# Patient Record
Sex: MALE | Race: WHITE | Employment: OTHER | URBAN - NONMETROPOLITAN AREA
[De-identification: names, ages, dates, MRNs, and addresses within clinical notes are randomized per-mention and may not be internally consistent; named-entity substitution may affect disease eponyms.]

---

## 2017-02-10 PROBLEM — L97.919 NONHEALING ULCER OF RIGHT LOWER LEG (HCC): Status: ACTIVE | Noted: 2017-02-10

## 2017-02-10 PROBLEM — I73.9 PVD (PERIPHERAL VASCULAR DISEASE) (HCC): Status: ACTIVE | Noted: 2017-02-10

## 2017-07-17 ENCOUNTER — HOSPITAL ENCOUNTER (OUTPATIENT)
Dept: WOUND CARE | Age: 67
Discharge: HOME | End: 2017-07-17
Payer: MEDICARE

## 2017-07-17 VITALS
SYSTOLIC BLOOD PRESSURE: 122 MMHG | RESPIRATION RATE: 16 BRPM | OXYGEN SATURATION: 96 % | TEMPERATURE: 98.3 F | DIASTOLIC BLOOD PRESSURE: 60 MMHG | HEART RATE: 65 BPM

## 2017-07-17 DIAGNOSIS — L97.919 VENOUS ULCER OF RIGHT LOWER EXTREMITY WITH VARICOSE VEINS (HCC): ICD-10-CM

## 2017-07-17 DIAGNOSIS — I87.2 VENOUS INSUFFICIENCY OF RIGHT LEG: ICD-10-CM

## 2017-07-17 DIAGNOSIS — I83.019 VENOUS ULCER OF RIGHT LOWER EXTREMITY WITH VARICOSE VEINS (HCC): ICD-10-CM

## 2017-07-17 DIAGNOSIS — I73.9 PVD (PERIPHERAL VASCULAR DISEASE) (HCC): ICD-10-CM

## 2017-07-17 PROCEDURE — 29580 STRAPPING UNNA BOOT: CPT

## 2017-07-17 PROCEDURE — A6456 ZINC PASTE BAND W >=3"<5"/YD: HCPCS

## 2017-07-17 ASSESSMENT — PAIN SCALES - GENERAL: PAINLEVEL_OUTOF10: 0

## 2017-07-20 ENCOUNTER — HOSPITAL ENCOUNTER (OUTPATIENT)
Dept: WOUND CARE | Age: 67
Discharge: HOME | End: 2017-07-20
Payer: MEDICARE

## 2017-07-20 VITALS
TEMPERATURE: 97.9 F | DIASTOLIC BLOOD PRESSURE: 71 MMHG | OXYGEN SATURATION: 98 % | HEART RATE: 65 BPM | SYSTOLIC BLOOD PRESSURE: 155 MMHG | RESPIRATION RATE: 18 BRPM

## 2017-07-20 DIAGNOSIS — I83.019 VENOUS ULCER OF RIGHT LOWER EXTREMITY WITH VARICOSE VEINS (HCC): ICD-10-CM

## 2017-07-20 DIAGNOSIS — L97.919 VENOUS ULCER OF RIGHT LOWER EXTREMITY WITH VARICOSE VEINS (HCC): ICD-10-CM

## 2017-07-20 DIAGNOSIS — I73.9 PVD (PERIPHERAL VASCULAR DISEASE) (HCC): ICD-10-CM

## 2017-07-20 DIAGNOSIS — L97.919: Primary | ICD-10-CM

## 2017-07-20 DIAGNOSIS — I87.2 VENOUS INSUFFICIENCY OF RIGHT LEG: ICD-10-CM

## 2017-07-20 PROCEDURE — 6370000000 HC RX 637 (ALT 250 FOR IP): Performed by: NURSE PRACTITIONER

## 2017-07-20 PROCEDURE — 97597 DBRDMT OPN WND 1ST 20 CM/<: CPT | Performed by: NURSE PRACTITIONER

## 2017-07-20 PROCEDURE — A6456 ZINC PASTE BAND W >=3"<5"/YD: HCPCS

## 2017-07-20 PROCEDURE — 97597 DBRDMT OPN WND 1ST 20 CM/<: CPT

## 2017-07-20 PROCEDURE — A6454 SELF-ADHER BAND W>=3" <5"/YD: HCPCS

## 2017-07-20 PROCEDURE — 97598 DBRDMT OPN WND ADDL 20CM/<: CPT | Performed by: NURSE PRACTITIONER

## 2017-07-20 PROCEDURE — A6197 ALGINATE DRSG >16 <=48 SQ IN: HCPCS

## 2017-07-20 RX ADMIN — LIDOCAINE HYDROCHLORIDE: 20 JELLY TOPICAL at 14:21

## 2017-07-20 ASSESSMENT — PAIN SCALES - GENERAL: PAINLEVEL_OUTOF10: 0

## 2017-07-24 ENCOUNTER — HOSPITAL ENCOUNTER (OUTPATIENT)
Dept: WOUND CARE | Age: 67
Discharge: HOME | End: 2017-07-24
Payer: MEDICARE

## 2017-07-24 VITALS
HEART RATE: 80 BPM | BODY MASS INDEX: 33.97 KG/M2 | RESPIRATION RATE: 20 BRPM | SYSTOLIC BLOOD PRESSURE: 115 MMHG | DIASTOLIC BLOOD PRESSURE: 57 MMHG | WEIGHT: 230 LBS | OXYGEN SATURATION: 96 % | TEMPERATURE: 97.8 F

## 2017-07-24 DIAGNOSIS — I73.9 PVD (PERIPHERAL VASCULAR DISEASE) (HCC): ICD-10-CM

## 2017-07-24 DIAGNOSIS — L97.919: ICD-10-CM

## 2017-07-24 DIAGNOSIS — I83.019 VENOUS ULCER OF RIGHT LOWER EXTREMITY WITH VARICOSE VEINS (HCC): ICD-10-CM

## 2017-07-24 DIAGNOSIS — L97.919 VENOUS ULCER OF RIGHT LOWER EXTREMITY WITH VARICOSE VEINS (HCC): ICD-10-CM

## 2017-07-24 DIAGNOSIS — I87.2 VENOUS INSUFFICIENCY OF RIGHT LEG: ICD-10-CM

## 2017-07-24 PROCEDURE — 29580 STRAPPING UNNA BOOT: CPT

## 2017-07-24 PROCEDURE — A6454 SELF-ADHER BAND W>=3" <5"/YD: HCPCS

## 2017-07-24 PROCEDURE — A6456 ZINC PASTE BAND W >=3"<5"/YD: HCPCS

## 2017-07-24 ASSESSMENT — PAIN SCALES - GENERAL: PAINLEVEL_OUTOF10: 0

## 2017-07-27 ENCOUNTER — HOSPITAL ENCOUNTER (OUTPATIENT)
Dept: WOUND CARE | Age: 67
Discharge: HOME | End: 2017-07-27
Payer: MEDICARE

## 2017-07-27 VITALS
HEIGHT: 69 IN | WEIGHT: 227.4 LBS | OXYGEN SATURATION: 97 % | SYSTOLIC BLOOD PRESSURE: 131 MMHG | RESPIRATION RATE: 18 BRPM | HEART RATE: 81 BPM | BODY MASS INDEX: 33.68 KG/M2 | TEMPERATURE: 97.7 F | DIASTOLIC BLOOD PRESSURE: 62 MMHG

## 2017-07-27 DIAGNOSIS — I83.019 VENOUS ULCER OF RIGHT LOWER EXTREMITY WITH VARICOSE VEINS (HCC): ICD-10-CM

## 2017-07-27 DIAGNOSIS — I87.2 VENOUS INSUFFICIENCY OF RIGHT LEG: ICD-10-CM

## 2017-07-27 DIAGNOSIS — L97.919: ICD-10-CM

## 2017-07-27 DIAGNOSIS — L60.3 DYSTROPHIC NAIL: ICD-10-CM

## 2017-07-27 DIAGNOSIS — L97.919 VENOUS ULCER OF RIGHT LOWER EXTREMITY WITH VARICOSE VEINS (HCC): ICD-10-CM

## 2017-07-27 PROCEDURE — 97597 DBRDMT OPN WND 1ST 20 CM/<: CPT | Performed by: NURSE PRACTITIONER

## 2017-07-27 PROCEDURE — 87186 SC STD MICRODIL/AGAR DIL: CPT

## 2017-07-27 PROCEDURE — 87075 CULTR BACTERIA EXCEPT BLOOD: CPT

## 2017-07-27 PROCEDURE — 87205 SMEAR GRAM STAIN: CPT

## 2017-07-27 PROCEDURE — 97597 DBRDMT OPN WND 1ST 20 CM/<: CPT

## 2017-07-27 PROCEDURE — 87147 CULTURE TYPE IMMUNOLOGIC: CPT

## 2017-07-27 PROCEDURE — A6454 SELF-ADHER BAND W>=3" <5"/YD: HCPCS

## 2017-07-27 PROCEDURE — A6197 ALGINATE DRSG >16 <=48 SQ IN: HCPCS

## 2017-07-27 PROCEDURE — A6456 ZINC PASTE BAND W >=3"<5"/YD: HCPCS

## 2017-07-27 PROCEDURE — 87077 CULTURE AEROBIC IDENTIFY: CPT

## 2017-07-27 PROCEDURE — 6370000000 HC RX 637 (ALT 250 FOR IP): Performed by: NURSE PRACTITIONER

## 2017-07-27 PROCEDURE — A6210 FOAM DRG >16<=48 SQ IN W/O B: HCPCS

## 2017-07-27 PROCEDURE — 87070 CULTURE OTHR SPECIMN AEROBIC: CPT

## 2017-07-27 RX ADMIN — LIDOCAINE HYDROCHLORIDE 1 TUBE: 20 JELLY TOPICAL at 11:02

## 2017-07-27 ASSESSMENT — PAIN SCALES - GENERAL: PAINLEVEL_OUTOF10: 0

## 2017-07-31 ENCOUNTER — HOSPITAL ENCOUNTER (OUTPATIENT)
Dept: WOUND CARE | Age: 67
Discharge: HOME | End: 2017-07-31
Payer: MEDICARE

## 2017-07-31 VITALS
SYSTOLIC BLOOD PRESSURE: 131 MMHG | DIASTOLIC BLOOD PRESSURE: 61 MMHG | TEMPERATURE: 98 F | OXYGEN SATURATION: 99 % | RESPIRATION RATE: 18 BRPM | HEART RATE: 78 BPM

## 2017-07-31 PROCEDURE — 29580 STRAPPING UNNA BOOT: CPT

## 2017-07-31 PROCEDURE — A6456 ZINC PASTE BAND W >=3"<5"/YD: HCPCS

## 2017-07-31 ASSESSMENT — PAIN SCALES - GENERAL: PAINLEVEL_OUTOF10: 0

## 2017-08-01 ENCOUNTER — TELEPHONE (OUTPATIENT)
Dept: WOUND CARE | Age: 67
End: 2017-08-01

## 2017-08-01 LAB
AEROBIC CULTURE: ABNORMAL
AEROBIC CULTURE: ABNORMAL
ANAEROBIC CULTURE: ABNORMAL
GRAM STAIN RESULT: ABNORMAL
ORGANISM: ABNORMAL

## 2017-08-01 RX ORDER — CEPHALEXIN 500 MG/1
500 CAPSULE ORAL 2 TIMES DAILY
COMMUNITY
Start: 2017-08-01 | End: 2017-08-15

## 2017-08-03 ENCOUNTER — HOSPITAL ENCOUNTER (OUTPATIENT)
Dept: WOUND CARE | Age: 67
Discharge: HOME | End: 2017-08-03
Payer: MEDICARE

## 2017-08-03 VITALS
TEMPERATURE: 97.3 F | HEART RATE: 77 BPM | OXYGEN SATURATION: 100 % | RESPIRATION RATE: 16 BRPM | WEIGHT: 227 LBS | BODY MASS INDEX: 33.52 KG/M2 | DIASTOLIC BLOOD PRESSURE: 74 MMHG | SYSTOLIC BLOOD PRESSURE: 133 MMHG

## 2017-08-03 PROCEDURE — A6454 SELF-ADHER BAND W>=3" <5"/YD: HCPCS

## 2017-08-03 PROCEDURE — 29580 STRAPPING UNNA BOOT: CPT

## 2017-08-03 PROCEDURE — A6456 ZINC PASTE BAND W >=3"<5"/YD: HCPCS

## 2017-08-03 ASSESSMENT — PAIN SCALES - GENERAL: PAINLEVEL_OUTOF10: 0

## 2017-08-07 ENCOUNTER — HOSPITAL ENCOUNTER (OUTPATIENT)
Dept: WOUND CARE | Age: 67
Discharge: HOME | End: 2017-08-07
Payer: MEDICARE

## 2017-08-07 VITALS
OXYGEN SATURATION: 100 % | RESPIRATION RATE: 16 BRPM | BODY MASS INDEX: 33.63 KG/M2 | DIASTOLIC BLOOD PRESSURE: 67 MMHG | HEART RATE: 66 BPM | HEIGHT: 69 IN | SYSTOLIC BLOOD PRESSURE: 130 MMHG | WEIGHT: 227.06 LBS

## 2017-08-07 PROCEDURE — A6456 ZINC PASTE BAND W >=3"<5"/YD: HCPCS

## 2017-08-07 PROCEDURE — 29580 STRAPPING UNNA BOOT: CPT

## 2017-08-07 PROCEDURE — A6210 FOAM DRG >16<=48 SQ IN W/O B: HCPCS

## 2017-08-07 PROCEDURE — A6454 SELF-ADHER BAND W>=3" <5"/YD: HCPCS

## 2017-08-07 ASSESSMENT — PAIN SCALES - GENERAL: PAINLEVEL_OUTOF10: 0

## 2017-08-14 ENCOUNTER — HOSPITAL ENCOUNTER (OUTPATIENT)
Dept: WOUND CARE | Age: 67
Discharge: HOME | End: 2017-08-14
Payer: MEDICARE

## 2017-08-14 VITALS
HEIGHT: 69 IN | OXYGEN SATURATION: 99 % | WEIGHT: 233.8 LBS | RESPIRATION RATE: 18 BRPM | DIASTOLIC BLOOD PRESSURE: 69 MMHG | HEART RATE: 76 BPM | BODY MASS INDEX: 34.63 KG/M2 | SYSTOLIC BLOOD PRESSURE: 134 MMHG | TEMPERATURE: 97.7 F

## 2017-08-14 DIAGNOSIS — L97.919: Primary | ICD-10-CM

## 2017-08-14 PROCEDURE — A6454 SELF-ADHER BAND W>=3" <5"/YD: HCPCS

## 2017-08-14 PROCEDURE — 99212 OFFICE O/P EST SF 10 MIN: CPT

## 2017-08-14 PROCEDURE — A6210 FOAM DRG >16<=48 SQ IN W/O B: HCPCS

## 2017-08-14 PROCEDURE — A6456 ZINC PASTE BAND W >=3"<5"/YD: HCPCS

## 2017-08-17 ENCOUNTER — HOSPITAL ENCOUNTER (OUTPATIENT)
Dept: WOUND CARE | Age: 67
Discharge: HOME | End: 2017-08-17
Payer: MEDICARE

## 2017-08-21 ENCOUNTER — HOSPITAL ENCOUNTER (OUTPATIENT)
Dept: WOUND CARE | Age: 67
Discharge: HOME | End: 2017-08-21
Payer: MEDICARE

## 2017-08-21 VITALS
HEART RATE: 71 BPM | DIASTOLIC BLOOD PRESSURE: 65 MMHG | BODY MASS INDEX: 34.45 KG/M2 | TEMPERATURE: 97.8 F | HEIGHT: 69 IN | OXYGEN SATURATION: 99 % | RESPIRATION RATE: 18 BRPM | WEIGHT: 232.6 LBS | SYSTOLIC BLOOD PRESSURE: 134 MMHG

## 2017-08-21 DIAGNOSIS — L97.919: Primary | ICD-10-CM

## 2017-08-21 PROCEDURE — A6454 SELF-ADHER BAND W>=3" <5"/YD: HCPCS

## 2017-08-21 PROCEDURE — 99213 OFFICE O/P EST LOW 20 MIN: CPT

## 2017-08-24 ENCOUNTER — HOSPITAL ENCOUNTER (OUTPATIENT)
Dept: WOUND CARE | Age: 67
Discharge: HOME | End: 2017-08-24
Payer: MEDICARE

## 2017-08-24 VITALS
SYSTOLIC BLOOD PRESSURE: 133 MMHG | RESPIRATION RATE: 18 BRPM | DIASTOLIC BLOOD PRESSURE: 65 MMHG | TEMPERATURE: 97.7 F | HEART RATE: 71 BPM

## 2017-08-24 DIAGNOSIS — L97.919: ICD-10-CM

## 2017-08-24 PROCEDURE — A6454 SELF-ADHER BAND W>=3" <5"/YD: HCPCS

## 2017-08-24 PROCEDURE — 99213 OFFICE O/P EST LOW 20 MIN: CPT

## 2017-08-24 ASSESSMENT — PAIN SCALES - GENERAL: PAINLEVEL_OUTOF10: 0

## 2017-08-28 ENCOUNTER — HOSPITAL ENCOUNTER (OUTPATIENT)
Dept: WOUND CARE | Age: 67
Discharge: HOME | End: 2017-08-28
Payer: MEDICARE

## 2017-08-28 VITALS
BODY MASS INDEX: 33.74 KG/M2 | TEMPERATURE: 97.9 F | HEART RATE: 67 BPM | SYSTOLIC BLOOD PRESSURE: 129 MMHG | WEIGHT: 228.5 LBS | OXYGEN SATURATION: 100 % | RESPIRATION RATE: 18 BRPM | DIASTOLIC BLOOD PRESSURE: 67 MMHG

## 2017-08-28 DIAGNOSIS — L97.919: ICD-10-CM

## 2017-08-28 PROCEDURE — A6454 SELF-ADHER BAND W>=3" <5"/YD: HCPCS

## 2017-08-28 PROCEDURE — A6197 ALGINATE DRSG >16 <=48 SQ IN: HCPCS

## 2017-08-28 PROCEDURE — 99213 OFFICE O/P EST LOW 20 MIN: CPT

## 2017-08-28 ASSESSMENT — PAIN SCALES - GENERAL: PAINLEVEL_OUTOF10: 0

## 2017-08-31 ENCOUNTER — HOSPITAL ENCOUNTER (OUTPATIENT)
Dept: WOUND CARE | Age: 67
Discharge: HOME | End: 2017-08-31
Payer: MEDICARE

## 2017-08-31 VITALS
TEMPERATURE: 97.7 F | BODY MASS INDEX: 33.67 KG/M2 | SYSTOLIC BLOOD PRESSURE: 132 MMHG | OXYGEN SATURATION: 99 % | HEART RATE: 73 BPM | RESPIRATION RATE: 20 BRPM | WEIGHT: 228 LBS | DIASTOLIC BLOOD PRESSURE: 67 MMHG

## 2017-08-31 PROCEDURE — A6456 ZINC PASTE BAND W >=3"<5"/YD: HCPCS

## 2017-08-31 PROCEDURE — A6210 FOAM DRG >16<=48 SQ IN W/O B: HCPCS

## 2017-08-31 PROCEDURE — A6454 SELF-ADHER BAND W>=3" <5"/YD: HCPCS

## 2017-08-31 PROCEDURE — 99213 OFFICE O/P EST LOW 20 MIN: CPT

## 2017-08-31 ASSESSMENT — PAIN SCALES - GENERAL: PAINLEVEL_OUTOF10: 0

## 2017-09-06 ENCOUNTER — HOSPITAL ENCOUNTER (OUTPATIENT)
Dept: WOUND CARE | Age: 67
Discharge: HOME | End: 2017-09-06
Payer: MEDICARE

## 2017-09-06 VITALS
DIASTOLIC BLOOD PRESSURE: 72 MMHG | SYSTOLIC BLOOD PRESSURE: 160 MMHG | OXYGEN SATURATION: 99 % | TEMPERATURE: 97.9 F | HEART RATE: 61 BPM | RESPIRATION RATE: 18 BRPM

## 2017-09-06 DIAGNOSIS — I83.019 VENOUS ULCER OF RIGHT LOWER EXTREMITY WITH VARICOSE VEINS (HCC): Primary | ICD-10-CM

## 2017-09-06 DIAGNOSIS — L97.919 VENOUS ULCER OF RIGHT LOWER EXTREMITY WITH VARICOSE VEINS (HCC): Primary | ICD-10-CM

## 2017-09-06 PROCEDURE — A4649 SURGICAL SUPPLIES: HCPCS

## 2017-09-06 PROCEDURE — A6454 SELF-ADHER BAND W>=3" <5"/YD: HCPCS

## 2017-09-06 PROCEDURE — 97597 DBRDMT OPN WND 1ST 20 CM/<: CPT

## 2017-09-06 PROCEDURE — 97598 DBRDMT OPN WND ADDL 20CM/<: CPT | Performed by: NURSE PRACTITIONER

## 2017-09-06 PROCEDURE — A6210 FOAM DRG >16<=48 SQ IN W/O B: HCPCS

## 2017-09-06 PROCEDURE — 6370000000 HC RX 637 (ALT 250 FOR IP): Performed by: NURSE PRACTITIONER

## 2017-09-06 PROCEDURE — 97597 DBRDMT OPN WND 1ST 20 CM/<: CPT | Performed by: NURSE PRACTITIONER

## 2017-09-06 RX ADMIN — LIDOCAINE HYDROCHLORIDE: 20 JELLY TOPICAL at 13:29

## 2017-09-11 ENCOUNTER — HOSPITAL ENCOUNTER (OUTPATIENT)
Dept: WOUND CARE | Age: 67
Discharge: HOME | End: 2017-09-11
Payer: MEDICARE

## 2017-09-11 VITALS
TEMPERATURE: 97.5 F | WEIGHT: 233 LBS | DIASTOLIC BLOOD PRESSURE: 75 MMHG | OXYGEN SATURATION: 98 % | HEART RATE: 61 BPM | BODY MASS INDEX: 34.51 KG/M2 | SYSTOLIC BLOOD PRESSURE: 156 MMHG | RESPIRATION RATE: 18 BRPM | HEIGHT: 69 IN

## 2017-09-11 PROCEDURE — A6454 SELF-ADHER BAND W>=3" <5"/YD: HCPCS

## 2017-09-11 PROCEDURE — A4649 SURGICAL SUPPLIES: HCPCS

## 2017-09-11 PROCEDURE — 99213 OFFICE O/P EST LOW 20 MIN: CPT

## 2017-09-11 ASSESSMENT — PAIN SCALES - GENERAL: PAINLEVEL_OUTOF10: 0

## 2017-09-15 ENCOUNTER — HOSPITAL ENCOUNTER (OUTPATIENT)
Dept: WOUND CARE | Age: 67
Discharge: HOME | End: 2017-09-15
Payer: MEDICARE

## 2017-09-15 VITALS
DIASTOLIC BLOOD PRESSURE: 73 MMHG | RESPIRATION RATE: 18 BRPM | OXYGEN SATURATION: 98 % | TEMPERATURE: 97.8 F | SYSTOLIC BLOOD PRESSURE: 148 MMHG | HEART RATE: 72 BPM

## 2017-09-15 PROCEDURE — 97597 DBRDMT OPN WND 1ST 20 CM/<: CPT | Performed by: NURSE PRACTITIONER

## 2017-09-15 PROCEDURE — 97597 DBRDMT OPN WND 1ST 20 CM/<: CPT

## 2017-09-15 PROCEDURE — A6454 SELF-ADHER BAND W>=3" <5"/YD: HCPCS

## 2017-09-15 PROCEDURE — 87186 SC STD MICRODIL/AGAR DIL: CPT

## 2017-09-15 PROCEDURE — 87070 CULTURE OTHR SPECIMN AEROBIC: CPT

## 2017-09-15 PROCEDURE — 87205 SMEAR GRAM STAIN: CPT

## 2017-09-15 PROCEDURE — 15271 SKIN SUB GRAFT TRNK/ARM/LEG: CPT

## 2017-09-15 PROCEDURE — 87077 CULTURE AEROBIC IDENTIFY: CPT

## 2017-09-15 PROCEDURE — 87147 CULTURE TYPE IMMUNOLOGIC: CPT

## 2017-09-15 PROCEDURE — 87075 CULTR BACTERIA EXCEPT BLOOD: CPT

## 2017-09-15 PROCEDURE — 6370000000 HC RX 637 (ALT 250 FOR IP): Performed by: NURSE PRACTITIONER

## 2017-09-15 RX ADMIN — LIDOCAINE HYDROCHLORIDE 1 APPLICATOR: 20 JELLY TOPICAL at 10:24

## 2017-09-15 ASSESSMENT — PAIN SCALES - GENERAL: PAINLEVEL_OUTOF10: 0

## 2017-09-18 ENCOUNTER — HOSPITAL ENCOUNTER (OUTPATIENT)
Dept: GENERAL RADIOLOGY | Age: 67
Discharge: HOME | End: 2017-09-18
Payer: MEDICARE

## 2017-09-18 ENCOUNTER — HOSPITAL ENCOUNTER (OUTPATIENT)
Age: 67
Discharge: HOME | End: 2017-09-18
Payer: MEDICARE

## 2017-09-18 ENCOUNTER — HOSPITAL ENCOUNTER (OUTPATIENT)
Dept: WOUND CARE | Age: 67
Discharge: HOME | End: 2017-09-18
Payer: MEDICARE

## 2017-09-18 VITALS
OXYGEN SATURATION: 99 % | SYSTOLIC BLOOD PRESSURE: 147 MMHG | RESPIRATION RATE: 18 BRPM | HEART RATE: 82 BPM | DIASTOLIC BLOOD PRESSURE: 70 MMHG | TEMPERATURE: 97.8 F

## 2017-09-18 DIAGNOSIS — L97.919 VENOUS ULCER OF RIGHT LOWER EXTREMITY WITH VARICOSE VEINS (HCC): ICD-10-CM

## 2017-09-18 DIAGNOSIS — I87.2 VENOUS INSUFFICIENCY OF RIGHT LEG: ICD-10-CM

## 2017-09-18 DIAGNOSIS — I83.019 VENOUS ULCER OF RIGHT LOWER EXTREMITY WITH VARICOSE VEINS (HCC): ICD-10-CM

## 2017-09-18 DIAGNOSIS — I83.019 VENOUS ULCER OF RIGHT LOWER EXTREMITY WITH VARICOSE VEINS (HCC): Primary | ICD-10-CM

## 2017-09-18 DIAGNOSIS — L97.919: ICD-10-CM

## 2017-09-18 DIAGNOSIS — L97.919 VENOUS ULCER OF RIGHT LOWER EXTREMITY WITH VARICOSE VEINS (HCC): Primary | ICD-10-CM

## 2017-09-18 LAB
ANION GAP SERPL CALCULATED.3IONS-SCNC: 13 MEQ/L (ref 8–16)
BUN BLDV-MCNC: 9 MG/DL (ref 7–22)
CALCIUM SERPL-MCNC: 9.4 MG/DL (ref 8.5–10.5)
CHLORIDE BLD-SCNC: 98 MEQ/L (ref 98–111)
CO2: 28 MEQ/L (ref 23–33)
CREAT SERPL-MCNC: 1 MG/DL (ref 0.4–1.2)
EKG ATRIAL RATE: 58 BPM
EKG P AXIS: 41 DEGREES
EKG P-R INTERVAL: 288 MS
EKG Q-T INTERVAL: 434 MS
EKG QRS DURATION: 112 MS
EKG QTC CALCULATION (BAZETT): 426 MS
EKG R AXIS: 51 DEGREES
EKG T AXIS: -59 DEGREES
EKG VENTRICULAR RATE: 58 BPM
GFR SERPL CREATININE-BSD FRML MDRD: 74 ML/MIN/1.73M2
GLUCOSE BLD-MCNC: 85 MG/DL (ref 70–108)
HCT VFR BLD CALC: 40.3 % (ref 42–52)
HEMOGLOBIN: 13.8 GM/DL (ref 14–18)
MCH RBC QN AUTO: 30.4 PG (ref 27–31)
MCHC RBC AUTO-ENTMCNC: 34.3 GM/DL (ref 33–37)
MCV RBC AUTO: 88.7 FL (ref 80–94)
PDW BLD-RTO: 14.7 % (ref 11.5–14.5)
PLATELET # BLD: 223 THOU/MM3 (ref 130–400)
PMV BLD AUTO: 7.3 MCM (ref 7.4–10.4)
POTASSIUM SERPL-SCNC: 4.2 MEQ/L (ref 3.5–5.2)
RBC # BLD: 4.54 MILL/MM3 (ref 4.7–6.1)
SODIUM BLD-SCNC: 139 MEQ/L (ref 135–145)
WBC # BLD: 5.8 THOU/MM3 (ref 4.8–10.8)

## 2017-09-18 PROCEDURE — 71020 XR CHEST STANDARD TWO VW: CPT

## 2017-09-18 PROCEDURE — 29580 STRAPPING UNNA BOOT: CPT

## 2017-09-18 PROCEDURE — A4649 SURGICAL SUPPLIES: HCPCS

## 2017-09-18 PROCEDURE — A6456 ZINC PASTE BAND W >=3"<5"/YD: HCPCS

## 2017-09-18 PROCEDURE — 93005 ELECTROCARDIOGRAM TRACING: CPT

## 2017-09-18 PROCEDURE — 85027 COMPLETE CBC AUTOMATED: CPT

## 2017-09-18 PROCEDURE — 80048 BASIC METABOLIC PNL TOTAL CA: CPT

## 2017-09-18 PROCEDURE — A6454 SELF-ADHER BAND W>=3" <5"/YD: HCPCS

## 2017-09-18 PROCEDURE — A6210 FOAM DRG >16<=48 SQ IN W/O B: HCPCS

## 2017-09-18 PROCEDURE — 36415 COLL VENOUS BLD VENIPUNCTURE: CPT

## 2017-09-18 PROCEDURE — G0463 HOSPITAL OUTPT CLINIC VISIT: HCPCS

## 2017-09-18 RX ORDER — CEFADROXIL 500 MG/1
500 CAPSULE ORAL 2 TIMES DAILY
Qty: 56 CAPSULE | Refills: 0 | Status: SHIPPED | OUTPATIENT
Start: 2017-09-18 | End: 2017-09-19 | Stop reason: CLARIF

## 2017-09-18 ASSESSMENT — PAIN SCALES - GENERAL: PAINLEVEL_OUTOF10: 0

## 2017-09-19 RX ORDER — SULFAMETHOXAZOLE AND TRIMETHOPRIM 800; 160 MG/1; MG/1
1 TABLET ORAL 2 TIMES DAILY
COMMUNITY
End: 2017-10-16 | Stop reason: SDUPTHER

## 2017-09-19 NOTE — PROGRESS NOTES
PAT call; Spoke with Marcellus Andino. David Ryan had seen Nadeen Godinez CNP in past, she is not longer at the office. Pt is without a primary care provider at this moment. Going to try to get in with Dr. Meenakshi Briceno.

## 2017-09-19 NOTE — PROGRESS NOTES
Sanjana Jesus reviewed pt history and EKG and feels it would be best to do surgery in the Main OR. Phone message regarding this left for Dr. Sam Fonseca [phone].

## 2017-09-20 LAB
AEROBIC CULTURE: ABNORMAL
AEROBIC CULTURE: ABNORMAL
ANAEROBIC CULTURE: ABNORMAL
GRAM STAIN RESULT: ABNORMAL
ORGANISM: ABNORMAL
ORGANISM: ABNORMAL

## 2017-09-21 ENCOUNTER — HOSPITAL ENCOUNTER (OUTPATIENT)
Dept: WOUND CARE | Age: 67
Discharge: HOME | End: 2017-09-21
Payer: MEDICARE

## 2017-09-21 VITALS
RESPIRATION RATE: 18 BRPM | HEART RATE: 80 BPM | DIASTOLIC BLOOD PRESSURE: 58 MMHG | TEMPERATURE: 96 F | SYSTOLIC BLOOD PRESSURE: 112 MMHG | BODY MASS INDEX: 34.07 KG/M2 | OXYGEN SATURATION: 96 % | HEIGHT: 69 IN | WEIGHT: 230 LBS

## 2017-09-21 DIAGNOSIS — L97.919: ICD-10-CM

## 2017-09-21 PROCEDURE — 29580 STRAPPING UNNA BOOT: CPT

## 2017-09-21 PROCEDURE — A6456 ZINC PASTE BAND W >=3"<5"/YD: HCPCS

## 2017-09-21 PROCEDURE — A6454 SELF-ADHER BAND W>=3" <5"/YD: HCPCS

## 2017-09-25 ENCOUNTER — ANESTHESIA EVENT (OUTPATIENT)
Dept: OPERATING ROOM | Age: 67
End: 2017-09-25
Payer: MEDICARE

## 2017-09-25 ENCOUNTER — ANESTHESIA (OUTPATIENT)
Dept: OPERATING ROOM | Age: 67
End: 2017-09-25
Payer: MEDICARE

## 2017-09-25 ENCOUNTER — HOSPITAL ENCOUNTER (OUTPATIENT)
Age: 67
Setting detail: OUTPATIENT SURGERY
Discharge: HOME | End: 2017-09-25
Attending: PODIATRIST | Admitting: PODIATRIST
Payer: MEDICARE

## 2017-09-25 VITALS
DIASTOLIC BLOOD PRESSURE: 71 MMHG | RESPIRATION RATE: 16 BRPM | OXYGEN SATURATION: 94 % | TEMPERATURE: 96.9 F | HEIGHT: 69 IN | BODY MASS INDEX: 33.34 KG/M2 | SYSTOLIC BLOOD PRESSURE: 140 MMHG | WEIGHT: 225.09 LBS | HEART RATE: 69 BPM

## 2017-09-25 VITALS — OXYGEN SATURATION: 98 % | DIASTOLIC BLOOD PRESSURE: 56 MMHG | SYSTOLIC BLOOD PRESSURE: 131 MMHG

## 2017-09-25 PROCEDURE — 7100000010 HC PHASE II RECOVERY - FIRST 15 MIN: Performed by: PODIATRIST

## 2017-09-25 PROCEDURE — 6370000000 HC RX 637 (ALT 250 FOR IP)

## 2017-09-25 PROCEDURE — 6360000002 HC RX W HCPCS: Performed by: STUDENT IN AN ORGANIZED HEALTH CARE EDUCATION/TRAINING PROGRAM

## 2017-09-25 PROCEDURE — 7100000001 HC PACU RECOVERY - ADDTL 15 MIN: Performed by: PODIATRIST

## 2017-09-25 PROCEDURE — 2500000003 HC RX 250 WO HCPCS: Performed by: PODIATRIST

## 2017-09-25 PROCEDURE — 6360000002 HC RX W HCPCS: Performed by: NURSE ANESTHETIST, CERTIFIED REGISTERED

## 2017-09-25 PROCEDURE — 7100000000 HC PACU RECOVERY - FIRST 15 MIN: Performed by: PODIATRIST

## 2017-09-25 PROCEDURE — A6454 SELF-ADHER BAND W>=3" <5"/YD: HCPCS | Performed by: PODIATRIST

## 2017-09-25 PROCEDURE — A6446 CONFORM BAND S W>=3" <5"/YD: HCPCS | Performed by: PODIATRIST

## 2017-09-25 PROCEDURE — 2500000003 HC RX 250 WO HCPCS: Performed by: NURSE ANESTHETIST, CERTIFIED REGISTERED

## 2017-09-25 PROCEDURE — 3600000003 HC SURGERY LEVEL 3 BASE: Performed by: PODIATRIST

## 2017-09-25 PROCEDURE — 3700000000 HC ANESTHESIA ATTENDED CARE: Performed by: PODIATRIST

## 2017-09-25 PROCEDURE — 2580000003 HC RX 258: Performed by: STUDENT IN AN ORGANIZED HEALTH CARE EDUCATION/TRAINING PROGRAM

## 2017-09-25 PROCEDURE — 3700000001 HC ADD 15 MINUTES (ANESTHESIA): Performed by: PODIATRIST

## 2017-09-25 PROCEDURE — 3600000013 HC SURGERY LEVEL 3 ADDTL 15MIN: Performed by: PODIATRIST

## 2017-09-25 PROCEDURE — 7100000011 HC PHASE II RECOVERY - ADDTL 15 MIN: Performed by: PODIATRIST

## 2017-09-25 DEVICE — INTEGRA® BILAYER MATRIX WOUND DRESSING 4 IN*5 IN (10 CM*12.5 CM)
Type: IMPLANTABLE DEVICE | Status: FUNCTIONAL
Brand: INTEGRA®

## 2017-09-25 RX ORDER — FENTANYL CITRATE 50 UG/ML
INJECTION, SOLUTION INTRAMUSCULAR; INTRAVENOUS PRN
Status: DISCONTINUED | OUTPATIENT
Start: 2017-09-25 | End: 2017-09-25 | Stop reason: SDUPTHER

## 2017-09-25 RX ORDER — SODIUM CHLORIDE 0.9 % (FLUSH) 0.9 %
10 SYRINGE (ML) INJECTION EVERY 12 HOURS SCHEDULED
Status: DISCONTINUED | OUTPATIENT
Start: 2017-09-25 | End: 2017-09-25 | Stop reason: HOSPADM

## 2017-09-25 RX ORDER — HYDROCODONE BITARTRATE AND ACETAMINOPHEN 5; 325 MG/1; MG/1
2 TABLET ORAL EVERY 4 HOURS PRN
Status: DISCONTINUED | OUTPATIENT
Start: 2017-09-25 | End: 2017-09-25 | Stop reason: HOSPADM

## 2017-09-25 RX ORDER — HYDROCODONE BITARTRATE AND ACETAMINOPHEN 5; 325 MG/1; MG/1
1 TABLET ORAL EVERY 4 HOURS PRN
Qty: 42 TABLET | Refills: 0 | Status: SHIPPED | OUTPATIENT
Start: 2017-09-25 | End: 2017-10-02

## 2017-09-25 RX ORDER — ONDANSETRON 2 MG/ML
4 INJECTION INTRAMUSCULAR; INTRAVENOUS EVERY 6 HOURS PRN
Status: DISCONTINUED | OUTPATIENT
Start: 2017-09-25 | End: 2017-09-25 | Stop reason: HOSPADM

## 2017-09-25 RX ORDER — GLYCOPYRROLATE 0.2 MG/ML
INJECTION INTRAMUSCULAR; INTRAVENOUS PRN
Status: DISCONTINUED | OUTPATIENT
Start: 2017-09-25 | End: 2017-09-25 | Stop reason: SDUPTHER

## 2017-09-25 RX ORDER — PROPOFOL 10 MG/ML
INJECTION, EMULSION INTRAVENOUS PRN
Status: DISCONTINUED | OUTPATIENT
Start: 2017-09-25 | End: 2017-09-25 | Stop reason: SDUPTHER

## 2017-09-25 RX ORDER — LIDOCAINE HYDROCHLORIDE 20 MG/ML
INJECTION, SOLUTION INFILTRATION; PERINEURAL PRN
Status: DISCONTINUED | OUTPATIENT
Start: 2017-09-25 | End: 2017-09-25 | Stop reason: SDUPTHER

## 2017-09-25 RX ORDER — FENTANYL CITRATE 50 UG/ML
25 INJECTION, SOLUTION INTRAMUSCULAR; INTRAVENOUS EVERY 5 MIN PRN
Status: DISCONTINUED | OUTPATIENT
Start: 2017-09-25 | End: 2017-09-25 | Stop reason: HOSPADM

## 2017-09-25 RX ORDER — SODIUM CHLORIDE 9 MG/ML
INJECTION, SOLUTION INTRAVENOUS CONTINUOUS
Status: DISCONTINUED | OUTPATIENT
Start: 2017-09-25 | End: 2017-09-25 | Stop reason: HOSPADM

## 2017-09-25 RX ORDER — ONDANSETRON 2 MG/ML
INJECTION INTRAMUSCULAR; INTRAVENOUS PRN
Status: DISCONTINUED | OUTPATIENT
Start: 2017-09-25 | End: 2017-09-25 | Stop reason: SDUPTHER

## 2017-09-25 RX ORDER — HYDROCODONE BITARTRATE AND ACETAMINOPHEN 5; 325 MG/1; MG/1
TABLET ORAL
Status: COMPLETED
Start: 2017-09-25 | End: 2017-09-25

## 2017-09-25 RX ORDER — HYDROCODONE BITARTRATE AND ACETAMINOPHEN 5; 325 MG/1; MG/1
1 TABLET ORAL EVERY 4 HOURS PRN
Status: DISCONTINUED | OUTPATIENT
Start: 2017-09-25 | End: 2017-09-25 | Stop reason: HOSPADM

## 2017-09-25 RX ORDER — DEXAMETHASONE SODIUM PHOSPHATE 4 MG/ML
INJECTION, SOLUTION INTRA-ARTICULAR; INTRALESIONAL; INTRAMUSCULAR; INTRAVENOUS; SOFT TISSUE PRN
Status: DISCONTINUED | OUTPATIENT
Start: 2017-09-25 | End: 2017-09-25 | Stop reason: SDUPTHER

## 2017-09-25 RX ORDER — LABETALOL HYDROCHLORIDE 5 MG/ML
10 INJECTION, SOLUTION INTRAVENOUS EVERY 10 MIN PRN
Status: DISCONTINUED | OUTPATIENT
Start: 2017-09-25 | End: 2017-09-25 | Stop reason: HOSPADM

## 2017-09-25 RX ORDER — FENTANYL CITRATE 50 UG/ML
50 INJECTION, SOLUTION INTRAMUSCULAR; INTRAVENOUS EVERY 5 MIN PRN
Status: DISCONTINUED | OUTPATIENT
Start: 2017-09-25 | End: 2017-09-25 | Stop reason: HOSPADM

## 2017-09-25 RX ORDER — SODIUM CHLORIDE 0.9 % (FLUSH) 0.9 %
10 SYRINGE (ML) INJECTION PRN
Status: DISCONTINUED | OUTPATIENT
Start: 2017-09-25 | End: 2017-09-25 | Stop reason: HOSPADM

## 2017-09-25 RX ORDER — PROMETHAZINE HYDROCHLORIDE 25 MG/ML
12.5 INJECTION, SOLUTION INTRAMUSCULAR; INTRAVENOUS
Status: DISCONTINUED | OUTPATIENT
Start: 2017-09-25 | End: 2017-09-25 | Stop reason: HOSPADM

## 2017-09-25 RX ORDER — ACETAMINOPHEN 325 MG/1
650 TABLET ORAL EVERY 4 HOURS PRN
Status: DISCONTINUED | OUTPATIENT
Start: 2017-09-25 | End: 2017-09-25 | Stop reason: HOSPADM

## 2017-09-25 RX ADMIN — DEXAMETHASONE SODIUM PHOSPHATE 4 MG: 4 INJECTION, SOLUTION INTRAMUSCULAR; INTRAVENOUS at 13:49

## 2017-09-25 RX ADMIN — SODIUM CHLORIDE: 9 INJECTION, SOLUTION INTRAVENOUS at 13:42

## 2017-09-25 RX ADMIN — ONDANSETRON 4 MG: 2 INJECTION INTRAMUSCULAR; INTRAVENOUS at 14:19

## 2017-09-25 RX ADMIN — PROPOFOL 200 MG: 10 INJECTION, EMULSION INTRAVENOUS at 13:49

## 2017-09-25 RX ADMIN — FENTANYL CITRATE 25 MCG: 50 INJECTION INTRAMUSCULAR; INTRAVENOUS at 14:23

## 2017-09-25 RX ADMIN — GLYCOPYRROLATE 0.2 MG: 0.2 INJECTION, SOLUTION INTRAMUSCULAR; INTRAVENOUS at 13:42

## 2017-09-25 RX ADMIN — LIDOCAINE HYDROCHLORIDE 80 MG: 20 INJECTION, SOLUTION INFILTRATION; PERINEURAL at 13:49

## 2017-09-25 RX ADMIN — CEFAZOLIN SODIUM 2 G: 2 SOLUTION INTRAVENOUS at 13:42

## 2017-09-25 RX ADMIN — SODIUM CHLORIDE: 9 INJECTION, SOLUTION INTRAVENOUS at 10:53

## 2017-09-25 RX ADMIN — HYDROCODONE BITARTRATE AND ACETAMINOPHEN 2 TABLET: 5; 325 TABLET ORAL at 15:38

## 2017-09-25 RX ADMIN — FENTANYL CITRATE 25 MCG: 50 INJECTION INTRAMUSCULAR; INTRAVENOUS at 14:08

## 2017-09-25 RX ADMIN — FENTANYL CITRATE 50 MCG: 50 INJECTION INTRAMUSCULAR; INTRAVENOUS at 13:58

## 2017-09-25 ASSESSMENT — PULMONARY FUNCTION TESTS
PIF_VALUE: 4
PIF_VALUE: 4
PIF_VALUE: 6
PIF_VALUE: 0
PIF_VALUE: 2
PIF_VALUE: 0
PIF_VALUE: 26
PIF_VALUE: 4
PIF_VALUE: 0
PIF_VALUE: 4
PIF_VALUE: 5
PIF_VALUE: 4
PIF_VALUE: 3
PIF_VALUE: 4
PIF_VALUE: 4
PIF_VALUE: 0
PIF_VALUE: 0
PIF_VALUE: 4
PIF_VALUE: 0
PIF_VALUE: 3
PIF_VALUE: 3
PIF_VALUE: 4
PIF_VALUE: 1
PIF_VALUE: 4
PIF_VALUE: 0
PIF_VALUE: 3
PIF_VALUE: 2
PIF_VALUE: 5
PIF_VALUE: 3
PIF_VALUE: 4
PIF_VALUE: 4
PIF_VALUE: 3
PIF_VALUE: 0
PIF_VALUE: 14
PIF_VALUE: 5
PIF_VALUE: 4
PIF_VALUE: 1
PIF_VALUE: 9
PIF_VALUE: 4
PIF_VALUE: 4
PIF_VALUE: 3
PIF_VALUE: 5

## 2017-09-25 ASSESSMENT — PAIN SCALES - GENERAL
PAINLEVEL_OUTOF10: 8
PAINLEVEL_OUTOF10: 3
PAINLEVEL_OUTOF10: 5
PAINLEVEL_OUTOF10: 0

## 2017-09-25 ASSESSMENT — PAIN DESCRIPTION - ORIENTATION: ORIENTATION: RIGHT

## 2017-09-25 ASSESSMENT — PAIN DESCRIPTION - LOCATION: LOCATION: LEG

## 2017-09-25 ASSESSMENT — PAIN DESCRIPTION - PAIN TYPE: TYPE: SURGICAL PAIN

## 2017-09-25 NOTE — IP AVS SNAPSHOT
Patient Information     Patient Name DERRICK Van Party 1950      SEDATION/ANALGESIA INFORMATION/HOME GOING ADVICE     SEDATION / ANALGESIA INFORMATION / Beatriz Zayana 85 have received the sedation/analgesia medication during your visit    Sedation/analgesia is used during short medical procedures under controlled supervision. The medication will produce a strong relaxation. You will be able to hear, speak and follow instructions, but your memory and alertness will be decreased. You will be able to swallow and breathe on your own. During sedation/analgesia your blood pressure, heart and breathing will be watched closely. After the procedure, you may not remember what was said or done. You may have the following effects from the medication. \" Drowsiness, dizziness, sleepiness or confusion. \" Difficulty remembering or delayed reaction times. \" Loss of fine muscle control or difficulty with your balance especially while walking. \" Difficulty focusing or blurred vision. You may not be aware of slight changes in your behavior and/or your reaction time because of the medication used during the procedure. Therefore you should follow these instructions. \" Have someone responsible help you with your care. \" Do not drive for 24 hours. \" Do not operate equipment for 24 hours (lawnmowers, power tools, kitchen accessories, stove). \" Do not drink any alcoholic beverages for a minimum of 24 hours. \" Do not make important personal, legal or business decisions for 24 hours. \" You may experience dizziness or lightheadedness. Move slowly and carefully, do not make sudden position changes. \" Drink extra amounts of fluids today. \" Increase your diet as tolerated (unless you have received specific instructions from your doctor). \" If you feel nauseated, continue with liquids until the nausea is gone. \" Notify your physician if you have not urinated within 8 hours after the procedure. \" Resume your medications unless otherwise instructed. Contact your physician if you have any questions or concerns.     IF YOU REPORT TO AN EMERGENCY ROOM, DOCTOR'S OFFICE OR HOSPITAL WITHIN 24 HOURS AFTER YOUR PROCEDURE, BRING THIS SHEET AND YOUR AFTER VISIT SUMMARY WITH YOU AND GIVE IT TO THE PHYSICIAN OR NURSE ATTENDING YOU.

## 2017-09-25 NOTE — H&P (VIEW-ONLY)
sounds, soft, non-distended  Extremities (lower extremity examination in detail)  Vascular: Dorsalis pedis and posterior tibial pulses are weakly bilaterally. Skin temperature is warm to warm from proximal tibial tuberosity to distal digits. CFT sluggish but intact to stacia-wound site. Edema nonpitting in nature. Hair growth absent. Quality of skin atrophic/brawny. Dermatologic: See wound documentation for further details. Erythematous/inflammatory ulceration of the right lateral calf, just proximal to the lateral malleoli. Minimal serous drainage, with punched out presentation around the periphery. No malodor/probing to bone/proximal streaking appreciated. Neurovascular: Epicritic and protopathic sensations are grossly intact. Musculoskeletal: Muscle strength 5/5 for all plantarflexors, dorsiflexors, inverters and everters examined. Mild pain with palpation of right ankle.  No further acute bony abnormalities of note.       Wound Care Documentation:  Wound 10/28/16 Leg Right;Lateral;Lower;Proximal #1 (Active)   Wound Type Wound 9/18/2017 10:29 AM   Dressing Status Intact 9/18/2017 10:29 AM   Dressing Changed Changed/New 9/18/2017 10:29 AM   Dressing/Treatment Vacuum dressing 6/12/2017  8:53 AM   Wound Cleansed Rinsed/Irrigated with saline 9/18/2017 10:29 AM   Wound Length (cm) 3.5 cm 9/18/2017 10:29 AM   Wound Width (cm) 7.5 cm 9/18/2017 10:29 AM   Wound Depth (cm)  0.3 9/18/2017 10:29 AM   Calculated Wound Size (cm^2) (l*w) 26.25 cm^2 9/18/2017 10:29 AM   Change in Wound Size % (l*w) -118.75 9/18/2017 10:29 AM   Wound Assessment Red;Yellow 9/18/2017 10:29 AM   Margins Attached edges 9/18/2017 10:29 AM   Stacia-wound Assessment Dry;Pink;Purple;Red 9/18/2017 10:29 AM   [place]%Wound Bed 80 6/29/2017  2:09 PM   Red%Wound Bed 80 9/18/2017 10:29 AM   Yellow%Wound Bed 20 9/18/2017 10:29 AM   Black%Wound Bed 5 6/2/2017  9:05 AM   Drainage Amount Moderate 9/18/2017 10:29 AM   Drainage Description Serosanguinous 9/18/2017 10:29 AM   Odor None 9/18/2017 10:29 AM   Culture Taken Yes 5/25/2017 12:04 PM   Time out Yes 9/15/2017 10:21 AM   Procedural Pain 0 9/15/2017 10:21 AM   Post procedural Pain 0 9/15/2017 10:21 AM   Op First Treatment Date 10/28/16 10/28/2016 11:35 AM   Number of days:325           Wound Culture                  Gram Stain Result 09/15/2017 10:38  [facility]     No segmented neutrophils observed. Rare epithelial cells observed. Few gram positive cocci occurring singly and in pairs.       Organism (Abnormal) 09/15/2017 10:38 AM [facility]     Staphylococcus aureus     Aerobic Culture 09/15/2017 10:38 AM [facility]     moderate growth   In the treatment of gram positive infections, GENTAMICIN   should be CONSIDERED a SYNERGYSTIC agent ONLY. Ciprofloxacin and Levofloxacin, regardless of in vitro   sensitivity, should not be used for staphylococcal infections   other than uncomplicated lower UTIs. Moxifloxacin, regardless of in vitro sensitivity, should   not be used for staphylococcal infections.       Organism (Abnormal) 09/15/2017 10:38 AM [facility]     gram negative bacilli     Aerobic Culture 09/15/2017 10:38 AM [facility]     light growth                 Testing Performed By             Lab - Abbreviation Name Director Address Valid Date Range     [facility] - [address] [facility] Morris Powers MD [address] [place] [id number] 08/30/17 0855-Present                 Narrative               Source: leg       Site: swab right lower leg wound          Current Antibiotics: none                 Culture & Susceptibility                    STAPHYLOCOCCUS AUREUS         Antibiotic Interpretation DESTINY Unit Status     Penicillin G Resistant >=0.5 mcg/mL Preliminary     clindamycin Sensitive <=0.25 mcg/mL Preliminary     erythromycin Sensitive =0.5 mcg/mL Preliminary     gentamicin Sensitive <=0.5 mcg/mL Preliminary     oxacillin Sensitive =0.5 mcg/mL Preliminary     tetracycline Sensitive <=1 mcg/mL Preliminary     trimethoprim-sulfamethoxazole Sensitive <=10 mcg/mL Preliminary                        Arteriogram       PROCEDURE: AORTOFEMORAL ANGIOGRAM/with runoff procedure:           CLINICAL INFORMATION: Venous ulcer of right lower extremity with varicose veins (HCC), Venous insufficiency of right leg, Nonhealing ulcer of right lower leg ([address] PVD (peripheral vascular disease) ([facility])       PERFORMED BY:  Dr. Jose Hernandez MD       APPROACH: Left common femoral artery, micropuncture technique. CATHETERS: 5 [facility] VCF, [facility],, 5 Western Suzanne SOS, 5 [facility] Fer[facility] 2. ..       STENTS: None. VESSELS CATHETERIZED: Upper abdominal aorta, lower abdominal aorta, right common iliac and left external iliac arteries. Dayron Hatch DIAGNOSTIC PROCEDURES: Abdominal aortogram, pelvic angiography, right leg arteriogram, left leg arteriogram..           INTERVENTIONS: None. FLUOROSCOPY TIME: 9 minutes 56 seconds   FLUOROSCOPIC IMAGES: 40   SEDATION: Versed 2.0mg ; fentanyl 100mcg , IV; the patient was sedated for 45 minutes during this procedure and monitored with EKG and pulse ox monitoring devices by registered nurse.       OTHER MEDICATIONS: None . CONTRAST: 114 ml, Optiray-320. CLOSURE: Angio-Seal device, successful without complication.           TECHNIQUE: Signed informed consent was obtained prior to performing this procedure. The patient was sedated, as indicated above. Access was obtained and a 5 Western Suzanne vascular sheath was inserted. The procedures as above were then performed.        FINDINGS:        AORTA: Aorta is mildly tortuous. Both renal arteries, SMA, and celiac artery are widely patent.       PELVIC VESSELS: Both common, internal, and external iliac arteries are widely patent.       Angiogram RIGHT leg:   CFA AND PROFUNDA: The common and deep femoral arteries are widely patent.  The specimen is submitted as received.  1 ns.    MTK/DKR:candice  Microscopic Examination: The specimen consists of a punch biopsy of skin.  The overlying epidermis demonstrates spongiosis with acanthosis and parakeratosis.  The underlying dermis consists of granulation tissue and stroma with repair. Virginia Bumpers is no evidence of dysplasia or neoplasia.  No specific pathologic findings are identified.  The histologic findings are indicative of chronic repair.         ASSESSMENT AND PLAN:  Assessment:      Ulcer Identification:  Ulcer Type: venous  Contributing Factors: edema, venous stasis, shear force and non-adherence     Wound: Venous stasis dermatitis with associated inflammatory ulceration, right calf      Depth of Diabetic/Pressure/Non Pressure Ulcers or Wound:  Non-Pressure ulcer, fat layer exposed          Patient Active Problem List   Diagnosis Code    Seizure disorder ([facility] G40.909    Venous ulcer of right lower extremity with varicose veins ([facility]) I83.019    Venous insufficiency of right leg I87.2    Dystrophic nail L60.3    Nonhealing [facility] right lower leg ([facility] L97.819    PVD (peripheral vascular disease) ([facility]) I73.9         Procedure Note  Indications:  Based on my examination of this patient's wound(s)/ulcer(s) today, debridement is not required to promote healing and evaluate the extent healing.     Plan:      Patient initially examined and evaluated  Patient has failed outpatient conservative wound care, will be scheduled for excisional debridement of right calf wound with application of skin substitute graft/wound vac  Patient placed in a unna boot compression therapy   Rx Duricef 500mg BID [id number] based on most recent A/A C/s  Patient to follow up at [facility]ound clinic 1 week post op          Bennett Mcdonald D.P.M.    9/18/2017  6:54 PM

## 2017-09-25 NOTE — IP AVS SNAPSHOT
Zoster Vaccine 3/24/2010    Pneumococcal Vaccines (two) for all adults aged 72 and over (1 of 2 - PCV13) 3/24/2015    Yearly Flu Vaccine (1) 9/1/2017    Cholesterol Screening 9/15/2020    Tetanus Combination Vaccine (2 - Td) 4/12/2023                 Care Plan Once You Return Home    This section includes instructions you will need to follow once you leave the hospital.  Your care team will discuss these with you, so you and those caring for you know how to best care for your health needs at home. This section may also include educational information about certain health topics that may be of help to you. Discharge Instructions                                              Post-Operative Instructions    Diet: Drink liquids first; if tolerated add soft foods and increase diet as tolerated  Activity: Adults do NOT drive today. Due to anesthesia and medication your reflexes are slower. If you are wearing a surgical shoe or walking boot you may not drive until Dr. Marko Ashraf releases you to do so. · Ambulate: Weightbearing tolerated to the surgical extremity with the CAM boot applied  · Leave the bandage in place. Do not remove it. Keep it dry and clean. · Rest by staying off your feet as much as possible. (Rent movies, read a book, etc.)  · Wear surgical shoe/boot at all times when you are up on your foot/feet. · Elevate the involved foot. Proper elevation is when the involved foot/feet are at the same level as the heart or slightly higher. · If the foot has some throbbing you may apply ice to the top of the ankle area or behind the knee, 20 min on 20 min off. · Take medications as prescribed   · If there should be any excess bleeding to the bandage (wet blood larger than the size of a quarter), uncontrolled pain, questions or concerns please contact:   Office between 8:00 a.m. - 4:00P. Monica Mcdowell by cell phone [phone] · Please return to office for a post-op dressing change and elevation of healing. Post-operative appointment is at previously scheduled date. NNAMDI Webb DPM PGY-I  Foot and Ankle Surgical Resident            Maurice Easley allows you to send messages to your doctor, view your test results, renew your prescriptions, schedule appointments, view visit notes, and more. How Do I Sign Up? 1. In your Internet browser, go to [email or web address]  2. Click on the Sign Up Now link in the Sign In box. You will see the New Member Sign Up page. 3. Enter your Cod[employer]ied Access Code exactly as it appears below. You will not need to use this code after youve completed the sign-up process. If you do not sign up before the expiration date, you must request a new code. Cod[employer]ied Access Code: [email or web address]  Expires: 10/13/2017 11:49 AM    4. Enter your Social Security Number ([id number]) and Date of Birth (mm/dd/yyyy) as indicated and click Submit. You will be taken to the next sign-up page. 5. Create a Codefied ID. This will be your Codefied login ID and cannot be changed, so think of one that is secure and easy to remember. 6. Create a Codefied password. You can change your password at any time. 7. Enter your Password Reset Question and Answer. This can be used at a later time if you forget your password. 8. Enter your e-mail address. You will receive e-mail notification when new information is available in [address]. 9. Click Sign Up. You can now view your medical record. Additional Information  If you have questions, please contact the physician practice where you receive care. Remember, Cod[employer]ied is NOT to be used for urgent needs. For medical emergencies, dial 911. For questions regarding your Codefied account call [phone]. If you have a clinical question, please call your doctor's office.          View your information online ? Review your current list of  medications, immunization, and allergies. ? Review your future test results online . ? Review your discharge instructions provided by your caregivers at discharge    Certain functionality such as prescription refills, scheduling appointments or sending messages to your provider are not activated if your provider does not use [facility] in his/her office    For questions regarding your MyChart account call [phone]. If you have a clinical question, please call your doctor's office. The information on all pages of the After Visit Summary has been reviewed with me, the patient and/or responsible adult, by my health care provider(s). I had the opportunity to ask questions regarding this information. I understand I should dispose of my armband safely at home to protect my health information. A complete copy of the After Visit Summary has been given to me, the patient and/or responsible adult.            Patient Signature/Responsible Adult:____________________    Clinician Signature:_____________________    Date:_____________________    Time:_____________________

## 2017-09-25 NOTE — PROGRESS NOTES
Pt in bed with family by side. Asking for pain medication. Asking for sandwich and pudding and applesauce and pepsi. No drainage from right leg   1535 No drainage from incision site. Pt is eating and drinking asking for pain medication. 1605  Pt in bed with family by side. No drainage from incision site.

## 2017-10-02 ENCOUNTER — HOSPITAL ENCOUNTER (OUTPATIENT)
Dept: WOUND CARE | Age: 67
Discharge: HOME | End: 2017-10-02
Payer: MEDICARE

## 2017-10-02 VITALS
TEMPERATURE: 97.8 F | DIASTOLIC BLOOD PRESSURE: 63 MMHG | HEART RATE: 93 BPM | SYSTOLIC BLOOD PRESSURE: 130 MMHG | RESPIRATION RATE: 18 BRPM | OXYGEN SATURATION: 99 % | WEIGHT: 229 LBS | BODY MASS INDEX: 33.82 KG/M2

## 2017-10-02 PROCEDURE — 97605 NEG PRS WND THER DME<=50SQCM: CPT

## 2017-10-02 PROCEDURE — A6222 GAUZE <=16 IN NO W/SAL W/O B: HCPCS

## 2017-10-02 ASSESSMENT — PAIN SCALES - GENERAL: PAINLEVEL_OUTOF10: 0

## 2017-10-02 NOTE — IP AVS SNAPSHOT
After Visit Summary  (Discharge Instructions)    Medication List for Home    Based on the information you provided to us as well as any changes during this visit, the following is your updated medication list.  Compare this with your prescription bottles at home. If you have any questions or concerns, contact your primary care physician's office.              Daily Medication List (This medication list can be shared with any healthcare provider who is helping you manage your medications)      ASK your doctor about these medications if you have questions        Last Dose    Next Dose Due AM NOON PM NIGHT    HYDROcodone-acetaminophen 5-325 MG per tablet   Commonly known as:  NORCO   Take 1 tablet by mouth every 4 hours as needed for Pain .                                         levETIRAcetam 1000 MG tablet   Commonly known as:  KEPPRA   Take 1 tablet by mouth 2 times daily                                         lisinopril 20 MG tablet   Commonly known as:  PRINIVIL;ZESTRIL   Take 20 mg by mouth daily Indications: Doesn't know when he ran out                                         pyridoxine 25 MG tablet   Commonly known as:  B-6   Take 25 mg by mouth daily Indications: doesnt when he ran out                                         sulfamethoxazole-trimethoprim 800-160 MG per tablet   Commonly known as:  BACTRIM DS;SEPTRA DS   Take 1 tablet by mouth 2 times daily Indications: 28 days                                         vitamin B-12 1000 MCG tablet   Commonly known as:  CYANOCOBALAMIN   Take 1,000 mcg by mouth daily Indications: doesnt know when he ran out                                                 Allergies as of 10/2/2017     No Known Allergies      Immunizations as of 10/2/2017     Name Date Dose VIS Date Route    Tdap (Boostrix, Adacel) 4/12/2013 0.5 mL 1/24/2012 Intramuscular      Last Vitals          Most Recent Value    Temp  97.8 °F (36.6 °C)    Pulse  93    Resp  18    BP  130/63 It is important to keep your appointments. Please bring your current insurance card, photo ID, co-pay, and all medication bottles to your appointment. If self-pay, payment is expected at the time of service. Future Appointments     10/16/2017 10:15 AM     Appointment with Erinn Gutierres DPM at [address] ([phone])   Please arrive 15 minutes prior to appointment time, bring insurance card and photo ID.    [place] [address] [address]  [facility] [address]       10/18/2017 2:00 PM     Appointment with Luis Pinedo DO at [address] [facility] ([phone])   Please arrive 15 minutes prior to scheduled appointment time to complete paper work, bring photo ID and insurance cards. [facility] [address]       11/29/2017 3:30 PM     Appointment with Ruiz Jonas MD at [address] [facility] ([phone])   Please arrive 15 minutes prior to appointment time, bring insurance card and photo ID.    [address] [facility] [address]  [facility] [address]         Preventive Care        Date Due    Hepatitis C screening is recommended for all adults regardless of risk factors born between [facility] at least once (lifetime) who have never been tested. 1950    Colonoscopy 3/24/2000    Zoster Vaccine 3/24/2010    Pneumococcal Vaccines (two) for all adults aged 72 and over (1 of 2 - PCV13) 3/24/2015    Yearly Flu Vaccine (1) 9/1/2017    Cholesterol Screening 9/15/2020    Tetanus Combination Vaccine (2 - Td) 4/12/2023                 Care Plan Once You Return Home    This section includes instructions you will need to follow once you leave the hospital.  Your care team will discuss these with you, so you and those caring for you know how to best care for your health needs at home. This section may also include educational information about certain health topics that may be of help to you.           Discharge Instructions       Visit Discharge/Physician Orders: -Continue antibiotic as prescribed.  -Staples removed from skin graft today.  -Skin graft surgery in 1 week on Monday October 9th. Wound Location: Right lower leg     Do not shower, take baths or get wound wet, unless otherwise instructed by your Wound Care doctor. Keep all dressings clean & dry. Dressing orders: Wound dressed with cuticerin, wound vac, and wrapped with kerlix and ace bandage. Leave intact until surgery in 1 week. Follow up visit:   1 week- surgery at [facility] on Monday October 9th at 1:00 pm, arrive at 11:00 am  2 weeks at [address] [place] [address] on Monday October 16th at 10:15 am      Keep next scheduled appointment. Please give 24 hour notice if unable to keep appointment. [phone]      If you experience any of the following, please call the Wound Care Service during business hours: [phone].                        *Increase in pain                        *Temperature over 101                        *Increase in drainage from your wound or a foul odor                        *Uncontrolled swelling                        *Need for compression bandage changes due to slippage, breakthrough drainage      If you need medical attention outside of business hours, please contact your Primary Care Doctor or go to the nearest emergency room. [employer] Signup     [employer] allows you to send messages to your doctor, view your test results, renew your prescriptions, schedule appointments, view visit notes, and more. How Do I Sign Up? 1. In your Internet browser, go to [email or web address]  2. Click on the Sign Up Now link in the Sign In box. You will see the New Member Sign Up page. 3. Enter your Selligy Access Code exactly as it appears below. You will not need to use this code after youve completed the sign-up process. If you do not sign up before the expiration date, you must request a new code.   Selligy Access Code: [email or web address] has been given to me, the patient and/or responsible adult.            Patient Signature/Responsible Adult:____________________    Clinician Signature:_____________________    Date:_____________________    Time:_____________________

## 2017-10-02 NOTE — PROGRESS NOTES
[facility]         Progress Note      [facility] RECORD NUMBER:  [id number]  AGE: 79 y.o. GENDER: male  : 1950  EPISODE DATE:  10/2/2017    Subjective:     No chief complaint on file. HISTORY of PRESENT ILLNESS HPI     Laurie Owens is a 79 y.o. male established at the wound center, following up after excisional debridement of right leg wound with application of skin substitute graft 17. Graft appears to be healthy, no signs of infection present. Silicone bilayer removed from surface with reapplication of NPWT. Will plan on harvest of STSG next week. Updated prescription for bactrim DS. All further questions/concerns were addressed.          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 Ashlyn Thomas follows, no siezure activity since       PAST SURGICAL HISTORY    Past Surgical History:   Procedure Laterality Date    ME INCIS/DRAIN THIGH/KNEE ABSCESS,DEEP Right 2017    RIGHT SERGIO LEG WOUND DEBRIDEMENT WITH SKIN GRAFT AND WOUND VAC APPLICATION performed by Eduin Smiley DPM at . [facility]      vein stripping       FAMILY HISTORY    Family History   Problem Relation Age of Onset    Cancer Mother     Stroke Mother     Diabetes Mother     High Blood Pressure Mother     High Cholesterol Mother     Stroke Father     COPD Father     Diabetes Maternal Grandmother     Heart Disease Maternal Grandfather     Cancer Sister     Asthma Sister     High Blood Pressure Sister     High Cholesterol Sister     Arthritis Sister     Diabetes Sister     Diabetes Sister        SOCIAL HISTORY    Social History   Substance Use Topics    Smoking status: Never Smoker    Smokeless tobacco: Never Used    Alcohol use No      Comment:         ALLERGIES    No Known Allergies    MEDICATIONS    Current Outpatient Prescriptions on File Prior to Encounter   Medication Sig Dispense Refill    HYDROcodone-acetaminophen (NORCO) 5-325 MG per tablet Take 1 tablet by mouth every 4 hours as needed for Pain . 42 tablet 0    sulfamethoxazole-trimethoprim (BACTRIM DS;SEPTRA DS) 800-160 MG per tablet Take 1 tablet by mouth 2 times daily Indications: 28 days      vitamin B-12 (CYANOCOBALAMIN) 1000 MCG tablet Take 1,000 mcg by mouth daily Indications: doesnt know when he ran out       pyridoxine (B-6) 25 MG tablet Take 25 mg by mouth daily Indications: doesnt when he ran out       lisinopril (PRINIVIL;ZESTRIL) 20 MG tablet Take 20 mg by mouth daily Indications: Doesn't know when he ran out       levETIRAcetam (KEPPRA) 1000 MG tablet Take 1 tablet by mouth 2 times daily 60 tablet 11     No current facility-administered medications on file prior to encounter. REVIEW OF SYSTEMS    A comprehensive review of systems was negative. Objective: There were no vitals taken for this visit.     Wt Readings from Last 3 Encounters:   09/25/17 225 lb 1.4 oz (102.1 kg)   09/21/17 230 lb (104.3 kg)   09/11/17 233 lb (105.7 kg)     PHYSICAL EXAMINATION  CONSTITUTIONAL:  awake, alert, cooperative, no apparent distress, and appears stated age  EYES:  pupils equal, round and reactive to light, extra ocular muscles intact, sclera clear, conjunctiva normal  ENT:  normocepalic, without obvious abnormality  NECK:  Supple, symmetrical, trachea midline, no adenopathy  LUNGS:  No increased work of breathing, good air exchange, clear to auscultation bilaterally, no crackles or wheezing  CARDIOVASCULAR:  Normal apical impulse, regular rate and rhythm  ABDOMEN:  normal bowel sounds, soft, non-distended  Extremities (lower extremity examination in detail)  Vascular: Dorsalis pedis and posterior tibial pulses are weakly bilaterally. Skin temperature is warm to warm from proximal tibial tuberosity to distal digits. CFT sluggish but intact to stacia-wound site. Edema nonpitting in nature. Hair growth absent. Quality of skin atrophic/brawny. Dermatologic: See wound documentation for further details. Graft well adhered to site. No signs of infection present. Silicone bilayer removed + staples. NPWT reapplied to site at 125mmhg continuous. Neurovascular: Epicritic and protopathic sensations are grossly intact. Musculoskeletal: Muscle strength 5/5 for all plantarflexors, dorsiflexors, inverters and everters examined. Mild pain with palpation of right ankle. No further acute bony abnormalities of note. Wound Care Documentation:  Negative Pressure Wound Therapy Leg Right (Active)   Wound Type Surgical 9/25/2017  4:05 PM   Wound Assessment Edges well approximated;Yellow;Red 10/2/2017 11:33 AM   Stacia-wound Assessment Maceration; White 10/2/2017 11:33 AM   Unit Type KCI 10/2/2017 11:33 AM   Dressing Type Black foam 10/2/2017 11:33 AM   Number of pieces used 1 10/2/2017 11:33 AM   Cycle Continuous 10/2/2017 11:33 AM   Target Pressure (mmHg) 125 10/2/2017 11:33 AM   Irrigation Solution Normal Saline 10/2/2017 11:33 AM   Canister changed? Yes 10/2/2017 11:33 AM   Dressing Status Dry; Intact; Changed 10/2/2017 11:33 AM   Dressing Changed Changed/New 10/2/2017 11:33 AM   Drainage Amount Moderate 10/2/2017 11:33 AM   Drainage Description Serosanguinous 10/2/2017 11:33 AM   Odor None 10/2/2017 11:33 AM   Number of days:7       Wound 10/28/16 Leg Right;Lateral;Lower;Proximal #1 (Active)   Wound Type Wound 10/2/2017 11:33 AM   Dressing Status Intact 10/2/2017 11:33 AM   Dressing Changed Changed/New 10/2/2017 11:33 AM   Dressing/Treatment Dry dressing 9/25/2017 10:46 AM   Wound Cleansed Rinsed/Irrigated with saline 10/2/2017 11:33 AM   Wound Length (cm) 4.5 cm 10/2/2017 11:33 AM   Wound Width (cm) 7 cm 10/2/2017 11:33 AM   Wound Depth By      [address] Name Director Address Valid Date Range     071-OS - [address]2553 Bernardo Cho MD [address] [place] [id number] 08/30/17 0855-Present       Narrative      Source: leg       Site: swab right lower leg wound          Current Antibiotics: none       Culture & Susceptibility      STAPHYLOCOCCUS AUREUS      Antibiotic Interpretation DESTINY Unit Status     Penicillin G Resistant >=0.5 mcg/mL Preliminary     clindamycin Sensitive <=0.25 mcg/mL Preliminary     erythromycin Sensitive =0.5 mcg/mL Preliminary     gentamicin Sensitive <=0.5 mcg/mL Preliminary     oxacillin Sensitive =0.5 mcg/mL Preliminary     tetracycline Sensitive <=1 mcg/mL Preliminary     trimethoprim-sulfamethoxazole Sensitive <=10 mcg/mL Preliminary                 Arteriogram  PROCEDURE: AORTOFEMORAL ANGIOGRAM/with runoff procedure:           CLINICAL INFORMATION: Venous ulcer of right lower extremity with varicose veins (HCC), Venous insufficiency of right leg, Nonhealing ulcer of right lower leg ([address] PVD (peripheral vascular disease) ([facility])       PERFORMED BY:  Dr. Paty Linares MD       APPROACH: Left common femoral artery, micropuncture technique. CATHETERS: [facility], [facility] JAZLYN catheter,, 5 [facility] SOS, 5 [facility] Ferro 2... STENTS: None. VESSELS CATHETERIZED: Upper abdominal aorta, lower abdominal aorta, right common iliac and left external iliac arteries. Berta Early DIAGNOSTIC PROCEDURES: Abdominal aortogram, pelvic angiography, right leg arteriogram, left leg arteriogram..           INTERVENTIONS: None. FLUOROSCOPY TIME: 9 minutes 56 seconds   FLUOROSCOPIC IMAGES: 40   SEDATION: Versed 2.0mg ; fentanyl 100mcg , IV; the patient was sedated for 45 minutes during this procedure and monitored with EKG and pulse ox monitoring devices by registered nurse. OTHER MEDICATIONS: None . CONTRAST: 114 ml, Optiray-320.    CLOSURE: Angio-Seal device, successful take, well maintained  Silicone layer removed, NPWT reapplied at 125mmhg continuous  Rx updated for Bactrim  Plan for harvest STSG 10.9.17    Treatment:   No orders of the defined types were placed in this encounter. Antibiotics: Yes (bactrim)  Follow up: 1 weeks  Please see attached Discharge Instructions  Written patient dismissal instructions given to patient and signed by patient or POA. Visit Discharge/Physician Orders:  -Continue antibiotic as prescribed.  -Staples removed from skin graft today.  -Skin graft surgery in 1 week on Monday October 9th. Wound Location: Right lower leg   Do not shower, take baths or get wound wet, unless otherwise instructed by your Wound Care doctor. Keep all dressings clean & dry. Dressing orders: Wound dressed with cuticerin, wound vac, and wrapped with kerlix and ace bandage. Leave intact until surgery in 1 week. Follow up visit:   1 week- surgery at [facility] on Monday October 9th at 1:00 pm, arrive at 11:00 am  2 weeks at [address] [place] [address] on Monday October 16th at 10:15 am  Keep next scheduled appointment. Please give 24 hour notice if unable to keep appointment. [phone]  If you experience any of the following, please call the Wound Care Service during business hours: [phone].                        *Increase in pain                        *Temperature over 101                        *Increase in drainage from your wound or a foul odor                        *Uncontrolled swelling                        *Need for compression bandage changes due to slippage, breakthrough drainage  If you need medical attention outside of business hours, please contact your Primary Care Doctor or go to the nearest emergency room.      Calli Calderón   Electronically signed by Calli Calderón DPM on 10/2/2017 at 11:06 AM

## 2017-10-03 ENCOUNTER — TELEPHONE (OUTPATIENT)
Dept: FAMILY MEDICINE CLINIC | Age: 67
End: 2017-10-03

## 2017-10-03 NOTE — TELEPHONE ENCOUNTER
Pt confirmed appointment day and time  Pt informed. Pt informed. Pt states that he had pneumonia shots completed but will need to find where they were completed and dates. Needs flu shot at upcoming appointment.

## 2017-10-03 NOTE — TELEPHONE ENCOUNTER
Previsit planning call, pt had a generic ans lucian left msg stating this is Yuri Lente at 's office, please call us at [phone], at your earliest convenience. 1. Appt time and date. (give directions) Dr Forde Monday  10/18/17 at 2:00     2. Arrive 15 min before appt. 3. Please bring all medications to appt. 4. Bring immunization record.   (if no record, which immunizations have you had and where?)

## 2017-10-09 ENCOUNTER — ANESTHESIA (OUTPATIENT)
Dept: OPERATING ROOM | Age: 67
End: 2017-10-09
Payer: MEDICARE

## 2017-10-09 ENCOUNTER — ANESTHESIA EVENT (OUTPATIENT)
Dept: OPERATING ROOM | Age: 67
End: 2017-10-09
Payer: MEDICARE

## 2017-10-09 ENCOUNTER — HOSPITAL ENCOUNTER (OUTPATIENT)
Age: 67
Setting detail: OUTPATIENT SURGERY
Discharge: HOME | End: 2017-10-09
Attending: PODIATRIST | Admitting: PODIATRIST
Payer: MEDICARE

## 2017-10-09 VITALS
DIASTOLIC BLOOD PRESSURE: 52 MMHG | OXYGEN SATURATION: 100 % | SYSTOLIC BLOOD PRESSURE: 110 MMHG | RESPIRATION RATE: 5 BRPM | TEMPERATURE: 98.6 F

## 2017-10-09 VITALS
HEART RATE: 94 BPM | RESPIRATION RATE: 18 BRPM | WEIGHT: 221 LBS | OXYGEN SATURATION: 96 % | TEMPERATURE: 97.9 F | DIASTOLIC BLOOD PRESSURE: 65 MMHG | SYSTOLIC BLOOD PRESSURE: 133 MMHG | HEIGHT: 69 IN | BODY MASS INDEX: 32.73 KG/M2

## 2017-10-09 DIAGNOSIS — G40.909 SEIZURE DISORDER (HCC): ICD-10-CM

## 2017-10-09 PROCEDURE — 2500000003 HC RX 250 WO HCPCS: Performed by: NURSE ANESTHETIST, CERTIFIED REGISTERED

## 2017-10-09 PROCEDURE — 6370000000 HC RX 637 (ALT 250 FOR IP): Performed by: PODIATRIST

## 2017-10-09 PROCEDURE — 3700000001 HC ADD 15 MINUTES (ANESTHESIA): Performed by: PODIATRIST

## 2017-10-09 PROCEDURE — 2500000003 HC RX 250 WO HCPCS: Performed by: PODIATRIST

## 2017-10-09 PROCEDURE — A6446 CONFORM BAND S W>=3" <5"/YD: HCPCS | Performed by: PODIATRIST

## 2017-10-09 PROCEDURE — 3600000003 HC SURGERY LEVEL 3 BASE: Performed by: PODIATRIST

## 2017-10-09 PROCEDURE — 7100000011 HC PHASE II RECOVERY - ADDTL 15 MIN: Performed by: PODIATRIST

## 2017-10-09 PROCEDURE — 7100000001 HC PACU RECOVERY - ADDTL 15 MIN: Performed by: PODIATRIST

## 2017-10-09 PROCEDURE — 6360000002 HC RX W HCPCS: Performed by: STUDENT IN AN ORGANIZED HEALTH CARE EDUCATION/TRAINING PROGRAM

## 2017-10-09 PROCEDURE — 2580000003 HC RX 258: Performed by: STUDENT IN AN ORGANIZED HEALTH CARE EDUCATION/TRAINING PROGRAM

## 2017-10-09 PROCEDURE — 6360000002 HC RX W HCPCS: Performed by: NURSE ANESTHETIST, CERTIFIED REGISTERED

## 2017-10-09 PROCEDURE — A6223 GAUZE >16<=48 NO W/SAL W/O B: HCPCS | Performed by: PODIATRIST

## 2017-10-09 PROCEDURE — 3600000013 HC SURGERY LEVEL 3 ADDTL 15MIN: Performed by: PODIATRIST

## 2017-10-09 PROCEDURE — A6259 TRANSPARENT FILM > 48 SQ IN: HCPCS | Performed by: PODIATRIST

## 2017-10-09 PROCEDURE — 7100000010 HC PHASE II RECOVERY - FIRST 15 MIN: Performed by: PODIATRIST

## 2017-10-09 PROCEDURE — 3700000000 HC ANESTHESIA ATTENDED CARE: Performed by: PODIATRIST

## 2017-10-09 PROCEDURE — 7100000000 HC PACU RECOVERY - FIRST 15 MIN: Performed by: PODIATRIST

## 2017-10-09 RX ORDER — DEXAMETHASONE SODIUM PHOSPHATE 4 MG/ML
INJECTION, SOLUTION INTRA-ARTICULAR; INTRALESIONAL; INTRAMUSCULAR; INTRAVENOUS; SOFT TISSUE PRN
Status: DISCONTINUED | OUTPATIENT
Start: 2017-10-09 | End: 2017-10-09 | Stop reason: SDUPTHER

## 2017-10-09 RX ORDER — SODIUM CHLORIDE 0.9 % (FLUSH) 0.9 %
10 SYRINGE (ML) INJECTION PRN
Status: DISCONTINUED | OUTPATIENT
Start: 2017-10-09 | End: 2017-10-09 | Stop reason: HOSPADM

## 2017-10-09 RX ORDER — HYDROCODONE BITARTRATE AND ACETAMINOPHEN 5; 325 MG/1; MG/1
1 TABLET ORAL EVERY 6 HOURS PRN
Qty: 42 TABLET | Refills: 0 | Status: SHIPPED | OUTPATIENT
Start: 2017-10-09 | End: 2017-10-16

## 2017-10-09 RX ORDER — GINSENG 100 MG
CAPSULE ORAL PRN
Status: DISCONTINUED | OUTPATIENT
Start: 2017-10-09 | End: 2017-10-09 | Stop reason: HOSPADM

## 2017-10-09 RX ORDER — LIDOCAINE HYDROCHLORIDE 20 MG/ML
INJECTION, SOLUTION INFILTRATION; PERINEURAL PRN
Status: DISCONTINUED | OUTPATIENT
Start: 2017-10-09 | End: 2017-10-09 | Stop reason: SDUPTHER

## 2017-10-09 RX ORDER — MINERAL OIL
OIL (ML) MISCELLANEOUS PRN
Status: DISCONTINUED | OUTPATIENT
Start: 2017-10-09 | End: 2017-10-09 | Stop reason: HOSPADM

## 2017-10-09 RX ORDER — EPHEDRINE SULFATE 50 MG/ML
INJECTION INTRAVENOUS PRN
Status: DISCONTINUED | OUTPATIENT
Start: 2017-10-09 | End: 2017-10-09 | Stop reason: SDUPTHER

## 2017-10-09 RX ORDER — BUPIVACAINE HYDROCHLORIDE AND EPINEPHRINE 5; 5 MG/ML; UG/ML
INJECTION, SOLUTION EPIDURAL; INTRACAUDAL; PERINEURAL PRN
Status: DISCONTINUED | OUTPATIENT
Start: 2017-10-09 | End: 2017-10-09 | Stop reason: HOSPADM

## 2017-10-09 RX ORDER — PROPOFOL 10 MG/ML
INJECTION, EMULSION INTRAVENOUS PRN
Status: DISCONTINUED | OUTPATIENT
Start: 2017-10-09 | End: 2017-10-09 | Stop reason: SDUPTHER

## 2017-10-09 RX ORDER — SODIUM CHLORIDE 9 MG/ML
INJECTION, SOLUTION INTRAVENOUS CONTINUOUS
Status: DISCONTINUED | OUTPATIENT
Start: 2017-10-09 | End: 2017-10-09 | Stop reason: HOSPADM

## 2017-10-09 RX ORDER — LEVETIRACETAM 1000 MG/1
1000 TABLET ORAL 2 TIMES DAILY
Qty: 60 TABLET | Refills: 11 | Status: SHIPPED | OUTPATIENT
Start: 2017-10-09 | End: 2017-11-29 | Stop reason: SDUPTHER

## 2017-10-09 RX ORDER — FENTANYL CITRATE 50 UG/ML
INJECTION, SOLUTION INTRAMUSCULAR; INTRAVENOUS PRN
Status: DISCONTINUED | OUTPATIENT
Start: 2017-10-09 | End: 2017-10-09 | Stop reason: SDUPTHER

## 2017-10-09 RX ORDER — GLYCOPYRROLATE 0.2 MG/ML
INJECTION INTRAMUSCULAR; INTRAVENOUS PRN
Status: DISCONTINUED | OUTPATIENT
Start: 2017-10-09 | End: 2017-10-09 | Stop reason: SDUPTHER

## 2017-10-09 RX ORDER — SODIUM CHLORIDE 0.9 % (FLUSH) 0.9 %
10 SYRINGE (ML) INJECTION EVERY 12 HOURS SCHEDULED
Status: DISCONTINUED | OUTPATIENT
Start: 2017-10-09 | End: 2017-10-09 | Stop reason: HOSPADM

## 2017-10-09 RX ORDER — HYDROCODONE BITARTRATE AND ACETAMINOPHEN 5; 325 MG/1; MG/1
1 TABLET ORAL EVERY 6 HOURS PRN
Status: DISCONTINUED | OUTPATIENT
Start: 2017-10-09 | End: 2017-10-09 | Stop reason: HOSPADM

## 2017-10-09 RX ORDER — SULFAMETHOXAZOLE AND TRIMETHOPRIM 800; 160 MG/1; MG/1
1 TABLET ORAL 2 TIMES DAILY
Qty: 28 TABLET | Refills: 0 | Status: SHIPPED | OUTPATIENT
Start: 2017-10-09 | End: 2017-10-23

## 2017-10-09 RX ORDER — COVID-19 ANTIGEN TEST
1 KIT MISCELLANEOUS DAILY
Status: ON HOLD | COMMUNITY
End: 2017-10-28 | Stop reason: HOSPADM

## 2017-10-09 RX ORDER — ONDANSETRON 2 MG/ML
INJECTION INTRAMUSCULAR; INTRAVENOUS PRN
Status: DISCONTINUED | OUTPATIENT
Start: 2017-10-09 | End: 2017-10-09 | Stop reason: SDUPTHER

## 2017-10-09 RX ADMIN — HYDROCODONE BITARTRATE AND ACETAMINOPHEN 1 TABLET: 5; 325 TABLET ORAL at 14:49

## 2017-10-09 RX ADMIN — FENTANYL CITRATE 50 MCG: 50 INJECTION INTRAMUSCULAR; INTRAVENOUS at 13:22

## 2017-10-09 RX ADMIN — FENTANYL CITRATE 50 MCG: 50 INJECTION INTRAMUSCULAR; INTRAVENOUS at 13:08

## 2017-10-09 RX ADMIN — PROPOFOL 200 MG: 10 INJECTION, EMULSION INTRAVENOUS at 12:37

## 2017-10-09 RX ADMIN — EPHEDRINE SULFATE 10 MG: 50 INJECTION, SOLUTION INTRAVENOUS at 13:17

## 2017-10-09 RX ADMIN — ONDANSETRON 4 MG: 2 INJECTION INTRAMUSCULAR; INTRAVENOUS at 13:05

## 2017-10-09 RX ADMIN — EPHEDRINE SULFATE 10 MG: 50 INJECTION, SOLUTION INTRAVENOUS at 13:02

## 2017-10-09 RX ADMIN — FENTANYL CITRATE 100 MCG: 50 INJECTION INTRAMUSCULAR; INTRAVENOUS at 12:37

## 2017-10-09 RX ADMIN — PHENYLEPHRINE HYDROCHLORIDE 100 MCG: 10 INJECTION INTRAMUSCULAR; INTRAVENOUS; SUBCUTANEOUS at 13:02

## 2017-10-09 RX ADMIN — LIDOCAINE HYDROCHLORIDE 100 MG: 20 INJECTION, SOLUTION INFILTRATION; PERINEURAL at 12:37

## 2017-10-09 RX ADMIN — PHENYLEPHRINE HYDROCHLORIDE 100 MCG: 10 INJECTION INTRAMUSCULAR; INTRAVENOUS; SUBCUTANEOUS at 13:10

## 2017-10-09 RX ADMIN — EPHEDRINE SULFATE 10 MG: 50 INJECTION, SOLUTION INTRAVENOUS at 12:56

## 2017-10-09 RX ADMIN — SODIUM CHLORIDE: 9 INJECTION, SOLUTION INTRAVENOUS at 11:38

## 2017-10-09 RX ADMIN — EPHEDRINE SULFATE 10 MG: 50 INJECTION, SOLUTION INTRAVENOUS at 13:04

## 2017-10-09 RX ADMIN — CEFAZOLIN SODIUM 2 G: 2 SOLUTION INTRAVENOUS at 12:37

## 2017-10-09 RX ADMIN — GLYCOPYRROLATE 0.2 MG: 0.2 INJECTION, SOLUTION INTRAMUSCULAR; INTRAVENOUS at 12:51

## 2017-10-09 RX ADMIN — DEXAMETHASONE SODIUM PHOSPHATE 8 MG: 4 INJECTION, SOLUTION INTRAMUSCULAR; INTRAVENOUS at 12:37

## 2017-10-09 RX ADMIN — EPHEDRINE SULFATE 10 MG: 50 INJECTION, SOLUTION INTRAVENOUS at 12:58

## 2017-10-09 ASSESSMENT — PULMONARY FUNCTION TESTS
PIF_VALUE: 12
PIF_VALUE: 11
PIF_VALUE: 3
PIF_VALUE: 4
PIF_VALUE: 25
PIF_VALUE: 1
PIF_VALUE: 12
PIF_VALUE: 4
PIF_VALUE: 3
PIF_VALUE: 3
PIF_VALUE: 4
PIF_VALUE: 11
PIF_VALUE: 12
PIF_VALUE: 2
PIF_VALUE: 16
PIF_VALUE: 5
PIF_VALUE: 4
PIF_VALUE: 11
PIF_VALUE: 12
PIF_VALUE: 12
PIF_VALUE: 4
PIF_VALUE: 4
PIF_VALUE: 2
PIF_VALUE: 11
PIF_VALUE: 11
PIF_VALUE: 3
PIF_VALUE: 12
PIF_VALUE: 11
PIF_VALUE: 5
PIF_VALUE: 12
PIF_VALUE: 21
PIF_VALUE: 3
PIF_VALUE: 11
PIF_VALUE: 4
PIF_VALUE: 11
PIF_VALUE: 4
PIF_VALUE: 12
PIF_VALUE: 4
PIF_VALUE: 5
PIF_VALUE: 11
PIF_VALUE: 12
PIF_VALUE: 3
PIF_VALUE: 12
PIF_VALUE: 6
PIF_VALUE: 4
PIF_VALUE: 3
PIF_VALUE: 11
PIF_VALUE: 3
PIF_VALUE: 3
PIF_VALUE: 4

## 2017-10-09 ASSESSMENT — PAIN SCALES - GENERAL
PAINLEVEL_OUTOF10: 5
PAINLEVEL_OUTOF10: 5
PAINLEVEL_OUTOF10: 3

## 2017-10-09 ASSESSMENT — PAIN - FUNCTIONAL ASSESSMENT: PAIN_FUNCTIONAL_ASSESSMENT: 0-10

## 2017-10-09 ASSESSMENT — PAIN DESCRIPTION - LOCATION: LOCATION: LEG

## 2017-10-09 ASSESSMENT — PAIN DESCRIPTION - PAIN TYPE: TYPE: SURGICAL PAIN

## 2017-10-09 ASSESSMENT — PAIN DESCRIPTION - ORIENTATION: ORIENTATION: RIGHT

## 2017-10-09 NOTE — ANESTHESIA POSTPROCEDURE EVALUATION
Department of Anesthesiology  Postprocedure Note    Patient: Ivette Vega  MRN: [id number]  YOB: 1950  Date of evaluation: 10/9/2017  Time:  3:22 PM     Procedure Summary     Date:  10/09/17 Room / Location:  [address]    Anesthesia Start:  5918 Anesthesia Stop:  2519    Procedure:  SPLIT THICKNESS SKIN GRAFT FROM RIGHT LEG (Right ) Diagnosis:  (RIGHT LEG ULCER)    Surgeon:  Sally Gonsalez DPM Responsible Provider:  Stephenie Willson DO    Anesthesia Type:  general ASA Status:  Not recorded          Anesthesia Type: general    Daljit Phase I: Daljit Score: 9    Daljit Phase II: Daljit Score: 10    Last vitals: Reviewed and per EMR flowsheets.        Anesthesia Post Evaluation    Patient location during evaluation: bedside  Patient participation: complete - patient participated  Level of consciousness: awake and alert  Airway patency: patent  Nausea & Vomiting: no nausea and no vomiting  Complications: no  Cardiovascular status: hemodynamically stable  Respiratory status: acceptable  Hydration status: stable

## 2017-10-09 NOTE — PROGRESS NOTES
Pt meets d/c requirements. Pt given instructions and RX given. Pt to lobby via wheelchair. Pt in stable condition.

## 2017-10-09 NOTE — PROGRESS NOTES
1330-Pt to PACU from OR post right leg split thickness skin graft by Dr Trvea Calhoun. Bedside report received from Ellie Dickinson CRNA [facility]. Pt connected to monitor, VS stable, respirations unlabored with 8L O2 via simple mask from OR.  20g IV noted in LH, infusing well; site soft, clean, dry, intact. Surgical site noted to RLE, ace wrap dressing in place, clean, dry, intact. Per report, skin graft site right outer thigh, wound vac in place on right outer calf. Ortho boot in place upon arrival.    1335-Pt waking, oriented and appropriate, simple mask removed and 2L o2 via NC applied. Pt denies needs at this time, denies pain. Nurse offers warm blankets, pt refuses at this time. 1345-Pt awake, denies pain or needs. VS stable. Nurse offers ice chips, pt refuses, states \"I'll wait to have a pepsi\". 1400-Pt reassessed for readiness for discharge from PACU. Pt alert and oriented, VS stable, respirations unlabored on 2L O2 via NC. IV continues to infuse without difficulty; site soft, clean, dry, intact. Surgical dressing remains in place, clean, dry, intact. Wound vac in place. Pt continues to deny pain. Pt transported to [facility] by nurse at this time. Bed left in lowest position and locked. Sister present upon arrival.  Call light given. Report given to Russel Persaud RN; no questions or concerns voiced.

## 2017-10-09 NOTE — OP NOTE
kit.  The wound VAC was set at 100 mmHE continuous for  normal postoperative skin protocol. The patient was noted to have  good adequate suction on the periphery of the graft which is followed  with a dry sterile dressing. The harvest site was dressed with  Bacitracin ointment with application of the overlying Xeroform  dressing and a double fix and william. The patient tolerated the  procedure well. The patient was transported to PACU in stable. All  questions concerning postoperative management were addressed with the  patient's sister. The patient will continue with the postoperative  Bactrim DS and followup in the wound care center at approximately in 1  week for further evaluation.         Rocky Galloway D.P.M.    D: 10/09/2017 13:36:42       T: 10/09/2017 14:30:20     [email or web address]  Job#: [id number]     Doc#: [id number]

## 2017-10-09 NOTE — BRIEF OP NOTE
Brief Postoperative Note  ______________________________________________________________    Patient: Laurie Owens  YOB: 1950  MRN: [id number]  Date of Procedure: 10/9/2017    Pre-Op Diagnosis: RIGHT LEG ULCER    Post-Op Diagnosis: Same       Procedure(s):  Complexed delayed wound closure venous stasis ulcer measuring 7x4 cm with SPLIT THICKNESS SKIN GRAFT FROM RIGHT LEG  Application of negative pressure therapy    Anesthesia: General    Surgeon(s):  Eduin Smiley DPM    Staff:  Scrub Person First: Hal Feliz  Scrub Person Second: Silvia Leahy     Estimated Blood Loss: * No values recorded between 10/9/2017 12:31 PM and 10/9/2017  8:22 PM *    Complications: None    Specimens:   * No specimens in log *     Implants:  * No implants in log *      Drains:   Negative Pressure Wound Therapy Leg Right (Active)   Wound Type Surgical 9/25/2017  4:05 PM   Wound Assessment Edges well approximated;Yellow;Red 10/2/2017 11:33 AM   Renate-wound Assessment Maceration; White 10/2/2017 11:33 AM   Unit Type KCI 10/2/2017 11:33 AM   Dressing Type Black foam 10/2/2017 11:33 AM   Number of pieces used 1 10/2/2017 11:33 AM   Cycle Continuous 10/2/2017 11:33 AM   Target Pressure (mmHg) 125 10/2/2017 11:33 AM   Irrigation Solution Normal Saline 10/2/2017 11:33 AM   Canister changed? Yes 10/2/2017 11:33 AM   Dressing Status Dry; Intact; Changed 10/2/2017 11:33 AM   Dressing Changed Changed/New 10/2/2017 11:33 AM   Drainage Amount Moderate 10/2/2017 11:33 AM   Drainage Description Serosanguinous 10/2/2017 11:33 AM   Odor None 10/2/2017 11:33 AM       Findings: Consistent with dx    Rj Britton DPM  Date: 10/9/2017  Time: 1:31 PM

## 2017-10-09 NOTE — INTERVAL H&P NOTE
Department of Surgery  H&P Interval Note  Outpatient History and Physical was reviewed pre-operatively, with no interval changes to note prior to scheduled surgical intervention. Patient was assessed, all questions/concerns regarding stacia-operative management were addressed to patient satisfaction. Operative site was marked prior to transportation to the operative room. Sangita Morrison D.P.M.

## 2017-10-09 NOTE — ANESTHESIA PRE PROCEDURE
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 Janeth Jacobs follows, no siezure activity since       Past Surgical History:        Procedure Laterality Date    ND INCIS/DRAIN THIGH/KNEE ABSCESS,DEEP Right 9/25/2017    RIGHT SERGIO LEG WOUND DEBRIDEMENT WITH SKIN GRAFT AND WOUND VAC APPLICATION performed by Brandi Regalado DPM at . [facility] 97      vein stripping       Social History:    Social History   Substance Use Topics    Smoking status: Never Smoker    Smokeless tobacco: Never Used    Alcohol use No      Comment:                                  Counseling given: Not Answered      Vital Signs (Current):   Vitals:    10/09/17 1109   BP: 119/65   Pulse: 78   Resp: 16   Temp: 98 °F (36.7 °C)   TempSrc: Temporal   SpO2: 97%   Weight: 221 lb (100.2 kg)   Height: 5' 9\" (1.753 m)                                              BP Readings from Last 3 Encounters:   10/09/17 119/65   10/02/17 130/63   09/25/17 (!) 140/71       NPO Status: Time of last liquid consumption: 2130                        Time of last solid consumption: 2130                        Date of last liquid consumption: 10/08/17                        Date of last solid food consumption: 10/08/17    BMI:   Wt Readings from Last 3 Encounters:   10/09/17 221 lb (100.2 kg)   10/02/17 229 lb (103.9 kg)   09/25/17 225 lb 1.4 oz (102.1 kg)     Body mass index is 32.64 kg/m².     CBC:   Lab Results   Component Value Date    WBC 5.8 09/18/2017 RBC 4.54 09/18/2017    HGB 13.8 09/18/2017    HCT 40.3 09/18/2017    MCV 88.7 09/18/2017    RDW 14.7 09/18/2017     09/18/2017       CMP:   Lab Results   Component Value Date     09/18/2017    K 4.2 09/18/2017    CL 98 09/18/2017    CO2 28 09/18/2017    BUN 9 09/18/2017    CREATININE 1.0 09/18/2017    LABGLOM 74 09/18/2017    GLUCOSE 85 09/18/2017    PROT 6.6 07/22/2014    CALCIUM 9.4 09/18/2017    BILITOT 0.6 07/22/2014    ALKPHOS 49 07/22/2014    AST 17 07/22/2014    ALT 14 07/22/2014       POC Tests: No results for input(s): POCGLU, POCNA, POCK, POCCL, POCBUN, POCHEMO, POCHCT in the last 72 hours. Coags:   Lab Results   Component Value Date    PROTIME 24.9 10/01/2014    INR 2.29 10/01/2014    APTT 33.3 07/17/2014       HCG (If Applicable): No results found for: PREGTESTUR, PREGSERUM, HCG, HCGQUANT     ABGs: No results found for: PHART, PO2ART, FPA3WBP, VAG9GOG, BEART, X2XJYSMJ     Type & Screen (If Applicable):  No results found for: LABABO, [address]    Anesthesia Evaluation  Patient summary reviewed and Nursing notes reviewed no history of anesthetic complications:   Airway: Mallampati: II  TM distance: >3 FB   Neck ROM: full  Mouth opening: > = 3 FB Dental:          Pulmonary: breath sounds clear to auscultation                             Cardiovascular:    (+) hypertension: moderate,       ECG reviewed  Rhythm: regular  Rate: normal                    Neuro/Psych:               GI/Hepatic/Renal:             Endo/Other:        (-) patient has not had pre-anesthesia visit        Pt had no PAT visit       Abdominal:           Vascular:                                      Anesthesia Plan      general     ASA 2       Induction: intravenous. MIPS: Postoperative opioids intended and Prophylactic antiemetics administered. Anesthetic plan and risks discussed with patient and spouse.       Plan discussed with CRNA, attending and surgical team.                  Tomas Favors, DO 10/9/2017

## 2017-10-16 ENCOUNTER — HOSPITAL ENCOUNTER (OUTPATIENT)
Dept: WOUND CARE | Age: 67
Discharge: HOME | End: 2017-10-16
Payer: MEDICARE

## 2017-10-16 VITALS
BODY MASS INDEX: 33.21 KG/M2 | OXYGEN SATURATION: 97 % | SYSTOLIC BLOOD PRESSURE: 108 MMHG | WEIGHT: 224.9 LBS | TEMPERATURE: 97.8 F | RESPIRATION RATE: 18 BRPM | HEART RATE: 87 BPM | DIASTOLIC BLOOD PRESSURE: 53 MMHG

## 2017-10-16 PROCEDURE — 97605 NEG PRS WND THER DME<=50SQCM: CPT

## 2017-10-16 PROCEDURE — 99213 OFFICE O/P EST LOW 20 MIN: CPT

## 2017-10-16 PROCEDURE — A6222 GAUZE <=16 IN NO W/SAL W/O B: HCPCS

## 2017-10-16 ASSESSMENT — PAIN SCALES - GENERAL: PAINLEVEL_OUTOF10: 0

## 2017-10-16 NOTE — PROGRESS NOTES
Right;Lateral;Lower;Proximal #1 (Active)   Wound Type Wound 10/2/2017 11:33 AM   Dressing Status Intact 10/2/2017 11:33 AM   Dressing Changed Changed/New 10/2/2017 11:33 AM   Dressing/Treatment Dry dressing 9/25/2017 10:46 AM   Wound Cleansed Rinsed/Irrigated with saline 10/2/2017 11:33 AM   Wound Length (cm) 4.5 cm 10/2/2017 11:33 AM   Wound Width (cm) 7 cm 10/2/2017 11:33 AM   Wound Depth (cm)  graft 10/2/2017 11:33 AM   Calculated Wound Size (cm^2) (l*w) 31.5 cm^2 10/2/2017 11:33 AM   Change in Wound Size % (l*w) -162.5 10/2/2017 11:33 AM   Wound Assessment Yellow;Pink 10/2/2017 11:33 AM   Margins Attached edges 10/2/2017 11:33 AM   Renate-wound Assessment Maceration; White;Pink 10/2/2017 11:33 AM   [place]%Wound Bed 50 10/2/2017 11:33 AM   Red%Wound Bed 90 9/21/2017 11:46 AM   Yellow%Wound Bed 50 10/2/2017 11:33 AM   Black%Wound Bed 5 6/2/2017  9:05 AM   Drainage Amount Moderate 10/2/2017 11:33 AM   Drainage Description Serosanguinous 10/2/2017 11:33 AM   Odor None 10/2/2017 11:33 AM   Culture Taken Yes 5/25/2017 12:04 PM   Time out Yes 9/15/2017 10:21 AM   Procedural Pain 0 9/15/2017 10:21 AM   Post procedural Pain 0 9/15/2017 10:21 AM   Op First Treatment Date 10/28/16 10/28/2016 11:35 AM   Number of days: 353       Incision 10/09/17 Thigh Right (Active)   Wound Assessment HENRY 10/9/2017  2:00 PM   Renate-wound Assessment HENRY 10/9/2017  2:00 PM   Closure HENRY 10/9/2017  2:00 PM   Drainage Amount None 10/9/2017  2:00 PM   Dressing/Treatment Ace Wrap 10/9/2017  2:00 PM   Dressing Status Clean;Dry; Intact 10/9/2017  2:00 PM   Number of days: 6       Incision 10/09/17 Leg Right (Active)   Wound Assessment HENRY 10/9/2017  2:00 PM   Renate-wound Assessment HENYR 10/9/2017  2:00 PM   Closure HENRY 10/9/2017  2:00 PM   Drainage Amount None 10/9/2017  2:22 PM   Dressing/Treatment Ace Wrap 10/9/2017  2:22 PM   Dressing Status Clean;Dry; Intact 10/9/2017  2:22 PM   Number of days: 6       Wound Care Documentation:  Negative Pressure Wound found for: SEDRATE  CRP: No results found for: CRP  Micro:   Lab Results   Component Value Date    BC No growth-preliminary  No growth   07/18/2014      Hemoglobin A1C: No results found for: [id number]      Wound Culture    Gram Stain Result 09/15/2017 10:38  [facility] [address] [facility]     No segmented neutrophils observed. Rare epithelial cells observed. Few gram positive cocci occurring singly and in pairs. Organism (Abnormal) 09/15/2017 10:38 AM [facility]     Staphylococcus aureus     Aerobic Culture 09/15/2017 10:38 AM [facility]     moderate growth   In the treatment of gram positive infections, GENTAMICIN   should be CONSIDERED a SYNERGYSTIC agent ONLY. Ciprofloxacin and Levofloxacin, regardless of in vitro   sensitivity, should not be used for staphylococcal infections   other than uncomplicated lower UTIs. Moxifloxacin, regardless of in vitro sensitivity, should   not be used for staphylococcal infections. Organism (Abnormal) 09/15/2017 10:38 AM [facility]     gram negative bacilli     Aerobic Culture 09/15/2017 10:38 AM [address] [facility]     light growth       Testing Performed By      Mary Kay Yip Name Director Address Valid Date Range     861-BD - 99577 Bernardo Vega MD [address] [place] [id number] 08/30/17 0855-Present       Narrative      Source: leg       Site: swab right lower leg wound          Current Antibiotics: none       Culture & Susceptibility      STAPHYLOCOCCUS AUREUS      Antibiotic Interpretation DESTINY Unit Status     Penicillin G Resistant >=0.5 mcg/mL Preliminary     clindamycin Sensitive <=0.25 mcg/mL Preliminary     erythromycin Sensitive =0.5 mcg/mL Preliminary     gentamicin Sensitive <=0.5 mcg/mL Preliminary     oxacillin Sensitive =0.5 mcg/mL Preliminary     tetracycline Sensitive <=1 mcg/mL Preliminary epidermis demonstrates spongiosis with acanthosis and parakeratosis. The underlying dermis consists of granulation tissue and stroma with repair. There is no evidence of dysplasia or neoplasia. No specific pathologic findings are identified. The histologic findings are indicative of chronic repair. Assessment:     Ulcer Identification:  Ulcer Type: venous  Contributing Factors: edema, venous stasis, shear force and non-adherence    Wound: Venous stasis dermatitis with associated inflammatory ulceration, right calf     Depth of Diabetic/Pressure/Non Pressure Ulcers or Wound:  Non-Pressure ulcer, fat layer exposed    Patient Active Problem List   Diagnosis Code    Seizure disorder ([facility] G40.909    Venous ulcer of right lower extremity with varicose veins ([facility]) I83.019    Venous insufficiency of right leg I87.2    Dystrophic nail L60.3    Nonhealing ulcer of right lower leg ([facility] L97.919    PVD (peripheral vascular disease) ([facility]) I73.9       Procedure Note  Indications:  Based on my examination of this patient's wound(s)/ulcer(s) today, debridement is not required to promote healing and evaluate the extent healing. NPWT applied to foot at 75mmhg continuous     Plan:     Patient examined and evaluated  Graft appears to have good take, well maintained   NPWT reapplied at 75mmhg continuous  Continue Bactrim DS until completion   Follow up in (1) week    Treatment:   No orders of the defined types were placed in this encounter. Visit Discharge/Physician Orders:  -Continue antibiotic as prescribed. Wound Location: Right lower leg, Right upper leg  Do not shower, take baths or get wound wet, unless otherwise instructed by your Wound Care doctor. Keep all dressings clean & dry. Dressing orders:   Right lower leg- NWPT at 75mmhg  Right upper leg- Adaptic, dry dressing   Follow up visit:   Keep next scheduled appointment. Please give 24 hour notice if unable to keep appointment.  [phone]  If you experience any of the following, please call the Wound Care Service during business hours: [phone].                        *Increase in pain                        *Temperature over 101                        *Increase in drainage from your wound or a foul odor                        *Uncontrolled swelling                        *Need for compression bandage changes due to slippage, breakthrough drainage  If you need medical attention outside of business hours, please contact your Primary Care Doctor or go to the nearest emergency room.      Leonid Zabala   Electronically signed by Leonid Zabala DPM on 10/16/2017 at 11:08 AM

## 2017-10-16 NOTE — PLAN OF CARE
Problem: Wound:  Goal: Will show signs of wound healing; wound closure and no evidence of infection  Will show signs of wound healing; wound closure and no evidence of infection   Outcome: Ongoing  Patient presents to wound clinic for follow up of right leg wounds. No s/s of infection. Patient afebrile. Right lower leg wound/graft dressed with wound vac at 75 mmHg continuous suction, leg wrapped with kerlix and ace wrap, then cam walker boot applied- leave intact for 1 week. Right upper leg dressed with cuticerin, gauze, and ABD pad- dressing to be changed every 3 days. Follow up in wound clinic in 1 week. Comments: Care plan reviewed with patient and sister. Patient and sister verbalize understanding of the plan of care and contribute to goal setting.

## 2017-10-18 ENCOUNTER — OFFICE VISIT (OUTPATIENT)
Dept: FAMILY MEDICINE CLINIC | Age: 67
End: 2017-10-18
Payer: MEDICARE

## 2017-10-18 ENCOUNTER — TELEPHONE (OUTPATIENT)
Dept: FAMILY MEDICINE CLINIC | Age: 67
End: 2017-10-18

## 2017-10-18 VITALS
HEART RATE: 84 BPM | DIASTOLIC BLOOD PRESSURE: 70 MMHG | HEIGHT: 60 IN | BODY MASS INDEX: 43.82 KG/M2 | WEIGHT: 223.2 LBS | RESPIRATION RATE: 14 BRPM | TEMPERATURE: 98.2 F | SYSTOLIC BLOOD PRESSURE: 110 MMHG

## 2017-10-18 DIAGNOSIS — L97.919 NONHEALING ULCER OF RIGHT LOWER LEG, UNSPECIFIED ULCER STAGE (HCC): ICD-10-CM

## 2017-10-18 DIAGNOSIS — E53.1 VITAMIN B6 DEFICIENCY: ICD-10-CM

## 2017-10-18 DIAGNOSIS — G40.909 SEIZURE DISORDER (HCC): Chronic | ICD-10-CM

## 2017-10-18 DIAGNOSIS — I10 ESSENTIAL HYPERTENSION: Primary | Chronic | ICD-10-CM

## 2017-10-18 DIAGNOSIS — Z12.5 SPECIAL SCREENING FOR MALIGNANT NEOPLASM OF PROSTATE: ICD-10-CM

## 2017-10-18 DIAGNOSIS — E53.8 B12 DEFICIENCY: ICD-10-CM

## 2017-10-18 DIAGNOSIS — I50.22 CHRONIC SYSTOLIC HEART FAILURE (HCC): Chronic | ICD-10-CM

## 2017-10-18 DIAGNOSIS — Z23 NEED FOR INFLUENZA VACCINATION: ICD-10-CM

## 2017-10-18 DIAGNOSIS — Z23 NEED FOR PNEUMOCOCCAL VACCINATION: ICD-10-CM

## 2017-10-18 PROCEDURE — G8484 FLU IMMUNIZE NO ADMIN: HCPCS | Performed by: FAMILY MEDICINE

## 2017-10-18 PROCEDURE — G0009 ADMIN PNEUMOCOCCAL VACCINE: HCPCS | Performed by: FAMILY MEDICINE

## 2017-10-18 PROCEDURE — 90688 IIV4 VACCINE SPLT 0.5 ML IM: CPT | Performed by: FAMILY MEDICINE

## 2017-10-18 PROCEDURE — 99204 OFFICE O/P NEW MOD 45 MIN: CPT | Performed by: FAMILY MEDICINE

## 2017-10-18 PROCEDURE — G0008 ADMIN INFLUENZA VIRUS VAC: HCPCS | Performed by: FAMILY MEDICINE

## 2017-10-18 PROCEDURE — G8417 CALC BMI ABV UP PARAM F/U: HCPCS | Performed by: FAMILY MEDICINE

## 2017-10-18 PROCEDURE — 90670 PCV13 VACCINE IM: CPT | Performed by: FAMILY MEDICINE

## 2017-10-18 PROCEDURE — 1123F ACP DISCUSS/DSCN MKR DOCD: CPT | Performed by: FAMILY MEDICINE

## 2017-10-18 PROCEDURE — 4040F PNEUMOC VAC/ADMIN/RCVD: CPT | Performed by: FAMILY MEDICINE

## 2017-10-18 PROCEDURE — G8427 DOCREV CUR MEDS BY ELIG CLIN: HCPCS | Performed by: FAMILY MEDICINE

## 2017-10-18 PROCEDURE — 1036F TOBACCO NON-USER: CPT | Performed by: FAMILY MEDICINE

## 2017-10-18 PROCEDURE — 3017F COLORECTAL CA SCREEN DOC REV: CPT | Performed by: FAMILY MEDICINE

## 2017-10-18 ASSESSMENT — PATIENT HEALTH QUESTIONNAIRE - PHQ9
SUM OF ALL RESPONSES TO PHQ9 QUESTIONS 1 & 2: 0
SUM OF ALL RESPONSES TO PHQ QUESTIONS 1-9: 0
2. FEELING DOWN, DEPRESSED OR HOPELESS: 0
1. LITTLE INTEREST OR PLEASURE IN DOING THINGS: 0

## 2017-10-18 NOTE — PATIENT INSTRUCTIONS
better pace. · Get enough rest at night. Sleeping with 1 or 2 pillows under your upper body and head may help you breathe easier. Lifestyle changes  · Do not smoke. Smoking can make a heart condition worse. If you need help quitting, talk to your doctor about stop-smoking programs and medicines. These can increase your chances of quitting for good. Quitting smoking may be the most important step you can take to protect your heart. · Limit alcohol to 2 drinks a day for men and 1 drink a day for women. Too much alcohol can cause health problems. · Avoid getting sick from colds and the flu. Get a pneumococcal vaccine shot. If you have had one before, ask your doctor whether you need another dose. Get a flu shot each year. If you must be around people with colds or the flu, wash your hands often. When should you call for help? Call 911 if you have symptoms of sudden heart failure such as:  · You have severe trouble breathing. · You cough up pink, foamy mucus. · You have a new irregular or rapid heartbeat. Call your doctor now or seek immediate medical care if:  · You have new or increased shortness of breath. · You are dizzy or lightheaded, or you feel like you may faint. · You have sudden weight gain, such as more than 2 to 3 pounds in a day or 5 pounds in a week. (Your doctor may suggest a different range of weight gain.)  · You have increased swelling in your legs, ankles, or feet. · You are suddenly so tired or weak that you cannot do your usual activities. Watch closely for changes in your health, and be sure to contact your doctor if:  · You develop new symptoms. Where can you learn more? Go to https://Markitcolette.iPosition. org and sign in to your [employer] account. Enter J274 in the [employer] box to learn more about \"Heart Failure: Care Instructions. \"     If you do not have an account, please click on the \"Sign Up Now\" link.   Current as of: April 3, 2017  Content Version: [employer]. Care instructions adapted under license by [facility] ([facility]). If you have questions about a medical condition or this instruction, always ask your healthcare professional. [address] any warranty or liability for your use of this information.

## 2017-10-18 NOTE — TELEPHONE ENCOUNTER
Pt scheduled at [address] [place] [address] on 10/24/17 at 4:00PM for ECHO. Pt to report to 2nd floor heart center at 3:30. NO prep for test. Left VM for Andreia Ceja (pt's sister) informing her. OK per HIPAA.

## 2017-10-18 NOTE — PROGRESS NOTES
2013  Influenza Vaccine - UTD FALL 2017  Pneumonia Vaccine - UTD PPV 23 AUG 2014 and PCV 13 OCT 2017  Zostavax - next visit     PSA testing discussion - due, ordered  AAA Screening - never smoker    Falls screening - n/a      Specialists List:  Leatha Beard: wound care  Min: neuro for seizures       Current Outpatient Prescriptions   Medication Sig Dispense Refill    Naproxen Sodium (ALEVE) 220 MG CAPS Take 1 tablet by mouth daily      sulfamethoxazole-trimethoprim (BACTRIM DS) 800-160 MG per tablet Take 1 tablet by mouth 2 times daily for 14 days 28 tablet 0    levETIRAcetam (KEPPRA) 1000 MG tablet Take 1 tablet by mouth 2 times daily 60 tablet 11    vitamin B-12 (CYANOCOBALAMIN) 1000 MCG tablet Take 1,000 mcg by mouth daily Indications: doesnt know when he ran out       pyridoxine (B-6) 25 MG tablet Take 25 mg by mouth daily Indications: doesnt when he ran out       lisinopril (PRINIVIL;ZESTRIL) 20 MG tablet Take 20 mg by mouth daily Indications: Doesn't know when he ran out        No current facility-administered medications for this visit. No orders of the defined types were placed in this encounter. All medications reviewed and reconciled, including OTC and herbal medications. Updated list given to patient. Patient Active Problem List    Diagnosis Date Noted    Osteoarthritis     Hypertension     Hyperlipidemia     History of DVT of right lower extremity      2015. Txt with 6 months of coumadin. Provoked.       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 Suha Sahu follows, no seizure activity since           Past Medical History:   Diagnosis Date    Heart failure with reduced ejection fraction ([facility]     History of alcohol abuse     History of DVT of right lower extremity     History of traumatic brain injury     Hyperlipidemia     Hypertension     Mentally challenged     \"slow\" states his sister    Osteoarthritis     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 ([facility] 07/2014    s/p fall down a hill, Dr. Suha Sahu follows, no seizure activity since    Seizure disorder ([facility] 7/17/2014         Past Surgical History:   Procedure Laterality Date    MO INCIS/DRAIN THIGH/KNEE ABSCESS,DEEP Right 9/25/2017    RIGHT SERGIO LEG WOUND DEBRIDEMENT WITH SKIN GRAFT AND WOUND VAC APPLICATION performed by Rangel Childs DPM at [address] [place] [address] Road Right 10/9/2017    SPLIT THICKNESS SKIN GRAFT FROM RIGHT LEG performed by Rangel Childs DPM at [place] [address]      vein stripping         No Known Allergies      Social History     Social History    Marital status:      Spouse name: N/A    Number of children: 1    Years of education: N/A     Occupational History    retired      Social History Main Topics    Smoking status: Never Smoker    Smokeless tobacco: Never Used    Alcohol use No      Comment:      Drug use: No    Sexual activity: Not on file     Other Topics Concern    Not on file     Social History Narrative    ** Merged History Encounter **              Family History   Problem Relation Age of Onset    Cancer Mother     Stroke Mother     Diabetes Mother     High Blood Pressure Mother     High Cholesterol Mother     Stroke Father     COPD Father     Diabetes Maternal Grandmother     Heart Disease Maternal Grandfather     Cancer Sister     Asthma Sister     High Blood Pressure Sister     High Cholesterol Sister     Arthritis Sister     Diabetes Sister     Diabetes Sister     Colon Cancer Neg Hx     Prostate Cancer Neg Hx          I have reviewed the patient's past medical history, past surgical history, allergies, medications, social and family history and I have made updates where appropriate. Review of Systems  Positive responses are highlighted in bold    Constitutional:  Fever, Chills, Night Sweats, Fatigue, Unexpected changes in weight  Eyes:  Eye discharge, Eye pain, Eye redness, Visual disturbances   HENT:  Ear pain, Tinnitus, Nosebleeds, Trouble swallowing, Hearing loss, Sore throat  Cardiovascular:  Chest Pain, Palpitations, Orthopnea, Paroxysmal Nocturnal Dyspnea  Respiratory:  Cough, Wheezing, Shortness of breath, Chest tightness, Apnea  Gastrointestinal:  Nausea, Vomiting, Diarrhea, Constipation, Heartburn, Blood in stool  Genitourinary:  Difficulty or painful urination, Flank pain, Change in frequency, Urgency  Skin:  Color change, Rash, Itching, Wound  Psychiatric:  Hallucinations, Anxiety, Depression, Suicidal ideation  Hematological:  Enlarged glands, Easy bleeding, Easily bruising  Musculoskeletal:  Joint pain, Back pain, Gait problems, Joint swelling, Myalgias  Neurological:  Dizziness, Headaches, Presyncope, Numbness, Seizures, Tremors  Allergy:  Environmental allergies, Food allergies  Endocrine:  Heat Intolerance, Cold Intolerance, Polydipsia, Polyphagia, Polyuria      PHYSICAL EXAM:  Vitals:    10/18/17 1409 10/18/17 1435   BP: (!) 96/56 110/70   Pulse: 84    Resp: 14    Temp: 98.2 °F (36.8 °C)    TempSrc: Oral    Weight: 223 lb 3.2 oz (101.2 kg)    Height: 4' 10\" (1.473 m)      Body mass index is 46.65 kg/m².   Pain Score:   0 - No pain    VS Reviewed  General Appearance: A&O x 3, No acute distress,well developed and well- nourished  Head: normocephalic and atraumatic  Eyes: pupils equal, round, and reactive to light, extraocular eye movements intact, conjunctivae and eye lids without erythema  ENT: external ear and ear canal clear bilaterally, TMs intact and regular, nose without deformity, nasal mucosa and turbinates normal without polyps, oropharynx normal, dentition is normal for age  Neck: supple and non-tender without mass, no thyromegaly or thyroid nodules, no cervical lymphadenopathy  Pulmonary/Chest: clear to auscultation bilaterally- no wheezes, rales or rhonchi, normal air movement, no respiratory distress or retractions  Cardiovascular: S1 and S2 auscultated w/ RRR. No murmurs, rubs, clicks, or gallops, distal pulses intact. Abdomen: soft, non-tender, non-distended, bowl sounds physiologic,  no rebound or guarding, no masses or hernias noted. Liver and spleen without enlargement. Extremities: no cyanosis, clubbing or edema of the lower extremities. +2 PT/DP bilaterally. Musculoskeletal: No joint swelling or gross deformity   Neuro:  Alert, 5/5 strength globally and symmetrically, 2+ patellar reflexes bilaterally  Psych: Affect appropriate. Mood normal. Thought process is normal without evidence of depression or psychosis. Good insight and appropriate interaction. Cognition and memory appear to be impaired. Skin: warm and dry, no rash or erythema, R leg in boot. Lymph:  No cervical, auricular or supraclavicular lymph nodes palpated      ECHO 22 JAN 2016   Conclusions      Summary   There was moderate global hypokinesis of the left ventricle. Doppler parameters were consistent with abnormal left ventricular   relaxation (grade 1 diastolic dysfunction). Ejection fraction is visually estimated at 35%. Left Ventricular size is Mildly increased . Pulmonic valve is structurally normal.   mild pulmonic regurgitation visualized. ASSESSMENT & PLAN  1. Essential hypertension    At goal  con't meds  Labs ordered  F/u 4 wks  Await records from [address] [place] [address] Metabolic Panel; Future  - Lipid Panel; Future  - Microalbumin / Creatinine Urine Ratio; Future  - TSH with Reflex; Future    2.  Seizure disorder ([facility]    Stable  con't keppra  F/u with

## 2017-10-23 ENCOUNTER — HOSPITAL ENCOUNTER (OUTPATIENT)
Dept: WOUND CARE | Age: 67
Discharge: HOME | End: 2017-10-23
Payer: MEDICARE

## 2017-10-23 VITALS
WEIGHT: 218 LBS | RESPIRATION RATE: 18 BRPM | HEART RATE: 68 BPM | TEMPERATURE: 97.6 F | SYSTOLIC BLOOD PRESSURE: 106 MMHG | DIASTOLIC BLOOD PRESSURE: 58 MMHG | BODY MASS INDEX: 32.29 KG/M2 | HEIGHT: 69 IN | OXYGEN SATURATION: 100 %

## 2017-10-23 DIAGNOSIS — L97.912 NONHEALING ULCER OF RIGHT LOWER LEG WITH FAT LAYER EXPOSED (HCC): Primary | ICD-10-CM

## 2017-10-23 PROCEDURE — A6222 GAUZE <=16 IN NO W/SAL W/O B: HCPCS

## 2017-10-23 PROCEDURE — 99213 OFFICE O/P EST LOW 20 MIN: CPT

## 2017-10-23 ASSESSMENT — PAIN SCALES - GENERAL: PAINLEVEL_OUTOF10: 0

## 2017-10-23 NOTE — PLAN OF CARE
Problem: Wound:  Goal: Will show signs of wound healing; wound closure and no evidence of infection  Will show signs of wound healing; wound closure and no evidence of infection   Outcome: Ongoing  Patient presents to wound clinic for follow up of right leg wounds. Right upper leg wound is scabbed, left open to air as ordered. Wound vac placed on hold. Right lower leg dressed with adaptic, gauze, kerlix, ace, cam walker boot- dressing changed at wound clinic on Wednesday and Friday. No s/s of infection. Patient afebrile. Skin graft staples removed today per Dr. Treva Calhoun. Follow up with Dr. Treva Calhoun in [address] [place] [address] in 1 week. Comments: Care plan reviewed with patient and sister. Patient and sister verbalize understanding of the plan of care and contribute to goal setting.

## 2017-10-24 ENCOUNTER — HOSPITAL ENCOUNTER (OUTPATIENT)
Dept: NON INVASIVE DIAGNOSTICS | Age: 67
Discharge: HOME | DRG: 683 | End: 2017-10-24
Payer: MEDICARE

## 2017-10-24 DIAGNOSIS — I50.22 CHRONIC SYSTOLIC HEART FAILURE (HCC): Chronic | ICD-10-CM

## 2017-10-24 LAB
LV EF: 50 %
LVEF MODALITY: NORMAL

## 2017-10-24 PROCEDURE — 93306 TTE W/DOPPLER COMPLETE: CPT

## 2017-10-25 ENCOUNTER — TELEPHONE (OUTPATIENT)
Dept: FAMILY MEDICINE CLINIC | Age: 67
End: 2017-10-25

## 2017-10-25 ENCOUNTER — HOSPITAL ENCOUNTER (OUTPATIENT)
Age: 67
Discharge: HOME | DRG: 683 | End: 2017-10-25
Payer: MEDICARE

## 2017-10-25 ENCOUNTER — APPOINTMENT (OUTPATIENT)
Dept: ULTRASOUND IMAGING | Age: 67
DRG: 683 | End: 2017-10-25
Payer: MEDICARE

## 2017-10-25 ENCOUNTER — HOSPITAL ENCOUNTER (INPATIENT)
Age: 67
LOS: 3 days | Discharge: HOME | DRG: 683 | End: 2017-10-28
Attending: EMERGENCY MEDICINE | Admitting: INTERNAL MEDICINE
Payer: MEDICARE

## 2017-10-25 DIAGNOSIS — I50.22 CHRONIC SYSTOLIC HEART FAILURE (HCC): Primary | Chronic | ICD-10-CM

## 2017-10-25 DIAGNOSIS — I10 ESSENTIAL HYPERTENSION: Chronic | ICD-10-CM

## 2017-10-25 DIAGNOSIS — N17.9 ACUTE RENAL FAILURE, UNSPECIFIED ACUTE RENAL FAILURE TYPE (HCC): Primary | ICD-10-CM

## 2017-10-25 DIAGNOSIS — E53.8 B12 DEFICIENCY: ICD-10-CM

## 2017-10-25 DIAGNOSIS — I50.22 CHRONIC SYSTOLIC HEART FAILURE (HCC): Chronic | ICD-10-CM

## 2017-10-25 DIAGNOSIS — E87.5 HYPERKALEMIA: ICD-10-CM

## 2017-10-25 DIAGNOSIS — Z12.5 SPECIAL SCREENING FOR MALIGNANT NEOPLASM OF PROSTATE: ICD-10-CM

## 2017-10-25 DIAGNOSIS — E53.1 VITAMIN B6 DEFICIENCY: ICD-10-CM

## 2017-10-25 LAB
ALBUMIN SERPL-MCNC: 4.3 G/DL (ref 3.5–5.1)
ALBUMIN SERPL-MCNC: 4.5 G/DL (ref 3.5–5.1)
ALP BLD-CCNC: 39 U/L (ref 38–126)
ALP BLD-CCNC: 42 U/L (ref 38–126)
ALT SERPL-CCNC: 20 U/L (ref 11–66)
ALT SERPL-CCNC: 22 U/L (ref 11–66)
ANION GAP SERPL CALCULATED.3IONS-SCNC: 14 MEQ/L (ref 8–16)
ANION GAP SERPL CALCULATED.3IONS-SCNC: 14 MEQ/L (ref 8–16)
ANISOCYTOSIS: ABNORMAL
AST SERPL-CCNC: 21 U/L (ref 5–40)
AST SERPL-CCNC: 22 U/L (ref 5–40)
BACTERIA: ABNORMAL /HPF
BASOPHILS # BLD: 0.9 %
BASOPHILS ABSOLUTE: 0 THOU/MM3 (ref 0–0.1)
BILIRUB SERPL-MCNC: 0.2 MG/DL (ref 0.3–1.2)
BILIRUB SERPL-MCNC: 0.2 MG/DL (ref 0.3–1.2)
BILIRUBIN URINE: NEGATIVE
BLOOD, URINE: NEGATIVE
BUN BLDV-MCNC: 58 MG/DL (ref 7–22)
BUN BLDV-MCNC: 62 MG/DL (ref 7–22)
CALCIUM SERPL-MCNC: 9.3 MG/DL (ref 8.5–10.5)
CALCIUM SERPL-MCNC: 9.4 MG/DL (ref 8.5–10.5)
CASTS 2: ABNORMAL /LPF
CASTS UA: ABNORMAL /LPF
CHARACTER, URINE: CLEAR
CHLORIDE BLD-SCNC: 103 MEQ/L (ref 98–111)
CHLORIDE BLD-SCNC: 104 MEQ/L (ref 98–111)
CHLORIDE, URINE: 64 MEQ/L
CHOLESTEROL, TOTAL: 163 MG/DL (ref 100–199)
CHP ED QC CHECK: NORMAL
CO2: 18 MEQ/L (ref 23–33)
CO2: 19 MEQ/L (ref 23–33)
COLOR: YELLOW
CREAT SERPL-MCNC: 4.1 MG/DL (ref 0.4–1.2)
CREAT SERPL-MCNC: 4.1 MG/DL (ref 0.4–1.2)
CREATININE, URINE: 306.3 MG/DL
CRYSTALS, UA: ABNORMAL
EOSINOPHIL # BLD: 2.5 %
EOSINOPHILS ABSOLUTE: 0.1 THOU/MM3 (ref 0–0.4)
EPITHELIAL CELLS, UA: ABNORMAL /HPF
GFR SERPL CREATININE-BSD FRML MDRD: 15 ML/MIN/1.73M2
GFR SERPL CREATININE-BSD FRML MDRD: 15 ML/MIN/1.73M2
GLUCOSE BLD-MCNC: 104 MG/DL (ref 70–108)
GLUCOSE BLD-MCNC: 96 MG/DL
GLUCOSE BLD-MCNC: 96 MG/DL (ref 70–108)
GLUCOSE BLD-MCNC: 99 MG/DL (ref 70–108)
GLUCOSE URINE: 500 MG/DL
HCT VFR BLD CALC: 36.3 % (ref 42–52)
HDLC SERPL-MCNC: 40 MG/DL
HEMOGLOBIN: 12.7 GM/DL (ref 14–18)
KETONES, URINE: NEGATIVE
LDL CHOLESTEROL CALCULATED: 100 MG/DL
LEUKOCYTE ESTERASE, URINE: NEGATIVE
LYMPHOCYTES # BLD: 19.7 %
LYMPHOCYTES ABSOLUTE: 0.9 THOU/MM3 (ref 1–4.8)
MAGNESIUM: 1.6 MG/DL (ref 1.6–2.4)
MCH RBC QN AUTO: 31.5 PG (ref 27–31)
MCHC RBC AUTO-ENTMCNC: 34.8 GM/DL (ref 33–37)
MCV RBC AUTO: 90.4 FL (ref 80–94)
MICROALBUMIN UR-MCNC: 5.85 MG/DL
MICROALBUMIN/CREAT UR-RTO: 19 MG/G (ref 0–30)
MISCELLANEOUS 2: ABNORMAL
MONOCYTES # BLD: 8.8 %
MONOCYTES ABSOLUTE: 0.4 THOU/MM3 (ref 0.4–1.3)
NITRITE, URINE: NEGATIVE
NUCLEATED RED BLOOD CELLS: 0 /100 WBC
OSMOLALITY CALCULATION: 289.9 MOSMOL/KG (ref 275–300)
PDW BLD-RTO: 15.6 % (ref 11.5–14.5)
PH UA: 5
PHOSPHORUS: 3.5 MG/DL (ref 2.4–4.7)
PLATELET # BLD: 276 THOU/MM3 (ref 130–400)
PMV BLD AUTO: 6.9 MCM (ref 7.4–10.4)
POTASSIUM SERPL-SCNC: 5.3 MEQ/L (ref 3.5–5.2)
POTASSIUM SERPL-SCNC: 5.6 MEQ/L (ref 3.5–5.2)
POTASSIUM SERPL-SCNC: 6.2 MEQ/L (ref 3.5–5.2)
POTASSIUM SERPL-SCNC: 6.2 MEQ/L (ref 3.5–5.2)
PROSTATE SPECIFIC ANTIGEN: 2.08 NG/ML (ref 0–1)
PROTEIN UA: NEGATIVE
RBC # BLD: 4.02 MILL/MM3 (ref 4.7–6.1)
RBC URINE: ABNORMAL /HPF
RENAL EPITHELIAL, UA: ABNORMAL
SEG NEUTROPHILS: 68.1 %
SEGMENTED NEUTROPHILS ABSOLUTE COUNT: 3.3 THOU/MM3 (ref 1.8–7.7)
SODIUM BLD-SCNC: 136 MEQ/L (ref 135–145)
SODIUM BLD-SCNC: 136 MEQ/L (ref 135–145)
SODIUM URINE: 86 MEQ/L
SPECIFIC GRAVITY, URINE: 1.02 (ref 1–1.03)
T4 FREE: 0.81 NG/DL (ref 0.93–1.76)
TOTAL PROTEIN: 7.5 G/DL (ref 6.1–8)
TOTAL PROTEIN: 7.6 G/DL (ref 6.1–8)
TRIGL SERPL-MCNC: 115 MG/DL (ref 0–199)
TSH SERPL DL<=0.05 MIU/L-ACNC: 4.49 UIU/ML (ref 0.4–4.2)
UROBILINOGEN, URINE: 0.2 EU/DL
VITAMIN B-12: 663 PG/ML (ref 211–911)
WBC # BLD: 4.8 THOU/MM3 (ref 4.8–10.8)
WBC UA: ABNORMAL /HPF
YEAST: ABNORMAL

## 2017-10-25 PROCEDURE — 93005 ELECTROCARDIOGRAM TRACING: CPT

## 2017-10-25 PROCEDURE — 2580000003 HC RX 258: Performed by: EMERGENCY MEDICINE

## 2017-10-25 PROCEDURE — 82043 UR ALBUMIN QUANTITATIVE: CPT

## 2017-10-25 PROCEDURE — 2500000003 HC RX 250 WO HCPCS: Performed by: NURSE PRACTITIONER

## 2017-10-25 PROCEDURE — 1200000003 HC TELEMETRY R&B

## 2017-10-25 PROCEDURE — 80061 LIPID PANEL: CPT

## 2017-10-25 PROCEDURE — 83735 ASSAY OF MAGNESIUM: CPT

## 2017-10-25 PROCEDURE — 2580000003 HC RX 258: Performed by: NURSE PRACTITIONER

## 2017-10-25 PROCEDURE — 84132 ASSAY OF SERUM POTASSIUM: CPT

## 2017-10-25 PROCEDURE — 81001 URINALYSIS AUTO W/SCOPE: CPT

## 2017-10-25 PROCEDURE — 80177 DRUG SCRN QUAN LEVETIRACETAM: CPT

## 2017-10-25 PROCEDURE — 6370000000 HC RX 637 (ALT 250 FOR IP): Performed by: EMERGENCY MEDICINE

## 2017-10-25 PROCEDURE — 80053 COMPREHEN METABOLIC PANEL: CPT

## 2017-10-25 PROCEDURE — 96375 TX/PRO/DX INJ NEW DRUG ADDON: CPT

## 2017-10-25 PROCEDURE — 84439 ASSAY OF FREE THYROXINE: CPT

## 2017-10-25 PROCEDURE — 82607 VITAMIN B-12: CPT

## 2017-10-25 PROCEDURE — 82948 REAGENT STRIP/BLOOD GLUCOSE: CPT

## 2017-10-25 PROCEDURE — 76770 US EXAM ABDO BACK WALL COMP: CPT

## 2017-10-25 PROCEDURE — 99222 1ST HOSP IP/OBS MODERATE 55: CPT | Performed by: INTERNAL MEDICINE

## 2017-10-25 PROCEDURE — 84100 ASSAY OF PHOSPHORUS: CPT

## 2017-10-25 PROCEDURE — 84207 ASSAY OF VITAMIN B-6: CPT

## 2017-10-25 PROCEDURE — 6370000000 HC RX 637 (ALT 250 FOR IP): Performed by: INTERNAL MEDICINE

## 2017-10-25 PROCEDURE — 80050 GENERAL HEALTH PANEL: CPT

## 2017-10-25 PROCEDURE — 96361 HYDRATE IV INFUSION ADD-ON: CPT

## 2017-10-25 PROCEDURE — 96374 THER/PROPH/DIAG INJ IV PUSH: CPT

## 2017-10-25 PROCEDURE — 99284 EMERGENCY DEPT VISIT MOD MDM: CPT

## 2017-10-25 PROCEDURE — 51798 US URINE CAPACITY MEASURE: CPT

## 2017-10-25 PROCEDURE — 82436 ASSAY OF URINE CHLORIDE: CPT

## 2017-10-25 PROCEDURE — 84300 ASSAY OF URINE SODIUM: CPT

## 2017-10-25 PROCEDURE — G0103 PSA SCREENING: HCPCS

## 2017-10-25 PROCEDURE — 2500000003 HC RX 250 WO HCPCS: Performed by: EMERGENCY MEDICINE

## 2017-10-25 PROCEDURE — 36415 COLL VENOUS BLD VENIPUNCTURE: CPT

## 2017-10-25 RX ORDER — SODIUM CHLORIDE 0.9 % (FLUSH) 0.9 %
10 SYRINGE (ML) INJECTION PRN
Status: DISCONTINUED | OUTPATIENT
Start: 2017-10-25 | End: 2017-10-28 | Stop reason: HOSPADM

## 2017-10-25 RX ORDER — LANOLIN ALCOHOL/MO/W.PET/CERES
25 CREAM (GRAM) TOPICAL DAILY
Status: DISCONTINUED | OUTPATIENT
Start: 2017-10-25 | End: 2017-10-28 | Stop reason: HOSPADM

## 2017-10-25 RX ORDER — SODIUM CHLORIDE 0.9 % (FLUSH) 0.9 %
10 SYRINGE (ML) INJECTION EVERY 12 HOURS SCHEDULED
Status: DISCONTINUED | OUTPATIENT
Start: 2017-10-25 | End: 2017-10-28 | Stop reason: HOSPADM

## 2017-10-25 RX ORDER — SODIUM CHLORIDE 9 MG/ML
INJECTION, SOLUTION INTRAVENOUS CONTINUOUS
Status: DISCONTINUED | OUTPATIENT
Start: 2017-10-25 | End: 2017-10-25

## 2017-10-25 RX ORDER — ONDANSETRON 2 MG/ML
4 INJECTION INTRAMUSCULAR; INTRAVENOUS EVERY 6 HOURS PRN
Status: DISCONTINUED | OUTPATIENT
Start: 2017-10-25 | End: 2017-10-28 | Stop reason: HOSPADM

## 2017-10-25 RX ORDER — DEXTROSE MONOHYDRATE 25 G/50ML
25 INJECTION, SOLUTION INTRAVENOUS ONCE
Status: COMPLETED | OUTPATIENT
Start: 2017-10-25 | End: 2017-10-25

## 2017-10-25 RX ORDER — LANOLIN ALCOHOL/MO/W.PET/CERES
1000 CREAM (GRAM) TOPICAL DAILY
Status: DISCONTINUED | OUTPATIENT
Start: 2017-10-25 | End: 2017-10-28 | Stop reason: HOSPADM

## 2017-10-25 RX ORDER — SODIUM CHLORIDE 9 MG/ML
INJECTION, SOLUTION INTRAVENOUS ONCE
Status: COMPLETED | OUTPATIENT
Start: 2017-10-25 | End: 2017-10-25

## 2017-10-25 RX ORDER — LEVETIRACETAM 500 MG/1
500 TABLET ORAL 2 TIMES DAILY
Status: DISCONTINUED | OUTPATIENT
Start: 2017-10-25 | End: 2017-10-28 | Stop reason: HOSPADM

## 2017-10-25 RX ORDER — LEVETIRACETAM 500 MG/1
1000 TABLET ORAL 2 TIMES DAILY
Status: DISCONTINUED | OUTPATIENT
Start: 2017-10-25 | End: 2017-10-25

## 2017-10-25 RX ADMIN — SODIUM BICARBONATE 50 MEQ: 84 INJECTION, SOLUTION INTRAVENOUS at 11:00

## 2017-10-25 RX ADMIN — SODIUM BICARBONATE: 84 INJECTION INTRAVENOUS at 16:33

## 2017-10-25 RX ADMIN — INSULIN HUMAN 10 UNITS: 100 INJECTION, SOLUTION PARENTERAL at 10:58

## 2017-10-25 RX ADMIN — DEXTROSE MONOHYDRATE 25 G: 25 INJECTION, SOLUTION INTRAVENOUS at 11:00

## 2017-10-25 RX ADMIN — CYANOCOBALAMIN TAB 1000 MCG 1000 MCG: 1000 TAB at 16:33

## 2017-10-25 RX ADMIN — PYRIDOXINE HCL TAB 50 MG 25 MG: 50 TAB at 16:36

## 2017-10-25 RX ADMIN — LEVETIRACETAM 500 MG: 500 TABLET, FILM COATED ORAL at 22:50

## 2017-10-25 RX ADMIN — SODIUM CHLORIDE: 9 INJECTION, SOLUTION INTRAVENOUS at 11:16

## 2017-10-25 ASSESSMENT — PAIN SCALES - GENERAL
PAINLEVEL_OUTOF10: 0
PAINLEVEL_OUTOF10: 0

## 2017-10-25 NOTE — TELEPHONE ENCOUNTER
10/25/17  Yadiel at [facility] lab called with critical value on Potassium 6.2 and Creatinine 4.1.    Thanks/blm

## 2017-10-25 NOTE — ED NOTES
Patient transported to [facility] by cart and tele.      Noam Toledo LPN  74/43/89 [id number]

## 2017-10-25 NOTE — PROGRESS NOTES
10/23/2017  2:24 PM   Drainage Description Serosanguinous 10/23/2017  2:24 PM   Odor None 10/23/2017  2:24 PM   Culture Taken Yes 5/25/2017 12:04 PM   Time out Yes 9/15/2017 10:21 AM   Procedural Pain 0 9/15/2017 10:21 AM   Post procedural Pain 0 9/15/2017 10:21 AM   Op First Treatment Date 10/28/16 10/28/2016 11:35 AM   Number of days: 361       Incision 10/09/17 Thigh Right (Active)   Wound Assessment Black 10/23/2017  2:24 PM   Renate-wound Assessment Dry 10/23/2017  2:24 PM   Wound Length (cm) 0 cm 10/23/2017  2:24 PM   Wound Width (cm) 0 cm 10/23/2017  2:24 PM   Wound Depth (cm)  scabbed 10/23/2017  2:24 PM   Closure HENRY 10/9/2017  2:00 PM   Drainage Amount None 10/23/2017  2:24 PM   Drainage Description Serosanguinous 10/16/2017 12:10 PM   Odor None 10/23/2017  2:24 PM   Dressing/Treatment Open to air 10/23/2017  2:24 PM   Dressing Changed Changed/New 10/16/2017 12:10 PM   Dressing Status Intact; Changed 10/16/2017 12:10 PM   Number of days: 15         Wound Care Documentation:  Negative Pressure Wound Therapy Leg Right (Active)   Wound Type Surgical 9/25/2017  4:05 PM   Wound Assessment Edges well approximated;Yellow;Red 10/2/2017 11:33 AM   Renate-wound Assessment Maceration; White 10/2/2017 11:33 AM   Unit Type KCI 10/2/2017 11:33 AM   Dressing Type Black foam 10/2/2017 11:33 AM   Number of pieces used 1 10/2/2017 11:33 AM   Cycle Continuous 10/2/2017 11:33 AM   Target Pressure (mmHg) 125 10/2/2017 11:33 AM   Irrigation Solution Normal Saline 10/2/2017 11:33 AM   Canister changed? Yes 10/2/2017 11:33 AM   Dressing Status Dry; Intact; Changed 10/2/2017 11:33 AM   Dressing Changed Changed/New 10/2/2017 11:33 AM   Drainage Amount Moderate 10/2/2017 11:33 AM   Drainage Description Serosanguinous 10/2/2017 11:33 AM   Odor None 10/2/2017 11:33 AM   Number of days: 21       Wound 10/28/16 Leg Right;Lateral;Lower;Proximal #1 (Active)   Wound Type Wound 10/2/2017 11:33 AM   Dressing Status Intact 10/2/2017 11:33 AM 10/2/2017 11:33 AM   Renate-wound Assessment Maceration; White 10/2/2017 11:33 AM   Unit Type KCI 10/2/2017 11:33 AM   Dressing Type Black foam 10/2/2017 11:33 AM   Number of pieces used 1 10/2/2017 11:33 AM   Cycle Continuous 10/2/2017 11:33 AM   Target Pressure (mmHg) 125 10/2/2017 11:33 AM   Irrigation Solution Normal Saline 10/2/2017 11:33 AM   Canister changed? Yes 10/2/2017 11:33 AM   Dressing Status Dry; Intact; Changed 10/2/2017 11:33 AM   Dressing Changed Changed/New 10/2/2017 11:33 AM   Drainage Amount Moderate 10/2/2017 11:33 AM   Drainage Description Serosanguinous 10/2/2017 11:33 AM   Odor None 10/2/2017 11:33 AM   Number of days:7       Wound 10/28/16 Leg Right;Lateral;Lower;Proximal #1 (Active)   Wound Type Wound 10/2/2017 11:33 AM   Dressing Status Intact 10/2/2017 11:33 AM   Dressing Changed Changed/New 10/2/2017 11:33 AM   Dressing/Treatment Dry dressing 9/25/2017 10:46 AM   Wound Cleansed Rinsed/Irrigated with saline 10/2/2017 11:33 AM   Wound Length (cm) 4.5 cm 10/2/2017 11:33 AM   Wound Width (cm) 7 cm 10/2/2017 11:33 AM   Wound Depth (cm)  graft 10/2/2017 11:33 AM   Calculated Wound Size (cm^2) (l*w) 31.5 cm^2 10/2/2017 11:33 AM   Change in Wound Size % (l*w) -162.5 10/2/2017 11:33 AM   Wound Assessment Yellow;Pink 10/2/2017 11:33 AM   Margins Attached edges 10/2/2017 11:33 AM   Renate-wound Assessment Maceration; White;Pink 10/2/2017 11:33 AM   [place]%Wound Bed 50 10/2/2017 11:33 AM   Red%Wound Bed 90 9/21/2017 11:46 AM   Yellow%Wound Bed 50 10/2/2017 11:33 AM   Black%Wound Bed 5 6/2/2017  9:05 AM   Drainage Amount Moderate 10/2/2017 11:33 AM   Drainage Description Serosanguinous 10/2/2017 11:33 AM   Odor None 10/2/2017 11:33 AM   Culture Taken Yes 5/25/2017 12:04 PM   Time out Yes 9/15/2017 10:21 AM   Procedural Pain 0 9/15/2017 10:21 AM   Post procedural Pain 0 9/15/2017 10:21 AM   Op First Treatment Date 10/28/16 10/28/2016 11:35 AM   Number of days:339       Wound 10/28/16 Leg ANGIOGRAM/with runoff procedure:           CLINICAL INFORMATION: Venous ulcer of right lower extremity with varicose veins (HCC), Venous insufficiency of right leg, Nonhealing ulcer of right lower leg ([address] PVD (peripheral vascular disease) (HCC)       PERFORMED BY:  Dr. Junior Buckner MD       APPROACH: Left common femoral artery, micropuncture technique. CATHETERS: 5 Berdie Milks VCF, 5 Berdie Milks JAZLYN catheter,, 5 Berdie Milks SOS, 5 Berdie Milks Ferro 2... STENTS: None. VESSELS CATHETERIZED: Upper abdominal aorta, lower abdominal aorta, right common iliac and left external iliac arteries. Shi Torres DIAGNOSTIC PROCEDURES: Abdominal aortogram, pelvic angiography, right leg arteriogram, left leg arteriogram..           INTERVENTIONS: None. FLUOROSCOPY TIME: 9 minutes 56 seconds   FLUOROSCOPIC IMAGES: 40   SEDATION: Versed 2.0mg ; fentanyl 100mcg , IV; the patient was sedated for 45 minutes during this procedure and monitored with EKG and pulse ox monitoring devices by registered nurse. OTHER MEDICATIONS: None . CONTRAST: 114 ml, Optiray-320. CLOSURE: Angio-Seal device, successful without complication. TECHNIQUE: Signed informed consent was obtained prior to performing this procedure. The patient was sedated, as indicated above. Access was obtained and a 5 Berdie Milks vascular sheath was inserted. The procedures as above were then performed. FINDINGS:        AORTA: Aorta is mildly tortuous. Both renal arteries, SMA, and celiac artery are widely patent. PELVIC VESSELS: Both common, internal, and external iliac arteries are widely patent. Angiogram RIGHT leg:   CFA AND PROFUNDA: The common and deep femoral arteries are widely patent. SFA AND POPLITEAL ARTERY:The superficial femoral artery and popliteal artery are widely patent. TRIFURCATION VESSELS: All 3 trifurcation vessels are widely patent. Anterior tibial artery is the main runoff vessel. .       Angiogram LEFT leg:   CFA AND

## 2017-10-25 NOTE — ED PROVIDER NOTES
tenderness  Musculoskeletal:  Intact distal pulses, No edema,   Continued postoperative boot on the right with wound dressing  Back:No tenderness. Integument:  Warm, Dry, No erythema, No rash (on exposed areas)   Lymphatic:  No lymphadenopathy noted. Neurologic:  Alert & oriented x 3, Normal motor function, Normal sensory function, No focal deficits noted. Psychiatric:  Affect normal, Judgment normal, Mood normal.     EKG    Sinus rate and rhythm at 66, OR-2 24, no ischemia or infarction. Old Q waves inferiorly unchanged from prior EKG. No hyperacute or peaked T waves                  RADIOLOGY    No orders to display       PROCEDURES    none      CONSULTS:  Dr. Zita Ortiz From nephrology paged at 11:05  Dr. Parul Higgins at 11:46  Dr. Iban Castaneda at 11:53      CRITICAL CARE:  None    ED Anselmo Brien Valentino [facility] 630 studies reviewed. (See chart for details)   Patient presents with abnormal labs. He is found with a creatinine over 4. Potassium over 6. This is new for him. He's never had kidney failure in the past.  Denies headache neck pain chest pain or shortness of breath or other issues. He has had significant issues with a chronic wound of his right leg and intermittently on antibiotics and just finished a month long course of Bactrim. Obviously could have some medication toxicity. Given potassium over 6 recommend admission monitoring. REASSESSMENT  Patient rechecked and updated on lab/xray status, progress and results. .  Patient was reassessed and condition was unchanged after tx. No further needs at this time. Patient had repeat labs done just now to assess for lab air or other findings and this was performed and is unchanged from this morning and shows acute renal failure with hyperkalemia. As noted above case was discussed with nephrology. No other change in plan.   Case was discussed with Dr. Parul Higgins who will be happy to admit for further evaluation of his acute renal failure. FINAL IMPRESSION    1. Acute renal failure, unspecified acute renal failure type ([facility]    2. Hyperkalemia         PATIENT REFERRED TO:  No follow-up provider specified.     DISCHARGE MEDICATIONS:  New Prescriptions    No medications on file           Colton Alexandra MD  10/25/17 [address] [facility] Telma Hare MD  10/25/17 [id number]

## 2017-10-25 NOTE — H&P
Dr. Bailee penn follows, no seizure activity since    Seizure disorder ([facility] 7/17/2014       Past Surgical History:          Procedure Laterality Date    VA INCIS/DRAIN THIGH/KNEE ABSCESS,DEEP Right 9/25/2017    RIGHT SERGIO LEG WOUND DEBRIDEMENT WITH SKIN GRAFT AND WOUND VAC APPLICATION performed by Carrol Gómez DPM at [address] [place] [address] [address] Right 10/9/2017    SPLIT THICKNESS SKIN GRAFT FROM RIGHT LEG performed by Carrol Gómez DPM at [address] [place] [address]      vein stripping       Medications Prior to Admission:      Prior to Admission medications    Medication Sig Start Date End Date Taking? Authorizing Provider   Naproxen Sodium (ALEVE) 220 MG CAPS Take 1 tablet by mouth daily   Yes Historical Provider, MD   levETIRAcetam (KEPPRA) 1000 MG tablet Take 1 tablet by mouth 2 times daily 10/9/17  Yes Carlin Mercado MD   vitamin B-12 (CYANOCOBALAMIN) 1000 MCG tablet Take 1,000 mcg by mouth daily Indications: doesnt know when he ran out    Yes Historical Provider, MD   pyridoxine (B-6) 25 MG tablet Take 25 mg by mouth daily Indications: doesnt when he ran out    Yes Historical Provider, MD   lisinopril (PRINIVIL;ZESTRIL) 20 MG tablet Take 20 mg by mouth daily Indications: Doesn't know when he ran out    Yes Historical Provider, MD       Allergies:  Review of patient's allergies indicates no known allergies. Social History:      The patient currently lives at home. TOBACCO:   reports that he has never smoked. He has never used smokeless tobacco.  ETOH:   reports that he does not drink alcohol.       Family History:      Positive as follows:        Problem Relation Age of Onset    Cancer Mother     Stroke Mother     Diabetes Mother     High Blood Pressure Mother     High Cholesterol Mother     Stroke Father     COPD Father     Diabetes Maternal Grandmother     Heart Disease Maternal Grandfather     Cancer Sister     Asthma Sister     High Blood Pressure Sister  High Cholesterol Sister     Arthritis Sister     Diabetes Sister     Diabetes Sister     Colon Cancer Neg Hx     Prostate Cancer Neg Hx        Diet:       REVIEW OF SYSTEMS:   Pertinent positives as noted in the HPI. All other systems reviewed and negative. PHYSICAL EXAM:    /76   Pulse 66   Temp 98 °F (36.7 °C) (Oral)   Resp 16   SpO2 100%     General appearance:  No apparent distress, appears stated age and cooperative. No generalized swelling or edema of limbs appreciated. HEENT:  Normal cephalic, atraumatic without obvious deformity. Pupils equal, round, and reactive to light. Extra ocular muscles intact. Conjunctivae/corneas clear. Neck: Supple, with full range of motion. No jugular venous distention. Trachea midline. Respiratory:  Normal respiratory effort. Clear to auscultation, bilaterally without Rales/Wheezes/Rhonchi. Cardiovascular:  Regular rate and rhythm with normal S1/S2 without murmurs, rubs or gallops. Abdomen: Soft, obese, non-tender, non-distended with normal bowel sounds. Musculoskeletal:  No clubbing, cyanosis or edema bilaterally. Full range of motion without deformity. Skin: Skin color, texture, turgor normal.  No rashes or lesions. Neurologic:  Neurovascularly intact without any focal sensory/motor deficits. Cranial nerves: II-XII intact, grossly non-focal.  Psychiatric:  Alert and oriented, thought content appropriate, normal insight  Capillary Refill: Brisk,< 3 seconds   Peripheral Pulses: +2 palpable, equal bilaterally   Loving catheter installed with collection system. Greater than 800 mL of clear yellow urine already collected.     Labs:     Recent Labs      10/25/17   0801   WBC  4.8   HGB  12.7*   HCT  36.3*   PLT  276     Recent Labs      10/25/17   0801  10/25/17   1044   NA  136  136   K  6.2*  6.2*   CL  104  103   CO2  18*  19*   BUN  58*  62*   CREATININE  4.1*  4.1*   CALCIUM  9.4  9.3   PHOS   --   3.5     Recent Labs 10/25/17   0801  10/25/17   1044   AST  21  22   ALT  22  20   BILITOT  0.2*  0.2*   ALKPHOS  42  39     No results for input(s): INR in the last 72 hours. No results for input(s): Vlad Clarke in the last 72 hours. Urinalysis:      Lab Results   Component Value Date    NITRU NEGATIVE 07/17/2014    WBCUA 0 07/17/2014    BACTERIA NONE 07/17/2014    RBCUA 0 07/17/2014    BLOODU MODERATE 07/17/2014    SPECGRAV 1.015 07/17/2014       Radiology:       No orders to display            DVT prophylaxis: [x] Lovenox                                 [] SCDs                                 [] SQ Heparin                                 [x] Encourage ambulation           [] Already on Anticoagulation    Code Status: Prior        Disposition:    [] Home       [] TCU       [] Rehab       [] Psych       [] SNF       [] [place]       [] Other-    ASSESSMENT:    C/Juliette Pugh 1106 Problems    Diagnosis Date Noted    Acute renal failure (ARF) ([facility] [N17.9] 10/25/2017     Priority: High    Venous ulcer of right lower extremity with varicose veins ([facility] [I83.019] 10/31/2016     Priority: Low    Hypertension [I10]     Hyperlipidemia [E78.5]     PVD (peripheral vascular disease) ([facility] [I73.9] 02/10/2017    Seizure disorder ([facility] [Y97.786] 07/17/2014       PLAN:    Patient related that he rarely drinks water and mostly has intake of Pepsi-Cola. He was advised to drink by mouth water in future. Nephrology has been consulted, but meanwhile patient is receiving IV hydration and encouraged to take po fluids. The wound team have also been consulted for his leg wound. Thank you Ira Killian DO for the opportunity to be involved in this patient's care.     Electronically signed by Sachin Doshi MD on 10/25/2017 at 12:42 PM

## 2017-10-25 NOTE — PROCEDURES
A Bladder scan was performed at 1035 . The patient's last void was at 1000 . The residual amount was measured to be 22 ML. Report of results was given to Nilam Whelan RN.

## 2017-10-25 NOTE — CONSULTS
10/25/17   1044  10/25/17   1144   NA  136  136   --    K  6.2*  6.2*   --    CL  104  103   --    CO2  18*  19*   --    BUN  58*  62*   --    CREATININE  4.1*  4.1*   --    GLUCOSE  99  104  96   CALCIUM  9.4  9.3   --    MG   --   1.6   --      PTH: @PTH@  TSH:   Recent Labs      10/25/17   0801   TSH  4.490*     HgBa1c: No results for input(s): LABA1C in the last 72 hours. Hepatic:   Recent Labs      10/25/17   0801  10/25/17   1044   LABALBU  4.5  4.3   AST  21  22   ALT  22  20   BILITOT  0.2*  0.2*   ALKPHOS  42  39     Results for Claudio Meckel (MRN [id number]) as of 10/25/2017 15:04   Ref. Range 10/25/2017 08:01   Vitamin B-12 Latest Ref Range: 211 - 911 pg/mL 663   Prostatic Specific Ag Latest Ref Range: 0.00 - 1.00 ng/ml 2.08 (H)   Creatinine, Urine Latest Units: mg/dL 306.3   Microalb/Creat Ratio Latest Ref Range: 0 - 30 mg/g 19   MICROALBUMIN, RANDOM URINE Latest Units: mg/dL 5.85   Lipids:   Recent Labs      10/25/17   0801   CHOL  163   HDL  40     INR: No results for input(s): INR in the last 72 hours. 1/22/2016 Echocardiogram   Summary   There was moderate global hypokinesis of the left ventricle. Doppler parameters were consistent with abnormal left ventricular   relaxation (grade 1 diastolic dysfunction). Ejection fraction is visually estimated at 35%. Left Ventricular size is Mildly increased . Pulmonic valve is structurally normal.   mild pulmonic regurgitation visualized. Echocardiogram 10/24/17   Summary   Normal left ventricle size and Low Normal LV systolic function. Ejection   fraction was estimated at 50 %. There were no regional left ventricular   wall motion abnormalities and wall thickness was within normal limits.   Doppler parameters were consistent with abnormal left ventricular   relaxation (grade 1 diastolic dysfunction).   The left atrium is Mildly dilated.     24HR INTAKE/OUTPUT:  No intake or output data in the 24 hours ending 10/25/17 1507  No intake/output bicarbonate as below  - Strict I&O  - Daily weights  - Renal US  - BMP in am.     2. Hyperkalemia, due to reduced renal excretion in ANA LILIA  - Given D50/insulin, bicarb 1 amp,  IVF at 125 mL/hr  - Potassium recheck stat now. - Recheck K 5.6    3. Acidosis, secondary to ANA LILIA, s/p bicarbonate administration, bicarb 19  - start bicarb drip 50 meq in 0.45% NS at 75 mL/hr. 4. Anemia, normocytic, possibly chronic?  - Iron studies in am.     5. Right ankle wound, with skin graft, followed by Dr. Kirkwood Hammans as O/P.   6. Grade 1 diastolic dysfunction with LVEF 50% per recent echo. - No s/s of overt fluid overload. 7. Mental delay, with TBI/fall 4 years ago, previous alcohol abuse. 8. Hypothyroidism, new finding, management per primary service. 9. PMH alcohol abuse, no alcohol in 4 years. Discussed with Dr. Steff Chawla.     All labs, radiology and medications were reviewed and discussed with the patient and sister at bedside. Thank you for the consult. Please feel free to call me if you have any questions.      Kimberley Zambrano, CNP  [facility]

## 2017-10-26 LAB
ANION GAP SERPL CALCULATED.3IONS-SCNC: 10 MEQ/L (ref 8–16)
BUN BLDV-MCNC: 39 MG/DL (ref 7–22)
CALCIUM SERPL-MCNC: 8.7 MG/DL (ref 8.5–10.5)
CHLORIDE BLD-SCNC: 106 MEQ/L (ref 98–111)
CO2: 21 MEQ/L (ref 23–33)
CREAT SERPL-MCNC: 2.3 MG/DL (ref 0.4–1.2)
EKG ATRIAL RATE: 66 BPM
EKG P-R INTERVAL: 224 MS
EKG Q-T INTERVAL: 388 MS
EKG QRS DURATION: 110 MS
EKG QTC CALCULATION (BAZETT): 406 MS
EKG R AXIS: 4 DEGREES
EKG T AXIS: -16 DEGREES
EKG VENTRICULAR RATE: 66 BPM
GFR SERPL CREATININE-BSD FRML MDRD: 28 ML/MIN/1.73M2
GLUCOSE BLD-MCNC: 88 MG/DL (ref 70–108)
HCT VFR BLD CALC: 32.7 % (ref 42–52)
HEMOGLOBIN: 11.2 GM/DL (ref 14–18)
KEPPRA: 70 UG/ML (ref 12–46)
MCH RBC QN AUTO: 31.1 PG (ref 27–31)
MCHC RBC AUTO-ENTMCNC: 34.3 GM/DL (ref 33–37)
MCV RBC AUTO: 90.7 FL (ref 80–94)
PDW BLD-RTO: 15.5 % (ref 11.5–14.5)
PLATELET # BLD: 230 THOU/MM3 (ref 130–400)
PMV BLD AUTO: 6.6 MCM (ref 7.4–10.4)
POTASSIUM SERPL-SCNC: 5.5 MEQ/L (ref 3.5–5.2)
POTASSIUM SERPL-SCNC: 5.8 MEQ/L (ref 3.5–5.2)
RBC # BLD: 3.61 MILL/MM3 (ref 4.7–6.1)
SODIUM BLD-SCNC: 137 MEQ/L (ref 135–145)
WBC # BLD: 4.1 THOU/MM3 (ref 4.8–10.8)

## 2017-10-26 PROCEDURE — 85027 COMPLETE CBC AUTOMATED: CPT

## 2017-10-26 PROCEDURE — 99232 SBSQ HOSP IP/OBS MODERATE 35: CPT | Performed by: INTERNAL MEDICINE

## 2017-10-26 PROCEDURE — 6370000000 HC RX 637 (ALT 250 FOR IP): Performed by: INTERNAL MEDICINE

## 2017-10-26 PROCEDURE — 80177 DRUG SCRN QUAN LEVETIRACETAM: CPT

## 2017-10-26 PROCEDURE — 2580000003 HC RX 258: Performed by: NURSE PRACTITIONER

## 2017-10-26 PROCEDURE — 2500000003 HC RX 250 WO HCPCS: Performed by: NURSE PRACTITIONER

## 2017-10-26 PROCEDURE — 36415 COLL VENOUS BLD VENIPUNCTURE: CPT

## 2017-10-26 PROCEDURE — 1200000003 HC TELEMETRY R&B

## 2017-10-26 PROCEDURE — 80048 BASIC METABOLIC PNL TOTAL CA: CPT

## 2017-10-26 PROCEDURE — 84132 ASSAY OF SERUM POTASSIUM: CPT

## 2017-10-26 RX ORDER — SODIUM POLYSTYRENE SULFONATE 15 G/60ML
15 SUSPENSION ORAL; RECTAL ONCE
Status: COMPLETED | OUTPATIENT
Start: 2017-10-26 | End: 2017-10-26

## 2017-10-26 RX ORDER — HEPARIN SODIUM 5000 [USP'U]/ML
5000 INJECTION, SOLUTION INTRAVENOUS; SUBCUTANEOUS EVERY 12 HOURS
Status: DISCONTINUED | OUTPATIENT
Start: 2017-10-26 | End: 2017-10-28 | Stop reason: HOSPADM

## 2017-10-26 RX ADMIN — Medication 15 G: at 13:21

## 2017-10-26 RX ADMIN — LEVETIRACETAM 500 MG: 500 TABLET, FILM COATED ORAL at 20:26

## 2017-10-26 RX ADMIN — SODIUM BICARBONATE: 84 INJECTION INTRAVENOUS at 20:25

## 2017-10-26 RX ADMIN — LEVETIRACETAM 500 MG: 500 TABLET, FILM COATED ORAL at 08:04

## 2017-10-26 RX ADMIN — SODIUM BICARBONATE: 84 INJECTION INTRAVENOUS at 06:15

## 2017-10-26 RX ADMIN — CYANOCOBALAMIN TAB 1000 MCG 1000 MCG: 1000 TAB at 08:04

## 2017-10-26 RX ADMIN — PYRIDOXINE HCL TAB 50 MG 25 MG: 50 TAB at 08:04

## 2017-10-26 ASSESSMENT — PAIN SCALES - GENERAL
PAINLEVEL_OUTOF10: 0

## 2017-10-26 NOTE — PLAN OF CARE
Problem: Falls - Risk of  Goal: Absence of falls  Outcome: Ongoing  Safety measures continued. Call light in reach SR up x2, personal items in reach on table next to bed. Bed alarm on for added safety . Pt reminded to ask for help if needs oob. Problem: Daily Care:  Intervention: Assess patient self-care activities  Pt able to do most  ADL, pt may need some help due to right foot boot and weight bearing. Problem: Pain:  Intervention: Assess pain scale for assessing level of pain  Pt denied any pain. Pt updated if has pain to let rn know so can provide medications      Problem: Skin Integrity:  Intervention: Assess risk factors for impaired skin integrity and/or pressure ulcers  Pt old skin graft right outer upper thigh healing no drainage. Pt reported has itched some dry skin . Discussed with pt importance not to break skin let heal.      Problem: Discharge Planning:  Intervention: Identify potential discharge barriers on admission  Pt lives alone. Pt denied any needs except transportation,unable to drive with boot on right foot.

## 2017-10-26 NOTE — PROGRESS NOTES
Pharmacy Medication History Note      List of current medications patient is taking is complete. Source of information: Patient, [facility]    Changes made to medication list:  Medications removed (include reason, ex. therapy complete or physician discontinued):  · None    Medications added/doses adjusted:  · None    Other notes (ex. Recent course of antibiotics, Coumadin dosing):  · Denies use of other OTC or herbal medications.       Allergies reviewed      Electronically signed by Bessy Bermudez on 10/26/2017 at 9:59 AM

## 2017-10-26 NOTE — PLAN OF CARE
Problem: Falls - Risk of  Goal: Absence of falls  Outcome: Ongoing  Call light within reach. Side rails up x2. Bed alarm on. Non skid slippers available. Comments: Care plan reviewed with patient. Patient  verbalize understanding of the plan of care and contribute to goal setting.

## 2017-10-26 NOTE — CONSULTS
Podiatric Surgery Consult    Reason for Consult:  STSF applied to right leg on 10. 9.17  Requesting Physician:  Dr. Olya Upton:  Abnormal lab value, ANA LILIA    HISTORY OF PRESENT ILLNESS:                The patient is a 79 y.o. male with significant past medical history of HTN, HLD, heart failure seizures, PVD, ANA LILIA, venous insufficiency, traumatic brain injury, DVT, alcohol abuse who is being seen at bedside on behalf of Dr. Katherine Chaudhary. Patient is well known to Dr. Katherine Chaudhary and follows with him in the wound care center, last visit was on Monday, 10.23.17. Patient had a STSG harvested from his right thigh and placed on his long standing venous stasis wound of his right lateral lower leg on 10.9. 17. Patient states he no longer has pain in the right leg. Patient is anxious to get out of the walking boot so he can drive again. Patient was admitted yesterday for abnormal lab values and is currently being worked up for acute renal failure. Patient denies any N,V,F,C,SOB,CP. No further pedal complaints.       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 Austin Vigil follows, no seizure activity since    Seizure disorder ([facility] [address] 7/17/2014     Past Surgical History:        Procedure Laterality Date    MN INCIS/DRAIN THIGH/KNEE ABSCESS,DEEP Right 9/25/2017    RIGHT SERGIO LEG WOUND DEBRIDEMENT WITH SKIN GRAFT AND WOUND VAC APPLICATION performed by Meenakshi Whiteside DPM at [address] [address] [address] Right 10/9/2017    SPLIT THICKNESS SKIN GRAFT FROM RIGHT LEG performed by Meenakshi Whiteside DPM at . [facility] [address]      vein stripping     Current Medications:    Current Facility-Administered Medications: vitamin B-6 (PYRIDOXINE) tablet 25 mg, 25 mg, Oral, Daily  vitamin B-12 (CYANOCOBALAMIN) tablet 1,000 mcg, 1,000 mcg, Oral, Daily  sodium chloride flush 0.9 % injection 10 mL, 10 mL, Intravenous, 2 times per day  sodium chloride flush 0.9 % injection 10 mL, 10 mL, Intravenous, PRN  magnesium hydroxide (MILK OF MAGNESIA) 400 MG/5ML suspension 30 mL, 30 mL, Oral, Daily PRN  ondansetron (ZOFRAN) injection 4 mg, 4 mg, Intravenous, Q6H PRN  sodium bicarbonate 75 mEq in sodium chloride 0.45 % 1,000 mL infusion, , Intravenous, Continuous  levETIRAcetam (KEPPRA) tablet 500 mg, 500 mg, Oral, BID  Allergies:  Review of patient's allergies indicates no known allergies. Social History:    TOBACCO:  Never used tobacco  ETOH:  Past alcohol usage.     DRUGS:  Never used recreational drugs  Family History:       Problem Relation Age of Onset   [facility] Cancer Mother     Stroke Mother     Diabetes Mother     High Blood Pressure Mother     High Cholesterol Mother     Stroke Father     COPD Father     Diabetes Maternal Grandmother     Heart Disease Maternal Grandfather     Cancer Sister     Asthma Sister     High Blood Pressure Sister     High Cholesterol Sister     Arthritis Sister     Diabetes Sister     Diabetes Sister     Colon Cancer Neg Hx     Prostate Cancer Neg Hx      REVIEW OF SYSTEMS:    Review of Systems  Constitutional: Negative for weight change, fatigue, weakness, fever, chills, night sweats, anorexia, malaise  Head: Negative for trauma, headache, confusion, light-headedness  Eyes: Negative for pain, blurriness, itching, redness, acute visual changes  Ears: Negative for hearing loss, tinnitus, vertigo, discharge, earache  Nose/Sinuses: Negative for rhinorrhea, congestion, sneezing, itching, allergies, epistaxis   Mouth/Throat/Neck: Negative for bleeding gums, hoarseness, sore throat, swollen neck  Cardiac: Negative for chest pain, hypertension, resolved. No active drainage or malodor noted. Cody site on right thigh is dry, stable, with over lying eschar. No signs of infection    Neurovascular: Epicritic and protopathic sensations are grossly intact bilateral.    Musculoskeletal: Muscle strength 5/5 for all plantarflexors, dorsiflexors, inverters and everters examined. No pain with palpation of stacia-wound area. No gross deformities noted. LABS:    Recent Labs      10/25/17   0801  10/26/17   0522   WBC  4.8  4.1*   HGB  12.7*  11.2*   HCT  36.3*  32.7*   PLT  276  230        Recent Labs      10/25/17   1044   10/26/17   0522   NA  136   --   137   K  6.2*   < >  5.8*   CL  103   --   106   CO2  19*   --   21*   PHOS  3.5   --    --    BUN  62*   --   39*   CREATININE  4.1*   --   2.3*    < > = values in this interval not displayed. Recent Labs      10/25/17   0801  10/25/17   1044   PROT  7.6  7.5      No results for input(s): CKTOTAL, CKMB, CKMBINDEX, TROPONINI in the last 72 hours.         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 Leave dressing in place until patient follows up with Dr. Raimundo Weber in [facility]. - Continue wearing CAM boot while graft continues to incorporate  - Podiatry will continue to follow patient    DISPO: Okay for discharge from podiatry standpoint. Patient has appointment with Dr. Raimundo Weber on Monday, 10.30.17 in wound care center. Leave dressing to right leg in place until patient's appointment. Dr. Opal Barksdale thank you for the consultation, allowing podiatry to assist in the medical welfare of this patient. Podiatry will continue to follow this patient throughout the duration of hospitalization. Saida Martínez DPM  10/26/2017 10:03 AM     I saw and evaluated the patient independently bedside. Please refer to resident physicians note for further details. Discussed with resident assessment and findings, I agree with plan as documented.      Gretchen Horner   3:58 PM  10/27/17   Electronically signed by Gretchen Horner DPM on 10/27/2017 at 3:58 PM

## 2017-10-26 NOTE — PROGRESS NOTES
[facility]         Renal Inpatient Follow-Up note         10/26/2017 11:02 AM    Pt Name:   Juliet Ryan  YOB: 1950  Attending:   Sanjana Miranda MD    Chief Complaint : Juliet Ryan is a 79 y.o. male being followed by nephrology for ANA LILIA    Interval History :   Patient seen and examined by me. No distress  States he feels well. UOP 2625 mL/24 hours  1600 mL in soto bag at this time. Reviewed US and lab results with pt.       Scheduled Medications :    pyridoxine  25 mg Oral Daily    vitamin B-12  1,000 mcg Oral Daily    sodium chloride flush  10 mL Intravenous 2 times per day    levETIRAcetam  500 mg Oral BID      sodium bicarbonate infusion 75 mL/hr at 10/26/17 0615       Vitals :  /73   Pulse 65   Temp 97.8 °F (36.6 °C) (Oral)   Resp 16   Ht 5' 9\" (1.753 m)   Wt 237 lb (107.5 kg)   SpO2 100%   BMI 35.00 kg/m²     24HR INTAKE/OUTPUT:      Intake/Output Summary (Last 24 hours) at 10/26/17 1102  Last data filed at 10/26/17 0608   Gross per 24 hour   Intake          1496.54 ml   Output             2625 ml   Net         -1128.46 ml       Last 3 Weights  Wt Readings from Last 3 Encounters:   10/26/17 237 lb (107.5 kg)   10/23/17 218 lb (98.9 kg)   10/18/17 223 lb 3.2 oz (101.2 kg)           Physical Exam :  Constitutional: alert and cooperative with exam, appears comfortable, no distress  Oral: moist oral mucus membranes  Neck: No JVD  Lungs: Clear  Heart: regular rate and rhythm, S1, S2   Extremities: No left LE edema, RLE in boot  GI: soft, non-tender, no guarding  Skin: Intact, right lower leg wrapped   Musculo: moves all extremities  Neuro: no deficits apparent  Psychiatric: Awake, alert, Oriented         Last 3 CBC   Recent Labs      10/25/17   0801  10/26/17   0522   WBC  4.8  4.1*   RBC  4.02*  3.61*   HGB  12.7*  11.2*   HCT  36.3*  32.7*   PLT  276  230     Last 3 CMP  Recent Labs      10/25/17   0801  10/25/17   1044  10/25/17   1517  10/25/17

## 2017-10-27 LAB
ANION GAP SERPL CALCULATED.3IONS-SCNC: 10 MEQ/L (ref 8–16)
BUN BLDV-MCNC: 25 MG/DL (ref 7–22)
CALCIUM SERPL-MCNC: 8.5 MG/DL (ref 8.5–10.5)
CHLORIDE BLD-SCNC: 103 MEQ/L (ref 98–111)
CO2: 26 MEQ/L (ref 23–33)
CREAT SERPL-MCNC: 1.7 MG/DL (ref 0.4–1.2)
GFR SERPL CREATININE-BSD FRML MDRD: 40 ML/MIN/1.73M2
GLUCOSE BLD-MCNC: 85 MG/DL (ref 70–108)
KEPPRA: 32 UG/ML (ref 12–46)
POTASSIUM SERPL-SCNC: 4.6 MEQ/L (ref 3.5–5.2)
POTASSIUM SERPL-SCNC: 5.1 MEQ/L (ref 3.5–5.2)
SODIUM BLD-SCNC: 139 MEQ/L (ref 135–145)

## 2017-10-27 PROCEDURE — 1200000003 HC TELEMETRY R&B

## 2017-10-27 PROCEDURE — 80048 BASIC METABOLIC PNL TOTAL CA: CPT

## 2017-10-27 PROCEDURE — 2580000003 HC RX 258: Performed by: NURSE PRACTITIONER

## 2017-10-27 PROCEDURE — 6370000000 HC RX 637 (ALT 250 FOR IP): Performed by: INTERNAL MEDICINE

## 2017-10-27 PROCEDURE — 2500000003 HC RX 250 WO HCPCS: Performed by: NURSE PRACTITIONER

## 2017-10-27 PROCEDURE — 84132 ASSAY OF SERUM POTASSIUM: CPT

## 2017-10-27 PROCEDURE — 36415 COLL VENOUS BLD VENIPUNCTURE: CPT

## 2017-10-27 PROCEDURE — 2580000003 HC RX 258: Performed by: INTERNAL MEDICINE

## 2017-10-27 PROCEDURE — 99232 SBSQ HOSP IP/OBS MODERATE 35: CPT | Performed by: INTERNAL MEDICINE

## 2017-10-27 PROCEDURE — 6360000002 HC RX W HCPCS: Performed by: INTERNAL MEDICINE

## 2017-10-27 RX ORDER — SODIUM CHLORIDE 9 MG/ML
INJECTION, SOLUTION INTRAVENOUS CONTINUOUS
Status: DISCONTINUED | OUTPATIENT
Start: 2017-10-27 | End: 2017-10-28 | Stop reason: HOSPADM

## 2017-10-27 RX ADMIN — CYANOCOBALAMIN TAB 1000 MCG 1000 MCG: 1000 TAB at 08:06

## 2017-10-27 RX ADMIN — PYRIDOXINE HCL TAB 50 MG 25 MG: 50 TAB at 08:06

## 2017-10-27 RX ADMIN — SODIUM CHLORIDE: 9 INJECTION, SOLUTION INTRAVENOUS at 16:41

## 2017-10-27 RX ADMIN — SODIUM BICARBONATE: 84 INJECTION INTRAVENOUS at 12:10

## 2017-10-27 RX ADMIN — LEVETIRACETAM 500 MG: 500 TABLET, FILM COATED ORAL at 08:06

## 2017-10-27 RX ADMIN — HEPARIN SODIUM 5000 UNITS: 5000 INJECTION, SOLUTION INTRAVENOUS; SUBCUTANEOUS at 08:07

## 2017-10-27 RX ADMIN — HEPARIN SODIUM 5000 UNITS: 5000 INJECTION, SOLUTION INTRAVENOUS; SUBCUTANEOUS at 00:48

## 2017-10-27 ASSESSMENT — PAIN SCALES - GENERAL: PAINLEVEL_OUTOF10: 0

## 2017-10-27 NOTE — CARE COORDINATION
10/27/17, 2:40 PM      Lexi Gonsales day: 2  Location: [facility]10/010- Reason for admit: Acute renal failure (ARF) ([facility] [address] [N17.9]   Treatment Plan of Care: Creatinine 1. 7. Bicarb gtt.  Dietitian consult  PCP: Fuentes Kwan DO  Discharge Plan: Home alone

## 2017-10-27 NOTE — PROGRESS NOTES
Nutrition Assessment    Type and Reason for Visit: Initial, Consult, Patient Education (patient with questions regarding renal diet)    Nutrition Recommendations: Consider folbee plus (renal multivitamin). Malnutrition Assessment:  · Malnutrition Status: No malnutrition    Nutrition Diagnosis:   · Problem: Food and nutrition-related knowledge deficit  · Etiology: related to Renal dysfunction     Signs and symptoms:  as evidenced by Patient report of (diet questions)    Nutrition Assessment:  · Subjective Assessment: Patient admit with acute renal failure. Patient seen-reports good appetite, lives alone, sister helps him, eats occasionally at soup kitchen. Reports right leg wound has been healing well, had skin graft, wearing boot. Reports drinks mostly pepsi but is aware he must stop and drink instead water or clear pop. Potassium on admit 6.2, today 5.1  · Wound Type:  (right leg wound, 10/9 skin graft)  · Current Nutrition Therapies:  · Oral Diet Orders: Renal   · Oral Diet intake: %  · Anthropometric Measures:  · Ht: 5' 9\" (175.3 cm)   · Current Body Wt: 229 lb 0.9 oz (103.9 kg)  · Admission Body Wt: 224 lb 13.9 oz (102 kg) (10/25/17, unable to assess edema due to boot )  · Usual Body Wt:  (per hospital record: (8/28/17) 228#)  · Adjusted Body Wt: 176 lb (79.8 kg),   · BMI Classification: BMI 30.0 - 34.9 Obese Class I  · Comparative Standards (Estimated Nutrition Needs):  · Estimated Daily Total Kcal: ~2000  · Estimated Daily Protein (g): 64-80 grams (as renal status allows)    Estimated Intake vs Estimated Needs: Intake Meets Needs    Nutrition Risk Level:  Moderate    Nutrition Interventions:   Continue current diet  Continued Inpatient Monitoring, Education Completed (10/27/17: educated patient on renal diet guidelines, written materials provided with RD contact information- encouraged patient to have sister review and call with questions)    Nutrition Evaluation:   · Evaluation: Goals set

## 2017-10-27 NOTE — PROGRESS NOTES
[facility]   Nephrology progress note  10/27/2017, 4:10 PM      Pt Name:    Javed Gonzalez  MRN:     [id number]     [place]:    1950  Admit Date:    10/25/2017 10:12 AM  Primary Care Physician:  Alec Burnett DO   Room number  6K-10/010-A    Chief Complaint: Nephrology following for ANA LILIA/hyperkalemia    Subjective:  Patient seen and examined  No chest pain or shortness of breath  Feels better today    Objective:  24HR INTAKE/OUTPUT:    Intake/Output Summary (Last 24 hours) at 10/27/17 1610  Last data filed at 10/27/17 1029   Gross per 24 hour   Intake          3092.47 ml   Output             2725 ml   Net           367.47 ml     I/O last 3 completed shifts: In: 3092.5 [P.O.:976; I.V.:2116.5]  Out: 2725 [Urine:2725]  No intake/output data recorded. Admission weight: 224 lb 13.9 oz (102 kg)  Wt Readings from Last 3 Encounters:   10/27/17 229 lb 0.9 oz (103.9 kg)   10/23/17 218 lb (98.9 kg)   10/18/17 223 lb 3.2 oz (101.2 kg)     Body mass index is 33.83 kg/m².     Physical examination  VITALS:     Vitals:    10/27/17 1152   BP: 120/60   Pulse: 65   Resp: 18   Temp: 97.8 °F (36.6 °C)   SpO2: 100%     General Appearance: alert and cooperative with exam, appears comfortable, no distress  Mouth/Throat: Oral mucosa moist  Neck: No JVD  Lungs: Air entry B/L, no rales, no use of accessory muscles  Heart:  S1, S2 heard  GI: soft, non-tender, no guarding  Extremities: Right foot dressing     Lab Data  CBC:   Recent Labs      10/25/17   0801  10/26/17   0522   WBC  4.8  4.1*   HGB  12.7*  11.2*   HCT  36.3*  32.7*   PLT  276  230     BMP:  Recent Labs      10/25/17   1044  10/25/17   1144   10/26/17   0522  10/26/17   1830  10/27/17   0150  10/27/17   0413   NA  136   --    --   137   --    --   139   K  6.2*   --    < >  5.8*  5.5*  4.6  5.1   CL  103   --    --   106   --    --   103   CO2  19*   --    --   21*   --    --   26   BUN  62*   --    --   39*   --    --   25*   CREATININE  4.1*

## 2017-10-27 NOTE — PROGRESS NOTES
Podiatric Surgery     HISTORY OF PRESENT ILLNESS:      10/27/17   Patient seen resting bedside, leg elevated with DSD intact. Updated labs show improved creatinine level. Will continue to follow. Patient to wear pneumatic fracture boot at all times when ambulating. Patient scheduled for follow up at the [facility] wound center for Monday. All questions/concerns were addressed bedside. 10.26.17            The patient is a 79 y.o. male with significant past medical history of HTN, HLD, heart failure seizures, PVD, ANA LILIA, venous insufficiency, traumatic brain injury, DVT, alcohol abuse who is being seen at bedside on behalf of Dr. Marilyn Chino. Patient is well known to Dr. Marilyn Chino and follows with him in the wound care center, last visit was on Monday, 10.23.17. Patient had a STSG harvested from his right thigh and placed on his long standing venous stasis wound of his right lateral lower leg on 10.9. 17. Patient states he no longer has pain in the right leg. Patient is anxious to get out of the walking boot so he can drive again. Patient was admitted yesterday for abnormal lab values and is currently being worked up for acute renal failure. Patient denies any N,V,F,C,SOB,CP. No further pedal complaints.       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 Lee Cat follows, no seizure activity since    Seizure disorder ([facility] [address] 7/17/2014     Past Surgical History:        Procedure Laterality Date    PA INCIS/DRAIN THIGH/KNEE ABSCESS,DEEP Right 9/25/2017    RIGHT SERGIO LEG WOUND DEBRIDEMENT WITH SKIN GRAFT AND WOUND VAC APPLICATION performed by Costa Oshea DPM at [address] [place] [address] SKIN GRAFT Right 10/9/2017    SPLIT THICKNESS SKIN GRAFT FROM RIGHT LEG performed by Amber Kendall DPM at . [address]      vein stripping     Current Medications:    Current Facility-Administered Medications: heparin (porcine) injection 5,000 Units, 5,000 Units, Subcutaneous, Q12H  vitamin B-6 (PYRIDOXINE) tablet 25 mg, 25 mg, Oral, Daily  vitamin B-12 (CYANOCOBALAMIN) tablet 1,000 mcg, 1,000 mcg, Oral, Daily  sodium chloride flush 0.9 % injection 10 mL, 10 mL, Intravenous, 2 times per day  sodium chloride flush 0.9 % injection 10 mL, 10 mL, Intravenous, PRN  magnesium hydroxide (MILK OF MAGNESIA) 400 MG/5ML suspension 30 mL, 30 mL, Oral, Daily PRN  ondansetron (ZOFRAN) injection 4 mg, 4 mg, Intravenous, Q6H PRN  sodium bicarbonate 75 mEq in sodium chloride 0.45 % 1,000 mL infusion, , Intravenous, Continuous  levETIRAcetam (KEPPRA) tablet 500 mg, 500 mg, Oral, BID  Allergies:  Review of patient's allergies indicates no known allergies. Social History:    TOBACCO:  Never used tobacco  ETOH:  Past alcohol usage. DRUGS:  Never used recreational drugs  Family History:       Problem Relation Age of Onset    Cancer Mother     Stroke Mother     Diabetes Mother     High Blood Pressure Mother     High Cholesterol Mother     Stroke Father     COPD Father     Diabetes Maternal Grandmother     Heart Disease Maternal Grandfather     Cancer Sister     Asthma Sister     High Blood Pressure Sister     High Cholesterol Sister     Arthritis Sister     Diabetes Sister     Diabetes Sister     Colon Cancer Neg Hx     Prostate Cancer Neg Hx        PHYSICAL EXAM:    General Appearance: Awake, alert, and oriented. In no acute distress. Well nourished. Resting in bed. HEENT: Atraumatic, normocephalic. Hearing grossly intact b/l. Pupils equal and round. Respiratory: Non-labored breathing. Cardiovascular: Pulses palpable upper and lower extremity  Neuro: Normal speech.  No focal findings  Abdomen: Soft, non-tender, non-distended. Extremities: 5/5 muscle strength of the upper and lower extremities b/l. No joint swelling, deformity, or tenderness. Vitals:    /60   Pulse 65   Temp 97.8 °F (36.6 °C) (Oral)   Resp 18   Ht 5' 9\" (1.753 m)   Wt 229 lb 0.9 oz (103.9 kg)   SpO2 100%   BMI 33.83 kg/m²     Exam:   Dermatologic: Dressing intact to right lower extremity, no strikethrough noted  Neurovascular: Epicritic and protopathic sensations are grossly intact bilateral.  Musculoskeletal: Muscle strength 5/5 for all plantarflexors, dorsiflexors, inverters and everters examined. No pain with palpation of stacia-wound area. No gross deformities noted. LABS:    Recent Labs      10/25/17   0801  10/26/17   0522   WBC  4.8  4.1*   HGB  12.7*  11.2*   HCT  36.3*  32.7*   PLT  276  230        Recent Labs      10/25/17   1044   10/27/17   0413   NA  136   < >  139   K  6.2*   < >  5.1   CL  103   < >  103   CO2  19*   < >  26   PHOS  3.5   --    --    BUN  62*   < >  25*   CREATININE  4.1*   < >  1.7*    < > = values in this interval not displayed. Recent Labs      10/25/17   0801  10/25/17   1044   PROT  7.6  7.5      No results for input(s): CKTOTAL, CKMB, CKMBINDEX, TROPONINI in the last 72 hours.     CBC:   Lab Results   Component Value Date    WBC 4.1 10/26/2017    RBC 3.61 10/26/2017    HGB 11.2 10/26/2017    HCT 32.7 10/26/2017    MCV 90.7 10/26/2017    MCH 31.1 10/26/2017    MCHC 34.3 10/26/2017    RDW 15.5 10/26/2017     10/26/2017    MPV 6.6 10/26/2017     BMP:    Lab Results   Component Value Date     10/27/2017    K 5.1 10/27/2017     10/27/2017    CO2 26 10/27/2017    BUN 25 10/27/2017    LABALBU 4.3 10/25/2017    CREATININE 1.7 10/27/2017    CALCIUM 8.5 10/27/2017    LABGLOM 40 10/27/2017    GLUCOSE 85 10/27/2017     BUN/Creatinine:    Lab Results   Component Value Date    BUN 25 10/27/2017    CREATININE 1.7 10/27/2017 Assessment  Principle  Venous stasis ulceration of right lower extremity s/p STSG  Acute Renal Injury, resolving    Chronic  Patient Active Problem List   Diagnosis    [facility] [address]    Venous ulcer of right lower extremity with varicose veins (HCC)    Venous insufficiency of right leg    Dystrophic nail    Nonhealing ulcer of right lower leg ([facility] [address] [facility]    PVD (peripheral vascular disease) ([facility] [address] [facility]    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 ([facility] [address] [facility]    Osteoarthritis    Hypertension    Hyperlipidemia    History of DVT of right lower extremity    Heart failure with reduced ejection fraction (HCC)    History of alcohol abuse    History of traumatic brain injury    Acute renal failure (ARF) (HCC)    Acute kidney injury ([facility] [address]    Hyperkalemia    Metabolic acidosis    Excessive use of nonsteroidal anti-inflammatory drug (NSAID)       Plan  - Patient examined and evaluated  - Dressing in place, no strike through noted   - Leave dressing in place until patient follows up with Dr. James Dunn in [facility]. - Continue wearing CAM boot while graft continues to incorporate      DISPO: Okay for discharge from podiatry standpoint. Patient scheduled for follow up at the wound care center Monday, 10.30.17 in wound care center.      Shanel Anand DPM  10/27/2017 3:51 PM

## 2017-10-27 NOTE — PROGRESS NOTES
Hospitalist Progress Note    Patient:  Babak Nelson      Unit/Bed:6K-10/010-A    YOB: 1950    MRN: [id number]       Acct: [de-identified]     PCP: Jermaine Fierro DO    Date of Admission: 10/25/2017    Chief Complaint:   Chief Complaint   Patient presents with    Abnormal Lab       Subjective:   Denies acute complaints at this time. Cr is trending down. 1.7 in am.  Hyperkalemia resolved. Medications:  Reviewed    Infusion Medications    sodium bicarbonate infusion 75 mL/hr at 10/27/17 1210     Scheduled Medications    heparin (porcine)  5,000 Units Subcutaneous Q12H    pyridoxine  25 mg Oral Daily    vitamin B-12  1,000 mcg Oral Daily    sodium chloride flush  10 mL Intravenous 2 times per day    levETIRAcetam  500 mg Oral BID     PRN Meds: sodium chloride flush, magnesium hydroxide, ondansetron      Intake/Output Summary (Last 24 hours) at 10/27/17 1445  Last data filed at 10/27/17 1029   Gross per 24 hour   Intake          3092.47 ml   Output             2725 ml   Net           367.47 ml       Diet:  DIET RENAL;    Exam:  /60   Pulse 65   Temp 97.8 °F (36.6 °C) (Oral)   Resp 18   Ht 5' 9\" (1.753 m)   Wt 229 lb 0.9 oz (103.9 kg)   SpO2 100%   BMI 33.83 kg/m²     Gen/overall appearance: Not in acute distress. Alert. Head: Normocephalic, atraumatic  Eyes: EOMI, no scleral icterus  CVS: regular rate and rhythm, Normal S1S2  Pulm: Clear to auscultation bilaterally. No crackles/wheezes  Gastrointestinal: Soft, nontender, nondistended, no guarding or rebound  Extremities: RLE with immobilizer. No edema.    Neuro: No gross focal deficits noted  Skin: Warm, dry    Labs:   Recent Labs      10/25/17   0801  10/26/17   0522   WBC  4.8  4.1*   HGB  12.7*  11.2*   HCT  36.3*  32.7*   PLT  276  230     Recent Labs      10/25/17   1044   10/26/17   0522  10/26/17   1830  10/27/17   0150  10/27/17   0413   NA  136   --   137   --    --   139   K  6.2*   < >  5.8*  5.5*  4.6  5.1 CL  103   --   106   --    --   103   CO2  19*   --   21*   --    --   26   BUN  62*   --   39*   --    --   25*   CREATININE  4.1*   --   2.3*   --    --   1.7*   CALCIUM  9.3   --   8.7   --    --   8.5   PHOS  3.5   --    --    --    --    --     < > = values in this interval not displayed. Recent Labs      10/25/17   0801  10/25/17   1044   AST  21  22   ALT  22  20   BILITOT  0.2*  0.2*   ALKPHOS  42  39     No results for input(s): INR in the last 72 hours. No results for input(s): Keith Giovanni in the last 72 hours. Urinalysis:      Lab Results   Component Value Date    NITRU NEGATIVE 10/25/2017    WBCUA NONE SEEN 10/25/2017    BACTERIA NONE 10/25/2017    RBCUA 0-2 10/25/2017    BLOODU NEGATIVE 10/25/2017    SPECGRAV 1.015 07/17/2014    GLUCOSEU 500 10/25/2017       Radiology:  Us Renal Complete    Result Date: 10/26/2017  PROCEDURE: US RENAL COMPLETE CLINICAL INFORMATION: RENAL FAILURE, ACUTE (KIDNEY INJURY),  . COMPARISON: No prior study. TECHNIQUE: Grayscale, color and spectral Doppler ultrasound of the kidneys FINDINGS: The kidneys are within normal limits of size and echogenicity. There is no mass, or hydronephrosis. Resistive indices are elevated bilaterally. Urinary bladder is completely decompressed with the presence of a Loving catheter. RIGHT KIDNEY - 9.5 x 5.2 x 4.4 cm Resistive Index - 0.80 Cortical Thickness - 1.0 cm LEFT KIDNEY - 10.1 x 5.2 x 5.6 cm Resistive Index - 0.79 Cortical Thickness - 1.3 cm     Normal-appearing kidneys elevated resistive indices which may indicate medical renal disease **This report has been created using voice recognition software. It may contain minor errors which are inherent in voice recognition technology. ** Final report electronically signed by Dr. Oren Lee on 10/26/2017 9:10 AM      Diet: DIET RENAL;     Disposition:    [] Home       [] TCU       [] Rehab       [] Psych       [] SNF       [] [place]       [] Other-    Code Status: Full Code    Assessment/Plan:    Active Hospital Problems    Diagnosis Date Noted    Acute renal failure (ARF) ([facility] [N17.9] 10/25/2017    Acute kidney injury ([facility] [N17.9]     Hyperkalemia [F45.0]     Metabolic acidosis [W65.2]     Excessive use of nonsteroidal anti-inflammatory drug (NSAID) [F19.90]     Hypertension [I10]     Hyperlipidemia [E78.5]     PVD (peripheral vascular disease) ([facility] [I73.9] 02/10/2017    Venous ulcer of right lower extremity with varicose veins ([facility] [I83.019] 10/31/2016    Seizure disorder ([facility] [G40.909] 07/17/2014   Chronic diastolic CHF; compensated.   ECHO with grade 1 diastolic dysfxn    C/w IVF hydration  Trend Cr  Monitor lytes  Counseled to decrease pepsi as natural diuretic  Monitor respiratory and fluid status closely  Wound care for leg wound  PT OT  D/c planning    Electronically signed by Vern Booth MD on 10/27/2017 at 2:45 PM

## 2017-10-27 NOTE — PLAN OF CARE
Problem: Falls - Risk of  Goal: Absence of falls  Outcome: Ongoing  Call light within reach. Side rails up x2. Bed alarm on. Non skid slippers available. Problem: Pain:  Goal: Patient's pain/discomfort is manageable  Patient's pain/discomfort is manageable   Outcome: Ongoing  Patient will be monitored hourly for pain and if patient has pain, patient will be medicated as ordered. Problem: Skin Integrity:  Goal: Skin integrity will stabilize  Skin integrity will stabilize   Outcome: Ongoing  Patient's skin will be assessed each shift for any breakdown. If any breakdown is noted, Dr will be notified and referral will be made to Wound. Problem: Discharge Planning:  Goal: Patients continuum of care needs are met  Patients continuum of care needs are met   Outcome: Ongoing  Patient plans to discharge back to home. Patient has good support system of his sister and his neighbor. Problem: Tissue Perfusion - Renal, Altered:  Goal: Electrolytes within specified parameters  Electrolytes within specified parameters   Outcome: Ongoing  Patient will have labwork drawn to monitor his electrolyte levels. If abnormal, Dr will be notified for corrective measures with medications. Goal: Ability to achieve a balanced intake and output will improve  Ability to achieve a balanced intake and output will improve   Outcome: Ongoing  Patient will have electrolye levels monitored, as well as his intake and output. Dr will be notified of anything abnormal.     Problem: PAIN  Goal: Patient's pain/discomfort is manageable  Outcome: Ongoing  Patient will be monitored hourly for pain. Patient will be medicated as needed for pain control. Comments: Care plan reviewed with patient. Patient  verbalize understanding of the plan of care and contribute to goal setting.

## 2017-10-27 NOTE — PLAN OF CARE
Problem: Falls - Risk of  Goal: Absence of falls  Outcome: Ongoing  No falls noted this shift. Continue falling star program. Bed alarm on, bed in low position. Call light and personal belongings in reach. Patient uses call light appropriately. Problem: Pain:  Goal: Patient's pain/discomfort is manageable  Patient's pain/discomfort is manageable  Outcome: Ongoing  Pain goal of 0 met. Patient denies pain this shift. No pain meds available. Problem: Skin Integrity:  Goal: Skin integrity will stabilize  Skin integrity will stabilize  Outcome: Ongoing  No skin breakdown this shift. Patient turns self in bed. Will monitor    Problem: Discharge Planning:  Goal: Patients continuum of care needs are met  Patients continuum of care needs are met  Outcome: Ongoing  Patient will return home when stable. Will monitor    Problem: Tissue Perfusion - Renal, Altered:  Goal: Electrolytes within specified parameters  Electrolytes within specified parameters  Outcome: Ongoing  Results for Derrick Perez (MRN [id number]) as of 10/26/2017 23:22   Ref. Range 10/26/2017 05:22   Sodium Latest Ref Range: 135 - 145 meq/L 137   Potassium Latest Ref Range: 3.5 - 5.2 meq/L 5.8 (H)   Chloride Latest Ref Range: 98 - 111 meq/L 106   CO2 Latest Ref Range: 23 - 33 meq/L 21 (L)   BUN Latest Ref Range: 7 - 22 mg/dL 39 (H)   Creatinine Latest Ref Range: 0.4 - 1.2 mg/dL 2.3 (H)   Anion Gap Latest Ref Range: 8.0 - 16.0 meq/L 10.0   Est, Glom Filt Rate Latest Units: ml/min/1.73m2 28 (A)   Glucose Latest Ref Range: 70 - 108 mg/dL 88   Calcium Latest Ref Range: 8.5 - 10.5 mg/dL 8.7     Goal: Serum creatinine will be within specified parameters  Serum creatinine will be within specified parameters  Outcome: Ongoing  Results for Derrick Perez (MRN [id number]) as of 10/26/2017 23:22   Ref.  Range 10/26/2017 05:22   Creatinine Latest Ref Range: 0.4 - 1.2 mg/dL 2.3 (H)       Problem: DISCHARGE BARRIERS  Goal: Patient's continuum of care needs are met  Outcome: Ongoing  Needs met at this time. Will return home when stable. Correcting creatinine and electrolytes. Will monitor    Comments: Care plan reviewed with patient. Patient verbalizes understanding of the plan of care and contribute to goal setting.

## 2017-10-28 VITALS
HEIGHT: 69 IN | BODY MASS INDEX: 33.31 KG/M2 | TEMPERATURE: 98.1 F | RESPIRATION RATE: 16 BRPM | HEART RATE: 64 BPM | OXYGEN SATURATION: 97 % | DIASTOLIC BLOOD PRESSURE: 59 MMHG | WEIGHT: 224.87 LBS | SYSTOLIC BLOOD PRESSURE: 130 MMHG

## 2017-10-28 LAB
ANION GAP SERPL CALCULATED.3IONS-SCNC: 10 MEQ/L (ref 8–16)
BUN BLDV-MCNC: 17 MG/DL (ref 7–22)
CALCIUM SERPL-MCNC: 8.6 MG/DL (ref 8.5–10.5)
CHLORIDE BLD-SCNC: 103 MEQ/L (ref 98–111)
CO2: 26 MEQ/L (ref 23–33)
CREAT SERPL-MCNC: 1.3 MG/DL (ref 0.4–1.2)
GFR SERPL CREATININE-BSD FRML MDRD: 55 ML/MIN/1.73M2
GLUCOSE BLD-MCNC: 86 MG/DL (ref 70–108)
POTASSIUM SERPL-SCNC: 5 MEQ/L (ref 3.5–5.2)
SODIUM BLD-SCNC: 139 MEQ/L (ref 135–145)
VITAMIN B6: 271 NMOL/L (ref 20–125)

## 2017-10-28 PROCEDURE — 80048 BASIC METABOLIC PNL TOTAL CA: CPT

## 2017-10-28 PROCEDURE — 99239 HOSP IP/OBS DSCHRG MGMT >30: CPT | Performed by: INTERNAL MEDICINE

## 2017-10-28 PROCEDURE — 2580000003 HC RX 258: Performed by: INTERNAL MEDICINE

## 2017-10-28 PROCEDURE — 99232 SBSQ HOSP IP/OBS MODERATE 35: CPT | Performed by: INTERNAL MEDICINE

## 2017-10-28 PROCEDURE — 36415 COLL VENOUS BLD VENIPUNCTURE: CPT

## 2017-10-28 PROCEDURE — 6360000002 HC RX W HCPCS: Performed by: INTERNAL MEDICINE

## 2017-10-28 PROCEDURE — 6370000000 HC RX 637 (ALT 250 FOR IP): Performed by: INTERNAL MEDICINE

## 2017-10-28 RX ADMIN — LEVETIRACETAM 500 MG: 500 TABLET, FILM COATED ORAL at 09:34

## 2017-10-28 RX ADMIN — HEPARIN SODIUM 5000 UNITS: 5000 INJECTION, SOLUTION INTRAVENOUS; SUBCUTANEOUS at 00:01

## 2017-10-28 RX ADMIN — LEVETIRACETAM 500 MG: 500 TABLET, FILM COATED ORAL at 00:01

## 2017-10-28 RX ADMIN — PYRIDOXINE HCL TAB 50 MG 25 MG: 50 TAB at 09:33

## 2017-10-28 RX ADMIN — CYANOCOBALAMIN TAB 1000 MCG 1000 MCG: 1000 TAB at 09:34

## 2017-10-28 RX ADMIN — SODIUM CHLORIDE, PRESERVATIVE FREE 10 ML: 5 INJECTION INTRAVENOUS at 09:37

## 2017-10-28 NOTE — PROGRESS NOTES
Discharge teaching and instructions for diagnosis/procedure to patient using teachback method. AVS reviewed. Printed prescriptions given to patient. Patient voiced understanding regarding prescriptions, follow up appointments, and care of self at home.  Discharged in a wheelchair to home with support per family

## 2017-10-28 NOTE — PROGRESS NOTES
39*   --    --   25*  17   CREATININE  2.3*   --    --   1.7*  1.3*   CALCIUM  8.7   --    --   8.5  8.6    < > = values in this interval not displayed. Assessment / Plan   Renal - Acute kidney injury secondary to multifactorial etiology including nonsteroidals Bactrim and lisinopril  · Renal function improving well currently down to 1.3  · His baseline creatinine is around 1.0 not at baseline but would expect to get better in the next few days    Hyperkalemia secondary to above currently resolved however it still around 5.0 continue low potassium diet  Acid-base status stable  Essential hypertension running well. Currently not on any medications. D/W patient, nurse. meds reviewed    Ok from renal stand point For discharge. However no lisinopril upon discharge. Patient is to follow-up with Dr. Jaida Yoon in 2-3 weeks with a BMP prior. WESLY Echeverria D.  [facility].

## 2017-10-29 DIAGNOSIS — E53.1 VITAMIN B6 DEFICIENCY: Primary | ICD-10-CM

## 2017-10-29 DIAGNOSIS — R79.89 ELEVATED TSH: ICD-10-CM

## 2017-10-30 ENCOUNTER — TELEPHONE (OUTPATIENT)
Dept: FAMILY MEDICINE CLINIC | Age: 67
End: 2017-10-30

## 2017-10-30 ENCOUNTER — HOSPITAL ENCOUNTER (OUTPATIENT)
Dept: WOUND CARE | Age: 67
Discharge: HOME | End: 2017-10-30
Payer: MEDICARE

## 2017-10-30 VITALS
SYSTOLIC BLOOD PRESSURE: 126 MMHG | BODY MASS INDEX: 32.82 KG/M2 | HEART RATE: 80 BPM | DIASTOLIC BLOOD PRESSURE: 65 MMHG | WEIGHT: 222.22 LBS | RESPIRATION RATE: 18 BRPM | OXYGEN SATURATION: 99 % | TEMPERATURE: 97.6 F

## 2017-10-30 DIAGNOSIS — L60.3 DYSTROPHIC NAIL: ICD-10-CM

## 2017-10-30 DIAGNOSIS — L97.919 VENOUS ULCER OF RIGHT LOWER EXTREMITY WITH VARICOSE VEINS (HCC): ICD-10-CM

## 2017-10-30 DIAGNOSIS — I83.019 VENOUS ULCER OF RIGHT LOWER EXTREMITY WITH VARICOSE VEINS (HCC): ICD-10-CM

## 2017-10-30 DIAGNOSIS — I87.2 VENOUS INSUFFICIENCY OF RIGHT LEG: ICD-10-CM

## 2017-10-30 PROCEDURE — A6222 GAUZE <=16 IN NO W/SAL W/O B: HCPCS

## 2017-10-30 PROCEDURE — 99212 OFFICE O/P EST SF 10 MIN: CPT

## 2017-10-30 NOTE — TELEPHONE ENCOUNTER
Sister Rohit Gunter called back [phone]. She was told to have Kaylie Chatman take Vit B6 every other day. She wants to make we know that he takes Vit B12 daily.   Please let her know what to do

## 2017-10-30 NOTE — TELEPHONE ENCOUNTER
----- Message from Terell Cartagena DO sent at 10/29/2017  8:28 AM EDT -----  Please let pt know that b6 level high and thyroid levels were off. Recommend taking b6 supplement every other day. Recommend we repeat that level and thyroid function again in 4 wks. Let me know if questions, thanks!

## 2017-10-30 NOTE — PROGRESS NOTES
[facility]         Progress Note      [facility] RECORD NUMBER:  [id number]  AGE: 79 y.o. GENDER: male  : 1950  EPISODE DATE:  10/30/2017    Subjective:   CC: Complex Delayed wound closure right calf due to chronic venous insufficiency ulceration     HISTORY of PRESENT ILLNESS HPI  10/30/17    Katlyn Olivas is a 79 y.o. male established at the wound center, following up after harvest/application of STSG 19.6. 17. Patient recently hospitalized for ANA LILIA, thought possibly due to PO bactrim treatment. Patients creatine stabilized. Patient was evaluated bedside while admitted, dressing was changed, and was okay for discharge per Podiatry. Patient presents to clinic today accompanied by his sister. Graft appears to be healthy, no signs of infection present. Tissue nipper used to remove some graft slough from the periphery, no further signs of infection present. Will transfer patient back to normal shoe gear. Dressing to stay in place, will be changed at the Wound center M/W/F. Patient to follow up in (2) weeks. Patient was recen Will pause vac treatment at this time. Nonadherent applied to wound graft site, with DSD. The harvest site on thigh was left to air dry.        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 Shorty Pringle follows, no seizure activity since    Seizure disorder ([facility] 2014       PAST SURGICAL HISTORY    Past Surgical History:   Procedure Laterality Date    IL INCIS/DRAIN THIGH/KNEE ABSCESS,DEEP Right 2017    RIGHT SERGIO LEG WOUND DEBRIDEMENT WITH SKIN GRAFT AND WOUND VAC APPLICATION performed by Surya Landaverde Katey Murray DPM at [address] [address] [address] Right 10/9/2017    SPLIT THICKNESS SKIN GRAFT FROM RIGHT LEG performed by Man Norwood DPM at [address]      vein stripping       FAMILY HISTORY    Family History   Problem Relation Age of Onset   [facility] Cancer Mother     Stroke Mother     Diabetes Mother     High Blood Pressure Mother     High Cholesterol Mother     Stroke Father     COPD Father     Diabetes Maternal Grandmother     Heart Disease Maternal Grandfather     Cancer Sister     Asthma Sister     High Blood Pressure Sister     High Cholesterol Sister     Arthritis Sister     Diabetes Sister     Diabetes Sister     Colon Cancer Neg Hx     Prostate Cancer Neg Hx        SOCIAL HISTORY    Social History   Substance Use Topics    Smoking status: Never Smoker    Smokeless tobacco: Never Used    Alcohol use No      Comment:         ALLERGIES    No Known Allergies    MEDICATIONS    Current Outpatient Prescriptions on File Prior to Encounter   Medication Sig Dispense Refill    levETIRAcetam (KEPPRA) 1000 MG tablet Take 1 tablet by mouth 2 times daily 60 tablet 11    vitamin B-12 (CYANOCOBALAMIN) 1000 MCG tablet Take 1,000 mcg by mouth daily       pyridoxine (B-6) 25 MG tablet Take 25 mg by mouth daily        No current facility-administered medications on file prior to encounter. REVIEW OF SYSTEMS    A comprehensive review of systems was negative. Objective: There were no vitals taken for this visit.     Wt Readings from Last 3 Encounters:   10/28/17 224 lb 13.9 oz (102 kg)   10/23/17 218 lb (98.9 kg)   10/18/17 223 lb 3.2 oz (101.2 kg)     PHYSICAL EXAMINATION  CONSTITUTIONAL:  awake, alert, cooperative, no apparent distress, and appears stated age  EYES:  pupils equal, round and reactive to light, extra ocular muscles intact, sclera clear, conjunctiva normal  ENT:  normocepalic, without obvious abnormality  NECK:  Supple, symmetrical, trachea midline, no adenopathy  LUNGS:  No increased work of breathing, good air exchange, clear to auscultation bilaterally, no crackles or wheezing  CARDIOVASCULAR:  Normal apical impulse, regular rate and rhythm  ABDOMEN:  normal bowel sounds, soft, non-distended  Extremities (lower extremity examination in detail)  Vascular: Dorsalis pedis and posterior tibial pulses are weakly bilaterally. Skin temperature is warm to warm from proximal tibial tuberosity to distal digits. CFT sluggish but intact to stacia-wound site. Edema nonpitting in nature. Hair growth absent. Quality of skin atrophic/brawny. Dermatologic: See wound documentation for further details. Graft well adhered to site. No signs of infection present. Tissue nipper used to remove some graft slough from around the periphery  Neurovascular: Epicritic and protopathic sensations are grossly intact. Musculoskeletal: Muscle strength 5/5 for all plantarflexors, dorsiflexors, inverters and everters examined. Mild pain with palpation of right ankle. No further acute bony abnormalities of note. Wound Care Documentation:  Wound 10/28/16 Leg Right;Lateral;Lower;Proximal #1 (Active)   Wound Type Wound 10/30/2017 10:30 AM   Dressing Status Intact; New drainage; Old drainage 10/30/2017 10:30 AM   Dressing Changed Changed/New 10/30/2017 10:30 AM   Dressing/Treatment Vacuum dressing 6/12/2017  8:53 AM   Wound Cleansed Rinsed/Irrigated with saline 10/23/2017  2:24 PM   Wound Length (cm) 2.4 cm 10/30/2017 10:30 AM   Wound Width (cm) 6 cm 10/30/2017 10:30 AM   Wound Depth (cm)  superficial 10/30/2017 10:30 AM   Calculated Wound Size (cm^2) (l*w) 14.4 cm^2 10/30/2017 10:30 AM   Change in Wound Size % (l*w) -20 10/30/2017 10:30 AM   Wound Assessment Clean; Intact 10/30/2017 10:30 AM   Margins Attached edges; Unattached edges; Undefined edges 10/30/2017 10:30 AM   Stacia-wound Assessment Maceration; White 10/23/2017  2:24 PM   Brayton%Wound Bed 90 10/30/2017 10:30 AM Pressure Wound Therapy Leg Right (Active)   Wound Type Surgical 9/25/2017  4:05 PM   Wound Assessment Edges well approximated;Yellow;Red 10/2/2017 11:33 AM   Renate-wound Assessment Maceration; White 10/2/2017 11:33 AM   Unit Type KCI 10/2/2017 11:33 AM   Dressing Type Black foam 10/2/2017 11:33 AM   Number of pieces used 1 10/2/2017 11:33 AM   Cycle Continuous 10/2/2017 11:33 AM   Target Pressure (mmHg) 125 10/2/2017 11:33 AM   Irrigation Solution Normal Saline 10/2/2017 11:33 AM   Canister changed? Yes 10/2/2017 11:33 AM   Dressing Status Dry; Intact; Changed 10/2/2017 11:33 AM   Dressing Changed Changed/New 10/2/2017 11:33 AM   Drainage Amount Moderate 10/2/2017 11:33 AM   Drainage Description Serosanguinous 10/2/2017 11:33 AM   Odor None 10/2/2017 11:33 AM   Number of days: 21       Wound 10/28/16 Leg Right;Lateral;Lower;Proximal #1 (Active)   Wound Type Wound 10/2/2017 11:33 AM   Dressing Status Intact 10/2/2017 11:33 AM   Dressing Changed Changed/New 10/2/2017 11:33 AM   Dressing/Treatment Dry dressing 9/25/2017 10:46 AM   Wound Cleansed Rinsed/Irrigated with saline 10/2/2017 11:33 AM   Wound Length (cm) 4.5 cm 10/2/2017 11:33 AM   Wound Width (cm) 7 cm 10/2/2017 11:33 AM   Wound Depth (cm)  graft 10/2/2017 11:33 AM   Calculated Wound Size (cm^2) (l*w) 31.5 cm^2 10/2/2017 11:33 AM   Change in Wound Size % (l*w) -162.5 10/2/2017 11:33 AM   Wound Assessment Yellow;Pink 10/2/2017 11:33 AM   Margins Attached edges 10/2/2017 11:33 AM   Renate-wound Assessment Maceration; White;Pink 10/2/2017 11:33 AM   Irving%Wound Bed 50 10/2/2017 11:33 AM   Red%Wound Bed 90 9/21/2017 11:46 AM   Yellow%Wound Bed 50 10/2/2017 11:33 AM   Black%Wound Bed 5 6/2/2017  9:05 AM   Drainage Amount Moderate 10/2/2017 11:33 AM   Drainage Description Serosanguinous 10/2/2017 11:33 AM   Odor None 10/2/2017 11:33 AM   Culture Taken Yes 5/25/2017 12:04 PM   Time out Yes 9/15/2017 10:21 AM   Procedural Pain 0 9/15/2017 10:21 AM   Post procedural Pain 0 [facility]     light growth       Testing Performed By      [address] Name Director Address Valid Date Range     [facility] - [address] LAB Lavon Lind MD [address] [place] [address] 08/30/17 0855-Present       Narrative      Source: leg       Site: swab right lower leg wound          Current Antibiotics: none       Culture & Susceptibility      STAPHYLOCOCCUS AUREUS      Antibiotic Interpretation DESTINY Unit Status     Penicillin G Resistant >=0.5 mcg/mL Preliminary     clindamycin Sensitive <=0.25 mcg/mL Preliminary     erythromycin Sensitive =0.5 mcg/mL Preliminary     gentamicin Sensitive <=0.5 mcg/mL Preliminary     oxacillin Sensitive =0.5 mcg/mL Preliminary     tetracycline Sensitive <=1 mcg/mL Preliminary     trimethoprim-sulfamethoxazole Sensitive <=10 mcg/mL Preliminary                 Arteriogram  PROCEDURE: AORTOFEMORAL ANGIOGRAM/with runoff procedure:           CLINICAL INFORMATION: Venous ulcer of right lower extremity with varicose veins ([facility]), Venous insufficiency of right leg, Nonhealing ulcer of right lower leg ([address] PVD (peripheral vascular disease) ([facility])       PERFORMED BY:  Dr. Audelia Spatz, MD       APPROACH: Left common femoral artery, micropuncture technique. CATHETERS: 5 [facility] VCF, 5 [facility] JAZLYN catheter,, 5 [facility] SOS, 5 [facility] Ferro 2... STENTS: None. VESSELS CATHETERIZED: Upper abdominal aorta, lower abdominal aorta, right common iliac and left external iliac arteries. Lorenso Frankel DIAGNOSTIC PROCEDURES: Abdominal aortogram, pelvic angiography, right leg arteriogram, left leg arteriogram..           INTERVENTIONS: None. FLUOROSCOPY TIME: 9 minutes 56 seconds   FLUOROSCOPIC IMAGES: 40   SEDATION: Versed 2.0mg ; fentanyl 100mcg , IV; the patient was sedated for 45 minutes during this procedure and monitored with EKG and pulse ox monitoring devices by registered nurse. OTHER MEDICATIONS: None .    CONTRAST: demonstrate normal progression but there is loss of dicrotic notch diffusely on both sides, consistent with mild peripheral vascular disease. Skin Punch Biopsy  FINAL DIAGNOSIS:  Right lateral leg, skin punch biopsy: Skin with granulation tissue formation and epidermal acanthosis and spongiosis. Negative for dysplasia and malignancy. Specimen:  SKIN BIOPSY, RIGHT LATERAL LEG  Gross Examination:  The container is labeled Nelida Loving, right lateral leg. Received in formalin is an unoriented punch biopsy of skin measuring 0.2 cm in diameter and is excised to a depth of 0.8 cm. The specimen has a tan surface with no discrete lesions. The specimen is submitted as received. 1 ns. MTK/DKR:candice  Microscopic Examination: The specimen consists of a punch biopsy of skin. The overlying epidermis demonstrates spongiosis with acanthosis and parakeratosis. The underlying dermis consists of granulation tissue and stroma with repair. There is no evidence of dysplasia or neoplasia. No specific pathologic findings are identified. The histologic findings are indicative of chronic repair.       Assessment:     Ulcer Identification:  Ulcer Type: venous  Contributing Factors: edema, venous stasis, shear force and non-adherence    Wound: Venous stasis dermatitis with associated inflammatory ulceration, right calf     Depth of Diabetic/Pressure/Non Pressure Ulcers or Wound:  Non-Pressure ulcer, fat layer exposed    Patient Active Problem List   Diagnosis Code    Seizure disorder ([facility] G40.909    Venous ulcer of right lower extremity with varicose veins ([facility]) I83.019    Venous insufficiency of right leg I87.2    Dystrophic nail L60.3    Nonhealing ulcer of right lower leg ([facility] L97.919    PVD (peripheral vascular disease) ([facility]) I73.9    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 ([facility] R56.1    Osteoarthritis M19.90    Hypertension I10    Hyperlipidemia E78.5    History of DVT of right lower extremity Z86.718    Heart failure with business hours, please contact your Primary Care Doctor or go to the nearest emergency room.    Rosie Goncalves   Electronically signed by Rosie Goncalves DPM on 10/30/2017 at 8:21 AM

## 2017-10-30 NOTE — TELEPHONE ENCOUNTER
Left detailed message. Pt informed. (ok per signed HIPAA)     Repeat lab work mailed to pt's home with instructions.

## 2017-10-30 NOTE — TELEPHONE ENCOUNTER
[facility] Transitions Initial Follow Up Call    Call within 2 business days of discharge: Yes    Patient: Soila Loving Patient : 1950   MRN: [id number]  Reason for Admission: There are no discharge diagnoses documented for the most recent discharge.   Discharge Date: 10/28/17 RARS: Ronald VILLA([phone])@     Spoke with: [address] Nataliyait: [unfilled]    Non-face-to-face services provided:  f/u appt scheduled    Inpatient Assessment  Care Transitions 24 Hour Call    Care Transitions Interventions         Follow Up  Future Appointments  Date Time Provider Brandon Lopez   2017 11:30 AM [address] None   2017 2:00 PM [address] None   2017 9:00 AM STRZ NURSE SCHEDULE STRZ WOUND None   2017 9:30 AM Mariya Asif DPM STRZ WOUND None   11/15/2017 3:40 PM Marilyn Delgado DO [facility]. [facility] [place]   2017 10:15 AM Stephannie Low Christoper [place], MD SRPX Heart [facility] [address] [place] [address]   2017 3:30 PM Timo Dunn MD [address] [place] [address] Marquis Betts TRISTEN

## 2017-10-31 NOTE — PLAN OF CARE
Problem: Wound:  Goal: Will show signs of wound healing; wound closure and no evidence of infection  Will show signs of wound healing; wound closure and no evidence of infection   Outcome: Ongoing  Right lower leg ulcer is measuring smaller. No redness, no pain, no fever, no pitting edema noted. No issues or concerns noted with graft that was previously applied in surgery by DPM.     Comments: Care plan reviewed with patient and patient's sister. Patient and patient's sister verbalize understanding of the plan of care and contribute to goal setting.

## 2017-11-02 ENCOUNTER — HOSPITAL ENCOUNTER (OUTPATIENT)
Dept: WOUND CARE | Age: 67
Discharge: HOME | End: 2017-11-02
Payer: MEDICARE

## 2017-11-02 VITALS
HEART RATE: 75 BPM | RESPIRATION RATE: 18 BRPM | DIASTOLIC BLOOD PRESSURE: 69 MMHG | SYSTOLIC BLOOD PRESSURE: 137 MMHG | OXYGEN SATURATION: 99 % | TEMPERATURE: 97.8 F

## 2017-11-02 DIAGNOSIS — L97.919 VENOUS ULCER OF RIGHT LOWER EXTREMITY WITH VARICOSE VEINS (HCC): Primary | ICD-10-CM

## 2017-11-02 DIAGNOSIS — L97.919: ICD-10-CM

## 2017-11-02 DIAGNOSIS — I83.019 VENOUS ULCER OF RIGHT LOWER EXTREMITY WITH VARICOSE VEINS (HCC): Primary | ICD-10-CM

## 2017-11-02 PROCEDURE — 99213 OFFICE O/P EST LOW 20 MIN: CPT

## 2017-11-02 PROCEDURE — A6222 GAUZE <=16 IN NO W/SAL W/O B: HCPCS

## 2017-11-02 NOTE — PLAN OF CARE
Problem: Wound:  Goal: Will show signs of wound healing; wound closure and no evidence of infection  Will show signs of wound healing; wound closure and no evidence of infection   Outcome: Ongoing  Wound improving. Afebrile. No s/s infection noted. RN visit this date. Wound dressed as ordered. Comments: Care plan reviewed with patient. Patient verbalize understanding of the plan of care and contribute to goal setting.

## 2017-11-03 ENCOUNTER — OFFICE VISIT (OUTPATIENT)
Dept: FAMILY MEDICINE CLINIC | Age: 67
End: 2017-11-03
Payer: MEDICARE

## 2017-11-03 VITALS
DIASTOLIC BLOOD PRESSURE: 60 MMHG | RESPIRATION RATE: 12 BRPM | SYSTOLIC BLOOD PRESSURE: 118 MMHG | HEART RATE: 76 BPM | WEIGHT: 226.8 LBS | TEMPERATURE: 98.2 F | HEIGHT: 69 IN | BODY MASS INDEX: 33.59 KG/M2

## 2017-11-03 DIAGNOSIS — E87.5 HYPERKALEMIA: ICD-10-CM

## 2017-11-03 DIAGNOSIS — R79.89 ELEVATED TSH: ICD-10-CM

## 2017-11-03 DIAGNOSIS — I10 ESSENTIAL HYPERTENSION: Chronic | ICD-10-CM

## 2017-11-03 DIAGNOSIS — E53.1 VITAMIN B6 DEFICIENCY: ICD-10-CM

## 2017-11-03 DIAGNOSIS — I50.22 CHRONIC SYSTOLIC HEART FAILURE (HCC): Chronic | ICD-10-CM

## 2017-11-03 DIAGNOSIS — N17.9 ACUTE KIDNEY INJURY (HCC): Primary | ICD-10-CM

## 2017-11-03 PROCEDURE — 99496 TRANSJ CARE MGMT HIGH F2F 7D: CPT | Performed by: FAMILY MEDICINE

## 2017-11-03 NOTE — PATIENT INSTRUCTIONS
first. Most of these are very high in potassium. · Be sure to tell your doctor about any prescription, over-the-counter, or herbal medicines you take. Some of these can raise potassium. When should you call for help? Call 911 anytime you think you may need emergency care. For example, call if:  · You passed out (lost consciousness). · You have trouble breathing. · You have an unusual heartbeat. Your heart may beat fast or skip beats. Call your doctor now or seek immediate medical care if:  · You have trouble urinating or can urinate only very small amounts. · Your nausea does not get better. Watch closely for changes in your health, and be sure to contact your doctor if:  · You do not get better as expected. · You want more help planning meals. Where can you learn more? Go to https://Uniweb.rubossmaneb.[employer]. org and sign in to your [employer] account. Enter T029 in the [employer] box to learn more about \"Hyperkalemia: Care Instructions. \"     If you do not have an account, please click on the \"Sign Up Now\" link. Current as of: August 8, 2016  Content Version: 11.3  © 9583-9601 [employer]. Care instructions adapted under license by [address] If you have questions about a medical condition or this instruction, always ask your healthcare professional. [address] any warranty or liability for your use of this information.

## 2017-11-03 NOTE — PROGRESS NOTES
216 (H) 09/15/2015     Lab Results   Component Value Date    TRIG 115 10/25/2017    TRIG 121 09/15/2015     Lab Results   Component Value Date    HDL 40 10/25/2017    HDL 40 09/15/2015     Lab Results   Component Value Date    LDLCALC 100 10/25/2017    LDLCALC 152 09/15/2015     No results found for: LABVLDL, VLDL  No results found for: CHOLHDLRATIO      DM Screen -   Lab Results   Component Value Date    GLUCOSE 86 10/28/2017       Colon Cancer Screening - wants to hold off until spring 2018  [address] (Age 54 to [de-identified] with 30 pack year hx, current smoker or quit within past 15 years) - never smoker    Tetanus - UTD 2013  Influenza Vaccine - UTD FALL 2017  Pneumonia Vaccine - UTD PPV 23 AUG 2014 and PCV 13 OCT 2017  Zostavax - next visit     PSA testing discussion -   Lab Results   Component Value Date    PSA 2.08 (H) 10/25/2017       AAA Screening - never smoker    Falls screening - n/a      Specialists List:  Kirkwood Hammans: wound care  Almmarie: neuro for seizures       Current Outpatient Prescriptions   Medication Sig Dispense Refill    levETIRAcetam (KEPPRA) 1000 MG tablet Take 1 tablet by mouth 2 times daily 60 tablet 11    vitamin B-12 (CYANOCOBALAMIN) 1000 MCG tablet Take 1,000 mcg by mouth daily       pyridoxine (B-6) 25 MG tablet Take 25 mg by mouth every other day        No current facility-administered medications for this visit. No orders of the defined types were placed in this encounter. All medications reviewed and reconciled, including OTC and herbal medications. Updated list given to patient. Patient Active Problem List    Diagnosis Date Noted    Vitamin B6 deficiency 10/29/2017    Elevated TSH 10/29/2017    [facility] ([facility]     Hyperkalemia     Metabolic acidosis     Excessive use of nonsteroidal anti-inflammatory drug (NSAID)     Osteoarthritis     History of hypertension     Hyperlipidemia     History of DVT of right lower extremity      2015.  Txt with 6 months of coumadin. Provoked.        Heart failure with reduced ejection fraction (HCC)     History of alcohol abuse     History of traumatic brain injury     Nonhealing ulcer of right lower leg ([facility] 02/10/2017    PVD (peripheral vascular disease) ([facility] 02/10/2017    Dystrophic nail 12/05/2016    Venous ulcer of right lower extremity with varicose veins (HCC) 10/31/2016    Venous insufficiency of right leg 10/31/2016    Seizure disorder ([facility] 07/17/2014    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 ([facility] 07/01/2014     s/p fall down a hill, Dr. Auguste Record follows, no seizure activity since           Past Medical History:   Diagnosis Date    Heart failure with reduced ejection fraction (HCC)     History of alcohol abuse     History of DVT of right lower extremity     History of hypertension     History of traumatic brain injury     Hyperlipidemia     Mentally challenged     \"slow\" states his sister    Osteoarthritis     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 ([facility] 07/2014    s/p fall down a [place], Dr. Auguste Record follows, no seizure activity since    Seizure disorder ([facility] 7/17/2014         Past Surgical History:   Procedure Laterality Date    UT INCIS/DRAIN THIGH/KNEE ABSCESS,DEEP Right 9/25/2017    RIGHT SERGIO LEG WOUND DEBRIDEMENT WITH SKIN GRAFT AND WOUND VAC APPLICATION performed by Rosa Elena Mane DPM at [address] [address] [address] Right 10/9/2017    SPLIT THICKNESS SKIN GRAFT FROM RIGHT LEG performed [facility] Rosa [facility]      vein stripping         No Known Allergies      Social History     Social History    Marital status:      Spouse name: N/A    Number of children: 1    Years of education: N/A     Occupational History    retired      Social History Main Topics    Smoking status: Never Smoker    Smokeless tobacco: Never Used    Alcohol use No      Comment:      Drug use: No    Sexual activity: Not on file     Other Topics Concern    Not on file Social History Narrative    ** Merged History Encounter **              Family History   Problem Relation Age of Onset   Nuzhat Cintia Cancer Mother    Nuzhat Cintia Stroke Mother     Diabetes Mother     High Blood Pressure Mother     High Cholesterol Mother     Stroke Father     COPD Father     Diabetes Maternal Grandmother     Heart Disease Maternal Grandfather     Cancer Sister     Asthma Sister     High Blood Pressure Sister     High Cholesterol Sister     Arthritis Sister     Diabetes Sister     Diabetes Sister     Colon Cancer Neg Hx     Prostate Cancer Neg Hx          I have reviewed the patient's past medical history, past surgical history, allergies, medications, social and family history and I have made updates where appropriate. Review of Systems  Positive responses are highlighted in bold    Constitutional:  Fever, Chills, Night Sweats, Fatigue, Unexpected changes in weight  HENT:  Ear pain, Tinnitus, Nosebleeds, Trouble swallowing, Hearing loss, Sore throat  Cardiovascular:  Chest Pain, Palpitations, Orthopnea, Paroxysmal Nocturnal Dyspnea  Respiratory:  Cough, Wheezing, Shortness of breath, Chest tightness, Apnea  Gastrointestinal:  Nausea, Vomiting, Diarrhea, Constipation, Heartburn, Blood in stool  Genitourinary:  Difficulty or painful urination, Flank pain, Change in frequency, Urgency  Skin:  Color change, Rash, Itching, Wound  Musculoskeletal:  Joint pain, Back pain, Gait problems, Joint swelling, Myalgias  Neurological:  Dizziness, Headaches, Presyncope, Numbness, Seizures, Tremors  Endocrine:  Heat Intolerance, Cold Intolerance, Polydipsia, Polyphagia, Polyuria        PHYSICAL EXAM:  Vitals:    11/03/17 1035   BP: 118/60   Pulse: 76   Resp: 12   Temp: 98.2 °F (36.8 °C)   Weight: 226 lb 12.8 oz (102.9 kg)   Height: 5' 9.02\" (1.753 m)     Body mass index is 33.48 kg/m².   Pain Score:   0 - No pain    VS Reviewed  General Appearance: A&O x 3, No acute distress,well developed and well- 22 mg/dL Final    Sodium 10/25/2017 136  135 - 145 meq/L Final    Potassium 10/25/2017 6.2* 3.5 - 5.2 meq/L Final    Chloride 10/25/2017 103  98 - 111 meq/L Final    CO2 10/25/2017 19* 23 - 33 meq/L Final    Calcium 10/25/2017 9.3  8.5 - 10.5 mg/dL Final    AST 10/25/2017 22  5 - 40 U/L Final    Alkaline Phosphatase 10/25/2017 39  38 - 126 U/L Final    Total Protein 10/25/2017 7.5  6.1 - 8.0 g/dL Final    Alb 10/25/2017 4.3  3.5 - 5.1 g/dL Final    Total Bilirubin 10/25/2017 0.2* 0.3 - 1.2 mg/dL Final    ALT 10/25/2017 20  11 - 66 U/L Final    Levetiracetam Lvl 10/26/2017 70* 12 - 46 ug/mL Final    Glucose 10/25/2017 96  mg/dL Final    QC OK? 10/25/2017 y   Final    Anion Gap 10/25/2017 14.0  8.0 - 16.0 meq/L Final    Est, Glom Filt Rate 10/25/2017 15* ml/min/1.73m2 Final    Osmolality Calc 10/25/2017 289.9  275.0 - 300 mOsmol/kg Final    POC Glucose 10/25/2017 96  70 - 108 mg/dl Final    Potassium 10/25/2017 5.6* 3.5 - 5.2 meq/L Final    Sodium, Ur 10/25/2017 86  meq/l Final    Chloride, Urine 10/25/2017 64.0  meq/l Final    Glucose, Ur 10/25/2017 500* NEGATIVE mg/dl Final    Bilirubin Urine 10/25/2017 NEGATIVE  NEGATIVE Final    Ketones, Urine 10/25/2017 NEGATIVE  NEGATIVE Final    Specific Gravity, Urine 10/25/2017 1.025  1.002 - 1.03 Final    Blood, Urine 10/25/2017 NEGATIVE  NEGATIVE Final    pH, UA 10/25/2017 5.0  5.0 - 9.0 Final    Protein, UA 10/25/2017 NEGATIVE  NEGATIVE Final    Urobilinogen, Urine 10/25/2017 0.2  0.0 - 1.0 eu/dl Final    Nitrite, Urine 10/25/2017 NEGATIVE  NEGATIVE Final    Leukocyte Esterase, Urine 10/25/2017 NEGATIVE  NEGATIVE Final    Color, UA 10/25/2017 YELLOW  STRAW-YELL Final    Character, Urine 10/25/2017 CLEAR  CLEAR-SL C Final    RBC, UA 10/25/2017 0-2  0-2/hpf /hpf Final    WBC, UA 10/25/2017 NONE SEEN  0-4/hpf /hpf Final    Epi Cells 10/25/2017 0-2  3-5/hpf /hpf Final    Bacteria, UA 10/25/2017 NONE  FEW/NONE S /hpf Final    Casts UA - 16.0 meq/L Final    Est, Glom Filt Rate 10/27/2017 40* ml/min/1.73m2 Final    Sodium 10/28/2017 139  135 - 145 meq/L Final    Potassium 10/28/2017 5.0  3.5 - 5.2 meq/L Final    Chloride 10/28/2017 103  98 - 111 meq/L Final    CO2 10/28/2017 26  23 - 33 meq/L Final    Glucose 10/28/2017 86  70 - 108 mg/dL Final    BUN 10/28/2017 17  7 - 22 mg/dL Final    CREATININE 10/28/2017 1.3* 0.4 - 1.2 mg/dL Final    Calcium 10/28/2017 8.6  8.5 - 10.5 mg/dL Final    Anion Gap 10/28/2017 10.0  8.0 - 16.0 meq/L Final    Est, Glom Filt Rate 10/28/2017 55* ml/min/1.73m2 Final         Narrative   PROCEDURE: US RENAL COMPLETE       CLINICAL INFORMATION: RENAL FAILURE, ACUTE (KIDNEY INJURY),  .       COMPARISON: No prior study.       TECHNIQUE: Grayscale, color and spectral Doppler ultrasound of the kidneys       FINDINGS: The kidneys are within normal limits of size and echogenicity. There is no mass, or hydronephrosis. Resistive indices are elevated bilaterally. Urinary bladder is completely decompressed with the presence of a Loving catheter.       RIGHT KIDNEY - 9.5 x 5.2 x 4.4 cm   Resistive Index - 0.80   Cortical Thickness - 1.0 cm       LEFT KIDNEY - 10.1 x 5.2 x 5.6 cm   Resistive Index - 0.79   Cortical Thickness - 1.3 cm           Impression   Normal-appearing kidneys elevated resistive indices which may indicate medical renal disease               **This report has been created using voice recognition software.  It may contain minor errors which are inherent in voice recognition technology. **       Final report electronically signed by Dr. Trina Toeldo on 10/26/2017 9:10 AM         ECHO 24 OCT 2017   Conclusions      Summary   Normal left ventricle size and Low Normal LV systolic function. Ejection   fraction was estimated at 50 %.  There were no regional left ventricular   wall motion abnormalities and wall thickness was within normal limits.   Doppler parameters were consistent with abnormal left ventricular   relaxation (grade 1 diastolic dysfunction).   The left atrium is Mildly dilated. ECHO 22 JAN 2016   Conclusions      Summary   There was moderate global hypokinesis of the left ventricle. Doppler parameters were consistent with abnormal left ventricular   relaxation (grade 1 diastolic dysfunction). Ejection fraction is visually estimated at 35%. Left Ventricular size is Mildly increased . Pulmonic valve is structurally normal.   mild pulmonic regurgitation visualized. ASSESSMENT & PLAN  1. Acute kidney injury ([address]    Pretty much resolved. Likely combo septra, lisinopril, lack of fluids and NSAID use  He is to avoid those things  con't to stick to mostly water  Avoid NSAID  Keep nephro f/u, they have f/u labs ordered per Emir's last note  Reviewed ER precautions, pt understands. Date of Discharge: 10/28/2017 (Saturday)  Date of interactive contact with pt/caregiver: 10/30/2017 (Monday)  Date of face-to-face visit: 11/3/2017  Complexity of medical decision making: HIGH    2. Hyperkalemia    Resolved  Avoid ACE  F/u nephro as above    3. Essential hypertension    So far BP's fine w/o antihypertensive medication  con't off for now, f/u with nephro  Monitor BP's, resume medication (not ace) if required. 4. Chronic systolic heart failure (HCC)    EF better than a year ago  Unclear etiology, suspect ETOH related  Has no sxs  Keep cardio apt as planned    5. Vitamin B6 deficiency    Take supplement every other day  Repeat labs 4 wks, has lab order    6. Elevated TSH    No sxs  Has f/u labs to determine if real or spurious result  Repeat 4 wks      DISPOSITION    Return in about 6 months (around 5/3/2018) for follow-up on chronic medical conditions, sooner as needed. Peña Zheng released without restrictions.     Future Appointments  Date Time Provider Brandon Lopez   11/6/2017 2:00 PM STRZ NURSE SCHEDULE STRZ WOUND None   11/9/2017 9:00 AM STRZ NURSE SCHEDULE STRZ WOUND None 11/13/2017 9:30 AM Lopez Bazzi DPM [place] WOUND None   11/29/2017 10:15  [address], MD SRPX Heart [facility] [place] [address]   11/29/2017 3:30 PM Bernida Krabbe, MD [address] [place] [address]   5/3/2018 3:00 PM Damian Gutierrez  [address]    Elly Forde received counseling on the following healthy behaviors: nutrition, exercise and medication adherence    Patient given educational materials on: See Attached    I have instructed Elly Forde to complete a self tracking handout on Blood Pressures  and instructed them to bring it with them to his next appointment. Barriers to learning and self management: Cognitive issues, sister present during appointment. Discussed use, benefit, and side effects of prescribed medications. Barriers to medication compliance addressed. All patient questions answered. Pt voiced understanding.        Electronically signed by Damian Gutierrez DO on 11/3/2017 at 12:25 PM

## 2017-11-06 ENCOUNTER — HOSPITAL ENCOUNTER (OUTPATIENT)
Dept: WOUND CARE | Age: 67
Discharge: HOME | End: 2017-11-06
Payer: MEDICARE

## 2017-11-06 VITALS
DIASTOLIC BLOOD PRESSURE: 68 MMHG | OXYGEN SATURATION: 100 % | HEART RATE: 68 BPM | RESPIRATION RATE: 16 BRPM | WEIGHT: 228 LBS | SYSTOLIC BLOOD PRESSURE: 149 MMHG | BODY MASS INDEX: 33.65 KG/M2 | TEMPERATURE: 98 F

## 2017-11-06 DIAGNOSIS — I83.019 VENOUS ULCER OF RIGHT LOWER EXTREMITY WITH VARICOSE VEINS (HCC): ICD-10-CM

## 2017-11-06 DIAGNOSIS — L97.919 VENOUS ULCER OF RIGHT LOWER EXTREMITY WITH VARICOSE VEINS (HCC): ICD-10-CM

## 2017-11-06 PROCEDURE — A6449 LT COMPRES BAND >=3" <5"/YD: HCPCS

## 2017-11-06 PROCEDURE — 99213 OFFICE O/P EST LOW 20 MIN: CPT

## 2017-11-06 PROCEDURE — A6222 GAUZE <=16 IN NO W/SAL W/O B: HCPCS

## 2017-11-06 PROCEDURE — A6454 SELF-ADHER BAND W>=3" <5"/YD: HCPCS

## 2017-11-06 ASSESSMENT — PAIN SCALES - GENERAL: PAINLEVEL_OUTOF10: 0

## 2017-11-06 NOTE — PLAN OF CARE
Problem: Wound:  Goal: Will show signs of wound healing; wound closure and no evidence of infection  Will show signs of wound healing; wound closure and no evidence of infection   Outcome: Ongoing  Patient presents to wound clinic for RN visit. Right leg wound dressed as ordered with cuticerin, gauze, kerlix, ace- dressing to be changed twice weekly at wound clinic. No s/s of infection. Patient afebrile. Follow up on Thursday for RN visit. Comments: Care plan reviewed with patient. Patient verbalize understanding of the plan of care and contribute to goal setting.

## 2017-11-09 ENCOUNTER — HOSPITAL ENCOUNTER (OUTPATIENT)
Dept: WOUND CARE | Age: 67
Discharge: HOME | End: 2017-11-09
Payer: MEDICARE

## 2017-11-09 VITALS
SYSTOLIC BLOOD PRESSURE: 152 MMHG | WEIGHT: 225.4 LBS | HEART RATE: 63 BPM | TEMPERATURE: 97.6 F | BODY MASS INDEX: 33.27 KG/M2 | DIASTOLIC BLOOD PRESSURE: 70 MMHG | OXYGEN SATURATION: 99 % | RESPIRATION RATE: 18 BRPM

## 2017-11-09 DIAGNOSIS — I83.019 VENOUS ULCER OF RIGHT LOWER EXTREMITY WITH VARICOSE VEINS (HCC): ICD-10-CM

## 2017-11-09 DIAGNOSIS — L97.919 VENOUS ULCER OF RIGHT LOWER EXTREMITY WITH VARICOSE VEINS (HCC): ICD-10-CM

## 2017-11-09 PROCEDURE — 99213 OFFICE O/P EST LOW 20 MIN: CPT

## 2017-11-09 PROCEDURE — A6222 GAUZE <=16 IN NO W/SAL W/O B: HCPCS

## 2017-11-09 PROCEDURE — A6454 SELF-ADHER BAND W>=3" <5"/YD: HCPCS

## 2017-11-09 ASSESSMENT — PAIN SCALES - GENERAL: PAINLEVEL_OUTOF10: 0

## 2017-11-13 ENCOUNTER — HOSPITAL ENCOUNTER (OUTPATIENT)
Dept: WOUND CARE | Age: 67
Discharge: HOME | End: 2017-11-13
Payer: MEDICARE

## 2017-11-13 VITALS
SYSTOLIC BLOOD PRESSURE: 141 MMHG | OXYGEN SATURATION: 98 % | RESPIRATION RATE: 16 BRPM | WEIGHT: 229.3 LBS | DIASTOLIC BLOOD PRESSURE: 67 MMHG | TEMPERATURE: 97.6 F | HEART RATE: 61 BPM | BODY MASS INDEX: 33.85 KG/M2

## 2017-11-13 PROCEDURE — 99212 OFFICE O/P EST SF 10 MIN: CPT

## 2017-11-13 RX ORDER — CHLORHEXIDINE GLUCONATE 4 G/100ML
SOLUTION TOPICAL
Qty: 1 BOTTLE | Refills: 1 | Status: SHIPPED | OUTPATIENT
Start: 2017-11-13 | End: 2017-11-27

## 2017-11-13 NOTE — PROGRESS NOTES
[facility]         Progress Note      [facility] RECORD NUMBER:  [id number]  AGE: 79 y.o. GENDER: male  : 1950  EPISODE DATE:  2017    Subjective:   CC: Complex Delayed wound closure right calf due to chronic venous insufficiency ulceration     HISTORY of PRESENT ILLNESS HPI  17    Ashley Ragsdale is a 79 y.o. male established at the wound center, following up after harvest/application of STSG 67.0. 17. Graft appears to be healthy, no signs of infection present. Will transfer patient back to normal shoe gear with use of graded compression stocking. Patient to follow up in (3) weeks. Patient given updated prescription for hibiclens soap, with instructions on home use.        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 Carlene Parra follows, no seizure activity since    Seizure disorder ([facility] [address] 2014       PAST SURGICAL HISTORY    Past Surgical History:   Procedure Laterality Date    MT INCIS/DRAIN THIGH/KNEE ABSCESS,DEEP Right 2017    RIGHT SERGIO LEG WOUND DEBRIDEMENT WITH SKIN GRAFT AND WOUND VAC APPLICATION performed by Rosie Goncalves DPM at [address] [address] [address] Right 10/9/2017    SPLIT THICKNESS SKIN GRAFT FROM RIGHT LEG performed by Rosie Goncalves DPM at . [facility]      vein stripping       FAMILY HISTORY    Family History   Problem Relation Age of Onset    Cancer Mother     Stroke Mother     Diabetes Mother     High Blood Pressure Mother     High Cholesterol Mother     Stroke Father     COPD Father     Diabetes Maternal Grandmother     Heart Disease Maternal Grandfather     Cancer Sister     Asthma Sister     High Blood Pressure Sister     High Cholesterol Sister     Arthritis Sister     Diabetes Sister     Diabetes Sister     Colon Cancer Neg Hx     Prostate Cancer Neg Hx        SOCIAL HISTORY    Social History   Substance Use Topics    Smoking status: Never Smoker    Smokeless tobacco: Never Used    Alcohol use No      Comment:         ALLERGIES    No Known Allergies    MEDICATIONS    Current Outpatient Prescriptions on File Prior to Encounter   Medication Sig Dispense Refill    levETIRAcetam (KEPPRA) 1000 MG tablet Take 1 tablet by mouth 2 times daily 60 tablet 11    vitamin B-12 (CYANOCOBALAMIN) 1000 MCG tablet Take 1,000 mcg by mouth daily       pyridoxine (B-6) 25 MG tablet Take 25 mg by mouth every other day        No current facility-administered medications on file prior to encounter. REVIEW OF SYSTEMS    A comprehensive review of systems was negative. Objective: There were no vitals taken for this visit. Wt Readings from Last 3 Encounters:   11/09/17 225 lb 6.4 oz (102.2 kg)   11/06/17 228 lb (103.4 kg)   11/03/17 226 lb 12.8 oz (102.9 kg)     PHYSICAL EXAMINATION  CONSTITUTIONAL:  awake, alert, cooperative, no apparent distress, and appears stated age  EYES:  pupils equal, round and reactive to light, extra ocular muscles intact, sclera clear, conjunctiva normal  ENT:  normocepalic, without obvious abnormality  NECK:  Supple, symmetrical, trachea midline, no adenopathy  LUNGS:  No increased work of breathing, good air exchange, clear to auscultation bilaterally, no crackles or wheezing  CARDIOVASCULAR:  Normal apical impulse, regular rate and rhythm  ABDOMEN:  normal bowel sounds, soft, non-distended  Extremities (lower extremity examination in detail)  Vascular: Dorsalis pedis and posterior tibial pulses are weakly bilaterally.  Skin temperature is warm to warm from proximal tibial tuberosity to distal digits. CFT sluggish but intact to stacia-wound site. Edema nonpitting in nature. Hair growth absent. Quality of skin atrophic/brawny. Dermatologic: See wound documentation for further details. Graft well adhered to site, with 100% take. No further wounds/rashes or subq nodules of note. Neurovascular: Epicritic and protopathic sensations are grossly intact. Musculoskeletal: Muscle strength 5/5 for all plantarflexors, dorsiflexors, inverters and everters examined. Mild pain with palpation of right ankle. No further acute bony abnormalities of note.                                    Wound Care Documentation:  Wound 10/28/16 Leg Right;Lateral;Lower;Proximal #1 (Active)   Wound Type Wound 11/9/2017 10:00 AM   Dressing Status Intact 11/13/2017  9:48 AM   Dressing Changed Changed/New 11/13/2017  9:48 AM   Dressing/Treatment Vacuum dressing 6/12/2017  8:53 AM   Wound Cleansed Rinsed/Irrigated with saline 11/13/2017  9:48 AM   Wound Length (cm) 0.2 cm 11/13/2017  9:48 AM   Wound Width (cm) 0.3 cm 11/13/2017  9:48 AM   Wound Depth (cm)  superficial 11/13/2017  9:48 AM   Calculated Wound Size (cm^2) (l*w) 0.06 cm^2 11/13/2017  9:48 AM   Change in Wound Size % (l*w) 99.5 11/13/2017  9:48 AM   Wound Assessment Pink;Epithelialization 11/13/2017  9:48 AM   Margins Attached edges 11/13/2017  9:48 AM   Stacia-wound Assessment Dry;Edema 11/13/2017  9:48 AM   [place]%Wound Bed 100 11/9/2017 10:00 AM   Red%Wound Bed 100 11/6/2017  2:13 PM   Yellow%Wound Bed 10 10/30/2017 10:30 AM   Black%Wound Bed 5 6/2/2017  9:05 AM   Drainage Amount None 11/13/2017  9:48 AM   Drainage Description Serosanguinous;Brown 11/9/2017 10:00 AM   Odor None 11/13/2017  9:48 AM   Culture Taken Yes 5/25/2017 12:04 PM   Time out Yes 9/15/2017 10:21 AM   Procedural Pain 0 9/15/2017 10:21 AM   Post procedural Pain 0 9/15/2017 10:21 AM   Op First Treatment Date 10/28/16 10/28/2016 11:35 AM   Number of days: 380       Wound 10/30/17 Thigh Anterior #2 Donor site Calculated Wound Size (cm^2) (l*w) 14.4 cm^2 10/30/2017 10:30 AM   Change in Wound Size % (l*w) -20 10/30/2017 10:30 AM   Wound Assessment Clean; Intact 10/30/2017 10:30 AM   Margins Attached edges; Unattached edges; Undefined edges 10/30/2017 10:30 AM   Renate-wound Assessment Maceration; White 10/23/2017  2:24 PM   [place]%Wound Bed 90 10/30/2017 10:30 AM   Red%Wound Bed 20 10/16/2017 12:10 PM   Yellow%Wound Bed 10 10/30/2017 10:30 AM   Black%Wound Bed 5 6/2/2017  9:05 AM   Drainage Amount Small 10/30/2017 10:30 AM   Drainage Description Yellow 10/30/2017 10:30 AM   Odor None 10/23/2017  2:24 PM   Culture Taken Yes 5/25/2017 12:04 PM   Time out Yes 9/15/2017 10:21 AM   Procedural Pain 0 9/15/2017 10:21 AM   Post procedural Pain 0 9/15/2017 10:21 AM   Op First Treatment Date 10/28/16 10/28/2016 11:35 AM   Number of days: 242       Wound 10/30/17 Thigh Anterior #2 Donor site (Active)   Wound Type Incision 10/30/2017 10:30 AM   Wound Other 10/30/2017 10:30 AM   Dressing Changed Changed/New 10/30/2017 10:30 AM   Wound Cleansed Rinsed/Irrigated with saline 10/30/2017 10:30 AM   Wound Length (cm) 9.4 cm 10/30/2017 10:30 AM   Wound Width (cm) 6.4 cm 10/30/2017 10:30 AM   Wound Depth (cm)  superficial 10/30/2017 10:30 AM   Calculated Wound Size (cm^2) (l*w) 60.16 cm^2 10/30/2017 10:30 AM   Wound Assessment Clean;Dry; Intact 10/30/2017 10:30 AM   Margins Attached edges 10/30/2017 10:30 AM   Renate-wound Assessment Clean;Dry 10/30/2017 10:30 AM   Red%Wound Bed 100 10/30/2017 10:30 AM   Drainage Amount Scant 10/30/2017 10:30 AM   Drainage Description Serosanguinous 10/30/2017 10:30 AM   Odor None 10/30/2017 10:30 AM   Number of days: 0       Incision 10/09/17 Thigh Right (Active)   Wound Assessment Clean;Dry; Intact 10/28/2017 11:31 AM   Renate-wound Assessment Dry;Red 10/28/2017 11:31 AM   Wound Length (cm) 0 cm 10/23/2017  2:24 PM   Wound Width (cm) 0 cm 10/23/2017  2:24 PM   Wound Depth (cm)  scabbed 10/23/2017  2:24 PM   Closure HENRY 10/9/2017  2:00 PM   Drainage Amount None 10/28/2017 11:31 AM   Drainage Description Other (Comment) 10/28/2017 11:31 AM   Odor None 10/28/2017 11:31 AM   Dressing/Treatment Open to air 10/28/2017 11:31 AM   Dressing Changed Changed/New 10/16/2017 12:10 PM   Dressing Status Intact; Changed 10/16/2017 12:10 PM   Number of days: 20       Wound Care Documentation:  Wound 10/28/16 Leg Right;Lateral;Lower;Proximal #1 (Active)   Wound Type Wound 10/23/2017  2:24 PM   Dressing Status Intact 10/23/2017  2:24 PM   Dressing Changed Changed/New 10/23/2017  2:24 PM   Dressing/Treatment Dry dressing 9/25/2017 10:46 AM   Wound Cleansed Rinsed/Irrigated with saline 10/23/2017  2:24 PM   Wound Length (cm) 4 cm 10/23/2017  2:24 PM   Wound Width (cm) 6 cm 10/23/2017  2:24 PM   Wound Depth (cm)  graft 10/23/2017  2:24 PM   Calculated Wound Size (cm^2) (l*w) 24 cm^2 10/23/2017  2:24 PM   Change in Wound Size % (l*w) -100 10/23/2017  2:24 PM   Wound Assessment Edges well approximated 10/16/2017 12:10 PM   Margins Attached edges 10/23/2017  2:24 PM   Renate-wound Assessment Maceration; White 10/23/2017  2:24 PM   [place]%Wound Bed 50 10/2/2017 11:33 AM   Red%Wound Bed 20 10/16/2017 12:10 PM   Yellow%Wound Bed 80 10/16/2017 12:10 PM   Black%Wound Bed 5 6/2/2017  9:05 AM   Drainage Amount Moderate 10/23/2017  2:24 PM   Drainage Description Serosanguinous 10/23/2017  2:24 PM   Odor None 10/23/2017  2:24 PM   Culture Taken Yes 5/25/2017 12:04 PM   Time out Yes 9/15/2017 10:21 AM   Procedural Pain 0 9/15/2017 10:21 AM   Post procedural Pain 0 9/15/2017 10:21 AM   Op First Treatment Date 10/28/16 10/28/2016 11:35 AM   Number of days: 361       Incision 10/09/17 Thigh Right (Active)   Wound Assessment Black 10/23/2017  2:24 PM   Renate-wound Assessment Dry 10/23/2017  2:24 PM   Wound Length (cm) 0 cm 10/23/2017  2:24 PM   Wound Width (cm) 0 cm 10/23/2017  2:24 PM   Wound Depth (cm)  scabbed 10/23/2017  2:24 PM   Closure HENRY 10/9/2017  2:00 PM   Drainage Amount None 10/23/2017  2:24 PM   Drainage Description Serosanguinous 10/16/2017 12:10 PM   Odor None 10/23/2017  2:24 PM   Dressing/Treatment Open to air 10/23/2017  2:24 PM   Dressing Changed Changed/New 10/16/2017 12:10 PM   Dressing Status Intact; Changed 10/16/2017 12:10 PM   Number of days: 15         Wound Care Documentation:  Negative Pressure Wound Therapy Leg Right (Active)   Wound Type Surgical 9/25/2017  4:05 PM   Wound Assessment Edges well approximated;Yellow;Red 10/2/2017 11:33 AM   Renate-wound Assessment Maceration; White 10/2/2017 11:33 AM   Unit Type KCI 10/2/2017 11:33 AM   Dressing Type Black foam 10/2/2017 11:33 AM   Number of pieces used 1 10/2/2017 11:33 AM   Cycle Continuous 10/2/2017 11:33 AM   Target Pressure (mmHg) 125 10/2/2017 11:33 AM   Irrigation Solution Normal Saline 10/2/2017 11:33 AM   Canister changed? Yes 10/2/2017 11:33 AM   Dressing Status Dry; Intact; Changed 10/2/2017 11:33 AM   Dressing Changed Changed/New 10/2/2017 11:33 AM   Drainage Amount Moderate 10/2/2017 11:33 AM   Drainage Description Serosanguinous 10/2/2017 11:33 AM   Odor None 10/2/2017 11:33 AM   Number of days: 21       Wound 10/28/16 Leg Right;Lateral;Lower;Proximal #1 (Active)   Wound Type Wound 10/2/2017 11:33 AM   Dressing Status Intact 10/2/2017 11:33 AM   Dressing Changed Changed/New 10/2/2017 11:33 AM   Dressing/Treatment Dry dressing 9/25/2017 10:46 AM   Wound Cleansed Rinsed/Irrigated with saline 10/2/2017 11:33 AM   Wound Length (cm) 4.5 cm 10/2/2017 11:33 AM   Wound Width (cm) 7 cm 10/2/2017 11:33 AM   Wound Depth (cm)  graft 10/2/2017 11:33 AM   Calculated Wound Size (cm^2) (l*w) 31.5 cm^2 10/2/2017 11:33 AM   Change in Wound Size % (l*w) -162.5 10/2/2017 11:33 AM   Wound Assessment Yellow;Pink 10/2/2017 11:33 AM   Margins Attached edges 10/2/2017 11:33 AM   Renate-wound Assessment Maceration; White;Pink 10/2/2017 11:33 AM   [place]%Wound Bed 50 10/2/2017 11:33 AM   Red%Wound Bed 90 9/21/2017 11:46 AM Yellow%Wound Bed 50 10/2/2017 11:33 AM   Black%Wound Bed 5 6/2/2017  9:05 AM   Drainage Amount Moderate 10/2/2017 11:33 AM   Drainage Description Serosanguinous 10/2/2017 11:33 AM   Odor None 10/2/2017 11:33 AM   Culture Taken Yes 5/25/2017 12:04 PM   Time out Yes 9/15/2017 10:21 AM   Procedural Pain 0 9/15/2017 10:21 AM   Post procedural Pain 0 9/15/2017 10:21 AM   Op First Treatment Date 10/28/16 10/28/2016 11:35 AM   Number of days: 353       Incision 10/09/17 Thigh Right (Active)   Wound Assessment HENRY 10/9/2017  2:00 PM   Renate-wound Assessment HENRY 10/9/2017  2:00 PM   Closure HENRY 10/9/2017  2:00 PM   Drainage Amount None 10/9/2017  2:00 PM   Dressing/Treatment Ace Wrap 10/9/2017  2:00 PM   Dressing Status Clean;Dry; Intact 10/9/2017  2:00 PM   Number of days: 6       Incision 10/09/17 Leg Right (Active)   Wound Assessment HENRY 10/9/2017  2:00 PM   Renate-wound Assessment HENRY 10/9/2017  2:00 PM   Closure HENRY 10/9/2017  2:00 PM   Drainage Amount None 10/9/2017  2:22 PM   Dressing/Treatment Ace Wrap 10/9/2017  2:22 PM   Dressing Status Clean;Dry; Intact 10/9/2017  2:22 PM   Number of days: 6       Wound Care Documentation:  Negative Pressure Wound Therapy Leg Right (Active)   Wound Type Surgical 9/25/2017  4:05 PM   Wound Assessment Edges well approximated;Yellow;Red 10/2/2017 11:33 AM   Renate-wound Assessment Maceration; White 10/2/2017 11:33 AM   Unit Type KCI 10/2/2017 11:33 AM   Dressing Type Black foam 10/2/2017 11:33 AM   Number of pieces used 1 10/2/2017 11:33 AM   Cycle Continuous 10/2/2017 11:33 AM   Target Pressure (mmHg) 125 10/2/2017 11:33 AM   Irrigation Solution Normal Saline 10/2/2017 11:33 AM   Canister changed? Yes 10/2/2017 11:33 AM   Dressing Status Dry; Intact; Changed 10/2/2017 11:33 AM   Dressing Changed Changed/New 10/2/2017 11:33 AM   Drainage Amount Moderate 10/2/2017 11:33 AM   Drainage Description Serosanguinous 10/2/2017 11:33 AM   Odor None 10/2/2017 11:33 AM   Number of days:7       Wound Dry;Pink;Purple;Red 9/18/2017 10:29 AM   [place]%Wound Bed 80 6/29/2017  2:09 PM   Red%Wound Bed 80 9/18/2017 10:29 AM   Yellow%Wound Bed 20 9/18/2017 10:29 AM   Black%Wound Bed 5 6/2/2017  9:05 AM   Drainage Amount Moderate 9/18/2017 10:29 AM   Drainage Description Serosanguinous 9/18/2017 10:29 AM   Odor None 9/18/2017 10:29 AM   Culture Taken Yes 5/25/2017 12:04 PM   Time out Yes 9/15/2017 10:21 AM   Procedural Pain 0 9/15/2017 10:21 AM   Post procedural Pain 0 9/15/2017 10:21 AM   Op First Treatment Date 10/28/16 10/28/2016 11:35 AM   Number of days:324       LABS       CBC:   Lab Results   Component Value Date    WBC 4.1 10/26/2017    HGB 11.2 10/26/2017    HCT 32.7 10/26/2017    MCV 90.7 10/26/2017     10/26/2017     BMP:   Lab Results   Component Value Date     10/28/2017    K 5.0 10/28/2017     10/28/2017    CO2 26 10/28/2017    PHOS 3.5 10/25/2017    BUN 17 10/28/2017    CREATININE 1.3 10/28/2017     PT/INR:   Lab Results   Component Value Date    PROTIME 24.9 10/01/2014    INR 2.29 10/01/2014     Prealbumin: No results found for: PREALBUMIN  Albumin:  Lab Results   Component Value Date    LABALBU 4.3 10/25/2017     Sed Rate:No results found for: SEDRATE  CRP: No results found for: CRP  Micro:   Lab Results   Component Value Date    BC No growth-preliminary  No growth   07/18/2014      Hemoglobin A1C: No results found for: TGAS9D3      Wound Culture    Gram Stain Result 09/15/2017 10:38 AM [facility]     No segmented neutrophils observed. Rare epithelial cells observed. Few gram positive cocci occurring singly and in pairs. Organism (Abnormal) 09/15/2017 10:38 AM [facility]     Staphylococcus aureus     Aerobic Culture 09/15/2017 10:38 AM [facility]     moderate growth   In the treatment of gram positive infections, GENTAMICIN   should be CONSIDERED a SYNERGYSTIC agent ONLY.    Ciprofloxacin and Levofloxacin, regardless of in

## 2017-11-15 ENCOUNTER — HOSPITAL ENCOUNTER (OUTPATIENT)
Dept: WOUND CARE | Age: 67
Discharge: HOME | End: 2017-11-15
Payer: MEDICARE

## 2017-11-15 VITALS
SYSTOLIC BLOOD PRESSURE: 172 MMHG | OXYGEN SATURATION: 100 % | TEMPERATURE: 97.9 F | DIASTOLIC BLOOD PRESSURE: 76 MMHG | HEART RATE: 57 BPM | RESPIRATION RATE: 18 BRPM

## 2017-11-15 DIAGNOSIS — I87.2 VENOUS INSUFFICIENCY OF RIGHT LEG: ICD-10-CM

## 2017-11-15 DIAGNOSIS — I83.019 VENOUS ULCER OF RIGHT LOWER EXTREMITY WITH VARICOSE VEINS (HCC): Primary | ICD-10-CM

## 2017-11-15 DIAGNOSIS — L97.919 VENOUS ULCER OF RIGHT LOWER EXTREMITY WITH VARICOSE VEINS (HCC): Primary | ICD-10-CM

## 2017-11-15 PROCEDURE — 99213 OFFICE O/P EST LOW 20 MIN: CPT

## 2017-11-15 ASSESSMENT — PAIN SCALES - GENERAL: PAINLEVEL_OUTOF10: 0

## 2017-11-15 NOTE — PLAN OF CARE
Problem: Wound:  Goal: Will show signs of wound healing; wound closure and no evidence of infection  Will show signs of wound healing; wound closure and no evidence of infection   Outcome: Ongoing  Patient presented to wound clinic and reports compression stockings are too tight and causing pain. RN visit for evaluation and management/dressing or right leg wound. Right leg wound is healed, but bilateral lower leg edema present. Instructed patient to apply brown compression stockings daily in AM and off at night, if patient unable to tolerate the brown (20 mmHg) layer then switch to the white layer (10 mmHg). Patient verbalized understanding. Instructed patient to elevate lower legs to see if it relieves pain/discomfort. Follow up in wound clinic on 12/4/17 as scheduled. Comments: Care plan reviewed with patient. Patient verbalize understanding of the plan of care and contribute to goal setting.

## 2017-11-27 ENCOUNTER — OFFICE VISIT (OUTPATIENT)
Dept: NEPHROLOGY | Age: 67
End: 2017-11-27
Payer: MEDICARE

## 2017-11-27 ENCOUNTER — HOSPITAL ENCOUNTER (OUTPATIENT)
Age: 67
Discharge: HOME | End: 2017-11-27
Payer: MEDICARE

## 2017-11-27 VITALS
DIASTOLIC BLOOD PRESSURE: 82 MMHG | OXYGEN SATURATION: 99 % | SYSTOLIC BLOOD PRESSURE: 130 MMHG | BODY MASS INDEX: 33.65 KG/M2 | HEART RATE: 66 BPM | WEIGHT: 228 LBS

## 2017-11-27 DIAGNOSIS — N17.9 AKI (ACUTE KIDNEY INJURY) (HCC): Primary | ICD-10-CM

## 2017-11-27 DIAGNOSIS — E53.1 VITAMIN B6 DEFICIENCY: ICD-10-CM

## 2017-11-27 DIAGNOSIS — N17.9 AKI (ACUTE KIDNEY INJURY) (HCC): ICD-10-CM

## 2017-11-27 DIAGNOSIS — R79.89 ELEVATED TSH: ICD-10-CM

## 2017-11-27 DIAGNOSIS — E87.5 HYPERKALEMIA: ICD-10-CM

## 2017-11-27 DIAGNOSIS — I10 ESSENTIAL HYPERTENSION: ICD-10-CM

## 2017-11-27 LAB
ANION GAP SERPL CALCULATED.3IONS-SCNC: 14 MEQ/L (ref 8–16)
BUN BLDV-MCNC: 9 MG/DL (ref 7–22)
CALCIUM SERPL-MCNC: 9.4 MG/DL (ref 8.5–10.5)
CHLORIDE BLD-SCNC: 100 MEQ/L (ref 98–111)
CO2: 27 MEQ/L (ref 23–33)
CREAT SERPL-MCNC: 1 MG/DL (ref 0.4–1.2)
GFR SERPL CREATININE-BSD FRML MDRD: 74 ML/MIN/1.73M2
GLUCOSE BLD-MCNC: 88 MG/DL (ref 70–108)
POTASSIUM SERPL-SCNC: 4 MEQ/L (ref 3.5–5.2)
SODIUM BLD-SCNC: 141 MEQ/L (ref 135–145)
TSH SERPL DL<=0.05 MIU/L-ACNC: 2.66 UIU/ML (ref 0.4–4.2)

## 2017-11-27 PROCEDURE — 36415 COLL VENOUS BLD VENIPUNCTURE: CPT

## 2017-11-27 PROCEDURE — 1036F TOBACCO NON-USER: CPT | Performed by: INTERNAL MEDICINE

## 2017-11-27 PROCEDURE — 84443 ASSAY THYROID STIM HORMONE: CPT

## 2017-11-27 PROCEDURE — 86376 MICROSOMAL ANTIBODY EACH: CPT

## 2017-11-27 PROCEDURE — 3017F COLORECTAL CA SCREEN DOC REV: CPT | Performed by: INTERNAL MEDICINE

## 2017-11-27 PROCEDURE — 99213 OFFICE O/P EST LOW 20 MIN: CPT | Performed by: INTERNAL MEDICINE

## 2017-11-27 PROCEDURE — 80048 BASIC METABOLIC PNL TOTAL CA: CPT

## 2017-11-27 PROCEDURE — 4040F PNEUMOC VAC/ADMIN/RCVD: CPT | Performed by: INTERNAL MEDICINE

## 2017-11-27 PROCEDURE — G8417 CALC BMI ABV UP PARAM F/U: HCPCS | Performed by: INTERNAL MEDICINE

## 2017-11-27 PROCEDURE — 1123F ACP DISCUSS/DSCN MKR DOCD: CPT | Performed by: INTERNAL MEDICINE

## 2017-11-27 PROCEDURE — G8484 FLU IMMUNIZE NO ADMIN: HCPCS | Performed by: INTERNAL MEDICINE

## 2017-11-27 PROCEDURE — 84207 ASSAY OF VITAMIN B-6: CPT

## 2017-11-27 PROCEDURE — G8427 DOCREV CUR MEDS BY ELIG CLIN: HCPCS | Performed by: INTERNAL MEDICINE

## 2017-11-27 PROCEDURE — 1111F DSCHRG MED/CURRENT MED MERGE: CPT | Performed by: INTERNAL MEDICINE

## 2017-11-27 NOTE — PROGRESS NOTES
[facility]    [facility], [address]   [place] [facility]  [phone]  Progress Note  11/27/2017 8:15 AM      Pt Name:    Vickie Park  YOB: 1950  Primary Care Physician:  Morris Aden DO     Chief Complaint:   Chief Complaint   Patient presents with    Follow-Up from Hospital     ANA LILIA/hyperkalemia        Background Information/Interval History:   80 yo male with hx HTN, foot wound, hx alcohol use, chronic diastolic dysfunction, NSAID use who was recently admitted with hypotension, ANA LILIA and hyperkalemia. Nephrology saw him in hospital for ANA LILIA. He was on bactrim and lisinopril on admission. He has had foot wound and following with podiatrist. He is due to see him in follow-up next week. No hx DM. He was managed supportively in the hospital for ANA LILIA. He did not need dialysis treatment. He is here for follow-up with his Sister, Sheryl Gomez. He feels well now. No chest pain or shortness of breath. He saw his PCP post discharge. No dysuria or hematuria.       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 Yadira Yates follows, no seizure activity since    Seizure disorder ([facility] [address] 7/17/2014     Past Surgical History:   Procedure Laterality Date    [place] INCIS/DRAIN THIGH/KNEE ABSCESS,DEEP Right 9/25/2017    RIGHT SERGIO LEG WOUND DEBRIDEMENT WITH SKIN GRAFT AND WOUND VAC APPLICATION performed by Shanel Anand DPM at [address] [address] [address] Right 10/9/2017    SPLIT THICKNESS SKIN GRAFT FROM RIGHT LEG performed by Shanel Anand DPM at . [facility] 97      vein stripping VITALS:  /82 (Site: Left Arm, Position: Sitting, Cuff Size: Medium Adult)   Pulse 66   Wt 228 lb (103.4 kg)   SpO2 99%   BMI 33.65 kg/m²   Wt Readings from Last 3 Encounters:   11/27/17 228 lb (103.4 kg)   11/13/17 229 lb 4.8 oz (104 kg)   11/09/17 225 lb 6.4 oz (102.2 kg)     Body mass index is 33.65 kg/m². General Appearance: alert and cooperative with exam, appears comfortable, no distress  Oral: moist oral mucus membranes  Neck: No jugular venous distention  Lungs: Air entry B/L, no crackles or rales, no use of accessory muscles  Heart: S1, S2 heard  GI: soft, non-tender, no guarding  Extremities: No sig LE edema     Medications:  Current Outpatient Prescriptions   Medication Sig Dispense Refill    chlorhexidine (HIBICLENS) 4 % external liquid Apply topically daily as needed. 1 Bottle 1    levETIRAcetam (KEPPRA) 1000 MG tablet Take 1 tablet by mouth 2 times daily 60 tablet 11    vitamin B-12 (CYANOCOBALAMIN) 1000 MCG tablet Take 1,000 mcg by mouth daily       pyridoxine (B-6) 25 MG tablet Take 25 mg by mouth every other day        No current facility-administered medications for this visit. Laboratory & Diagnostics:  Creat 4.1 down to 1.3, UA: no blood, no protein  US: R 9.5, L 10.1     Impression/Plan:   1. Recent ANA LILIA: in setting of hypotension, ACEI and bactrim use. Improved on discharge. No follow-up labs done. Will have him do BMP today. Advised to avoid NSAIDs. 2. Hyperkalemia: resolved. He is off lisinopril and bactrim at this time. 3. HTN: Currently stable, off medications. Advised low salt diet. Continue to monitor. 4. Previous NSAID (excessive) use: Not taking any more. US kidneys reviewed. His UA was benign in the hospital. Will check BMP today and call if abnormal  Thought process discussed with pt and Sister, Nathan Carmona  All questions answered.    Will see him back in 6 months to ensure kidney function stable and if labs are stable then can be discharged from renal service. D/w pt and Sister    Orders Placed This Encounter   Procedures    Basic Metabolic Panel    Basic Metabolic Panel     Return in about 6 months (around 5/27/2018).     Angel Vale MD  [facility]

## 2017-11-28 LAB — THYROID PEROXIDASE ANTIBODY: 0.8 IU/ML (ref 0–9)

## 2017-11-29 ENCOUNTER — OFFICE VISIT (OUTPATIENT)
Dept: NEUROLOGY | Age: 67
End: 2017-11-29
Payer: MEDICARE

## 2017-11-29 ENCOUNTER — OFFICE VISIT (OUTPATIENT)
Dept: CARDIOLOGY CLINIC | Age: 67
End: 2017-11-29
Payer: MEDICARE

## 2017-11-29 ENCOUNTER — TELEPHONE (OUTPATIENT)
Dept: FAMILY MEDICINE CLINIC | Age: 67
End: 2017-11-29

## 2017-11-29 VITALS
HEIGHT: 69 IN | SYSTOLIC BLOOD PRESSURE: 118 MMHG | WEIGHT: 227.6 LBS | DIASTOLIC BLOOD PRESSURE: 68 MMHG | BODY MASS INDEX: 33.71 KG/M2 | HEART RATE: 72 BPM

## 2017-11-29 VITALS
SYSTOLIC BLOOD PRESSURE: 130 MMHG | DIASTOLIC BLOOD PRESSURE: 82 MMHG | BODY MASS INDEX: 34.33 KG/M2 | HEIGHT: 69 IN | HEART RATE: 80 BPM | WEIGHT: 231.8 LBS

## 2017-11-29 DIAGNOSIS — I51.89 DIASTOLIC DYSFUNCTION: Primary | ICD-10-CM

## 2017-11-29 DIAGNOSIS — G40.909 SEIZURE DISORDER (HCC): Primary | ICD-10-CM

## 2017-11-29 PROCEDURE — G8417 CALC BMI ABV UP PARAM F/U: HCPCS | Performed by: INTERNAL MEDICINE

## 2017-11-29 PROCEDURE — G8427 DOCREV CUR MEDS BY ELIG CLIN: HCPCS | Performed by: INTERNAL MEDICINE

## 2017-11-29 PROCEDURE — 1036F TOBACCO NON-USER: CPT | Performed by: INTERNAL MEDICINE

## 2017-11-29 PROCEDURE — 3017F COLORECTAL CA SCREEN DOC REV: CPT | Performed by: PSYCHIATRY & NEUROLOGY

## 2017-11-29 PROCEDURE — 1123F ACP DISCUSS/DSCN MKR DOCD: CPT | Performed by: PSYCHIATRY & NEUROLOGY

## 2017-11-29 PROCEDURE — 1123F ACP DISCUSS/DSCN MKR DOCD: CPT | Performed by: INTERNAL MEDICINE

## 2017-11-29 PROCEDURE — 99214 OFFICE O/P EST MOD 30 MIN: CPT | Performed by: INTERNAL MEDICINE

## 2017-11-29 PROCEDURE — 1036F TOBACCO NON-USER: CPT | Performed by: PSYCHIATRY & NEUROLOGY

## 2017-11-29 PROCEDURE — G8427 DOCREV CUR MEDS BY ELIG CLIN: HCPCS | Performed by: PSYCHIATRY & NEUROLOGY

## 2017-11-29 PROCEDURE — 4040F PNEUMOC VAC/ADMIN/RCVD: CPT | Performed by: PSYCHIATRY & NEUROLOGY

## 2017-11-29 PROCEDURE — G8484 FLU IMMUNIZE NO ADMIN: HCPCS | Performed by: PSYCHIATRY & NEUROLOGY

## 2017-11-29 PROCEDURE — 4040F PNEUMOC VAC/ADMIN/RCVD: CPT | Performed by: INTERNAL MEDICINE

## 2017-11-29 PROCEDURE — G8417 CALC BMI ABV UP PARAM F/U: HCPCS | Performed by: PSYCHIATRY & NEUROLOGY

## 2017-11-29 PROCEDURE — G8484 FLU IMMUNIZE NO ADMIN: HCPCS | Performed by: INTERNAL MEDICINE

## 2017-11-29 PROCEDURE — 99213 OFFICE O/P EST LOW 20 MIN: CPT | Performed by: PSYCHIATRY & NEUROLOGY

## 2017-11-29 PROCEDURE — 3017F COLORECTAL CA SCREEN DOC REV: CPT | Performed by: INTERNAL MEDICINE

## 2017-11-29 RX ORDER — LEVETIRACETAM 1000 MG/1
1000 TABLET ORAL 2 TIMES DAILY
Qty: 60 TABLET | Refills: 11 | Status: SHIPPED | OUTPATIENT
Start: 2017-11-29 | End: 2018-10-15

## 2017-11-29 RX ORDER — ASPIRIN 81 MG/1
81 TABLET, CHEWABLE ORAL DAILY
COMMUNITY

## 2017-11-29 ASSESSMENT — ENCOUNTER SYMPTOMS
EYE PAIN: 0
EYE ITCHING: 0
CONSTIPATION: 0
CHEST TIGHTNESS: 0
SHORTNESS OF BREATH: 0
BACK PAIN: 0
NAUSEA: 0
COUGH: 0
EYE DISCHARGE: 0
ABDOMINAL PAIN: 0
APNEA: 0
SORE THROAT: 0
BLOOD IN STOOL: 0
EYE REDNESS: 0
DIARRHEA: 0
ABDOMINAL DISTENTION: 0
SINUS PRESSURE: 0

## 2017-11-29 NOTE — PROGRESS NOTES
Language is Intact. Head: PERRL, EOMI, no icterus  Neck: There is no carotid bruits. The Neck is supple. Neuro: CN 2-12 grossly intact with no focal deficits. Power 5/5 Throughout symmetric, Reflexes are decreased and symmetric. Long tracts are intact. Cerebellar exam is Intact. Sensory exam is intact to light touch. Gait is intact. Musculoskeletal:  Has no hand arthritis, no limitation of ROM in any of the four extremities. Lower extremities no edema        DATA:  Results for orders placed or performed during the hospital encounter of 11/27/17   TSH without Reflex   Result Value Ref Range    TSH 2.660 0.400 - 4.20 uIU/mL   Thyroid Peroxidase Antibody   Result Value Ref Range    Thyroid Peroxidase Antibody 0.8 0.0 - 9.0 IU/mL   Basic Metabolic Panel   Result Value Ref Range    Sodium 141 135 - 145 meq/L    Potassium 4.0 3.5 - 5.2 meq/L    Chloride 100 98 - 111 meq/L    CO2 27 23 - 33 meq/L    Glucose 88 70 - 108 mg/dL    BUN 9 7 - 22 mg/dL    CREATININE 1.0 0.4 - 1.2 mg/dL    Calcium 9.4 8.5 - 10.5 mg/dL   Anion Gap   Result Value Ref Range    Anion Gap 14.0 8.0 - 16.0 meq/L   Glomerular Filtration Rate, Estimated   Result Value Ref Range    Est, Glom Filt Rate 74 (A) ml/min/1.73m2             Assessment:    1. Seizure disorder [facility])            He is doing well. No reported seizure. He is compliant. He is off any ETOH for last 4 years. He is pleased with how he is doing. Family is pleased with how he is doing. After detailed discussion with patient we agreed on the following plan. Plan:  1. Continue with Keppra 1000 mg twice a day. Refills given. 2. Follow up in 1 year. 3. All patient's questions were answered. Please call if any questions. Please call if any questions.      Parker Russell MD

## 2017-11-29 NOTE — PROGRESS NOTES
[facility] PROFESSIONAL SERVS  [facility] [place]  [address]  [address]  [place] [address]  Dept: [phone]  Dept Fax: [phone]: [phone]    Visit Date: 11/29/2017    Mr. Loving is a 79 y.o. male  who presented for:  Chief Complaint   Patient presents with   [facility] Cardiologist     referred by Jonel Daley - chronic systolic heart failure       HPI:   HPI   80 yo M with hx of systolic failure and the history as below who presents for follow-up of management of his heart failure. 10/24/17 TTE shows EF 50% and grade 1 diastolic function. He had an EF of 35% in 1/2016 and at that time he had presented with seizure issues and head trauma. He has no chest pain, angina, sob, APARICIO, LE edema. No presyncope or syncope. No current A/C for previous DVT, he finished his treatment for it. Current Outpatient Prescriptions:     levETIRAcetam (KEPPRA) 1000 MG tablet, Take 1 tablet by mouth 2 times daily, Disp: 60 tablet, Rfl: 11    vitamin B-12 (CYANOCOBALAMIN) 1000 MCG tablet, Take 1,000 mcg by mouth daily , Disp: , Rfl:     pyridoxine (B-6) 25 MG tablet, Take 25 mg by mouth every other day , Disp: , Rfl:     Past Medical History  Olena Vidal  has a past medical history of Heart failure with reduced ejection fraction ([facility] [address] History of alcohol abuse; History of DVT of right lower extremity; History of hypertension; History of traumatic brain injury; Hyperlipidemia; Mentally challenged; Osteoarthritis; 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 Social History  Olena Vidal  reports that he has never smoked. He has never used smokeless tobacco. He reports that he does not drink alcohol or use drugs. Family History  Olena Vidal family history includes Arthritis in his sister; Asthma in his sister; COPD in his father; Cancer in his mother and sister; Diabetes in his maternal grandmother, mother, sister, and sister;  Heart Disease in his maternal grandfather; High Blood Pressure in his mother and sister; High Cholesterol in his mother and sister; Stroke in his father and mother. There is no family history of bicuspid aortic valve, aneurysms, heart transplant, pacemakers, defibrillators, or sudden cardiac death. Past Surgical History   Past Surgical History:   Procedure Laterality Date    [place] INCIS/DRAIN THIGH/KNEE ABSCESS,DEEP Right 9/25/2017    RIGHT SERGIO LEG WOUND DEBRIDEMENT WITH SKIN GRAFT AND WOUND VAC APPLICATION performed by Rosie Goncalves DPM at [address] Right 10/9/2017    SPLIT THICKNESS SKIN GRAFT FROM RIGHT LEG performed by Rosie Goncalves DPM at [address]      vein stripping       Subjective:     Review of Systems   Constitutional: Negative for activity change, appetite change, chills and fatigue. HENT: Negative for congestion, sinus pressure, sneezing and sore throat. Eyes: Negative for pain, discharge, redness and itching. Respiratory: Negative for apnea, cough, chest tightness and shortness of breath. Gastrointestinal: Negative for abdominal distention, abdominal pain, blood in stool, constipation, diarrhea and nausea. Endocrine: Negative for cold intolerance, heat intolerance, polydipsia and polyphagia. Genitourinary: Negative for dysuria, enuresis, flank pain and hematuria. Musculoskeletal: Negative for arthralgias, back pain, joint swelling and neck pain. Neurological: Negative for dizziness, syncope, numbness and headaches. Psychiatric/Behavioral: Negative for agitation, confusion, decreased concentration and dysphoric mood.        Objective:     /68   Pulse 72   Ht 5' 9\" (1.753 m)   Wt 227 lb 9.6 oz (103.2 kg)   BMI 33.61 kg/m²     Wt Readings from Last 3 Encounters:   11/29/17 227 lb 9.6 oz (103.2 kg)   11/27/17 228 lb (103.4 kg)   11/13/17 229 lb 4.8 oz (104 kg)     BP Readings from Last 3 Encounters:   11/29/17 118/68   11/27/17 130/82   11/15/17 (!) 172/76       Physical 09/10/2014         No results found for: LABA1C    Lab Results   Component Value Date    TRIG 115 10/25/2017    HDL 40 10/25/2017    LDLCALC 100 10/25/2017       Lab Results   Component Value Date    TSH 2.660 11/27/2017         Testing Reviewed:      I have individually reviewed the below cardiac tests    EKG: SR with 1st degree AVB, ? Anterior infarct, inferior T wave changes    ECHO:   Results for orders placed during the hospital encounter of 10/24/17   ECHO Complete 2D W Doppler W Color    Narrative Transthoracic Echocardiography Report (TTE)     Demographics      Patient Name    Jose Thomas  Gender               Male                   J      MR #            [id number]      Race                                                      Ethnicity      Account #       [de-identified]      Room Number      Accession       [id number]      Date of Study        10/24/2017   Number      Date of Birth   1950     Referring Physician  Radha KINSEY DO      Age             79 year(s)     Sonographer          Melonie Urbano                                     Interpreting         Echo reader of the                                  Physician            harvey Lucas MD     Procedure    Type of Study      TTE procedure:ECHOCARDIOGRAM COMPLETE 2D W DOPPLER W COLOR. Procedure Date  Date: 10/24/2017 Start: 03:56 PM    Study Location: Echo Lab  Technical Quality: Adequate visualization    Indications:Evaluate left ventricular function. Additional Medical History:Hypertension, Hyperlipidemia, ETOH abuse,  Seizures    Patient Status: Routine    Height: 58 inches Weight: 223 pounds BSA: 1.91 m^2 BMI: 46.61 kg/m^2    BP: 110/70 mmHg     Conclusions      Summary   Normal left ventricle size and Low Normal LV systolic function. Ejection   fraction was estimated at 50 %.  There were no regional left ventricular   wall motion abnormalities and wall thickness was PA ED Velocity: 90.1 cm/s   E/E' lateral: 5.91     [id number]       Assessment/Plan   Low-Normal Systolic Function  Diastolic Dysfunction, grade 1    Asymptomatic. NYHA I.  Cr 1.0 recently. Makes good urine. SBP controlled today but has been as high as 170s 2 weeks ago. His recent ANA LILIA was related to NSAIDS/Bactrim. It is resolved. Would restart Lisinopril 5 mg PO per Nephrology/PCP if BP is difficult to control. Start ASA 81 mg q day. No concern for ischemia. The function is likely related to alcohol use in the past, but he has since stopped all alcohol use. Lipids- per PCP    RF/Lifestyle discussed.      Disposition:  6 months       Electronically signed by Misty Ny MD   11/29/2017 at 10:52 AM

## 2017-11-29 NOTE — PATIENT INSTRUCTIONS
1.  Continue with Keppra 1000 mg twice a day. Refills given. 2. Follow up in 1 year. 3. All patient's questions were answered. Please call if any questions.

## 2017-11-30 LAB — VITAMIN B6: 249.1 NMOL/L (ref 20–125)

## 2017-12-01 ENCOUNTER — TELEPHONE (OUTPATIENT)
Dept: FAMILY MEDICINE CLINIC | Age: 67
End: 2017-12-01

## 2017-12-01 DIAGNOSIS — E53.1 VITAMIN B6 DEFICIENCY: Primary | ICD-10-CM

## 2017-12-01 RX ORDER — LANOLIN ALCOHOL/MO/W.PET/CERES
1000 CREAM (GRAM) TOPICAL DAILY
COMMUNITY
End: 2017-12-04 | Stop reason: ALTCHOICE

## 2017-12-04 ENCOUNTER — HOSPITAL ENCOUNTER (OUTPATIENT)
Dept: WOUND CARE | Age: 67
Discharge: HOME | End: 2017-12-04
Payer: MEDICARE

## 2017-12-04 VITALS
SYSTOLIC BLOOD PRESSURE: 165 MMHG | TEMPERATURE: 98.2 F | RESPIRATION RATE: 18 BRPM | BODY MASS INDEX: 33.47 KG/M2 | HEART RATE: 67 BPM | HEIGHT: 69 IN | WEIGHT: 226 LBS | OXYGEN SATURATION: 100 % | DIASTOLIC BLOOD PRESSURE: 75 MMHG

## 2017-12-04 DIAGNOSIS — I83.019 VENOUS ULCER OF RIGHT LOWER EXTREMITY WITH VARICOSE VEINS (HCC): ICD-10-CM

## 2017-12-04 DIAGNOSIS — L60.3 DYSTROPHIC NAIL: ICD-10-CM

## 2017-12-04 DIAGNOSIS — L97.919 VENOUS ULCER OF RIGHT LOWER EXTREMITY WITH VARICOSE VEINS (HCC): ICD-10-CM

## 2017-12-04 DIAGNOSIS — I87.2 VENOUS INSUFFICIENCY OF RIGHT LEG: ICD-10-CM

## 2017-12-04 PROCEDURE — A6222 GAUZE <=16 IN NO W/SAL W/O B: HCPCS

## 2017-12-04 PROCEDURE — 29580 STRAPPING UNNA BOOT: CPT

## 2017-12-04 PROCEDURE — A6456 ZINC PASTE BAND W >=3"<5"/YD: HCPCS

## 2017-12-04 PROCEDURE — A6454 SELF-ADHER BAND W>=3" <5"/YD: HCPCS

## 2017-12-04 ASSESSMENT — PAIN SCALES - GENERAL: PAINLEVEL_OUTOF10: 0

## 2017-12-04 NOTE — PLAN OF CARE
Problem: Wound:  Goal: Will show signs of wound healing; wound closure and no evidence of infection  Will show signs of wound healing; wound closure and no evidence of infection   Outcome: Ongoing  Pt presents to wound clinic for follow up of right leg wound. Wound opened in one area. No s/s of infection. Pt was afebrile. Cleansed wound with normal saline and gauze. Patted dry with clean gauze. Applied adaptic to wound bed. Wrapped with unna boot and coban. Started at base of toes to 2 inches below the bend of the knee. Pt advised to Leave intact until next appointment. Pt instructed not to get dressing wet. Next appt in 1 week. Comments: Care plan reviewed with patient . Patient verbalize understanding of the plan of care and contribute to goal setting.

## 2017-12-04 NOTE — PROGRESS NOTES
Jenna MARTINO has reviewed and agrees with HARLEY Asif LPN's shift   Assessment.     Sue Atwood  12/4/2017

## 2017-12-04 NOTE — PROGRESS NOTES
[facility]         Progress Note      [facility] RECORD NUMBER:  [id number]  AGE: 79 y.o. GENDER: male  : 1950  EPISODE DATE:  2017    Subjective:   CC: Small superficial opening over graft site, scant drainage. HISTORY of PRESENT ILLNESS HPI  17    Bartolo English is a 79 y.o. male established at the wound center, following up from a previous  harvest/application of STSG 53.3. 17. Patient had previously be clinically healed, ambulating in a graded custom compression stockings. Patient presents today with a small opening noted centrally, minimal depth with scan serious drainage appreciated. Patient was placed in a unna boot compression therapy, and will be re-evaluated in (1) week. Patient was educated on the importance of daily use of his compression stockings. All further questions/concerns were addressed bedside.        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 Lise Newman follows, no seizure activity since    Seizure disorder ([facility] [address] 2014       PAST SURGICAL HISTORY    Past Surgical History:   Procedure Laterality Date    DC INCIS/DRAIN THIGH/KNEE ABSCESS,DEEP Right 2017    RIGHT SERGIO LEG WOUND DEBRIDEMENT WITH SKIN GRAFT AND WOUND VAC APPLICATION performed by Darwin Harvey DPM at [address] [address] [address] Right 10/9/2017    SPLIT THICKNESS SKIN GRAFT FROM RIGHT LEG performed by Darwin Harvey DPM at . [facility]      vein stripping       FAMILY HISTORY    Family History   Problem Relation Age of Onset    Cancer Mother     Stroke Mother    [facility] Diabetes Mother     High Blood Pressure Mother     High Cholesterol Mother     Stroke Father     COPD Father     Diabetes Maternal Grandmother     Heart Disease Maternal Grandfather     Cancer Sister     Asthma Sister     High Blood Pressure Sister     High Cholesterol Sister     Arthritis Sister     Diabetes Sister     Diabetes Sister     Colon Cancer Neg Hx     Prostate Cancer Neg Hx        SOCIAL HISTORY    Social History   Substance Use Topics    Smoking status: Never Smoker    Smokeless tobacco: Never Used    Alcohol use No      Comment:         ALLERGIES    No Known Allergies    MEDICATIONS    Current Outpatient Prescriptions on File Prior to Encounter   Medication Sig Dispense Refill    aspirin 81 MG chewable tablet Take 81 mg by mouth daily      levETIRAcetam (KEPPRA) 1000 MG tablet Take 1 tablet by mouth 2 times daily 60 tablet 11     No current facility-administered medications on file prior to encounter. REVIEW OF SYSTEMS    A comprehensive review of systems was negative.     Objective:      BP (!) 165/75   Pulse 67   Temp 98.2 °F (36.8 °C) (Oral)   Resp 18   Ht 5' 9\" (1.753 m)   Wt 226 lb (102.5 kg)   SpO2 100%   BMI 33.37 kg/m²     Wt Readings from Last 3 Encounters:   12/04/17 226 lb (102.5 kg)   11/29/17 231 lb 12.8 oz (105.1 kg)   11/29/17 227 lb 9.6 oz (103.2 kg)     PHYSICAL EXAMINATION  CONSTITUTIONAL:  awake, alert, cooperative, no apparent distress, and appears stated age  EYES:  pupils equal, round and reactive to light, extra ocular muscles intact, sclera clear, conjunctiva normal  ENT:  normocepalic, without obvious abnormality  NECK:  Supple, symmetrical, trachea midline, no adenopathy  LUNGS:  No increased work of breathing, good air exchange, clear to auscultation bilaterally, no crackles or wheezing  CARDIOVASCULAR:  Normal apical impulse, regular rate and rhythm  ABDOMEN:  normal bowel sounds, soft, non-distended  Extremities (lower extremity examination in detail)  Vascular: Dorsalis pedis and posterior tibial pulses are weakly bilaterally. Skin temperature is warm to warm from proximal tibial tuberosity to distal digits. CFT sluggish but intact to stacia-wound site. Edema nonpitting in nature. Hair growth absent. Quality of skin atrophic/brawny. Dermatologic: See wound documentation for further details. Small superficial skin breakdown noted centrally at previous graft site, with scant drainage. No further signs of infection appreciated. Neurovascular: Epicritic and protopathic sensations are grossly intact. Musculoskeletal: Muscle strength 5/5 for all plantarflexors, dorsiflexors, inverters and everters examined. Mild pain with palpation of right ankle. No further acute bony abnormalities of note.                  Wound Care Documentation:  Wound 10/28/16 Leg Right;Lateral;Lower;Proximal #1 (Active)   Wound Image   12/4/2017 10:57 AM   Wound Type Wound 12/4/2017 10:57 AM   Dressing Status Intact 11/13/2017  9:48 AM   Dressing Changed Changed/New 11/13/2017  9:48 AM   Dressing/Treatment Vacuum dressing 6/12/2017  8:53 AM   Wound Cleansed Rinsed/Irrigated with saline 12/4/2017 10:57 AM   Wound Length (cm) 0.4 cm 12/4/2017 10:57 AM   Wound Width (cm) 0.5 cm 12/4/2017 10:57 AM   Wound Depth (cm)  0.05 12/4/2017 10:57 AM   Calculated Wound Size (cm^2) (l*w) 0.2 cm^2 12/4/2017 10:57 AM   Change in Wound Size % (l*w) 98.33 12/4/2017 10:57 AM   Wound Assessment Pink 12/4/2017 10:57 AM   Drainage Amount Scant 12/4/2017 10:57 AM   Drainage Description Serous 12/4/2017 10:57 AM   Odor None 12/4/2017 10:57 AM   Margins Attached edges 12/4/2017 10:57 AM   Stacia-wound Assessment Dry 12/4/2017 10:57 AM   [place]%Wound Bed 100 11/9/2017 10:00 AM   Red%Wound Bed 100 12/4/2017 10:57 AM   Yellow%Wound Bed 10 10/30/2017 10:30 AM   Black%Wound Bed 5 6/2/2017  9:05 AM   Culture Taken Yes 5/25/2017 12:04 PM   Time out Yes 9/15/2017 10:21 AM   Procedural Pain 0 9/15/2017 10:21 AM   Post procedural Pain 0 9/15/2017 10:21 AM   Op First Treatment Date 10/28/16 10/28/2016 11:35 AM   Number of days: 402       Wound 10/30/17 Thigh Anterior #2 Donor site (Active)   Wound Type Incision 10/30/2017 10:30 AM   Wound Other 10/30/2017 10:30 AM   Dressing Changed Changed/New 10/30/2017 10:30 AM   Wound Cleansed Rinsed/Irrigated with saline 10/30/2017 10:30 AM   Wound Length (cm) 9.5 cm 11/2/2017 12:07 PM   Wound Width (cm) 6.1 cm 11/2/2017 12:07 PM   Wound Depth (cm)  superficial 11/2/2017 12:07 PM   Calculated Wound Size (cm^2) (l*w) 57.95 cm^2 11/2/2017 12:07 PM   Change in Wound Size % (l*w) 3.67 11/2/2017 12:07 PM   Wound Assessment Clean;Dry; Intact 10/30/2017 10:30 AM   Drainage Amount Scant 10/30/2017 10:30 AM   Drainage Description Serosanguinous 10/30/2017 10:30 AM   Odor None 10/30/2017 10:30 AM   Margins Attached edges 11/2/2017 12:07 PM   Renate-wound Assessment Dry;Clean;Pink 11/2/2017 12:07 PM   Red%Wound Bed 100 11/2/2017 12:07 PM   Number of days: 35       Incision 10/09/17 Thigh Right (Active)   Wound Assessment Black 10/23/2017  2:24 PM   Renate-wound Assessment Dry 10/23/2017  2:24 PM   Wound Length (cm) 0 cm 10/23/2017  2:24 PM   Wound Width (cm) 0 cm 10/23/2017  2:24 PM   Wound Depth (cm)  scabbed 10/23/2017  2:24 PM   Drainage Amount None 10/23/2017  2:24 PM   Drainage Description Serosanguinous 10/16/2017 12:10 PM   Odor None 10/23/2017  2:24 PM   Dressing/Treatment Open to air 10/23/2017  2:24 PM   Dressing Changed Changed/New 10/16/2017 12:10 PM   Dressing Status Intact; Changed 10/16/2017 12:10 PM   Number of days: 55       Wound Care Documentation:  Wound 10/28/16 Leg Right;Lateral;Lower;Proximal #1 (Active)   Wound Type Wound 11/9/2017 10:00 AM   Dressing Status Intact 11/13/2017  9:48 AM   Dressing Changed Changed/New 11/13/2017  9:48 AM   Dressing/Treatment Vacuum dressing 6/12/2017  8:53 AM   Wound Cleansed Rinsed/Irrigated with saline 11/13/2017  9:48 AM   Wound Length (cm) 0.2 cm 11/13/2017  9:48 AM   Wound Width (cm) 0.3 cm 11/13/2017  9:48 AM   Wound Depth (cm)  superficial 11/13/2017  9:48 AM   Calculated Wound Size (cm^2) (l*w) 0.06 cm^2 11/13/2017  9:48 AM   Change in Wound Size % (l*w) 99.5 11/13/2017  9:48 AM   Wound Assessment Pink;Epithelialization 11/13/2017  9:48 AM   Margins Attached edges 11/13/2017  9:48 AM   Renate-wound Assessment Dry;Edema 11/13/2017  9:48 AM   [place]%Wound Bed 100 11/9/2017 10:00 AM   Red%Wound Bed 100 11/6/2017  2:13 PM   Yellow%Wound Bed 10 10/30/2017 10:30 AM   Black%Wound Bed 5 6/2/2017  9:05 AM   Drainage Amount None 11/13/2017  9:48 AM   Drainage Description Serosanguinous;Brown 11/9/2017 10:00 AM   Odor None 11/13/2017  9:48 AM   Culture Taken Yes 5/25/2017 12:04 PM   Time out Yes 9/15/2017 10:21 AM   Procedural Pain 0 9/15/2017 10:21 AM   Post procedural Pain 0 9/15/2017 10:21 AM   Op First Treatment Date 10/28/16 10/28/2016 11:35 AM   Number of days: 380       Wound 10/30/17 Thigh Anterior #2 Donor site (Active)   Wound Type Incision 10/30/2017 10:30 AM   Wound Other 10/30/2017 10:30 AM   Dressing Changed Changed/New 10/30/2017 10:30 AM   Wound Cleansed Rinsed/Irrigated with saline 10/30/2017 10:30 AM   Wound Length (cm) 9.5 cm 11/2/2017 12:07 PM   Wound Width (cm) 6.1 cm 11/2/2017 12:07 PM   Wound Depth (cm)  superficial 11/2/2017 12:07 PM   Calculated Wound Size (cm^2) (l*w) 57.95 cm^2 11/2/2017 12:07 PM   Change in Wound Size % (l*w) 3.67 11/2/2017 12:07 PM   Wound Assessment Clean;Dry; Intact 10/30/2017 10:30 AM   Margins Attached edges 11/2/2017 12:07 PM   Renate-wound Assessment Dry;Clean;Pink 11/2/2017 12:07 PM   Red%Wound Bed 100 11/2/2017 12:07 PM   Drainage Amount Scant 10/30/2017 10:30 AM   Drainage Description Serosanguinous 10/30/2017 10:30 AM   Odor None 10/30/2017 10:30 AM   Number of days: 14       Incision 10/09/17 Thigh Right (Active)   Wound Assessment Clean;Dry; Intact 10/28/2017 11:31 AM   Renate-wound Assessment Dry;Red 10/28/2017 11:31 AM   Wound Length (cm) 0 cm 10/23/2017  2:24 PM   Wound Width (cm) 0 cm 10/23/2017  2:24 PM   Wound Depth (cm)  scabbed 10/23/2017  2:24 PM   Drainage Amount None 10/28/2017 11:31 AM   Drainage Description Other (Comment) 10/28/2017 11:31 AM   Odor None 10/28/2017 11:31 AM   Dressing/Treatment Open to air 10/28/2017 11:31 AM   Dressing Changed Changed/New 10/16/2017 12:10 PM   Dressing Status Intact; Changed 10/16/2017 12:10 PM   Number of days: 34     LABS       CBC:   Lab Results   Component Value Date    WBC 4.1 10/26/2017    HGB 11.2 10/26/2017    HCT 32.7 10/26/2017    MCV 90.7 10/26/2017     10/26/2017     BMP:   Lab Results   Component Value Date     11/27/2017    K 4.0 11/27/2017     11/27/2017    CO2 27 11/27/2017    PHOS 3.5 10/25/2017    BUN 9 11/27/2017    CREATININE 1.0 11/27/2017     PT/INR:   Lab Results   Component Value Date    PROTIME 24.9 10/01/2014    INR 2.29 10/01/2014     Prealbumin: No results found for: PREALBUMIN  Albumin:  Lab Results   Component Value Date    LABALBU 4.3 10/25/2017     Sed Rate:No results found for: Gina Going  CRP: No results found for: CRP  Micro:   Lab Results   Component Value Date    BC No growth-preliminary  No growth   07/18/2014      Hemoglobin A1C: No results found for: TVRE7E9      Wound Culture    Gram Stain Result 09/15/2017 10:38 AM [facility]     No segmented neutrophils observed. Rare epithelial cells observed. Few gram positive cocci occurring singly and in pairs. Organism (Abnormal) 09/15/2017 10:38 AM [facility]     Staphylococcus aureus     Aerobic Culture 09/15/2017 10:38 AM [facility]     moderate growth   In the treatment of gram positive infections, GENTAMICIN   should be CONSIDERED a SYNERGYSTIC agent ONLY. Ciprofloxacin and Levofloxacin, regardless of in vitro   sensitivity, should not be used for staphylococcal infections   other than uncomplicated lower UTIs. Moxifloxacin, regardless of in vitro sensitivity, should   not be used for staphylococcal infections. Organism (Abnormal) 09/15/2017 10:38 AM  - [facility]     gram negative bacilli     Aerobic Culture 09/15/2017 10:38 AM [address] Lab     light growth       Testing Performed By      Mary Kay Yip Name Director Address Valid Date Range     489-PF - [address] Bernardo Vasquez MD [address] [place] [id number] 08/30/17 0855-Present       Narrative      Source: leg       Site: swab right lower leg wound          Current Antibiotics: none       Culture & Susceptibility      STAPHYLOCOCCUS AUREUS      Antibiotic Interpretation DESTINY Unit Status     Penicillin G Resistant >=0.5 mcg/mL Preliminary     clindamycin Sensitive <=0.25 mcg/mL Preliminary     erythromycin Sensitive =0.5 mcg/mL Preliminary     gentamicin Sensitive <=0.5 mcg/mL Preliminary     oxacillin Sensitive =0.5 mcg/mL Preliminary     tetracycline Sensitive <=1 mcg/mL Preliminary     trimethoprim-sulfamethoxazole Sensitive <=10 mcg/mL Preliminary                 Arteriogram  PROCEDURE: AORTOFEMORAL ANGIOGRAM/with runoff procedure:           CLINICAL INFORMATION: Venous ulcer of right lower extremity with varicose veins ([facility]), Venous insufficiency of right leg, Nonhealing ulcer of right lower leg ([address] PVD (peripheral vascular disease) ([facility])       PERFORMED BY:  Dr. Perri Moscoso MD       APPROACH: Left common femoral artery, micropuncture technique. CATHETERS: 5 [facility] VCF, 5 [facility] JAZLYN catheter,, [facility], 5 [facility] Ferro 2... STENTS: None. VESSELS CATHETERIZED: Upper abdominal aorta, lower abdominal aorta, right common iliac and left external iliac arteries. Mauro Boyce DIAGNOSTIC PROCEDURES: Abdominal aortogram, pelvic angiography, right leg arteriogram, left leg arteriogram..           INTERVENTIONS: None.    FLUOROSCOPY TIME: 9 minutes 56 seconds FLUOROSCOPIC IMAGES: 40   SEDATION: Versed 2.0mg ; fentanyl 100mcg , IV; the patient was sedated for 45 minutes during this procedure and monitored with EKG and pulse ox monitoring devices by registered nurse. OTHER MEDICATIONS: None . CONTRAST: 114 ml, Optiray-320. CLOSURE: Angio-Seal device, successful without complication. TECHNIQUE: Signed informed consent was obtained prior to performing this procedure. The patient was sedated, as indicated above. Access was obtained and a 5 [employer] vascular sheath was inserted. The procedures as above were then performed. FINDINGS:        AORTA: Aorta is mildly tortuous. Both renal arteries, SMA, and celiac artery are widely patent. PELVIC VESSELS: Both common, internal, and external iliac arteries are widely patent. Angiogram RIGHT leg:   CFA AND PROFUNDA: The common and deep femoral arteries are widely patent. SFA AND POPLITEAL ARTERY:The superficial femoral artery and popliteal artery are widely patent. TRIFURCATION VESSELS: All 3 trifurcation vessels are widely patent. Anterior tibial artery is the main runoff vessel. .       Angiogram LEFT leg:   CFA AND PROFUNDA: The common and deep femoral arteries are widely patent. SFA AND POPLITEAL ARTERY:The superficial femoral artery and popliteal artery are widely patent. TRIFURCATION VESSELS: All 3 trifurcation vessels are patent proximally. There appears be occlusion of the posterior tibial artery slightly above the ankle with reconstitution distally. The main runoff vessel in the left calf is the anterior tibial    artery. Impression   Unremarkable study. No significant abnormality. NIVES  FINDINGS:   Right GENEVIEVE: Noncompressible calcified vessels. Left GENEVIEVE: 1.75, markedly elevated, no doubt related to calcified vessels. Right toe pressure: 59 mmHg Left toe pressure: 84 mmHg   Pressure measurements: Pressure measurements limited by vascular calcifications. 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 ([facility] R56.1    Osteoarthritis M19.90    History of hypertension Z86.79    Hyperlipidemia E78.5    History of DVT of right lower extremity Z86.718    Heart failure with reduced ejection fraction (HCC) I50.20    History of alcohol abuse Z87.898    History of traumatic brain injury Z87.820    Acute kidney injury ([facility] N17.9    Hyperkalemia G80.2    Metabolic acidosis V52.2    Excessive use of nonsteroidal anti-inflammatory drug (NSAID) F19.90    Vitamin B6 deficiency E53.1    Elevated TSH R94.6       Procedure Note  Indications:  Based on my examination of this patient's wound(s)/ulcer(s) today, debridement is not required to promote healing and evaluate the extent healing. Plan:     Patient examined and evaluated  Superficial venous wound noted to lateral aspect right calf at site of previously healed graft  Patient placed in a unna boot compression therapy at todays visit  Follow up in (1) week    [facility]     Visit Discharge/Physician Orders:  Wear compression hose on left leg. Put on in the morning - remove at night. Wound Location: right lower leg   Do not shower, take baths or get wound wet, unless otherwise instructed by your Wound Care doctor. Keep all dressings clean & dry. Dressing orders: Cleanse wound with normal saline and gauze. Pat dry with clean gauze. Apply adaptic to wound bed. Wrap with unna boot and coban. Start at base of toes to 2 inches below the bend of the knee. Leave intact until next appointment. Follow up visit: 1 week December 11th @ 11 am   Keep next scheduled appointment. Please give 24 hour notice if unable to keep appointment.  [phone]  If you experience any of the following, please call the Wound Care Service during business hours: [phone].              *Increase in pain              *Temperature over 101              *Increase in drainage from your wound or a foul odor              *Uncontrolled swelling              *Need for compression bandage changes due to slippage, breakthrough drainage  If you need medical attention outside of business hours, please contact your Primary Care Doctor or go to the nearest emergency room.      Rosie Goncalves   Electronically signed by Rosie Goncalves DPM on 12/4/2017 at 11:03 AM

## 2017-12-11 ENCOUNTER — HOSPITAL ENCOUNTER (OUTPATIENT)
Dept: WOUND CARE | Age: 67
Discharge: HOME | End: 2017-12-11
Payer: MEDICARE

## 2017-12-11 VITALS
TEMPERATURE: 97.8 F | WEIGHT: 228.2 LBS | HEART RATE: 71 BPM | DIASTOLIC BLOOD PRESSURE: 69 MMHG | OXYGEN SATURATION: 97 % | BODY MASS INDEX: 33.7 KG/M2 | SYSTOLIC BLOOD PRESSURE: 158 MMHG | RESPIRATION RATE: 18 BRPM

## 2017-12-11 PROCEDURE — A6210 FOAM DRG >16<=48 SQ IN W/O B: HCPCS

## 2017-12-11 PROCEDURE — 99213 OFFICE O/P EST LOW 20 MIN: CPT

## 2017-12-11 ASSESSMENT — PAIN SCALES - GENERAL: PAINLEVEL_OUTOF10: 0

## 2017-12-11 NOTE — PLAN OF CARE
Problem: Wound:  Intervention: Assess wound size, appearance and drainage  Pt presents to clinic with ongoing care of left lower leg wound. No s/s of infection noted. Pt afebrile. Bordered foam applied to wound. Compression hose applied - pt only had white low compression layer, informed pt to put brown higher compression layer on top of white layer when he gets home. Informed pt to wear compression hose t/o day and take off at night before bed, reapply in morning. Pt to change bordered foam on Thursday after shower and leave intact until follow up appt in 1 week. Comments: Care plan reviewed with patient . Patient verbalizes understanding of the plan of care and contribute to goal setting.

## 2017-12-11 NOTE — PROGRESS NOTES
Father     COPD Father     Diabetes Maternal Grandmother     Heart Disease Maternal Grandfather     Cancer Sister     Asthma Sister     High Blood Pressure Sister     High Cholesterol Sister     Arthritis Sister     Diabetes Sister     Diabetes Sister     Colon Cancer Neg Hx     Prostate Cancer Neg Hx        SOCIAL HISTORY    Social History   Substance Use Topics    Smoking status: Never Smoker    Smokeless tobacco: Never Used    Alcohol use No      Comment:         ALLERGIES    No Known Allergies    MEDICATIONS    Current Outpatient Prescriptions on File Prior to Encounter   Medication Sig Dispense Refill    aspirin 81 MG chewable tablet Take 81 mg by mouth daily      levETIRAcetam (KEPPRA) 1000 MG tablet Take 1 tablet by mouth 2 times daily 60 tablet 11     No current facility-administered medications on file prior to encounter. REVIEW OF SYSTEMS    A comprehensive review of systems was negative. Objective: There were no vitals taken for this visit. Wt Readings from Last 3 Encounters:   12/04/17 226 lb (102.5 kg)   11/29/17 231 lb 12.8 oz (105.1 kg)   11/29/17 227 lb 9.6 oz (103.2 kg)     PHYSICAL EXAMINATION  CONSTITUTIONAL:  awake, alert, cooperative, no apparent distress, and appears stated age  EYES:  pupils equal, round and reactive to light, extra ocular muscles intact, sclera clear, conjunctiva normal  ENT:  normocepalic, without obvious abnormality  NECK:  Supple, symmetrical, trachea midline, no adenopathy  LUNGS:  No increased work of breathing, good air exchange, clear to auscultation bilaterally, no crackles or wheezing  CARDIOVASCULAR:  Normal apical impulse, regular rate and rhythm  ABDOMEN:  normal bowel sounds, soft, non-distended  Extremities (lower extremity examination in detail)  Vascular: Dorsalis pedis and posterior tibial pulses are weakly bilaterally. Skin temperature is warm to warm from proximal tibial tuberosity to distal digits.  CFT sluggish but intact to satcia-wound site. Edema nonpitting in nature. Hair growth absent. Quality of skin atrophic/brawny. Dermatologic: See wound documentation for further details. Superficial venous ulceration to the lateral aspect of the right calf. Minimal stacia-wound erythema present. No proximal streaking/drainage or malodor appreciated. Skin envelope is otherwise well maintained. Neurovascular: Epicritic and protopathic sensations are grossly intact. Musculoskeletal: Muscle strength 5/5 for all plantarflexors, dorsiflexors, inverters and everters examined. Mild pain with palpation of right ankle. No further acute bony abnormalities of note.                    Wound Care Documentation:  Wound 10/28/16 Leg Right;Lateral;Lower;Proximal #1 (Active)   Wound Image   12/4/2017 10:57 AM   Wound Type Wound 12/11/2017  9:32 AM   Dressing Status Intact 11/13/2017  9:48 AM   Dressing Changed Changed/New 12/11/2017  9:32 AM   Dressing/Treatment Vacuum dressing 6/12/2017  8:53 AM   Wound Cleansed Rinsed/Irrigated with saline 12/11/2017  9:32 AM   Wound Length (cm) 0.4 cm 12/11/2017  9:32 AM   Wound Width (cm) 0.5 cm 12/11/2017  9:32 AM   Wound Depth (cm)  0.2 12/11/2017  9:32 AM   Calculated Wound Size (cm^2) (l*w) 0.2 cm^2 12/11/2017  9:32 AM   Change in Wound Size % (l*w) 98.33 12/11/2017  9:32 AM   Wound Assessment Pink 12/11/2017  9:32 AM   Drainage Amount Scant 12/11/2017  9:32 AM   Drainage Description Serosanguinous 12/11/2017  9:32 AM   Odor None 12/11/2017  9:32 AM   Margins Attached edges 12/11/2017  9:32 AM   Stacia-wound Assessment Dry 12/11/2017  9:32 AM   [place]%Wound Bed 100 12/11/2017  9:32 AM   Red%Wound Bed 100 12/4/2017 10:57 AM   Yellow%Wound Bed 10 10/30/2017 10:30 AM   Black%Wound Bed 5 6/2/2017  9:05 AM   Culture Taken Yes 5/25/2017 12:04 PM   Time out Yes 9/15/2017 10:21 AM   Procedural Pain 0 9/15/2017 10:21 AM   Post procedural Pain 0 9/15/2017 10:21 AM   Op First Treatment Date 10/28/16 10/28/2016 11:35 AM   Number of Aerobic Culture 09/15/2017 10:38 AM [facility]     moderate growth   In the treatment of gram positive infections, GENTAMICIN   should be CONSIDERED a SYNERGYSTIC agent ONLY. Ciprofloxacin and Levofloxacin, regardless of in vitro   sensitivity, should not be used for staphylococcal infections   other than uncomplicated lower UTIs. Moxifloxacin, regardless of in vitro sensitivity, should   not be used for staphylococcal infections. Organism (Abnormal) 09/15/2017 10:38 AM [facility]     gram negative bacilli     Aerobic Culture 09/15/2017 10:38 AM [address] Lab     light growth       Testing Performed By      [address] Name Director Address Valid Date Range     797-NK - [id number] Bernardo Perez MD [address] [place] [address] 08/30/17 0855-Present       Narrative      Source: leg       Site: swab right lower leg wound          Current Antibiotics: none       Culture & Susceptibility      STAPHYLOCOCCUS AUREUS      Antibiotic Interpretation DESTINY Unit Status     Penicillin G Resistant >=0.5 mcg/mL Preliminary     clindamycin Sensitive <=0.25 mcg/mL Preliminary     erythromycin Sensitive =0.5 mcg/mL Preliminary     gentamicin Sensitive <=0.5 mcg/mL Preliminary     oxacillin Sensitive =0.5 mcg/mL Preliminary     tetracycline Sensitive <=1 mcg/mL Preliminary     trimethoprim-sulfamethoxazole Sensitive <=10 mcg/mL Preliminary                 Arteriogram  PROCEDURE: AORTOFEMORAL ANGIOGRAM/with runoff procedure:           CLINICAL INFORMATION: Venous ulcer of right lower extremity with varicose veins ([facility]), Venous insufficiency of right leg, Nonhealing ulcer of right lower leg ([address] PVD (peripheral vascular disease) ([facility])       PERFORMED BY:  Dr. Roberto Shin MD       APPROACH: Left common femoral artery, micropuncture technique.    CATHETERS: 5 [facility], 5 [facility] JAZLYN catheter,, 5 [facility] SOS,

## 2017-12-18 ENCOUNTER — HOSPITAL ENCOUNTER (OUTPATIENT)
Dept: WOUND CARE | Age: 67
Discharge: HOME | End: 2017-12-18
Payer: MEDICARE

## 2017-12-18 VITALS
DIASTOLIC BLOOD PRESSURE: 64 MMHG | BODY MASS INDEX: 33.98 KG/M2 | OXYGEN SATURATION: 97 % | RESPIRATION RATE: 18 BRPM | HEART RATE: 72 BPM | SYSTOLIC BLOOD PRESSURE: 140 MMHG | WEIGHT: 230.1 LBS | TEMPERATURE: 98.4 F

## 2017-12-18 PROCEDURE — 99212 OFFICE O/P EST SF 10 MIN: CPT

## 2017-12-18 ASSESSMENT — PAIN SCALES - GENERAL: PAINLEVEL_OUTOF10: 0

## 2017-12-18 NOTE — PROGRESS NOTES
[facility]         Progress Note      [facility] RECORD NUMBER:  [id number]  AGE: 79 y.o. GENDER: male  : 1950  EPISODE DATE:  2017    Subjective:   CC: Small superficial opening over graft site, scant drainage. HISTORY of PRESENT ILLNESS HPI  17    Katlyn Olivas is a 79 y.o. male established at the wound center, following up a small venous stasis ulceration re-occurrence along the lateral right calf. Wound dimensions are improved from previous examination, with small superficial scab formation at site. Patient to continue with current custom graded compression stockings, with planned follow up in (3) weeks. Patient to continue bathing with hibiclens anti-microbial soap. All further questions/concerns were addressed bedside.      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 Shorty Pringle follows, no seizure activity since    Seizure disorder ([facility] 2014       PAST SURGICAL HISTORY    Past Surgical History:   Procedure Laterality Date    PA INCIS/DRAIN THIGH/KNEE ABSCESS,DEEP Right 2017    RIGHT SERGIO LEG WOUND DEBRIDEMENT WITH SKIN GRAFT AND WOUND VAC APPLICATION performed by Eunice Ordaz DPM at [address] [address] Right 10/9/2017    SPLIT THICKNESS SKIN GRAFT FROM RIGHT LEG performed by Eunice Ordaz DPM at . [facility]      vein stripping       FAMILY HISTORY    Family History   Problem Relation Age of Onset    Cancer Mother     Stroke Mother     Diabetes Mother     High Blood Pressure Mother     High Cholesterol Mother     Stroke Father     COPD Father     Diabetes Maternal Grandmother     Heart Disease Maternal Grandfather     Cancer Sister     Asthma Sister     High Blood Pressure Sister     High Cholesterol Sister     Arthritis Sister     Diabetes Sister     Diabetes Sister     Colon Cancer Neg Hx     Prostate Cancer Neg Hx        SOCIAL HISTORY    Social History   Substance Use Topics    Smoking status: Never Smoker    Smokeless tobacco: Never Used    Alcohol use No      Comment:         ALLERGIES    No Known Allergies    MEDICATIONS    Current Outpatient Prescriptions on File Prior to Encounter   Medication Sig Dispense Refill    aspirin 81 MG chewable tablet Take 81 mg by mouth daily      levETIRAcetam (KEPPRA) 1000 MG tablet Take 1 tablet by mouth 2 times daily 60 tablet 11     No current facility-administered medications on file prior to encounter. REVIEW OF SYSTEMS    A comprehensive review of systems was negative. Objective:      BP (!) 140/64   Pulse 72   Temp 98.4 °F (36.9 °C) (Oral)   Resp 18   Wt 230 lb 1.6 oz (104.4 kg)   SpO2 97%   BMI 33.98 kg/m²     Wt Readings from Last 3 Encounters:   12/18/17 230 lb 1.6 oz (104.4 kg)   12/11/17 228 lb 3.2 oz (103.5 kg)   12/04/17 226 lb (102.5 kg)     PHYSICAL EXAMINATION  CONSTITUTIONAL:  awake, alert, cooperative, no apparent distress, and appears stated age  EYES:  pupils equal, round and reactive to light, extra ocular muscles intact, sclera clear, conjunctiva normal  ENT:  normocepalic, without obvious abnormality  NECK:  Supple, symmetrical, trachea midline, no adenopathy  LUNGS:  No increased work of breathing, good air exchange, clear to auscultation bilaterally, no crackles or wheezing  CARDIOVASCULAR:  Normal apical impulse, regular rate and rhythm  ABDOMEN:  normal bowel sounds, soft, non-distended  Extremities (lower extremity examination in detail)  Vascular: Dorsalis pedis and posterior tibial pulses are weakly bilaterally.  Skin temperature is warm to warm from proximal tibial tuberosity to distal digits. CFT sluggish but intact to stacia-wound site. Edema nonpitting in nature. Hair growth absent. Quality of skin atrophic/brawny. Dermatologic: See wound documentation for further details. Superficial venous ulceration to the lateral aspect of the right calf. Superficial scab formation, stable with no further signs of infection present. No proximal streaking/drainage or malodor appreciated. Skin envelope is otherwise well maintained. Neurovascular: Epicritic and protopathic sensations are grossly intact. Musculoskeletal: Muscle strength 5/5 for all plantarflexors, dorsiflexors, inverters and everters examined. Mild pain with palpation of right ankle. No further acute bony abnormalities of note.          Wound Care Documentation:  Wound 10/28/16 Leg Right;Lateral;Lower;Proximal #1 (Active)   Wound Image   12/18/2017 10:25 AM   Wound Type Wound 12/18/2017 10:25 AM   Dressing Status Intact 12/18/2017 10:25 AM   Dressing Changed Changed/New 12/18/2017 10:25 AM   Dressing/Treatment Vacuum dressing 6/12/2017  8:53 AM   Wound Cleansed Rinsed/Irrigated with saline 12/18/2017 10:25 AM   Wound Length (cm) 0.2 cm 12/18/2017 10:25 AM   Wound Width (cm) 0.3 cm 12/18/2017 10:25 AM   Wound Depth (cm)  .1 12/18/2017 10:25 AM   Calculated Wound Size (cm^2) (l*w) 0.06 cm^2 12/18/2017 10:25 AM   Change in Wound Size % (l*w) 99.5 12/18/2017 10:25 AM   Wound Assessment Red 12/18/2017 10:25 AM   Drainage Amount None 12/18/2017 10:25 AM   Drainage Description Serosanguinous 12/11/2017  9:32 AM   Odor None 12/18/2017 10:25 AM   Margins Attached edges 12/18/2017 10:25 AM   Stacia-wound Assessment Dry 12/18/2017 10:25 AM   [place]%Wound Bed 100 12/11/2017  9:32 AM   Red%Wound Bed 100 12/18/2017 10:25 AM   Yellow%Wound Bed 10 10/30/2017 10:30 AM   Black%Wound Bed 5 6/2/2017  9:05 AM   Culture Taken Yes 5/25/2017 12:04 PM   Time out Yes 9/15/2017 10:21 AM   Procedural Pain 0 9/15/2017 PREALBUMIN  Albumin:  Lab Results   Component Value Date    LABALBU 4.3 10/25/2017     Sed Rate:No results found for: Oksana [place]  CRP: No results found for: CRP  Micro:   Lab Results   Component Value Date    BC No growth-preliminary  No growth   07/18/2014      Hemoglobin A1C: No results found for: VYTH3O7      Wound Culture    Gram Stain Result 09/15/2017 10:38  [address] [facility]     No segmented neutrophils observed. Rare epithelial cells observed. Few gram positive cocci occurring singly and in pairs. Organism (Abnormal) 09/15/2017 10:38 AM [facility]     Staphylococcus aureus     Aerobic Culture 09/15/2017 10:38 AM [facility]     moderate growth   In the treatment of gram positive infections, GENTAMICIN   should be CONSIDERED a SYNERGYSTIC agent ONLY. Ciprofloxacin and Levofloxacin, regardless of in vitro   sensitivity, should not be used for staphylococcal infections   other than uncomplicated lower UTIs. Moxifloxacin, regardless of in vitro sensitivity, should   not be used for staphylococcal infections. Organism (Abnormal) 09/15/2017 10:38 AM [facility]     gram negative bacilli     Aerobic Culture 09/15/2017 10:38 AM [address] Lab     light growth       Testing Performed By      [address] Name Director Address Valid Date Range     322-RO - [id number] [address] [facility] Deidra Marino MD [address] [place] [id number] 08/30/17 0855-Present       Narrative      Source: leg       Site: swab right lower leg wound          Current Antibiotics: none       Culture & Susceptibility      STAPHYLOCOCCUS AUREUS      Antibiotic Interpretation DESTINY Unit Status     Penicillin G Resistant >=0.5 mcg/mL Preliminary     clindamycin Sensitive <=0.25 mcg/mL Preliminary     erythromycin Sensitive =0.5 mcg/mL Preliminary     gentamicin Sensitive <=0.5 mcg/mL Preliminary     oxacillin on my examination of this patient's wound(s)/ulcer(s) today, debridement is not required to promote healing and evaluate the extent healing. Plan:     Patient examined and evaluated  Superficial venous wound noted to lateral aspect right calf, stable compared to previous examination  Patient to continue with custom graded compression stockings  Patient to continue bathing with Rx Hibiclens soap  Follow up in (3) weeks    Ilya Bailey [address] [place] [address]    Visit Discharge/Physician Orders:  Wear compression hose on left leg. Put on in the morning - remove at night. Wound Location: right lower leg   Dressing orders: Cleanse wound with hibiclens soap every day. Wear compression hose. Remove at night, reapply in the morning. Follow up visit: 3 weeks  Keep next scheduled appointment. Please give 24 hour notice if unable to keep appointment. [phone]  If you experience any of the following, please call the Wound Care Service during business hours: [phone].              *Increase in pain              *Temperature over 101              *Increase in drainage from your wound or a foul odor              *Uncontrolled swelling              *Need for compression bandage changes due to slippage, breakthrough drainage  If you need medical attention outside of business hours, please contact your Primary Care Doctor or go to the nearest emergency room.      Electronically signed by Bri Ulloa DPM on 12/18/2017 at 10:42 AM

## 2017-12-18 NOTE — PLAN OF CARE
Problem: Wound:  Intervention: Assess wound size, appearance and drainage  Care plan reviewed with patient. Patient verbalize understanding of the plan of care and contribute to goal setting. Goal: Risk for impaired skin integrity will decrease  Risk for impaired skin integrity will decrease    Outcome: Ongoing  Pt.'s wound is measuring smaller. Pt to continue washing with Hibiclens soap and wearing compression hose daily.  Follow up in 3 weeks  Wound Care Documentation:  Wound 10/28/16 Leg Right;Lateral;Lower;Proximal #1 (Active)   Wound Image   12/18/2017 10:25 AM   Wound Type Wound 12/18/2017 10:25 AM   Dressing Status Intact 12/18/2017 10:25 AM   Dressing Changed Changed/New 12/18/2017 10:25 AM   Wound Cleansed Rinsed/Irrigated with saline 12/18/2017 10:25 AM   Wound Length (cm) 0.2 cm 12/18/2017 10:25 AM   Wound Width (cm) 0.3 cm 12/18/2017 10:25 AM   Wound Depth (cm)  .1 12/18/2017 10:25 AM   Calculated Wound Size (cm^2) (l*w) 0.06 cm^2 12/18/2017 10:25 AM   Change in Wound Size % (l*w) 99.5 12/18/2017 10:25 AM   Wound Assessment Red 12/18/2017 10:25 AM   Drainage Amount None 12/18/2017 10:25 AM   Odor None 12/18/2017 10:25 AM   Margins Attached edges 12/18/2017 10:25 AM   Renate-wound Assessment Dry 12/18/2017 10:25 AM   [place]%Wound Bed 100 12/11/2017  9:32 AM   Red%Wound Bed 100 12/18/2017 10:25 AM   Number of days: 416       Comments:

## 2018-01-08 ENCOUNTER — HOSPITAL ENCOUNTER (OUTPATIENT)
Dept: WOUND CARE | Age: 68
Discharge: HOME | End: 2018-01-08
Payer: MEDICARE

## 2018-01-08 VITALS
HEIGHT: 69 IN | BODY MASS INDEX: 35.1 KG/M2 | HEART RATE: 82 BPM | WEIGHT: 237 LBS | SYSTOLIC BLOOD PRESSURE: 156 MMHG | RESPIRATION RATE: 16 BRPM | TEMPERATURE: 97.4 F | DIASTOLIC BLOOD PRESSURE: 74 MMHG

## 2018-01-08 DIAGNOSIS — L97.919 VENOUS ULCER OF RIGHT LOWER EXTREMITY WITH VARICOSE VEINS (HCC): ICD-10-CM

## 2018-01-08 DIAGNOSIS — I87.2 VENOUS INSUFFICIENCY OF RIGHT LEG: ICD-10-CM

## 2018-01-08 DIAGNOSIS — L60.3 DYSTROPHIC NAIL: ICD-10-CM

## 2018-01-08 DIAGNOSIS — I83.019 VENOUS ULCER OF RIGHT LOWER EXTREMITY WITH VARICOSE VEINS (HCC): ICD-10-CM

## 2018-01-08 PROCEDURE — 99212 OFFICE O/P EST SF 10 MIN: CPT

## 2018-01-08 ASSESSMENT — PAIN SCALES - GENERAL: PAINLEVEL_OUTOF10: 0

## 2018-01-08 NOTE — PROGRESS NOTES
400 [facility]         Progress Note      300 [facility] RECORD NUMBER:  [id number]  AGE: 79 y.o. GENDER: male  : 1950  EPISODE DATE:  2018    Subjective:   CC: Small superficial opening over graft site, healed      HISTORY of PRESENT ILLNESS HPI  18    Klaus Govea is a 79 y.o. male established at the wound center, following up a small venous stasis ulceration re-occurrence along the lateral right calf. Wound dimensions have healed since previous visit. Patient to continue with current custom graded compression stockings, with planned follow up in (3) months. Patient to continue bathing with hibiclens anti-microbial soap. All further questions/concerns were addressed bedside.      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 Geller Shape follows, no seizure activity since    Seizure disorder ([facility] [address] 2014       PAST SURGICAL HISTORY    Past Surgical History:   Procedure Laterality Date    [place] INCIS/DRAIN THIGH/KNEE ABSCESS,DEEP Right 2017    RIGHT SERGIO LEG WOUND DEBRIDEMENT WITH SKIN GRAFT AND WOUND VAC APPLICATION performed by Karen Saenz DPM at [address] [place] [address] [address] Right 10/9/2017    SPLIT THICKNESS SKIN GRAFT FROM RIGHT LEG performed by Karen Saenz DPM at . [facility]      vein stripping       FAMILY HISTORY    Family History   Problem Relation Age of Onset    Cancer Mother     Stroke Mother     Diabetes Mother     High Blood Pressure Mother     High Cholesterol Mother     Stroke Father     COPD Father     Diabetes Maternal Grandmother     digits. CFT wnl to distal digits. Edema nonpitting in nature. Hair growth absent. Quality of skin atrophic/brawny. Dermatologic: See wound documentation for further details. Superficial venous ulceration to the lateral aspect of the right calf has healed. Full epithelialization present over surface of skin. No further signs of infection present. Neurovascular: Epicritic and protopathic sensations are grossly intact. Musculoskeletal: Muscle strength 5/5 for all plantarflexors, dorsiflexors, inverters and everters examined. Mild pain with palpation of right ankle. No further acute bony abnormalities of note.              Wound Care Documentation:  Wound 10/28/16 Leg Right;Lateral;Lower;Proximal #1 (Active)   Wound Image   12/18/2017 10:25 AM   Wound Type Wound 12/18/2017 10:25 AM   Dressing Status Other (Comment) 1/8/2018  9:12 AM   Dressing Changed Changed/New 12/18/2017 10:25 AM   Dressing/Treatment Vacuum dressing 6/12/2017  8:53 AM   Wound Cleansed Rinsed/Irrigated with saline 12/18/2017 10:25 AM   Wound Length (cm) 0 cm 1/8/2018  9:12 AM   Wound Width (cm) 0 cm 1/8/2018  9:12 AM   Wound Depth (cm)  0 1/8/2018  9:12 AM   Calculated Wound Size (cm^2) (l*w) 0 cm^2 1/8/2018  9:12 AM   Change in Wound Size % (l*w) 100 1/8/2018  9:12 AM   Wound Assessment Red 12/18/2017 10:25 AM   Drainage Amount None 1/8/2018  9:12 AM   Drainage Description Serosanguinous 12/11/2017  9:32 AM   Odor None 1/8/2018  9:12 AM   Margins Attached edges 12/18/2017 10:25 AM   Renate-wound Assessment Dry 12/18/2017 10:25 AM   [place]%Wound Bed 100 12/11/2017  9:32 AM   Red%Wound Bed 100 12/18/2017 10:25 AM   Yellow%Wound Bed 10 10/30/2017 10:30 AM   Black%Wound Bed 5 6/2/2017  9:05 AM   Culture Taken Yes 5/25/2017 12:04 PM   Time out Yes 9/15/2017 10:21 AM   Procedural Pain 0 9/15/2017 10:21 AM   Post procedural Pain 0 9/15/2017 10:21 AM   Op First Treatment Date 10/28/16 10/28/2016 11:35 AM   Number of days: 436         Wound Care be CONSIDERED a SYNERGYSTIC agent ONLY. Ciprofloxacin and Levofloxacin, regardless of in vitro   sensitivity, should not be used for staphylococcal infections   other than uncomplicated lower UTIs. Moxifloxacin, regardless of in vitro sensitivity, should   not be used for staphylococcal infections. Organism (Abnormal) 09/15/2017 10:38 AM  - [facility]     gram negative bacilli     Aerobic Culture 09/15/2017 10:38 AM [address] Lab     light growth       Testing Performed By      Mary Kay Yip Name Director Address Valid Date Range     477-EA - 58625 Bernardo Handy MD [address] [place] [id number] 08/30/17 0855-Present       Narrative      Source: leg       Site: swab right lower leg wound          Current Antibiotics: none       Culture & Susceptibility      STAPHYLOCOCCUS AUREUS      Antibiotic Interpretation DESTINY Unit Status     Penicillin G Resistant >=0.5 mcg/mL Preliminary     clindamycin Sensitive <=0.25 mcg/mL Preliminary     erythromycin Sensitive =0.5 mcg/mL Preliminary     gentamicin Sensitive <=0.5 mcg/mL Preliminary     oxacillin Sensitive =0.5 mcg/mL Preliminary     tetracycline Sensitive <=1 mcg/mL Preliminary     trimethoprim-sulfamethoxazole Sensitive <=10 mcg/mL Preliminary                 Arteriogram  PROCEDURE: AORTOFEMORAL ANGIOGRAM/with runoff procedure:           CLINICAL INFORMATION: Venous ulcer of right lower extremity with varicose veins (HCC), Venous insufficiency of right leg, Nonhealing ulcer of right lower leg ([address] PVD (peripheral vascular disease) ([facility])       PERFORMED BY:  Dr. Mallorie Harmon MD       APPROACH: Left common femoral artery, micropuncture technique. CATHETERS: 5 [facility] VCF, 5 [facility] JAZLYN catheter,, 5 [facility] SOS, 5 [facility] Ferro 2... STENTS: None.    VESSELS CATHETERIZED: Upper abdominal aorta, lower abdominal aorta, right common iliac and left external iliac edema, venous stasis, shear force and non-adherence, weeping    Wound: Venous stasis dermatitis with associated inflammatory ulceration, right calf     Depth of Diabetic/Pressure/Non Pressure Ulcers or Wound:  Non-Pressure ulcer, fat layer exposed    Patient Active Problem List   Diagnosis Code    Seizure disorder ([facility] G40.909    Venous ulcer of right lower extremity with varicose veins ([facility]) I83.019    Venous insufficiency of right leg I87.2    Dystrophic nail L60.3    Nonhealing ulcer of right lower leg (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 R56.1    Osteoarthritis M19.90    History of hypertension Z86.79    Hyperlipidemia E78.5    History of DVT of right lower extremity Z86.718    Heart failure with reduced ejection fraction ([facility]) I50.20    History of alcohol abuse Z87.898    History of traumatic brain injury Z87.820    Acute kidney injury ([facility] N17.9    Hyperkalemia W17.4    Metabolic acidosis X61.7    Excessive use of nonsteroidal anti-inflammatory drug (NSAID) F19.90    Vitamin B6 deficiency E53.1    Elevated TSH R94.6       Procedure Note  Indications:  Based on my examination of this patient's wound(s)/ulcer(s) today, debridement is not required to promote healing and evaluate the extent healing. Plan:     Patient examined and evaluated  Superficial venous wound noted to lateral aspect right calf, stable compared to previous examination  Patient to continue with custom graded compression stockings  Patient to continue bathing with Rx Hibiclens soap  Follow up in (3) weeks    Ilya Bailey 28 [facility]    Visit Discharge/Physician Orders:  Wound Location: rt lower leg  Cleanse wound with normal saline. Do not shower, take baths or get wound wet, unless otherwise instructed by your Wound Care doctor. Keep all dressings clean & dry. Dressing orders: Cleanse wound with hibiclens soap every day.  Wear

## 2018-04-16 ENCOUNTER — HOSPITAL ENCOUNTER (OUTPATIENT)
Dept: WOUND CARE | Age: 68
Discharge: HOME | End: 2018-04-16
Payer: MEDICARE

## 2018-04-16 VITALS
TEMPERATURE: 97.7 F | SYSTOLIC BLOOD PRESSURE: 144 MMHG | WEIGHT: 239.8 LBS | OXYGEN SATURATION: 98 % | HEART RATE: 74 BPM | RESPIRATION RATE: 16 BRPM | BODY MASS INDEX: 35.41 KG/M2 | DIASTOLIC BLOOD PRESSURE: 68 MMHG

## 2018-04-16 PROCEDURE — 11721 DEBRIDE NAIL 6 OR MORE: CPT

## 2018-04-16 ASSESSMENT — PAIN SCALES - GENERAL: PAINLEVEL_OUTOF10: 0

## 2018-05-01 ENCOUNTER — HOSPITAL ENCOUNTER (OUTPATIENT)
Age: 68
Discharge: HOME | End: 2018-05-01
Payer: MEDICARE

## 2018-05-01 DIAGNOSIS — N17.9 AKI (ACUTE KIDNEY INJURY) (HCC): ICD-10-CM

## 2018-05-01 DIAGNOSIS — E53.1 VITAMIN B6 DEFICIENCY: ICD-10-CM

## 2018-05-01 LAB
ANION GAP SERPL CALCULATED.3IONS-SCNC: 12 MEQ/L (ref 8–16)
BUN BLDV-MCNC: 9 MG/DL (ref 7–22)
CALCIUM SERPL-MCNC: 9.2 MG/DL (ref 8.5–10.5)
CHLORIDE BLD-SCNC: 100 MEQ/L (ref 98–111)
CO2: 28 MEQ/L (ref 23–33)
CREAT SERPL-MCNC: 0.9 MG/DL (ref 0.4–1.2)
GFR SERPL CREATININE-BSD FRML MDRD: 84 ML/MIN/1.73M2
GLUCOSE BLD-MCNC: 94 MG/DL (ref 70–108)
POTASSIUM SERPL-SCNC: 4.5 MEQ/L (ref 3.5–5.2)
SODIUM BLD-SCNC: 140 MEQ/L (ref 135–145)

## 2018-05-01 PROCEDURE — 36415 COLL VENOUS BLD VENIPUNCTURE: CPT

## 2018-05-01 PROCEDURE — 80048 BASIC METABOLIC PNL TOTAL CA: CPT

## 2018-05-01 PROCEDURE — 84207 ASSAY OF VITAMIN B-6: CPT

## 2018-05-02 PROBLEM — R79.89 ELEVATED TSH: Status: RESOLVED | Noted: 2017-10-29 | Resolved: 2018-05-02

## 2018-05-02 PROBLEM — I73.9 PVD (PERIPHERAL VASCULAR DISEASE) (HCC): Status: RESOLVED | Noted: 2017-02-10 | Resolved: 2018-05-02

## 2018-05-03 ENCOUNTER — OFFICE VISIT (OUTPATIENT)
Dept: FAMILY MEDICINE CLINIC | Age: 68
End: 2018-05-03
Payer: MEDICARE

## 2018-05-03 VITALS
SYSTOLIC BLOOD PRESSURE: 132 MMHG | HEIGHT: 68 IN | WEIGHT: 241 LBS | BODY MASS INDEX: 36.53 KG/M2 | HEART RATE: 58 BPM | RESPIRATION RATE: 22 BRPM | TEMPERATURE: 98.4 F | DIASTOLIC BLOOD PRESSURE: 60 MMHG

## 2018-05-03 DIAGNOSIS — Z86.79 HISTORY OF HYPERTENSION: Chronic | ICD-10-CM

## 2018-05-03 DIAGNOSIS — I50.30 HEART FAILURE WITH PRESERVED EJECTION FRACTION (HCC): Primary | Chronic | ICD-10-CM

## 2018-05-03 DIAGNOSIS — Z87.828 HISTORY OF OPEN LEG WOUND: Chronic | ICD-10-CM

## 2018-05-03 DIAGNOSIS — I87.2 VENOUS INSUFFICIENCY OF RIGHT LEG: ICD-10-CM

## 2018-05-03 DIAGNOSIS — G40.909 SEIZURE DISORDER (HCC): Chronic | ICD-10-CM

## 2018-05-03 DIAGNOSIS — Z12.11 SCREENING FOR COLON CANCER: ICD-10-CM

## 2018-05-03 PROCEDURE — 1036F TOBACCO NON-USER: CPT | Performed by: FAMILY MEDICINE

## 2018-05-03 PROCEDURE — 3017F COLORECTAL CA SCREEN DOC REV: CPT | Performed by: FAMILY MEDICINE

## 2018-05-03 PROCEDURE — G8427 DOCREV CUR MEDS BY ELIG CLIN: HCPCS | Performed by: FAMILY MEDICINE

## 2018-05-03 PROCEDURE — 4040F PNEUMOC VAC/ADMIN/RCVD: CPT | Performed by: FAMILY MEDICINE

## 2018-05-03 PROCEDURE — G8417 CALC BMI ABV UP PARAM F/U: HCPCS | Performed by: FAMILY MEDICINE

## 2018-05-03 PROCEDURE — 1123F ACP DISCUSS/DSCN MKR DOCD: CPT | Performed by: FAMILY MEDICINE

## 2018-05-03 PROCEDURE — 99214 OFFICE O/P EST MOD 30 MIN: CPT | Performed by: FAMILY MEDICINE

## 2018-05-06 DIAGNOSIS — E53.1 VITAMIN B6 DEFICIENCY: Primary | ICD-10-CM

## 2018-05-06 LAB — VITAMIN B6: 19.7 NMOL/L (ref 20–125)

## 2018-05-06 RX ORDER — PYRIDOXINE HCL (VITAMIN B6) 25 MG
25 TABLET ORAL DAILY
Qty: 30 TABLET | Refills: 11 | Status: SHIPPED | OUTPATIENT
Start: 2018-05-06 | End: 2018-06-25 | Stop reason: DRUGHIGH

## 2018-05-07 ENCOUNTER — TELEPHONE (OUTPATIENT)
Dept: FAMILY MEDICINE CLINIC | Age: 68
End: 2018-05-07

## 2018-05-07 ENCOUNTER — TELEPHONE (OUTPATIENT)
Dept: FAMILY MEDICINE CLINIC | Age: 68
End: 2018-05-07
Payer: MEDICARE

## 2018-05-07 DIAGNOSIS — Z53.20 COLONOSCOPY REFUSED: Primary | ICD-10-CM

## 2018-05-07 LAB
CONTROL: NORMAL
HEMOCCULT STL QL: NEGATIVE

## 2018-05-07 PROCEDURE — 82274 ASSAY TEST FOR BLOOD FECAL: CPT | Performed by: FAMILY MEDICINE

## 2018-05-22 ENCOUNTER — OFFICE VISIT (OUTPATIENT)
Dept: NEPHROLOGY | Age: 68
End: 2018-05-22
Payer: MEDICARE

## 2018-05-22 VITALS
HEIGHT: 69 IN | OXYGEN SATURATION: 99 % | DIASTOLIC BLOOD PRESSURE: 82 MMHG | WEIGHT: 220.6 LBS | HEART RATE: 64 BPM | BODY MASS INDEX: 32.67 KG/M2 | TEMPERATURE: 97 F | SYSTOLIC BLOOD PRESSURE: 150 MMHG

## 2018-05-22 DIAGNOSIS — I10 ESSENTIAL HYPERTENSION: Primary | ICD-10-CM

## 2018-05-22 PROCEDURE — G8427 DOCREV CUR MEDS BY ELIG CLIN: HCPCS | Performed by: INTERNAL MEDICINE

## 2018-05-22 PROCEDURE — 99213 OFFICE O/P EST LOW 20 MIN: CPT | Performed by: INTERNAL MEDICINE

## 2018-05-22 PROCEDURE — 4040F PNEUMOC VAC/ADMIN/RCVD: CPT | Performed by: INTERNAL MEDICINE

## 2018-05-22 PROCEDURE — 1036F TOBACCO NON-USER: CPT | Performed by: INTERNAL MEDICINE

## 2018-05-22 PROCEDURE — 3017F COLORECTAL CA SCREEN DOC REV: CPT | Performed by: INTERNAL MEDICINE

## 2018-05-22 PROCEDURE — 1123F ACP DISCUSS/DSCN MKR DOCD: CPT | Performed by: INTERNAL MEDICINE

## 2018-05-22 PROCEDURE — G8417 CALC BMI ABV UP PARAM F/U: HCPCS | Performed by: INTERNAL MEDICINE

## 2018-05-30 ENCOUNTER — TELEPHONE (OUTPATIENT)
Dept: NEPHROLOGY | Age: 68
End: 2018-05-30

## 2018-06-06 ENCOUNTER — OFFICE VISIT (OUTPATIENT)
Dept: CARDIOLOGY CLINIC | Age: 68
End: 2018-06-06
Payer: MEDICARE

## 2018-06-06 VITALS
WEIGHT: 241 LBS | HEART RATE: 64 BPM | BODY MASS INDEX: 35.7 KG/M2 | HEIGHT: 69 IN | SYSTOLIC BLOOD PRESSURE: 140 MMHG | DIASTOLIC BLOOD PRESSURE: 64 MMHG

## 2018-06-06 DIAGNOSIS — I10 ESSENTIAL HYPERTENSION: Primary | ICD-10-CM

## 2018-06-06 PROCEDURE — 1036F TOBACCO NON-USER: CPT | Performed by: INTERNAL MEDICINE

## 2018-06-06 PROCEDURE — 3017F COLORECTAL CA SCREEN DOC REV: CPT | Performed by: INTERNAL MEDICINE

## 2018-06-06 PROCEDURE — G8417 CALC BMI ABV UP PARAM F/U: HCPCS | Performed by: INTERNAL MEDICINE

## 2018-06-06 PROCEDURE — G8427 DOCREV CUR MEDS BY ELIG CLIN: HCPCS | Performed by: INTERNAL MEDICINE

## 2018-06-06 PROCEDURE — 4040F PNEUMOC VAC/ADMIN/RCVD: CPT | Performed by: INTERNAL MEDICINE

## 2018-06-06 PROCEDURE — 99213 OFFICE O/P EST LOW 20 MIN: CPT | Performed by: INTERNAL MEDICINE

## 2018-06-06 PROCEDURE — 1123F ACP DISCUSS/DSCN MKR DOCD: CPT | Performed by: INTERNAL MEDICINE

## 2018-06-19 ENCOUNTER — HOSPITAL ENCOUNTER (OUTPATIENT)
Age: 68
Discharge: HOME | End: 2018-06-19
Payer: MEDICARE

## 2018-06-19 DIAGNOSIS — E53.1 VITAMIN B6 DEFICIENCY: ICD-10-CM

## 2018-06-19 PROCEDURE — 36415 COLL VENOUS BLD VENIPUNCTURE: CPT

## 2018-06-19 PROCEDURE — 84207 ASSAY OF VITAMIN B-6: CPT

## 2018-06-22 ENCOUNTER — HOSPITAL ENCOUNTER (OUTPATIENT)
Age: 68
Discharge: HOME | End: 2018-06-22
Payer: MEDICARE

## 2018-06-22 PROCEDURE — 36415 COLL VENOUS BLD VENIPUNCTURE: CPT

## 2018-06-22 PROCEDURE — 84207 ASSAY OF VITAMIN B-6: CPT

## 2018-06-25 DIAGNOSIS — E53.1 VITAMIN B6 DEFICIENCY: Primary | ICD-10-CM

## 2018-06-25 LAB — VITAMIN B6: 361.3 NMOL/L (ref 20–125)

## 2018-06-25 RX ORDER — PYRIDOXINE HCL (VITAMIN B6) 25 MG
25 TABLET ORAL EVERY OTHER DAY
COMMUNITY
End: 2018-08-03 | Stop reason: DRUGHIGH

## 2018-06-26 ENCOUNTER — TELEPHONE (OUTPATIENT)
Dept: FAMILY MEDICINE CLINIC | Age: 68
End: 2018-06-26

## 2018-07-06 RX ORDER — AMLODIPINE BESYLATE 2.5 MG/1
2.5 TABLET ORAL DAILY
Qty: 30 TABLET | Refills: 3 | Status: SHIPPED | OUTPATIENT
Start: 2018-07-06 | End: 2018-09-25 | Stop reason: SDUPTHER

## 2018-07-16 ENCOUNTER — HOSPITAL ENCOUNTER (OUTPATIENT)
Dept: WOUND CARE | Age: 68
Discharge: HOME | End: 2018-07-16
Payer: MEDICARE

## 2018-07-16 VITALS
WEIGHT: 241 LBS | OXYGEN SATURATION: 98 % | HEIGHT: 69 IN | DIASTOLIC BLOOD PRESSURE: 70 MMHG | TEMPERATURE: 98.6 F | SYSTOLIC BLOOD PRESSURE: 149 MMHG | BODY MASS INDEX: 35.7 KG/M2 | HEART RATE: 67 BPM | RESPIRATION RATE: 18 BRPM

## 2018-07-16 PROCEDURE — 11721 DEBRIDE NAIL 6 OR MORE: CPT

## 2018-07-16 NOTE — PROGRESS NOTES
400 [facility]          Progress Note and Procedure Note      Gage Loving  MEDICAL RECORD NUMBER:  [id number]  AGE: 76 y.o. GENDER: male  : 1950  EPISODE DATE:  2018    Subjective:     Chief Complaint   Patient presents with    Wound Check     right lower leg         HISTORY of PRESENT ILLNESS HPI    Krzysztof Sue is a 76 y.o. male established at the wound center, following up a small venous stasis ulceration  along the lateral right calf. Wound is healed at this time. Patient to continue with current custom graded compression stockings. Need for debridement of nails 1-5 bilateral was indicated, patient tolerated well. Patient to continue bathing with hibiclens anti-microbial soap. All further questions/concerns were addressed bedside. Patient to follow up in office as needed.           PAST MEDICAL HISTORY        Diagnosis Date    Heart failure with preserved ejection fraction ([facility]     History of acute kidney injury from excessive NSAID use     History of alcohol abuse     History of DVT of right lower extremity     History of hypertension     History of non-healing open leg wound of R leg. S/p skin graft. Healed. From chronic venous insuficiency.     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 Leslye Cook follows, no seizure activity since    Seizure disorder ([facility] [address] 2014       PAST SURGICAL HISTORY    Past Surgical History:   Procedure Laterality Date    [place] INCIS/DRAIN THIGH/KNEE ABSCESS,DEEP Right 2017    RIGHT SERGIO LEG WOUND DEBRIDEMENT WITH SKIN GRAFT AND WOUND VAC APPLICATION performed by Trina Lopez DPM at [address] [place] [address] [address] Right 10/9/2017    SPLIT THICKNESS SKIN GRAFT FROM RIGHT LEG performed by Trina Lopez DPM at [place] [address]      vein ulceration is fully epithelialized at this time. No new wounds. No further signs of infection present. Thickened elongated nails to digits 1-5 bilateral.    Neurovascular: Epicritic and protopathic sensations are grossly intact. Musculoskeletal: Muscle strength 5/5 for all plantarflexors, dorsiflexors, inverters and everters examined. Mild pain with palpation of right ankle. No further acute bony abnormalities of note. Wound 10/28/16 Leg Right;Lateral;Lower;Proximal #1 (Active)   Number of days: 625       LABS      CBC:   Lab Results   Component Value Date    WBC 4.1 10/26/2017    HGB 11.2 10/26/2017    HCT 32.7 10/26/2017    MCV 90.7 10/26/2017     10/26/2017     BMP:   Lab Results   Component Value Date     05/01/2018    K 4.5 05/01/2018     05/01/2018    CO2 28 05/01/2018    PHOS 3.5 10/25/2017    BUN 9 05/01/2018    CREATININE 0.9 05/01/2018     PT/INR:   Lab Results   Component Value Date    PROTIME 24.9 10/01/2014    INR 2.29 10/01/2014     Prealbumin: No results found for: PREALBUMIN  Albumin:  Lab Results   Component Value Date    LABALBU 4.3 10/25/2017     Sed Rate:No results found for: Mabeline Krzysztof  CRP: No results found for: CRP  Micro:   Lab Results   Component Value Date    BC No growth-preliminary  No growth   07/18/2014      Hemoglobin A1C: No results found for: LABA1C    Assessment:     Ulcer Identification:  Ulcer Type: venous  Contributing Factors: edema, venous stasis and shear force    Wound: Venous stasis dermatitis with associated inflammatory ulceration, right calf     Depth of Diabetic/Pressure/Non Pressure Ulcers or Wound:  \"N/A, healed    Patient Active Problem List   Diagnosis Code    Seizure disorder ([facility] [address] G40.909    Venous insufficiency of right leg I87.2    History of non-healing open leg wound of R leg. S/p skin graft. Healed. From chronic venous insuficiency.  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

## 2018-07-30 ENCOUNTER — HOSPITAL ENCOUNTER (OUTPATIENT)
Age: 68
Discharge: HOME | End: 2018-07-30
Payer: MEDICARE

## 2018-07-30 DIAGNOSIS — E53.1 VITAMIN B6 DEFICIENCY: ICD-10-CM

## 2018-07-30 PROCEDURE — 84207 ASSAY OF VITAMIN B-6: CPT

## 2018-07-30 PROCEDURE — 36415 COLL VENOUS BLD VENIPUNCTURE: CPT

## 2018-08-02 LAB — VITAMIN B6: 182.9 NMOL/L (ref 20–125)

## 2018-08-03 ENCOUNTER — TELEPHONE (OUTPATIENT)
Dept: FAMILY MEDICINE CLINIC | Age: 68
End: 2018-08-03

## 2018-08-03 DIAGNOSIS — E53.1 VITAMIN B6 DEFICIENCY: Primary | ICD-10-CM

## 2018-08-03 RX ORDER — PYRIDOXINE HCL (VITAMIN B6) 25 MG
25 TABLET ORAL WEEKLY
Status: SHIPPED | COMMUNITY
Start: 2018-08-03 | End: 2019-05-06 | Stop reason: SDUPTHER

## 2018-08-15 ENCOUNTER — TELEPHONE (OUTPATIENT)
Dept: NEPHROLOGY | Age: 68
End: 2018-08-15

## 2018-09-09 NOTE — PROGRESS NOTES
 Stroke Father     COPD Father     Diabetes Maternal Grandmother     Heart Disease Maternal Grandfather     Cancer Sister     Asthma Sister     High Blood Pressure Sister     High Cholesterol Sister     Arthritis Sister     Diabetes Sister     Diabetes Sister     Colon Cancer Neg Hx     Prostate Cancer Neg Hx          I have reviewed the patient's past medical history, past surgical history, allergies, medications, social and family history and I have made updates where appropriate. Review of Systems  Positive responses are highlighted in bold    Constitutional:  Fever, Chills, Night Sweats, Fatigue, Unexpected changes in weight  HENT:  Ear pain, Tinnitus, Nosebleeds, Trouble swallowing, Hearing loss, Sore throat  Cardiovascular:  Chest Pain, Palpitations, Orthopnea, Paroxysmal Nocturnal Dyspnea  Respiratory:  Cough, Wheezing, Shortness of breath, Chest tightness, Apnea  Gastrointestinal:  Nausea, Vomiting, Diarrhea, Constipation, Heartburn, Blood in stool  Genitourinary:  Difficulty or painful urination, Flank pain, Change in frequency, Urgency  Skin:  Color change, Rash, Itching, Wound  Musculoskeletal:  Joint pain, Back pain, Gait problems, Joint swelling, Myalgias  Neurological:  Dizziness, Headaches, Presyncope, Numbness, Seizures, Tremors  Endocrine:  Heat Intolerance, Cold Intolerance, Polydipsia, Polyphagia, Polyuria        PHYSICAL EXAM:  Vitals:    09/10/18 0833   BP: 126/60   Pulse: 72   Resp: 16   Temp: 97.7 °F (36.5 °C)   TempSrc: Oral   Weight: 240 lb (108.9 kg)   Height: 5' 7.5\" (1.715 m)     Body mass index is 37.03 kg/m².   Pain Score:   4 (low back)    VS Reviewed  General Appearance: A&O x 3, No acute distress,well developed and well- nourished  Eyes: pupils equal, round, and reactive to light, extraocular eye movements intact, conjunctivae and eye lids without erythema  ENT: external ear and ear canal clear bilaterally, TMs intact and regular, nose without deformity, nasal stenosis as well. Will get xray d/t chronicity and gait issues (leaning forward)  Prn tylenol, occ aleve ok  Refer to PT  F/u NOV as scheduled    - [facility] -  [facility]  - XR LUMBAR SPINE (2-3 VIEWS); Future    2. Essential hypertension    At goal now  con't meds as rx;d  Due for labs soon    - CBC Auto Differential; Future  - Lipid Panel; Future  - TSH with Reflex; Future  - Hepatic Function Panel; Future    3. Vitamin B6 deficiency    con't once weekly supplementation  Labs next month    - Vitamin B6; Future    4. Special screening for malignant neoplasm of prostate    Discussed prostate screening guidelines and with shared decision making, he elects to proceed with psa    - Psa screening; Future    5. Need for influenza vaccination    - INFLUENZA, QUADV, 3 YRS AND OLDER, IM, PF, PREFILL SYR OR SDV, 0.5ML (FLUZONE QUADV, PF)    6. Need for hepatitis C screening test    - Hepatitis C Antibody; Future      DISPOSITION    Return in about 8 weeks (around 11/5/2018) for follow-up on chronic medical conditions, sooner as needed. Iman Carpenter released without restrictions. Future Appointments  Date Time Provider Brandon Lopez   11/5/2018 2:40 PM Connor Perez  [facility].MELISSA   11/14/2018 9:40 AM Lee Ann Machuca MD [facility].VIERTJACKSON   11/19/2018 9:15 AM Wendy Lowe MD [address] [place] [address] 87 received counseling on the following healthy behaviors: nutrition, exercise and medication adherence    Patient given educational materials on: See Attached    I have instructed Imankamila Carpenter to complete a self tracking handout on Blood Pressures  and instructed them to bring it with them to his next appointment. Barriers to learning and self management: Cognitive issues, sister present during appointment. Discussed use, benefit, and side effects of prescribed medications. Barriers to medication compliance addressed. All patient questions answered.

## 2018-09-10 ENCOUNTER — HOSPITAL ENCOUNTER (OUTPATIENT)
Age: 68
Discharge: HOME | End: 2018-09-10
Payer: MEDICARE

## 2018-09-10 ENCOUNTER — HOSPITAL ENCOUNTER (OUTPATIENT)
Dept: GENERAL RADIOLOGY | Age: 68
Discharge: HOME | End: 2018-09-10
Payer: MEDICARE

## 2018-09-10 ENCOUNTER — OFFICE VISIT (OUTPATIENT)
Dept: FAMILY MEDICINE CLINIC | Age: 68
End: 2018-09-10
Payer: MEDICARE

## 2018-09-10 VITALS
TEMPERATURE: 97.7 F | BODY MASS INDEX: 36.37 KG/M2 | HEART RATE: 72 BPM | DIASTOLIC BLOOD PRESSURE: 60 MMHG | RESPIRATION RATE: 16 BRPM | SYSTOLIC BLOOD PRESSURE: 126 MMHG | HEIGHT: 68 IN | WEIGHT: 240 LBS

## 2018-09-10 DIAGNOSIS — Z23 NEED FOR INFLUENZA VACCINATION: ICD-10-CM

## 2018-09-10 DIAGNOSIS — Z11.59 NEED FOR HEPATITIS C SCREENING TEST: ICD-10-CM

## 2018-09-10 DIAGNOSIS — Z12.5 SPECIAL SCREENING FOR MALIGNANT NEOPLASM OF PROSTATE: ICD-10-CM

## 2018-09-10 DIAGNOSIS — G89.29 CHRONIC BILATERAL LOW BACK PAIN WITHOUT SCIATICA: Primary | ICD-10-CM

## 2018-09-10 DIAGNOSIS — M54.50 CHRONIC BILATERAL LOW BACK PAIN WITHOUT SCIATICA: ICD-10-CM

## 2018-09-10 DIAGNOSIS — M54.50 CHRONIC BILATERAL LOW BACK PAIN WITHOUT SCIATICA: Primary | ICD-10-CM

## 2018-09-10 DIAGNOSIS — E53.1 VITAMIN B6 DEFICIENCY: ICD-10-CM

## 2018-09-10 DIAGNOSIS — G89.29 CHRONIC BILATERAL LOW BACK PAIN WITHOUT SCIATICA: ICD-10-CM

## 2018-09-10 DIAGNOSIS — I10 ESSENTIAL HYPERTENSION: Chronic | ICD-10-CM

## 2018-09-10 PROCEDURE — 99214 OFFICE O/P EST MOD 30 MIN: CPT | Performed by: FAMILY MEDICINE

## 2018-09-10 PROCEDURE — 72100 X-RAY EXAM L-S SPINE 2/3 VWS: CPT

## 2018-09-10 PROCEDURE — G8427 DOCREV CUR MEDS BY ELIG CLIN: HCPCS | Performed by: FAMILY MEDICINE

## 2018-09-10 PROCEDURE — 3017F COLORECTAL CA SCREEN DOC REV: CPT | Performed by: FAMILY MEDICINE

## 2018-09-10 PROCEDURE — G8417 CALC BMI ABV UP PARAM F/U: HCPCS | Performed by: FAMILY MEDICINE

## 2018-09-10 PROCEDURE — 4040F PNEUMOC VAC/ADMIN/RCVD: CPT | Performed by: FAMILY MEDICINE

## 2018-09-10 PROCEDURE — 1123F ACP DISCUSS/DSCN MKR DOCD: CPT | Performed by: FAMILY MEDICINE

## 2018-09-10 PROCEDURE — 1101F PT FALLS ASSESS-DOCD LE1/YR: CPT | Performed by: FAMILY MEDICINE

## 2018-09-10 PROCEDURE — G0008 ADMIN INFLUENZA VIRUS VAC: HCPCS | Performed by: FAMILY MEDICINE

## 2018-09-10 PROCEDURE — 90686 IIV4 VACC NO PRSV 0.5 ML IM: CPT | Performed by: FAMILY MEDICINE

## 2018-09-10 PROCEDURE — 1036F TOBACCO NON-USER: CPT | Performed by: FAMILY MEDICINE

## 2018-09-10 NOTE — PROGRESS NOTES
Immunization(s) given during visit:    Immunizations     Name Date Dose Route    Influenza, Francisco Bess, 3 yrs and older, IM, PF (Fluzone 3 yrs and older or Afluria 5 yrs and older) 9/10/2018 0.5 mL Intramuscular    Site: Deltoid- Right    Lot: [id number]    NDC: 18311-265-15          Most recent Vaccine Information Sheet dated 8/7/15 given to pt

## 2018-09-10 NOTE — PROGRESS NOTES
Visit Information    Have you changed or started any medications since your last visit including any over-the-counter medicines, vitamins, or herbal medicines? no   Are you having any side effects from any of your medications? -  no  Have you stopped taking any of your medications? Is so, why? -  no    Have you seen any other physician or provider since your last visit? Yes - Records Obtained  Have you had any other diagnostic tests since your last visit? Yes - Records Obtained  Have you been seen in the emergency room and/or had an admission to a hospital since we last saw you? No  Have you had your routine dental cleaning in the past 6 months? yes    Have you activated your [employer] account? If not, what are your barriers? No     Patient Care Team:  Dora Baird, DO as PCP - General (Family Medicine)  Dora Baird, DO as PCP - [facility] Attributed Provider    Medical History Review  Past Medical, Family, and Social History     Defer to provider.

## 2018-09-10 NOTE — PATIENT INSTRUCTIONS
11.7  © 1652-4927 [employer]. Care instructions adapted under license by [facility] ([facility]). If you have questions about a medical condition or this instruction, always ask your healthcare professional. [address] any warranty or liability for your use of this information. Patient Education        High Blood Pressure: Care Instructions  Your Care Instructions    If your blood pressure is usually above 130/80, you have high blood pressure, or hypertension. That means the top number is 130 or higher or the bottom number is 80 or higher, or both. Despite what a lot of people think, high blood pressure usually doesn't cause headaches or make you feel dizzy or lightheaded. It usually has no symptoms. But it does increase your risk for heart attack, stroke, and kidney or eye damage. The higher your blood pressure, the more your risk increases. Your doctor will give you a goal for your blood pressure. Your goal will be based on your health and your age. Lifestyle changes, such as eating healthy and being active, are always important to help lower blood pressure. You might also take medicine to reach your blood pressure goal.  Follow-up care is a key part of your treatment and safety. Be sure to make and go to all appointments, and call your doctor if you are having problems. It's also a good idea to know your test results and keep a list of the medicines you take. How can you care for yourself at home? Medical treatment  · If you stop taking your medicine, your blood pressure will go back up. You may take one or more types of medicine to lower your blood pressure. Be safe with medicines. Take your medicine exactly as prescribed. Call your doctor if you think you are having a problem with your medicine. · Talk to your doctor before you start taking aspirin every day. Aspirin can help certain people lower their risk of a heart attack or stroke.  But taking aspirin isn't right for instructions adapted under license by [facility] ([facility]). If you have questions about a medical condition or this instruction, always ask your healthcare professional. [address] any warranty or liability for your use of this information.

## 2018-09-11 ENCOUNTER — TELEPHONE (OUTPATIENT)
Dept: FAMILY MEDICINE CLINIC | Age: 68
End: 2018-09-11

## 2018-09-18 ENCOUNTER — HOSPITAL ENCOUNTER (OUTPATIENT)
Dept: PHYSICAL THERAPY | Age: 68
Setting detail: THERAPIES SERIES
Discharge: HOME | End: 2018-09-18
Payer: MEDICARE

## 2018-09-18 PROCEDURE — G8978 MOBILITY CURRENT STATUS: HCPCS

## 2018-09-18 PROCEDURE — 97161 PT EVAL LOW COMPLEX 20 MIN: CPT

## 2018-09-18 PROCEDURE — 97110 THERAPEUTIC EXERCISES: CPT

## 2018-09-18 PROCEDURE — G8979 MOBILITY GOAL STATUS: HCPCS

## 2018-09-18 ASSESSMENT — PAIN DESCRIPTION - FREQUENCY: FREQUENCY: INTERMITTENT

## 2018-09-18 ASSESSMENT — PAIN DESCRIPTION - LOCATION: LOCATION: HIP

## 2018-09-18 ASSESSMENT — PAIN SCALES - GENERAL: PAINLEVEL_OUTOF10: 0

## 2018-09-18 ASSESSMENT — PAIN DESCRIPTION - ORIENTATION: ORIENTATION: LEFT

## 2018-09-18 ASSESSMENT — PAIN DESCRIPTION - PAIN TYPE: TYPE: CHRONIC PAIN

## 2018-09-18 ASSESSMENT — PAIN DESCRIPTION - DESCRIPTORS: DESCRIPTORS: ACHING

## 2018-09-18 NOTE — PROGRESS NOTES
** PLEASE SIGN, DATE AND TIME CERTIFICATION BELOW AND RETURN TO [facility] OUTPATIENT REHABILITATION (FAX #: [phone]). ATTEST/CO-SIGN IF ACCESSING VIA [employer]. THANK YOU.**    I certify that I have examined the patient below and determined that Physical Medicine and Rehabilitation service is necessary and that I approve the established plan of care for up to 90 days or as specifically noted. Attestation, signature or co-signature of physician indicates approval of certification requirements.    ________________________ ____________ __________  Physician Signature   Date   Time       [place]     Time In: 1350  Time Out: 1440  Minutes: 50  Timed Code Treatment Minutes: 15 Minutes     Date: 2018  Patient Name: Dominga Zarate,  Gender:  male        CSN: [id number]   : 1950  (76 y.o.)  Referral Date : 09/10/18     Referring Practitioner: Alba Thomas DO      Diagnosis: Chronic bilateral low back pain without sciatica   Treatment Diagnosis: Low back pain; postura dysfunction   Additional Pertinent Hx: HTN; OA        General:  PT Visit Information  Onset Date: 18  PT Insurance Information: Medicare; aquatic therapy covered; modalities covered except IONTO  Total # of Visits to Date: 1  Plan of Care/Certification Expiration Date: 18  Progress Note Due Date: 10/16/18  Progress Note Counter:                         See Medical History Questionnaire for information related to allergies and medications. Subjective:  Chart Reviewed: Yes  Patient assessed for rehabilitation services?: Yes  Response To Previous Treatment: Patient with no complaints from previous session. Family / Caregiver Present: Yes     Subjective: First noticed low back pain starting about 2-3 months ago when he was grooming his dog and cleaning the floors. Pain is localized along left back/hip.  Able to sit for 5 min to alleviate his pain; standing limited to 20 min before needing a rest break. Pain:  Patient Currently in Pain: Yes  Pain Assessment: 0-10  Pain Level: 0  Pain Type: Chronic pain  Pain Location: Hip  Pain Orientation: Left  Pain Descriptors: Aching  Pain Frequency: Intermittent  Effect of Pain on Daily Activities: Limits standing tolerance; mowing his yard   Patient's Stated Pain Goal: No pain  Multiple Pain Sites: No     Social/Functional History:    Lives With: Alone  Type of Home: House  Home Layout: Two level, Bed/Bath upstairs                        Active : Yes  Mode of Transportation: Car  Occupation: Retired  Type of occupation: Driving forklift for [employer]    Objective                            Observation/Palpation  Posture: Fair  Palpation: No palpable tenderness  Observation: Very stiff and rigid throughout his spine; very little appreciable movement segmentally; when standing he shifts his weight over onto left side and keeps his right knee flexed to ~20 degrees in standing   Body Mechanics: Maintains posterior pelvic tilt when standing and sitting; struggles to differentiate between low back/pelvis and his upper trunk      RLE PROM: WFL    RLE AROM: WFL    LLE PROM: WFL    LLE AROM : WFL           Lumbar: Very stiff and hypomobile throughout his spine         Strength RLE: WFL    Strength LLE: WFL            Balance  Posture: Fair  Comments: Side view of his posture reveals a very concave spine-increased posterior pelvic tilt combined with increased thoracic kyphosis           Exercises  Exercise 1: LTR supine and seated trunk rotation 10x each  Exercise 2: Seated hamstring stretch with manual and verbal cuing to bend forward from hips                          Activity Tolerance:  Activity Tolerance: Patient Tolerated treatment well  Activity Tolerance: No exacerbation of back pain with today's stretches     Assessment:   Body structures, Functions, Activity limitations: Decreased functional

## 2018-09-25 ENCOUNTER — HOSPITAL ENCOUNTER (OUTPATIENT)
Dept: PHYSICAL THERAPY | Age: 68
Setting detail: THERAPIES SERIES
Discharge: HOME | End: 2018-09-25
Payer: MEDICARE

## 2018-09-25 ENCOUNTER — CARE COORDINATION (OUTPATIENT)
Dept: CARE COORDINATION | Age: 68
End: 2018-09-25

## 2018-09-25 PROCEDURE — 97530 THERAPEUTIC ACTIVITIES: CPT

## 2018-09-25 PROCEDURE — 97110 THERAPEUTIC EXERCISES: CPT

## 2018-09-25 RX ORDER — AMLODIPINE BESYLATE 2.5 MG/1
2.5 TABLET ORAL DAILY
Qty: 90 TABLET | Refills: 3 | Status: SHIPPED | OUTPATIENT
Start: 2018-09-25 | End: 2019-11-07 | Stop reason: SDUPTHER

## 2018-09-25 NOTE — PROGRESS NOTES
today's exercises     Assessment: Body structures, Functions, Activity limitations: Decreased functional mobility , Decreased ROM, Decreased strength, Decreased endurance, Decreased safe awareness, Decreased balance, Decreased coordination  Assessment: Shereen Miller has a tendency to revert back to his posterior pelvic tilt posture, however when cued to correct it he is able, he just does not have the strength/endurance to maintain it. Prognosis: Good  Discharge Recommendations: Continue to assess pending progress    Patient Education:  Patient Education: Provided and reviewed handout with new exercises and provided green resistance band                      Plan:  Times per week: 2 times   Plan weeks: 8 weeks   Specific instructions for Next Treatment: Review HEP; focus on neutral spine posture and ROM  Current Treatment Recommendations: Strengthening, ROM, Balance Training, Functional Mobility Training, Endurance Training, Gait Training, Neuromuscular Re-education, Manual Therapy - Soft Tissue Mobilization, Manual Therapy - Joint Manipulation, Home Exercise Program, Safety Education & Training, Patient/Caregiver Education & Training, Modalities, Aquatics    Goals:  Patient goals : Return to mowing his lawn, standing and walking without back/hip pain    Short term goals  Time Frame for Short term goals: 4 weeks   Short term goal 1: Patient will be able to demonstrate neutral spine when supine and seated with minimal verbal or manual cuing   Short term goal 2: Patient will stand with even weight through both legs while maintaining neutral spine to allow greater stability at his back for physical activites including lifting    Long term goals  Time Frame for Long term goals : 8 weeks  Long term goal 1: Patient will be able to mow his lawn with a push mower without triggering low back/hip pain.   Long term goal 2: Patient will be able to walk, stand etc on even/uneven surfaces allowing safe return to all household chores and ambulation without being limited by pain  Long term goal 3: Discharge with independent Alonso Gallo PT

## 2018-09-27 ENCOUNTER — HOSPITAL ENCOUNTER (OUTPATIENT)
Dept: PHYSICAL THERAPY | Age: 68
Setting detail: THERAPIES SERIES
Discharge: HOME | End: 2018-09-27
Payer: MEDICARE

## 2018-09-27 PROCEDURE — 97110 THERAPEUTIC EXERCISES: CPT

## 2018-09-27 NOTE — PROGRESS NOTES
Activity Tolerance:  Activity Tolerance: Patient Tolerated treatment well    Assessment: Body structures, Functions, Activity limitations: Decreased functional mobility , Decreased ROM, Decreased strength, Decreased endurance, Decreased safe awareness, Decreased balance, Decreased coordination  Assessment: Patient requires frequent cues for posture correction and to maintain abdominal bracing with activities. Progressed strengthening this date with no complaints of increased pain. Prognosis: Good  Discharge Recommendations: Continue to assess pending progress    Patient Education:  Patient Education: Continue with HEP    Plan:  Times per week: 2 times   Plan weeks: 8 weeks   Specific instructions for Next Treatment: Review HEP; focus on neutral spine posture and ROM  Current Treatment Recommendations: Strengthening, ROM, Balance Training, Functional Mobility Training, Endurance Training, Gait Training, Neuromuscular Re-education, Manual Therapy - Soft Tissue Mobilization, Manual Therapy - Joint Manipulation, Home Exercise Program, Safety Education & Training, Patient/Caregiver Education & Training, Modalities, Aquatics  Plan Comment: Continue with current POC    Goals:  Patient goals : Return to mowing his lawn, standing and walking without back/hip pain    Short term goals  Time Frame for Short term goals: 4 weeks   Short term goal 1: Patient will be able to demonstrate neutral spine when supine and seated with minimal verbal or manual cuing   Short term goal 2: Patient will stand with even weight through both legs while maintaining neutral spine to allow greater stability at his back for physical activites including lifting    Long term goals  Time Frame for Long term goals : 8 weeks  Long term goal 1: Patient will be able to mow his lawn with a push mower without triggering low back/hip pain.   Long term goal 2: Patient will be able to walk, stand etc on even/uneven surfaces allowing safe return to all

## 2018-10-02 ENCOUNTER — HOSPITAL ENCOUNTER (OUTPATIENT)
Dept: PHYSICAL THERAPY | Age: 68
Setting detail: THERAPIES SERIES
Discharge: HOME | End: 2018-10-02
Payer: MEDICARE

## 2018-10-02 PROCEDURE — 97110 THERAPEUTIC EXERCISES: CPT

## 2018-10-02 NOTE — PROGRESS NOTES
[place]     Time In: 7035  Time Out: 1210  Minutes: 44  Timed Code Treatment Minutes: 44 Minutes     Date: 10/2/2018  Patient Name: Nelda Cheatham,  Gender:  male        CSN: [id number]   : 1950  (76 y.o.)  Referral Date : 09/10/18    Referring Practitioner: Sonya Ortiz DO      Diagnosis: Chronic bilateral low back pain without sciatica   Treatment Diagnosis: Low back pain; postural dysfunction    Additional Pertinent Hx: HTN; OA                   General:  PT Visit Information  Onset Date: 18  PT Insurance Information: Medicare; aquatic therapy covered; modalities covered except IONTO  Total # of Visits to Date: 4  Plan of Care/Certification Expiration Date: 18  Progress Note Counter:  for PN. Subjective:  Response To Previous Treatment: Patient with no complaints from previous session. Family / Caregiver Present: No     Subjective: Patient reporting that he does not have any back pain currently but did have some back pain yesterday night.       Pain:  Patient Currently in Pain: Denies         Objective     Exercises  Exercise 1: LTR supine and seated trunk rotation 10x each  Exercise 2: Standing hamstring stretch with verbal cuing to bend forward from hips 3x 15 seconds bilateral   Exercise 3: NuStep (seat 9/arms 10) level 4 5 minutes  Exercise 4: Hydro stick push/pull, side to side, circles cw/ccw 10x each  Exercise 5: Hydro chest level 3 2x10  Exercise 6: Total Gym squats with TA activation:  20 x level J   Exercise 7: Rows and shoulder extension with green band 15x each   Exercise 8: Seated chest pull and bilateral ER with green band 15x each   Exercise 9: Bridges: 10x 5 seconds  Exercise 10: Lat pull downs: 50# 2x10  Exercise 11: Hydro hip level 4 2x10 Bilateral; Single leg Level 4 x 10    Exercise 12: 3 way hip with 1 UE for support x 10 - cues for posture

## 2018-10-04 ENCOUNTER — HOSPITAL ENCOUNTER (OUTPATIENT)
Dept: PHYSICAL THERAPY | Age: 68
Setting detail: THERAPIES SERIES
Discharge: HOME | End: 2018-10-04
Payer: MEDICARE

## 2018-10-04 PROCEDURE — 97110 THERAPEUTIC EXERCISES: CPT

## 2018-10-09 ENCOUNTER — HOSPITAL ENCOUNTER (OUTPATIENT)
Dept: PHYSICAL THERAPY | Age: 68
Setting detail: THERAPIES SERIES
Discharge: HOME | End: 2018-10-09
Payer: MEDICARE

## 2018-10-09 PROCEDURE — 97110 THERAPEUTIC EXERCISES: CPT

## 2018-10-09 PROCEDURE — 97140 MANUAL THERAPY 1/> REGIONS: CPT

## 2018-10-09 ASSESSMENT — PAIN SCALES - GENERAL: PAINLEVEL_OUTOF10: 3

## 2018-10-09 ASSESSMENT — PAIN DESCRIPTION - PAIN TYPE: TYPE: CHRONIC PAIN

## 2018-10-09 ASSESSMENT — PAIN DESCRIPTION - LOCATION: LOCATION: FLANK

## 2018-10-11 ENCOUNTER — HOSPITAL ENCOUNTER (OUTPATIENT)
Dept: PHYSICAL THERAPY | Age: 68
Setting detail: THERAPIES SERIES
Discharge: HOME | End: 2018-10-11
Payer: MEDICARE

## 2018-10-11 PROCEDURE — 97110 THERAPEUTIC EXERCISES: CPT

## 2018-10-11 NOTE — PROGRESS NOTES
[facility]     Time In: 8066  Time Out: 1205  Minutes: 42  Timed Code Treatment Minutes: 42 Minutes     Date: 10/11/2018  Patient Name: Zulema Garsia,  Gender:  male        CSN: [id number]   : 1950  (76 y.o.)  Referral Date : 09/10/18    Referring Practitioner: Liss Guardado DO      Diagnosis: Chronic bilateral low back pain without sciatica   Treatment Diagnosis: Low back pain; postural dysfunction    Additional Pertinent Hx: HTN; OA        General:  PT Visit Information  Onset Date: 18  PT Insurance Information: Medicare; aquatic therapy covered; modalities covered except IONTO  Total # of Visits to Date: 7  Plan of Care/Certification Expiration Date: 18  Progress Note Counter:              Subjective:  Chart Reviewed: Yes  Patient assessed for rehabilitation services?: Yes  Response To Previous Treatment: Patient with no complaints from previous session. Family / Caregiver Present: No     Subjective: Patient states that he was sore after the last therapy session. Denies pain at start of session. Pain:  Patient Currently in Pain: Yes     Objective     Exercises  Exercise 1: LTR supine and seated trunk rotation 10x each. Doorway pectoral stretch 3x 15 seconds.    Exercise 2: Sitting hamstring stretch with verbal cuing to bend forward from hips 3x 15 seconds bilateral   Exercise 3: NuStep (seat 9/arms 10) level 4 x5 minutes  Exercise 4: Hydrostick push/pull, side to side, circles cw/ccw 20x each  Exercise 5: Pushups at window ledge 10x  Exercise 7: Low rows 30# 2x10, Chest press 50# 2x10  Exercise 8: Standing chest pull and bilateral ER with green band 15x each with full foam roller up along spine  Exercise 10: Lat pull downs: blue band 2x10  Exercise 14: Supine pec stretch; alternating shoulder flexion/extension to lengthen spine and paraspinals 10x       Activity Tolerance:  Activity

## 2018-10-15 DIAGNOSIS — G40.909 SEIZURE DISORDER (HCC): ICD-10-CM

## 2018-10-15 RX ORDER — LEVETIRACETAM 1000 MG/1
TABLET ORAL
Qty: 60 TABLET | Refills: 11 | Status: SHIPPED | OUTPATIENT
Start: 2018-10-15 | End: 2019-10-13 | Stop reason: SDUPTHER

## 2018-10-16 ENCOUNTER — HOSPITAL ENCOUNTER (OUTPATIENT)
Dept: PHYSICAL THERAPY | Age: 68
Setting detail: THERAPIES SERIES
Discharge: HOME | End: 2018-10-16
Payer: MEDICARE

## 2018-10-16 PROCEDURE — 97530 THERAPEUTIC ACTIVITIES: CPT

## 2018-10-16 PROCEDURE — G8978 MOBILITY CURRENT STATUS: HCPCS

## 2018-10-16 PROCEDURE — G8979 MOBILITY GOAL STATUS: HCPCS

## 2018-10-16 PROCEDURE — 97110 THERAPEUTIC EXERCISES: CPT

## 2018-10-16 NOTE — PROGRESS NOTES
seated with minimal verbal or manual cuing-GOAL MET   Short term goal 2: Patient will stand with even weight through both legs while maintaining neutral spine to allow greater stability at his back for physical activites including lifting NOT MET-will progress TE/TA to include lifting activiites     Long term goals  Time Frame for Long term goals : 8 weeks  Long term goal 1: Patient will be able to mow his lawn with a push mower without triggering low back/hip pain. NOT MET-less pain, however he required some sitting breaks   Long term goal 2: Patient will be able to walk, stand etc on even/uneven surfaces allowing safe return to all household chores and ambulation without being limited by pain MET-pt was able to walk up a large grassy hill without triggering any pain. Long term goal 3: Discharge with independent HEP    PT G-Codes  Functional Assessment Tool Used: pain  Score: 2/10  Functional Limitation: Mobility: Walking and moving around  Mobility: Walking and Moving Around Current Status (): At least 1 percent but less than 20 percent impaired, limited or restricted  Mobility: Walking and Moving Around Goal Status ():  At least 1 percent but less than 20 percent impaired, limited or restricted    Jerry Quivers, PT

## 2018-10-19 ENCOUNTER — HOSPITAL ENCOUNTER (OUTPATIENT)
Dept: PHYSICAL THERAPY | Age: 68
Setting detail: THERAPIES SERIES
Discharge: HOME | End: 2018-10-19
Payer: MEDICARE

## 2018-10-19 PROCEDURE — 97110 THERAPEUTIC EXERCISES: CPT

## 2018-10-24 ENCOUNTER — HOSPITAL ENCOUNTER (OUTPATIENT)
Dept: PHYSICAL THERAPY | Age: 68
Setting detail: THERAPIES SERIES
Discharge: HOME | End: 2018-10-24
Payer: MEDICARE

## 2018-10-24 PROCEDURE — 97110 THERAPEUTIC EXERCISES: CPT

## 2018-10-24 ASSESSMENT — PAIN SCALES - GENERAL: PAINLEVEL_OUTOF10: 2

## 2018-10-26 ENCOUNTER — HOSPITAL ENCOUNTER (OUTPATIENT)
Dept: PHYSICAL THERAPY | Age: 68
Setting detail: THERAPIES SERIES
Discharge: HOME | End: 2018-10-26
Payer: MEDICARE

## 2018-10-26 PROCEDURE — 97110 THERAPEUTIC EXERCISES: CPT

## 2018-10-26 NOTE — PROGRESS NOTES
[place]     Time In: 1130  Time Out: 4314  Minutes: 39  Timed Code Treatment Minutes: 39 Minutes     Date: 10/26/2018  Patient Name: Rosy Moreira,  Gender:  male        CSN: [id number]   : 1950  (76 y.o.)  Referral Date : 09/10/18    Referring Practitioner: Isabel Guadarrama DO      Diagnosis: Chronic bilateral low back pain without sciatica   Treatment Diagnosis: Low back pain; postural dysfunction    Additional Pertinent Hx: HTN; OA        General:  PT Visit Information  Onset Date: 18  PT Insurance Information: Medicare; aquatic therapy covered; modalities covered except IONTO  Total # of Visits to Date: 11  Plan of Care/Certification Expiration Date: 18  Progress Note Counter: 3/4               Subjective:  Chart Reviewed: Yes  Patient assessed for rehabilitation services?: Yes  Response To Previous Treatment: Patient with no complaints from previous session. Family / Caregiver Present: No     Subjective: Patient states his back is feeling good right now. Pain:  Patient Currently in Pain: Denies    Objective  Exercises  Exercise 1: SportsArt bike (seat 4) level 4 x5 minutes - occasional cues needed for abdominal bracing. Patient had some difficulty with gettin leg over center of bike, may need to try airdyne next session. Exercise 3: Leg press (sled 3) 72# x15   Exercise 4: Hydrostick push/pull, side to side, circles cw/ccw 20x each while standing on mushrooms   Exercise 5: HydroChest press level 4 x20  Exercise 6: Leg extension 25# 10x, Hamstring curl 30# 15x - cues for bracing  Exercise 13: Standing against wall with soft blue bolster along spine for scapular retraction, shoulder rows, horizontal abduction and shoulder flexion overhead 15x each   Exercise 17: Box squat and lift (20#) 10x; Walking with box (20#) 40 feet x2 - mod cues needed for posture and bracing.           Activity

## 2018-10-29 ENCOUNTER — HOSPITAL ENCOUNTER (OUTPATIENT)
Age: 68
Discharge: HOME | End: 2018-10-29
Payer: MEDICARE

## 2018-10-29 DIAGNOSIS — E53.1 VITAMIN B6 DEFICIENCY: ICD-10-CM

## 2018-10-29 DIAGNOSIS — Z12.5 SPECIAL SCREENING FOR MALIGNANT NEOPLASM OF PROSTATE: ICD-10-CM

## 2018-10-29 DIAGNOSIS — Z11.59 NEED FOR HEPATITIS C SCREENING TEST: ICD-10-CM

## 2018-10-29 DIAGNOSIS — I10 ESSENTIAL HYPERTENSION: ICD-10-CM

## 2018-10-29 LAB
ALBUMIN SERPL-MCNC: 4.5 G/DL (ref 3.5–5.1)
ALP BLD-CCNC: 51 U/L (ref 38–126)
ALT SERPL-CCNC: 18 U/L (ref 11–66)
ANION GAP SERPL CALCULATED.3IONS-SCNC: 13 MEQ/L (ref 8–16)
AST SERPL-CCNC: 24 U/L (ref 5–40)
BASOPHILS # BLD: 0.6 %
BASOPHILS ABSOLUTE: 0 THOU/MM3 (ref 0–0.1)
BILIRUB SERPL-MCNC: 0.6 MG/DL (ref 0.3–1.2)
BILIRUBIN DIRECT: < 0.2 MG/DL (ref 0–0.3)
BUN BLDV-MCNC: 12 MG/DL (ref 7–22)
CALCIUM SERPL-MCNC: 9.5 MG/DL (ref 8.5–10.5)
CHLORIDE BLD-SCNC: 101 MEQ/L (ref 98–111)
CHOLESTEROL, TOTAL: 191 MG/DL (ref 100–199)
CO2: 26 MEQ/L (ref 23–33)
CREAT SERPL-MCNC: 0.9 MG/DL (ref 0.4–1.2)
CREATININE URINE: 181.8 MG/DL
EOSINOPHIL # BLD: 1.9 %
EOSINOPHILS ABSOLUTE: 0.1 THOU/MM3 (ref 0–0.4)
ERYTHROCYTE [DISTWIDTH] IN BLOOD BY AUTOMATED COUNT: 13.4 % (ref 11.5–14.5)
ERYTHROCYTE [DISTWIDTH] IN BLOOD BY AUTOMATED COUNT: 43.2 FL (ref 35–45)
GFR SERPL CREATININE-BSD FRML MDRD: 84 ML/MIN/1.73M2
GLUCOSE BLD-MCNC: 94 MG/DL (ref 70–108)
HCT VFR BLD CALC: 42.9 % (ref 42–52)
HDLC SERPL-MCNC: 42 MG/DL
HEMOGLOBIN: 14.8 GM/DL (ref 14–18)
HEPATITIS C ANTIBODY: NEGATIVE
IMMATURE GRANS (ABS): 0.02 THOU/MM3 (ref 0–0.07)
IMMATURE GRANULOCYTES: 0.4 %
LDL CHOLESTEROL CALCULATED: 135 MG/DL
LYMPHOCYTES # BLD: 21.4 %
LYMPHOCYTES ABSOLUTE: 1.1 THOU/MM3 (ref 1–4.8)
MCH RBC QN AUTO: 30.3 PG (ref 26–33)
MCHC RBC AUTO-ENTMCNC: 34.5 GM/DL (ref 32.2–35.5)
MCV RBC AUTO: 87.7 FL (ref 80–94)
MONOCYTES # BLD: 9.5 %
MONOCYTES ABSOLUTE: 0.5 THOU/MM3 (ref 0.4–1.3)
NUCLEATED RED BLOOD CELLS: 0 /100 WBC
PLATELET # BLD: 250 THOU/MM3 (ref 130–400)
PMV BLD AUTO: 8.9 FL (ref 9.4–12.4)
POTASSIUM SERPL-SCNC: 4.5 MEQ/L (ref 3.5–5.2)
PROSTATE SPECIFIC ANTIGEN: 1.62 NG/ML (ref 0–1)
PROT/CREAT RATIO, UR: 0.08
PROTEIN, URINE: 15.2 MG/DL
RBC # BLD: 4.89 MILL/MM3 (ref 4.7–6.1)
SEG NEUTROPHILS: 66.2 %
SEGMENTED NEUTROPHILS ABSOLUTE COUNT: 3.4 THOU/MM3 (ref 1.8–7.7)
SODIUM BLD-SCNC: 140 MEQ/L (ref 135–145)
TOTAL PROTEIN: 7.7 G/DL (ref 6.1–8)
TRIGL SERPL-MCNC: 70 MG/DL (ref 0–199)
TSH SERPL DL<=0.05 MIU/L-ACNC: 2.7 UIU/ML (ref 0.4–4.2)
WBC # BLD: 5.2 THOU/MM3 (ref 4.8–10.8)

## 2018-10-29 PROCEDURE — 82248 BILIRUBIN DIRECT: CPT

## 2018-10-29 PROCEDURE — G0103 PSA SCREENING: HCPCS

## 2018-10-29 PROCEDURE — 80061 LIPID PANEL: CPT

## 2018-10-29 PROCEDURE — 86803 HEPATITIS C AB TEST: CPT

## 2018-10-29 PROCEDURE — 82570 ASSAY OF URINE CREATININE: CPT

## 2018-10-29 PROCEDURE — 36415 COLL VENOUS BLD VENIPUNCTURE: CPT

## 2018-10-29 PROCEDURE — 84443 ASSAY THYROID STIM HORMONE: CPT

## 2018-10-29 PROCEDURE — 85025 COMPLETE CBC W/AUTO DIFF WBC: CPT

## 2018-10-29 PROCEDURE — 80053 COMPREHEN METABOLIC PANEL: CPT

## 2018-10-29 PROCEDURE — 84156 ASSAY OF PROTEIN URINE: CPT

## 2018-10-29 PROCEDURE — 84207 ASSAY OF VITAMIN B-6: CPT

## 2018-10-30 ENCOUNTER — TELEPHONE (OUTPATIENT)
Dept: FAMILY MEDICINE CLINIC | Age: 68
End: 2018-10-30

## 2018-10-30 DIAGNOSIS — E78.2 MIXED HYPERLIPIDEMIA: Primary | Chronic | ICD-10-CM

## 2018-10-30 RX ORDER — ATORVASTATIN CALCIUM 20 MG/1
20 TABLET, FILM COATED ORAL DAILY
Qty: 90 TABLET | Refills: 3 | Status: SHIPPED | OUTPATIENT
Start: 2018-10-30 | End: 2019-10-17 | Stop reason: SDUPTHER

## 2018-10-31 ENCOUNTER — CARE COORDINATION (OUTPATIENT)
Dept: CARE COORDINATION | Age: 68
End: 2018-10-31

## 2018-10-31 ENCOUNTER — HOSPITAL ENCOUNTER (OUTPATIENT)
Dept: PHYSICAL THERAPY | Age: 68
Setting detail: THERAPIES SERIES
Discharge: HOME | End: 2018-10-31
Payer: MEDICARE

## 2018-10-31 PROCEDURE — G8980 MOBILITY D/C STATUS: HCPCS

## 2018-10-31 PROCEDURE — 97530 THERAPEUTIC ACTIVITIES: CPT

## 2018-10-31 PROCEDURE — G8978 MOBILITY CURRENT STATUS: HCPCS

## 2018-10-31 PROCEDURE — G8979 MOBILITY GOAL STATUS: HCPCS

## 2018-10-31 PROCEDURE — 97110 THERAPEUTIC EXERCISES: CPT

## 2018-11-01 ENCOUNTER — TELEPHONE (OUTPATIENT)
Dept: FAMILY MEDICINE CLINIC | Age: 68
End: 2018-11-01

## 2018-11-01 LAB — VITAMIN B6: 89.5 NMOL/L (ref 20–125)

## 2018-11-01 NOTE — TELEPHONE ENCOUNTER
----- Message from Dipti Espinoza DO sent at 11/1/2018 12:40 PM EDT -----  Please let pt know that b6 level is WNL. con't supplementation at present dose. Let me know if questions, thanks!

## 2018-11-03 NOTE — PROGRESS NOTES
Chief Complaint   Patient presents with    6 Month Follow-Up     Chronic issues and back pain       History obtained from the patient and sister. 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 Sister here to help with hx. Trouble reading and writing. Used to see Tigre Garcia    SUBJECTIVE:  Ochoa Hahn is a 76 y.o. male that presents today for     -01. Low Back Pain LAST VISIT:    HPI: for the last 6 months has been having inc low back pain. Worse when lowing the yard or walking a lot. Better with sitting, worse with standing, feels he can't stand up straight d/t the pain. No radicular sxs. No injury. Some days worse than other. He describes the pain as aching, throbbing  in the lumbar region. Inciting injury or history of trauma? No  Pain is relieved by - as above  Pain is aggravated by - as above  Radiation of the pain? No  Paresthesias of the extremities? No  Saddle anesthesia? No  Bowel or bladder incontinence? No  Treatments tried - as above    UPDATE TODAY: back pain much better. Done with PT. Feels doing well. Declines need for further intervention. No radiation. No bowel/bladder issues.        -02. HTN:    HPI PRIOR VISIT: was on lisinopril. Stopped as above. BP's <140/90 since discharge w/o meds    UPDATE PRIOR VISIT: BP <140/90. No CP/SOB. con't to be off meds and BP's fine. UPDATE LAST VISIT: started on norvasc 2.5mg by nephro as BP's had started to go high. However, now BP <140/90. UPDATE TODAY: BPs <140/90. No CP/SOB     BP Readings from Last 3 Encounters:   11/05/18 122/60   09/10/18 126/60   07/16/18 (!) 149/70       -03. B 6 def LAST VISIT: on supplementation. When goes off all together, levels low. When takes daily high. currently on once per wk, due for labs soon. UPDATE TODAY: taking supplement once per wk (more frequent levels too high, not taking, levels too low). Tolerating well.        -04. HLD:    HPI: just started on lipitor last wk, tolerating so far.  Will need labs in

## 2018-11-05 ENCOUNTER — OFFICE VISIT (OUTPATIENT)
Dept: FAMILY MEDICINE CLINIC | Age: 68
End: 2018-11-05
Payer: MEDICARE

## 2018-11-05 VITALS
HEIGHT: 68 IN | DIASTOLIC BLOOD PRESSURE: 60 MMHG | TEMPERATURE: 98.4 F | SYSTOLIC BLOOD PRESSURE: 122 MMHG | RESPIRATION RATE: 14 BRPM | BODY MASS INDEX: 36.83 KG/M2 | HEART RATE: 57 BPM | WEIGHT: 243 LBS

## 2018-11-05 DIAGNOSIS — G89.29 CHRONIC BILATERAL LOW BACK PAIN WITHOUT SCIATICA: Primary | ICD-10-CM

## 2018-11-05 DIAGNOSIS — E78.2 MIXED HYPERLIPIDEMIA: Chronic | ICD-10-CM

## 2018-11-05 DIAGNOSIS — G40.909 SEIZURE DISORDER (HCC): Chronic | ICD-10-CM

## 2018-11-05 DIAGNOSIS — M54.50 CHRONIC BILATERAL LOW BACK PAIN WITHOUT SCIATICA: Primary | ICD-10-CM

## 2018-11-05 DIAGNOSIS — I50.30 HEART FAILURE WITH PRESERVED EJECTION FRACTION (HCC): ICD-10-CM

## 2018-11-05 DIAGNOSIS — I10 ESSENTIAL HYPERTENSION: ICD-10-CM

## 2018-11-05 DIAGNOSIS — E53.1 VITAMIN B6 DEFICIENCY: ICD-10-CM

## 2018-11-05 DIAGNOSIS — R56.1 SEIZURE AFTER HEAD INJURY (HCC): Chronic | ICD-10-CM

## 2018-11-05 PROCEDURE — G8482 FLU IMMUNIZE ORDER/ADMIN: HCPCS | Performed by: FAMILY MEDICINE

## 2018-11-05 PROCEDURE — G8427 DOCREV CUR MEDS BY ELIG CLIN: HCPCS | Performed by: FAMILY MEDICINE

## 2018-11-05 PROCEDURE — 1123F ACP DISCUSS/DSCN MKR DOCD: CPT | Performed by: FAMILY MEDICINE

## 2018-11-05 PROCEDURE — 1036F TOBACCO NON-USER: CPT | Performed by: FAMILY MEDICINE

## 2018-11-05 PROCEDURE — G8417 CALC BMI ABV UP PARAM F/U: HCPCS | Performed by: FAMILY MEDICINE

## 2018-11-05 PROCEDURE — 1101F PT FALLS ASSESS-DOCD LE1/YR: CPT | Performed by: FAMILY MEDICINE

## 2018-11-05 PROCEDURE — 99214 OFFICE O/P EST MOD 30 MIN: CPT | Performed by: FAMILY MEDICINE

## 2018-11-05 PROCEDURE — 4040F PNEUMOC VAC/ADMIN/RCVD: CPT | Performed by: FAMILY MEDICINE

## 2018-11-05 PROCEDURE — 3017F COLORECTAL CA SCREEN DOC REV: CPT | Performed by: FAMILY MEDICINE

## 2018-11-05 ASSESSMENT — PATIENT HEALTH QUESTIONNAIRE - PHQ9
2. FEELING DOWN, DEPRESSED OR HOPELESS: 0
1. LITTLE INTEREST OR PLEASURE IN DOING THINGS: 0
SUM OF ALL RESPONSES TO PHQ9 QUESTIONS 1 & 2: 0
SUM OF ALL RESPONSES TO PHQ QUESTIONS 1-9: 0
SUM OF ALL RESPONSES TO PHQ QUESTIONS 1-9: 0

## 2018-11-05 NOTE — PATIENT INSTRUCTIONS
oils with olive and canola oils. (Canola oil margarine without trans fat is fine.)  · Replace red meat with fish, poultry, and soy protein (like tofu). · Limit processed and packaged foods like chips, crackers, and cookies. · Bake, broil, or steam foods. Don't carter them. · Be physically active. Get at least 30 minutes of exercise on most days of the week. Walking is a good choice. You also may want to do other activities, such as running, swimming, cycling, or playing tennis or team sports. · Stay at a healthy weight or lose weight by making the changes in eating and physical activity listed above. Losing just a small amount of weight, even 5 to 10 pounds, can reduce your risk for having a heart attack or stroke. · Do not smoke. When should you call for help? Watch closely for changes in your health, and be sure to contact your doctor if:    · You need help making lifestyle changes.     · You have questions about your medicine. Where can you learn more? Go to https://[employer]. org and sign in to your [employer] account. Enter F211 in the [facility] box to learn more about \"High Cholesterol: Care Instructions. \"     If you do not have an account, please click on the \"Sign Up Now\" link. Current as of: May 10, 2017  Content Version: 11.7  © 1290-5840 [employer]. Care instructions adapted under license by [facility] [place] ([facility]). If you have questions about a medical condition or this instruction, always ask your healthcare professional. [place] [address] any warranty or liability for your use of this information.

## 2018-11-14 ENCOUNTER — OFFICE VISIT (OUTPATIENT)
Dept: NEPHROLOGY | Age: 68
End: 2018-11-14
Payer: MEDICARE

## 2018-11-14 VITALS
WEIGHT: 243.6 LBS | HEART RATE: 82 BPM | DIASTOLIC BLOOD PRESSURE: 78 MMHG | OXYGEN SATURATION: 98 % | BODY MASS INDEX: 37.04 KG/M2 | SYSTOLIC BLOOD PRESSURE: 139 MMHG

## 2018-11-14 DIAGNOSIS — I10 ESSENTIAL HYPERTENSION: Primary | ICD-10-CM

## 2018-11-14 PROCEDURE — 3017F COLORECTAL CA SCREEN DOC REV: CPT | Performed by: INTERNAL MEDICINE

## 2018-11-14 PROCEDURE — G8417 CALC BMI ABV UP PARAM F/U: HCPCS | Performed by: INTERNAL MEDICINE

## 2018-11-14 PROCEDURE — G8482 FLU IMMUNIZE ORDER/ADMIN: HCPCS | Performed by: INTERNAL MEDICINE

## 2018-11-14 PROCEDURE — 4040F PNEUMOC VAC/ADMIN/RCVD: CPT | Performed by: INTERNAL MEDICINE

## 2018-11-14 PROCEDURE — 1123F ACP DISCUSS/DSCN MKR DOCD: CPT | Performed by: INTERNAL MEDICINE

## 2018-11-14 PROCEDURE — 1101F PT FALLS ASSESS-DOCD LE1/YR: CPT | Performed by: INTERNAL MEDICINE

## 2018-11-14 PROCEDURE — 99213 OFFICE O/P EST LOW 20 MIN: CPT | Performed by: INTERNAL MEDICINE

## 2018-11-14 PROCEDURE — G8427 DOCREV CUR MEDS BY ELIG CLIN: HCPCS | Performed by: INTERNAL MEDICINE

## 2018-11-14 PROCEDURE — 1036F TOBACCO NON-USER: CPT | Performed by: INTERNAL MEDICINE

## 2018-11-19 ENCOUNTER — OFFICE VISIT (OUTPATIENT)
Dept: NEUROLOGY | Age: 68
End: 2018-11-19
Payer: MEDICARE

## 2018-11-19 VITALS
HEIGHT: 69 IN | WEIGHT: 247 LBS | HEART RATE: 72 BPM | BODY MASS INDEX: 36.58 KG/M2 | DIASTOLIC BLOOD PRESSURE: 60 MMHG | SYSTOLIC BLOOD PRESSURE: 120 MMHG

## 2018-11-19 DIAGNOSIS — G40.909 SEIZURE DISORDER (HCC): Primary | ICD-10-CM

## 2018-11-19 PROCEDURE — 99213 OFFICE O/P EST LOW 20 MIN: CPT | Performed by: PSYCHIATRY & NEUROLOGY

## 2018-11-19 PROCEDURE — G8417 CALC BMI ABV UP PARAM F/U: HCPCS | Performed by: PSYCHIATRY & NEUROLOGY

## 2018-11-19 PROCEDURE — G8482 FLU IMMUNIZE ORDER/ADMIN: HCPCS | Performed by: PSYCHIATRY & NEUROLOGY

## 2018-11-19 PROCEDURE — 3017F COLORECTAL CA SCREEN DOC REV: CPT | Performed by: PSYCHIATRY & NEUROLOGY

## 2018-11-19 PROCEDURE — 4040F PNEUMOC VAC/ADMIN/RCVD: CPT | Performed by: PSYCHIATRY & NEUROLOGY

## 2018-11-19 PROCEDURE — 1123F ACP DISCUSS/DSCN MKR DOCD: CPT | Performed by: PSYCHIATRY & NEUROLOGY

## 2018-11-19 PROCEDURE — 1101F PT FALLS ASSESS-DOCD LE1/YR: CPT | Performed by: PSYCHIATRY & NEUROLOGY

## 2018-11-19 PROCEDURE — 1036F TOBACCO NON-USER: CPT | Performed by: PSYCHIATRY & NEUROLOGY

## 2018-11-19 PROCEDURE — G8427 DOCREV CUR MEDS BY ELIG CLIN: HCPCS | Performed by: PSYCHIATRY & NEUROLOGY

## 2018-11-19 NOTE — PROGRESS NOTES
carotid bruits. The Neck is supple. Neuro: CN 2-12 grossly intact with no focal deficits. Power 5/5 Throughout symmetric, Reflexes are decreased and symmetric. Long tracts are intact. Cerebellar exam is Intact. Sensory exam is intact to light touch. Gait is limping intact. Musculoskeletal:  Has no hand arthritis, no limitation of ROM in any of the four extremities.   Lower extremities no edema        DATA:  Results for orders placed or performed during the hospital encounter of 73/86/77   Basic Metabolic Panel   Result Value Ref Range    Sodium 140 135 - 145 meq/L    Potassium 4.5 3.5 - 5.2 meq/L    Chloride 101 98 - 111 meq/L    CO2 26 23 - 33 meq/L    Glucose 94 70 - 108 mg/dL    BUN 12 7 - 22 mg/dL    CREATININE 0.9 0.4 - 1.2 mg/dL    Calcium 9.5 8.5 - 10.5 mg/dL   Protein / creatinine ratio, urine   Result Value Ref Range    Protein, Urine 15.2 mg/dl    Creatinine, Urine 181.8 mg/dl    Prot/Creat Ratio, Ur 0.08    CBC Auto Differential   Result Value Ref Range    WBC 5.2 4.8 - 10.8 thou/mm3    RBC 4.89 4.70 - 6.10 mill/mm3    Hemoglobin 14.8 14.0 - 18.0 gm/dl    Hematocrit 42.9 42.0 - 52.0 %    MCV 87.7 80.0 - 94.0 fL    MCH 30.3 26.0 - 33.0 pg    MCHC 34.5 32.2 - 35.5 gm/dl    RDW-CV 13.4 11.5 - 14.5 %    RDW-SD 43.2 35.0 - 45.0 fL    Platelets 110 993 - 177 thou/mm3    MPV 8.9 (L) 9.4 - 12.4 fL    Seg Neutrophils 66.2 %    Lymphocytes 21.4 %    Monocytes 9.5 %    Eosinophils 1.9 %    Basophils 0.6 %    Immature Granulocytes 0.4 %    Segs Absolute 3.4 1.8 - 7.7 thou/mm3    Lymphocytes # 1.1 1.0 - 4.8 thou/mm3    Monocytes # 0.5 0.4 - 1.3 thou/mm3    Eosinophils # 0.1 0.0 - 0.4 thou/mm3    Basophils # 0.0 0.0 - 0.1 thou/mm3    Immature Grans (Abs) 0.02 0.00 - 0.07 thou/mm3    nRBC 0 /100 wbc   Lipid Panel   Result Value Ref Range    Cholesterol, Total 191 100 - 199 mg/dL    Triglycerides 70 0 - 199 mg/dL    HDL 42 mg/dL    LDL Calculated 135 mg/dL   TSH with Reflex   Result Value Ref Range    TSH 2.700 0.400 -

## 2019-04-29 ENCOUNTER — TELEPHONE (OUTPATIENT)
Dept: FAMILY MEDICINE CLINIC | Age: 69
End: 2019-04-29

## 2019-04-29 ENCOUNTER — HOSPITAL ENCOUNTER (OUTPATIENT)
Age: 69
Discharge: HOME | End: 2019-04-29
Payer: MEDICARE

## 2019-04-29 DIAGNOSIS — E78.2 MIXED HYPERLIPIDEMIA: Chronic | ICD-10-CM

## 2019-04-29 LAB
CHOLESTEROL, TOTAL: 121 MG/DL (ref 100–199)
HDLC SERPL-MCNC: 39 MG/DL
LDL CHOLESTEROL CALCULATED: 70 MG/DL
TRIGL SERPL-MCNC: 61 MG/DL (ref 0–199)

## 2019-04-29 PROCEDURE — 36415 COLL VENOUS BLD VENIPUNCTURE: CPT

## 2019-04-29 PROCEDURE — 80061 LIPID PANEL: CPT

## 2019-04-29 NOTE — TELEPHONE ENCOUNTER
----- Message from Radha Khan DO sent at 4/29/2019  4:35 PM EDT -----  Please let pt know that lipids look good. con't liptor at present dose. Let me know if questions, thanks!

## 2019-05-05 NOTE — PROGRESS NOTES
Lab Results   Component Value Date    TRIG 61 04/29/2019    TRIG 70 10/29/2018    TRIG 115 10/25/2017     Lab Results   Component Value Date    HDL 39 04/29/2019    HDL 42 10/29/2018    HDL 40 10/25/2017     Lab Results   Component Value Date    LDLCALC 70 04/29/2019    LDLCALC 135 10/29/2018    [facility] 100 10/25/2017       DM Screen -   Lab Results   Component Value Date    GLUCOSE 94 10/29/2018       Colon Cancer Screening - NEG FIT MAY 2018/FIT test given MAY 2019  Lung Cancer Screening (Age 54 to [de-identified] with 30 pack year hx, current smoker or quit within past 15 years) - never smoker    Tetanus - UTD 2013  Influenza Vaccine - UTD FALL 2018  Pneumonia Vaccine - UTD PPV 23 AUG 2014 and PCV 13 OCT 2017  Zoster - to get at pharmacy per medicare     PSA testing discussion -   Lab Results   Component Value Date    PSA 1.62 (H) 10/29/2018    PSA 2.08 (H) 10/25/2017       AAA Screening - never smoker    Specialists List:  Eugene Novel: wound care  Min: neuro for seizures  Cardio: Ethel Thakkar       Current Outpatient Medications   Medication Sig Dispense Refill    atorvastatin (LIPITOR) 20 MG tablet Take 1 tablet by mouth daily 90 tablet 3    levETIRAcetam (KEPPRA) 1000 MG tablet take 1 tablet by mouth twice a day 60 tablet 11    amLODIPine (NORVASC) 2.5 MG tablet Take 1 tablet by mouth daily 90 tablet 3    aspirin 81 MG chewable tablet Take 81 mg by mouth daily      pyridoxine (B-6) 25 MG tablet Take 1 tablet by mouth once a week 12 tablet 3     No current facility-administered medications for this visit. No orders of the defined types were placed in this encounter. All medications reviewed and reconciled, including OTC and herbal medications. Updated list given to patient.        Patient Active Problem List    Diagnosis Date Noted    Chronic bilateral low back pain without sciatica 09/10/2018    Vitamin B6 deficiency 10/29/2017    History of acute kidney injury from excessive NSAID use     Osteoarthritis  Hypertension     Hyperlipidemia     History of DVT of right lower extremity      2015. Txt with 6 months of coumadin. Provoked.  Heart failure with preserved ejection fraction (HCC)     History of alcohol abuse     History of traumatic brain injury     History of non-healing open leg wound of R leg. S/p skin graft. Healed. From chronic venous insuficiency. 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 Jayshree Murphy follows, no seizure activity since         Past Medical History:   Diagnosis Date    [facility]     History of acute kidney injury from excessive NSAID use     History of alcohol abuse     History of DVT of right lower extremity     History of non-healing open leg wound of R leg. S/p skin graft. Healed. From chronic venous insuficiency.     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 Jayshree Murphy follows, no seizure activity since    Seizure disorder ([facility] 7/17/2014       Past Surgical History:   Procedure Laterality Date    [place] INCIS/DRAIN THIGH/KNEE ABSCESS,DEEP Right 9/25/2017    RIGHT SERGIO LEG WOUND DEBRIDEMENT WITH SKIN GRAFT AND WOUND VAC APPLICATION performed by Cullen Cardenas DPM at [address] [address] [address] Right 10/9/2017    SPLIT THICKNESS SKIN GRAFT FROM RIGHT LEG performed by Cullen Cardenas DPM at . [facility]      vein stripping       No Known Allergies      Social History     Tobacco Use    Smoking status: Never Smoker    Smokeless tobacco: Never Used   Substance Use Topics    Alcohol use: No     Comment:           Family History   Problem Relation Age of Onset    Cancer Mother     Stroke Mother    Luis Alberto Drain Diabetes Mother     High Blood Pressure Mother     High Cholesterol Mother     Stroke Father     COPD Father     Diabetes Maternal Grandmother     Heart Disease Maternal Grandfather     Cancer Sister     Asthma Sister     High Blood Pressure Sister     High Cholesterol Sister     Arthritis Sister     Diabetes Sister     Diabetes Sister     Colon Cancer Neg Hx     Prostate Cancer Neg Hx          I have reviewed the patient's past medical history, past surgical history, allergies, medications, social and family history and I have made updates where appropriate. Review of Systems  Positive responses are highlighted in bold    Constitutional:  Fever, Chills, Night Sweats, Fatigue, Unexpected changes in weight  HENT:  Ear pain, Tinnitus, Nosebleeds, Trouble swallowing, Hearing loss, Sore throat  Cardiovascular:  Chest Pain, Palpitations, Orthopnea, Paroxysmal Nocturnal Dyspnea  Respiratory:  Cough, Wheezing, Shortness of breath, Chest tightness, Apnea  Gastrointestinal:  Nausea, Vomiting, Diarrhea, Constipation, Heartburn, Blood in stool  Genitourinary:  Difficulty or painful urination, Flank pain, Change in frequency, Urgency  Skin:  Color change, Rash, Itching, Wound  Musculoskeletal:  Joint pain, Back pain, Gait problems, Joint swelling, Myalgias  Neurological:  Dizziness, Headaches, Presyncope, Numbness, Seizures, Tremors  Endocrine:  Heat Intolerance, Cold Intolerance, Polydipsia, Polyphagia, Polyuria        PHYSICAL EXAM:  Vitals:    05/06/19 0737   BP: (!) 138/54   Pulse: 64   Resp: 18   Temp: 98 °F (36.7 °C)   TempSrc: Oral   Weight: 247 lb 3.2 oz (112.1 kg)   Height: 5' 9.5\" (1.765 m)     Body mass index is 35.98 kg/m².   Pain Score:   0 - No pain    VS Reviewed  General Appearance: A&O x 3, No acute distress,well developed and well- nourished  Eyes: pupils equal, round, and reactive to light, extraocular eye movements intact, conjunctivae and eye lids without erythema  ENT: external ear and ear canal clear bilaterally, TMs intact and regular, nose without deformity, nasal mucosa and turbinates normal without polyps, oropharynx normal, dentition is normal for age  Neck: supple and non-tender without mass, no thyromegaly or thyroid nodules, no cervical lymphadenopathy  Pulmonary/Chest: clear to auscultation bilaterally- no wheezes, rales or rhonchi, normal air movement, no respiratory distress or retractions  Cardiovascular: S1 and S2 auscultated w/ RRR. No murmurs, rubs, clicks, or gallops, distal pulses intact. Abdomen: soft, non-tender, non-distended, bowl sounds physiologic,  no rebound or guarding, no masses or hernias noted. Liver and spleen without enlargement. Extremities: no cyanosis, clubbing or edema of the lower extremities. +2 PT/DP bilaterally. Psych: Affect appropriate. Mood normal. Thought process is normal without evidence of depression or psychosis. Good insight and appropriate interaction. Cognition and memory appear to be impaired. Skin: warm and dry, no rash or erythema      ASSESSMENT & PLAN  1. Essential hypertension    Stable  Doing well  con't current meds  Labs UTD    2. Vitamin B6 deficiency    Stable  Doing well  con't once weekly b6 supplementation  Labs appropriate    3. Mixed hyperlipidemia    Doing well  con't lipitor  Labs UTD    4. Heart failure with preserved ejection fraction (HCC)    Stable  Mild  Doing well  No sxs  Monitor BP's   Low salt diet  F/u cardio prn per their last note    5. Seizure disorder ([facility]    Stable  Doing well  con't keppra and neuro f/u    6. 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 Sukumar Bathe released without restrictions.     Future Appointments   Date Time Provider Brandon Lopez   11/6/2019  8:20 AM Alesia Mendez DO [facility] [place]   11/18/2019  9:15 AM Shine Valero MD [address] [place] [address] - Luis 87 received counseling on the following healthy behaviors: nutrition, exercise and medication adherence    Patient given educational materials on: See Attached    I have instructed Jamil Pantoja to complete a self tracking handout on Blood Pressures  and instructed them to bring it with them to his next appointment. Barriers to learning and self management: Cognitive issues, sister present during appointment. Discussed use, benefit, and side effects of prescribed medications. Barriers to medication compliance addressed. All patient questions answered. Pt voiced understanding.        Electronically signed by Jeri Parrish DO on 5/6/2019 at 7:47 AM

## 2019-05-06 ENCOUNTER — OFFICE VISIT (OUTPATIENT)
Dept: FAMILY MEDICINE CLINIC | Age: 69
End: 2019-05-06
Payer: MEDICARE

## 2019-05-06 VITALS
BODY MASS INDEX: 35.39 KG/M2 | TEMPERATURE: 98 F | HEIGHT: 70 IN | DIASTOLIC BLOOD PRESSURE: 54 MMHG | RESPIRATION RATE: 18 BRPM | SYSTOLIC BLOOD PRESSURE: 138 MMHG | HEART RATE: 64 BPM | WEIGHT: 247.2 LBS

## 2019-05-06 DIAGNOSIS — G40.909 SEIZURE DISORDER (HCC): ICD-10-CM

## 2019-05-06 DIAGNOSIS — E78.2 MIXED HYPERLIPIDEMIA: ICD-10-CM

## 2019-05-06 DIAGNOSIS — I50.30 HEART FAILURE WITH PRESERVED EJECTION FRACTION (HCC): ICD-10-CM

## 2019-05-06 DIAGNOSIS — R56.1 SEIZURE AFTER HEAD INJURY (HCC): ICD-10-CM

## 2019-05-06 DIAGNOSIS — E53.1 VITAMIN B6 DEFICIENCY: ICD-10-CM

## 2019-05-06 DIAGNOSIS — I10 ESSENTIAL HYPERTENSION: Primary | ICD-10-CM

## 2019-05-06 PROCEDURE — G8427 DOCREV CUR MEDS BY ELIG CLIN: HCPCS | Performed by: FAMILY MEDICINE

## 2019-05-06 PROCEDURE — G8417 CALC BMI ABV UP PARAM F/U: HCPCS | Performed by: FAMILY MEDICINE

## 2019-05-06 PROCEDURE — 3017F COLORECTAL CA SCREEN DOC REV: CPT | Performed by: FAMILY MEDICINE

## 2019-05-06 PROCEDURE — 1123F ACP DISCUSS/DSCN MKR DOCD: CPT | Performed by: FAMILY MEDICINE

## 2019-05-06 PROCEDURE — 4040F PNEUMOC VAC/ADMIN/RCVD: CPT | Performed by: FAMILY MEDICINE

## 2019-05-06 PROCEDURE — 99214 OFFICE O/P EST MOD 30 MIN: CPT | Performed by: FAMILY MEDICINE

## 2019-05-06 PROCEDURE — 1036F TOBACCO NON-USER: CPT | Performed by: FAMILY MEDICINE

## 2019-05-06 RX ORDER — PYRIDOXINE HCL (VITAMIN B6) 25 MG
25 TABLET ORAL WEEKLY
Qty: 12 TABLET | Refills: 3 | Status: SHIPPED | OUTPATIENT
Start: 2019-05-06 | End: 2019-11-07 | Stop reason: SDUPTHER

## 2019-05-06 NOTE — PATIENT INSTRUCTIONS
Patient Education        High Blood Pressure: Care Instructions  Overview    It's normal for blood pressure to go up and down throughout the day. But if it stays up, you have high blood pressure. Another name for high blood pressure is hypertension. Despite what a lot of people think, high blood pressure usually doesn't cause headaches or make you feel dizzy or lightheaded. It usually has no symptoms. But it does increase your risk of stroke, heart attack, and other problems. You and your doctor will talk about your risks of these problems based on your blood pressure. Your doctor will give you a goal for your blood pressure. Your goal will be based on your health and your age. Lifestyle changes, such as eating healthy and being active, are always important to help lower blood pressure. You might also take medicine to reach your blood pressure goal.  Follow-up care is a key part of your treatment and safety. Be sure to make and go to all appointments, and call your doctor if you are having problems. It's also a good idea to know your test results and keep a list of the medicines you take. How can you care for yourself at home? Medical treatment  · If you stop taking your medicine, your blood pressure will go back up. You may take one or more types of medicine to lower your blood pressure. Be safe with medicines. Take your medicine exactly as prescribed. Call your doctor if you think you are having a problem with your medicine. · Talk to your doctor before you start taking aspirin every day. Aspirin can help certain people lower their risk of a heart attack or stroke. But taking aspirin isn't right for everyone, because it can cause serious bleeding. · See your doctor regularly. You may need to see the doctor more often at first or until your blood pressure comes down.   · If you are taking blood pressure medicine, talk to your doctor before you take decongestants or anti-inflammatory medicine, such as

## 2019-05-06 NOTE — PROGRESS NOTES
Visit Information    Have you changed or started any medications since your last visit including any over-the-counter medicines, vitamins, or herbal medicines? no   Are you having any side effects from any of your medications? -  no  Have you stopped taking any of your medications? Is so, why? -  no    Have you seen any other physician or provider since your last visit? yes  Have you had any other diagnostic tests since your last visit? No  Have you been seen in the emergency room and/or had an admission to a hospital since we last saw you? no  Have you had your routine dental cleaning in the past 6 months? yes -     Have you activated your [employer] account? If not, what are your barriers? No:      Patient Care Team:  Aroldo Junior, DO as PCP - General (Family Medicine)  Aroldo Junior, DO as PCP - [facility] Attributed Provider    Medical History Review  Past Medical, Family, and Social History     Defer to provider.

## 2019-05-09 ENCOUNTER — TELEPHONE (OUTPATIENT)
Dept: FAMILY MEDICINE CLINIC | Age: 69
End: 2019-05-09

## 2019-05-09 ENCOUNTER — TELEPHONE (OUTPATIENT)
Dept: FAMILY MEDICINE CLINIC | Age: 69
End: 2019-05-09
Payer: MEDICARE

## 2019-05-09 DIAGNOSIS — Z53.20 COLONOSCOPY REFUSED: Primary | ICD-10-CM

## 2019-05-09 LAB
CONTROL: NORMAL
HEMOCCULT STL QL: NEGATIVE

## 2019-05-09 PROCEDURE — 82274 ASSAY TEST FOR BLOOD FECAL: CPT | Performed by: FAMILY MEDICINE

## 2019-05-09 NOTE — TELEPHONE ENCOUNTER
----- Message from Zhou Love DO sent at 5/9/2019 12:13 PM EDT -----  Please let pt know that FIT test is negative. Repeat 1 year. Let me know if questions, thanks!

## 2019-05-09 NOTE — TELEPHONE ENCOUNTER
Patient returned FIT test to office. Test performed and results were negative. Results recorded to pt's chart and HM updated.

## 2019-06-04 ENCOUNTER — OFFICE VISIT (OUTPATIENT)
Dept: FAMILY MEDICINE CLINIC | Age: 69
End: 2019-06-04
Payer: MEDICARE

## 2019-06-04 VITALS
TEMPERATURE: 98 F | HEART RATE: 64 BPM | HEIGHT: 67 IN | DIASTOLIC BLOOD PRESSURE: 60 MMHG | BODY MASS INDEX: 38.45 KG/M2 | WEIGHT: 245 LBS | SYSTOLIC BLOOD PRESSURE: 146 MMHG | RESPIRATION RATE: 14 BRPM

## 2019-06-04 DIAGNOSIS — L60.2 LONG TOENAIL: ICD-10-CM

## 2019-06-04 DIAGNOSIS — I87.2 EDEMA OF BOTH LOWER EXTREMITIES DUE TO PERIPHERAL VENOUS INSUFFICIENCY: Primary | ICD-10-CM

## 2019-06-04 DIAGNOSIS — B35.1 ONYCHOMYCOSIS: ICD-10-CM

## 2019-06-04 PROCEDURE — 99213 OFFICE O/P EST LOW 20 MIN: CPT | Performed by: NURSE PRACTITIONER

## 2019-06-04 PROCEDURE — 4040F PNEUMOC VAC/ADMIN/RCVD: CPT | Performed by: NURSE PRACTITIONER

## 2019-06-04 PROCEDURE — 1036F TOBACCO NON-USER: CPT | Performed by: NURSE PRACTITIONER

## 2019-06-04 PROCEDURE — 1123F ACP DISCUSS/DSCN MKR DOCD: CPT | Performed by: NURSE PRACTITIONER

## 2019-06-04 PROCEDURE — G8427 DOCREV CUR MEDS BY ELIG CLIN: HCPCS | Performed by: NURSE PRACTITIONER

## 2019-06-04 PROCEDURE — 3017F COLORECTAL CA SCREEN DOC REV: CPT | Performed by: NURSE PRACTITIONER

## 2019-06-04 PROCEDURE — G8417 CALC BMI ABV UP PARAM F/U: HCPCS | Performed by: NURSE PRACTITIONER

## 2019-06-04 RX ORDER — TERBINAFINE HYDROCHLORIDE 250 MG/1
250 TABLET ORAL DAILY
Qty: 42 TABLET | Refills: 0 | Status: SHIPPED | OUTPATIENT
Start: 2019-06-04 | End: 2019-07-16

## 2019-06-04 ASSESSMENT — PATIENT HEALTH QUESTIONNAIRE - PHQ9
SUM OF ALL RESPONSES TO PHQ QUESTIONS 1-9: 0
2. FEELING DOWN, DEPRESSED OR HOPELESS: 0
SUM OF ALL RESPONSES TO PHQ9 QUESTIONS 1 & 2: 0
1. LITTLE INTEREST OR PLEASURE IN DOING THINGS: 0
SUM OF ALL RESPONSES TO PHQ QUESTIONS 1-9: 0

## 2019-06-04 NOTE — PROGRESS NOTES
lymphadenopathy  Pulmonary/Chest: clear to auscultation bilaterally- no wheezes, rales or rhonchi, normal air movement, no respiratory distress orretractions  Cardiovascular: normal rate, regular rhythm, normal S1 and S2, no murmurs, rubs, clicks, or gallops,distal pulses intact  Abdomen: soft, non-tender, non-distended, normal bowel sounds,  no rebound or guarding, nomasses or hernias noted, no hepatospleenomegaly  Extremities: no cyanosis, clubbing of the lower extremities, 1+ pitting edema bilateral lower legs with varicosities, negative Amador's sign, no erythema  Musculoskeletal: No joint swelling or gross deformity   Neuro:  Alert, 5/5 strength globally and symmetrically, 2+ patellar reflexes b/l,  normal speech, no focal findings or movement disorder noted, gait wnl  Psych:  Normal affect without evidence of depression or anxiety, insight and judgement are appropriate, memoryappears intact  Skin: warm and dry, no rash or erythema, right great toenail thick, yellowed  Lymph:  No cervical, auricular or supraclavicular lymph nodes palpated    ASSESSMENT & PLAN  Lam Steven was seen today for swelling. Diagnoses and all orders for this visit:    Edema of both lower extremities due to peripheral venous insufficiency    Long toenail  -     Claude Scott DPM, Podiatry, [place]  -     terbinafine (LAMISIL) 250 MG tablet; Take 1 tablet by mouth daily    Onychomycosis  -     Claude Scott DPM, Podiatry, [place] [facility]  -     terbinafine (LAMISIL) 250 MG tablet; Take 1 tablet by mouth daily      - start Terbinafine and refer to Podiatry  - stressed importance of compliance with wearing his compression hose  - advised ok to take his compression hose off at bedtime, but wear them when he is up walking around, keep feet elevated when sitting    Return if symptoms worsen or fail to improve. Lam Oraime received counseling on the following healthy behaviors: medication adherence  Reviewedprior labs and health maintenance.   Continue current medications, diet and exercise. Discussed use, benefit, and side effects of prescribed medications. Barriers to medication compliance addressed. Patient given educationalmaterials - see patient instructions. All patient questions answered. Patient voiced understanding.

## 2019-10-04 ENCOUNTER — TELEPHONE (OUTPATIENT)
Dept: FAMILY MEDICINE CLINIC | Age: 69
End: 2019-10-04

## 2019-10-04 DIAGNOSIS — Z12.5 SPECIAL SCREENING FOR MALIGNANT NEOPLASM OF PROSTATE: ICD-10-CM

## 2019-10-04 DIAGNOSIS — E53.1 VITAMIN B6 DEFICIENCY: ICD-10-CM

## 2019-10-04 DIAGNOSIS — E78.2 MIXED HYPERLIPIDEMIA: ICD-10-CM

## 2019-10-04 DIAGNOSIS — I10 ESSENTIAL HYPERTENSION: Primary | ICD-10-CM

## 2019-10-13 DIAGNOSIS — G40.909 SEIZURE DISORDER (HCC): Primary | ICD-10-CM

## 2019-10-14 RX ORDER — LEVETIRACETAM 1000 MG/1
TABLET ORAL
Qty: 60 TABLET | Refills: 11 | Status: SHIPPED | OUTPATIENT
Start: 2019-10-14 | End: 2020-09-18

## 2019-10-17 DIAGNOSIS — E78.2 MIXED HYPERLIPIDEMIA: Chronic | ICD-10-CM

## 2019-10-17 RX ORDER — ATORVASTATIN CALCIUM 20 MG/1
TABLET, FILM COATED ORAL
Qty: 90 TABLET | Refills: 3 | Status: SHIPPED | OUTPATIENT
Start: 2019-10-17 | End: 2020-08-24 | Stop reason: SDUPTHER

## 2019-11-05 ENCOUNTER — HOSPITAL ENCOUNTER (OUTPATIENT)
Age: 69
Discharge: HOME | End: 2019-11-05
Payer: MEDICARE

## 2019-11-05 DIAGNOSIS — Z12.5 SPECIAL SCREENING FOR MALIGNANT NEOPLASM OF PROSTATE: ICD-10-CM

## 2019-11-05 DIAGNOSIS — E78.2 MIXED HYPERLIPIDEMIA: ICD-10-CM

## 2019-11-05 DIAGNOSIS — I10 ESSENTIAL HYPERTENSION: ICD-10-CM

## 2019-11-05 DIAGNOSIS — E53.1 VITAMIN B6 DEFICIENCY: ICD-10-CM

## 2019-11-05 LAB
ALBUMIN SERPL-MCNC: 4.3 G/DL (ref 3.5–5.1)
ALP BLD-CCNC: 61 U/L (ref 38–126)
ALT SERPL-CCNC: 16 U/L (ref 11–66)
ANION GAP SERPL CALCULATED.3IONS-SCNC: 13 MEQ/L (ref 8–16)
AST SERPL-CCNC: 19 U/L (ref 5–40)
BASOPHILS # BLD: 1.1 %
BASOPHILS ABSOLUTE: 0 THOU/MM3 (ref 0–0.1)
BILIRUB SERPL-MCNC: 0.7 MG/DL (ref 0.3–1.2)
BUN BLDV-MCNC: 12 MG/DL (ref 7–22)
CALCIUM SERPL-MCNC: 9.6 MG/DL (ref 8.5–10.5)
CHLORIDE BLD-SCNC: 100 MEQ/L (ref 98–111)
CHOLESTEROL, TOTAL: 133 MG/DL (ref 100–199)
CO2: 24 MEQ/L (ref 23–33)
CREAT SERPL-MCNC: 0.8 MG/DL (ref 0.4–1.2)
CREATININE, URINE: 119.9 MG/DL
EOSINOPHIL # BLD: 2.4 %
EOSINOPHILS ABSOLUTE: 0.1 THOU/MM3 (ref 0–0.4)
ERYTHROCYTE [DISTWIDTH] IN BLOOD BY AUTOMATED COUNT: 13.4 % (ref 11.5–14.5)
ERYTHROCYTE [DISTWIDTH] IN BLOOD BY AUTOMATED COUNT: 43.4 FL (ref 35–45)
GFR SERPL CREATININE-BSD FRML MDRD: > 90 ML/MIN/1.73M2
GLUCOSE BLD-MCNC: 102 MG/DL (ref 70–108)
HCT VFR BLD CALC: 42.3 % (ref 42–52)
HDLC SERPL-MCNC: 45 MG/DL
HEMOGLOBIN: 14.2 GM/DL (ref 14–18)
IMMATURE GRANS (ABS): 0.01 THOU/MM3 (ref 0–0.07)
IMMATURE GRANULOCYTES: 0.2 %
LDL CHOLESTEROL CALCULATED: 76 MG/DL
LYMPHOCYTES # BLD: 24.7 %
LYMPHOCYTES ABSOLUTE: 1.1 THOU/MM3 (ref 1–4.8)
MCH RBC QN AUTO: 30.3 PG (ref 26–33)
MCHC RBC AUTO-ENTMCNC: 33.6 GM/DL (ref 32.2–35.5)
MCV RBC AUTO: 90.2 FL (ref 80–94)
MICROALBUMIN UR-MCNC: < 1.2 MG/DL
MICROALBUMIN/CREAT UR-RTO: 10 MG/G (ref 0–30)
MONOCYTES # BLD: 9.7 %
MONOCYTES ABSOLUTE: 0.4 THOU/MM3 (ref 0.4–1.3)
NUCLEATED RED BLOOD CELLS: 0 /100 WBC
PLATELET # BLD: 239 THOU/MM3 (ref 130–400)
PMV BLD AUTO: 8.9 FL (ref 9.4–12.4)
POTASSIUM SERPL-SCNC: 4.5 MEQ/L (ref 3.5–5.2)
PROSTATE SPECIFIC ANTIGEN: 1.42 NG/ML (ref 0–1)
RBC # BLD: 4.69 MILL/MM3 (ref 4.7–6.1)
SEG NEUTROPHILS: 61.9 %
SEGMENTED NEUTROPHILS ABSOLUTE COUNT: 2.8 THOU/MM3 (ref 1.8–7.7)
SODIUM BLD-SCNC: 137 MEQ/L (ref 135–145)
TOTAL PROTEIN: 7.4 G/DL (ref 6.1–8)
TRIGL SERPL-MCNC: 58 MG/DL (ref 0–199)
TSH SERPL DL<=0.05 MIU/L-ACNC: 2.08 UIU/ML (ref 0.4–4.2)
WBC # BLD: 4.5 THOU/MM3 (ref 4.8–10.8)

## 2019-11-05 PROCEDURE — 84443 ASSAY THYROID STIM HORMONE: CPT

## 2019-11-05 PROCEDURE — 84207 ASSAY OF VITAMIN B-6: CPT

## 2019-11-05 PROCEDURE — 80061 LIPID PANEL: CPT

## 2019-11-05 PROCEDURE — 85025 COMPLETE CBC W/AUTO DIFF WBC: CPT

## 2019-11-05 PROCEDURE — G0103 PSA SCREENING: HCPCS

## 2019-11-05 PROCEDURE — 36415 COLL VENOUS BLD VENIPUNCTURE: CPT

## 2019-11-05 PROCEDURE — 82043 UR ALBUMIN QUANTITATIVE: CPT

## 2019-11-05 PROCEDURE — 80053 COMPREHEN METABOLIC PANEL: CPT

## 2019-11-07 ENCOUNTER — OFFICE VISIT (OUTPATIENT)
Dept: FAMILY MEDICINE CLINIC | Age: 69
End: 2019-11-07
Payer: MEDICARE

## 2019-11-07 VITALS
RESPIRATION RATE: 12 BRPM | HEART RATE: 84 BPM | DIASTOLIC BLOOD PRESSURE: 60 MMHG | SYSTOLIC BLOOD PRESSURE: 136 MMHG | TEMPERATURE: 97.9 F | WEIGHT: 246 LBS | HEIGHT: 67 IN | BODY MASS INDEX: 38.61 KG/M2

## 2019-11-07 DIAGNOSIS — E53.1 VITAMIN B6 DEFICIENCY: ICD-10-CM

## 2019-11-07 DIAGNOSIS — E78.2 MIXED HYPERLIPIDEMIA: ICD-10-CM

## 2019-11-07 DIAGNOSIS — R56.1 SEIZURE AFTER HEAD INJURY (HCC): ICD-10-CM

## 2019-11-07 DIAGNOSIS — Z23 NEED FOR VACCINATION: ICD-10-CM

## 2019-11-07 DIAGNOSIS — M25.561 ACUTE PAIN OF RIGHT KNEE: ICD-10-CM

## 2019-11-07 DIAGNOSIS — I50.32 CHRONIC HEART FAILURE WITH PRESERVED EJECTION FRACTION (HCC): ICD-10-CM

## 2019-11-07 DIAGNOSIS — I10 ESSENTIAL HYPERTENSION: Primary | ICD-10-CM

## 2019-11-07 PROCEDURE — G8482 FLU IMMUNIZE ORDER/ADMIN: HCPCS | Performed by: FAMILY MEDICINE

## 2019-11-07 PROCEDURE — G0008 ADMIN INFLUENZA VIRUS VAC: HCPCS | Performed by: FAMILY MEDICINE

## 2019-11-07 PROCEDURE — 1123F ACP DISCUSS/DSCN MKR DOCD: CPT | Performed by: FAMILY MEDICINE

## 2019-11-07 PROCEDURE — 4040F PNEUMOC VAC/ADMIN/RCVD: CPT | Performed by: FAMILY MEDICINE

## 2019-11-07 PROCEDURE — G8427 DOCREV CUR MEDS BY ELIG CLIN: HCPCS | Performed by: FAMILY MEDICINE

## 2019-11-07 PROCEDURE — 99214 OFFICE O/P EST MOD 30 MIN: CPT | Performed by: FAMILY MEDICINE

## 2019-11-07 PROCEDURE — 90732 PPSV23 VACC 2 YRS+ SUBQ/IM: CPT | Performed by: FAMILY MEDICINE

## 2019-11-07 PROCEDURE — 90653 IIV ADJUVANT VACCINE IM: CPT | Performed by: FAMILY MEDICINE

## 2019-11-07 PROCEDURE — 1036F TOBACCO NON-USER: CPT | Performed by: FAMILY MEDICINE

## 2019-11-07 PROCEDURE — 3017F COLORECTAL CA SCREEN DOC REV: CPT | Performed by: FAMILY MEDICINE

## 2019-11-07 PROCEDURE — G0009 ADMIN PNEUMOCOCCAL VACCINE: HCPCS | Performed by: FAMILY MEDICINE

## 2019-11-07 PROCEDURE — G8417 CALC BMI ABV UP PARAM F/U: HCPCS | Performed by: FAMILY MEDICINE

## 2019-11-07 RX ORDER — AMLODIPINE BESYLATE 2.5 MG/1
2.5 TABLET ORAL DAILY
Qty: 90 TABLET | Refills: 3 | Status: SHIPPED | OUTPATIENT
Start: 2019-11-07 | End: 2020-08-24 | Stop reason: SDUPTHER

## 2019-11-07 RX ORDER — TERBINAFINE HYDROCHLORIDE 250 MG/1
250 TABLET ORAL WEEKLY
COMMUNITY
End: 2020-08-24

## 2019-11-07 RX ORDER — PYRIDOXINE HCL (VITAMIN B6) 25 MG
25 TABLET ORAL WEEKLY
Qty: 12 TABLET | Refills: 3 | Status: SHIPPED | OUTPATIENT
Start: 2019-11-07 | End: 2020-10-19 | Stop reason: SDUPTHER

## 2019-11-08 LAB — VITAMIN B6: 23.5 NMOL/L (ref 20–125)

## 2019-11-11 ENCOUNTER — TELEPHONE (OUTPATIENT)
Dept: FAMILY MEDICINE CLINIC | Age: 69
End: 2019-11-11

## 2019-11-18 ENCOUNTER — OFFICE VISIT (OUTPATIENT)
Dept: NEUROLOGY | Age: 69
End: 2019-11-18
Payer: MEDICARE

## 2019-11-18 VITALS
SYSTOLIC BLOOD PRESSURE: 122 MMHG | DIASTOLIC BLOOD PRESSURE: 70 MMHG | WEIGHT: 244 LBS | HEART RATE: 76 BPM | BODY MASS INDEX: 43.23 KG/M2 | HEIGHT: 63 IN

## 2019-11-18 DIAGNOSIS — G40.909 SEIZURE DISORDER (HCC): Primary | ICD-10-CM

## 2019-11-18 PROCEDURE — 4040F PNEUMOC VAC/ADMIN/RCVD: CPT | Performed by: PSYCHIATRY & NEUROLOGY

## 2019-11-18 PROCEDURE — G8482 FLU IMMUNIZE ORDER/ADMIN: HCPCS | Performed by: PSYCHIATRY & NEUROLOGY

## 2019-11-18 PROCEDURE — 1036F TOBACCO NON-USER: CPT | Performed by: PSYCHIATRY & NEUROLOGY

## 2019-11-18 PROCEDURE — 3017F COLORECTAL CA SCREEN DOC REV: CPT | Performed by: PSYCHIATRY & NEUROLOGY

## 2019-11-18 PROCEDURE — G8417 CALC BMI ABV UP PARAM F/U: HCPCS | Performed by: PSYCHIATRY & NEUROLOGY

## 2019-11-18 PROCEDURE — G8427 DOCREV CUR MEDS BY ELIG CLIN: HCPCS | Performed by: PSYCHIATRY & NEUROLOGY

## 2019-11-18 PROCEDURE — 99213 OFFICE O/P EST LOW 20 MIN: CPT | Performed by: PSYCHIATRY & NEUROLOGY

## 2019-11-18 PROCEDURE — 1123F ACP DISCUSS/DSCN MKR DOCD: CPT | Performed by: PSYCHIATRY & NEUROLOGY

## 2019-11-18 RX ORDER — COVID-19 ANTIGEN TEST
KIT MISCELLANEOUS PRN
COMMUNITY
End: 2022-10-28

## 2020-05-27 ENCOUNTER — TELEPHONE (OUTPATIENT)
Dept: FAMILY MEDICINE CLINIC | Age: 70
End: 2020-05-27

## 2020-06-01 NOTE — PROGRESS NOTES
completes most ADLs and iADLs      -Morbid obesity:  wts down about 10 lbs  Diet better  Exercises some      Age/Gender Health Maintenance    Lipid -   Lab Results   Component Value Date    CHOL 133 11/05/2019    CHOL 121 04/29/2019    CHOL 191 10/29/2018     Lab Results   Component Value Date    TRIG 58 11/05/2019    TRIG 61 04/29/2019    TRIG 70 10/29/2018     Lab Results   Component Value Date    HDL 45 11/05/2019    HDL 39 04/29/2019    HDL 42 10/29/2018     Lab Results   Component Value Date    LDLCALC 76 11/05/2019    LDLCALC 70 04/29/2019    [address] 135 10/29/2018       DM Screen -   Lab Results   Component Value Date    GLUCOSE 102 11/05/2019       Colon Cancer Screening - NEG FIT MAY 2019, cologuard ordered June 2020  Lung Cancer Screening (Age 54 to [de-identified] with 30 pack year hx, current smoker or quit within past 15 years) - never smoker    Tetanus - UTD 2013  Influenza Vaccine - UTD FALL 2019  Pneumonia Vaccine - UTD PPV 23 AUG 2014 and PCV 13 OCT 2017/and PPV 23 in NOV 2019  Zoster - to get at pharmacy per medicare     PSA testing discussion -   Lab Results   Component Value Date    PSA 1.42 (H) 11/05/2019    PSA 1.62 (H) 10/29/2018    PSA 2.08 (H) 10/25/2017       AAA Screening - never smoker    Specialists List:  Cody Bloom: wound care  Min: neuro for seizures  Cardio: Ellie Bones       Current Outpatient Medications   Medication Sig Dispense Refill    Naproxen Sodium (ALEVE) 220 MG CAPS Take by mouth as needed for Pain      terbinafine (LAMISIL) 250 MG tablet Take 250 mg by mouth once a week weekly       pyridoxine (B-6) 25 MG tablet Take 1 tablet by mouth once a week 12 tablet 3    amLODIPine (NORVASC) 2.5 MG tablet Take 1 tablet by mouth daily 90 tablet 3    atorvastatin (LIPITOR) 20 MG tablet take 1 tablet by mouth once daily 90 tablet 3    levETIRAcetam (KEPPRA) 1000 MG tablet take 1 tablet by mouth twice a day 60 tablet 11    aspirin 81 MG chewable tablet Take 81 mg by mouth daily       No current facility-administered medications for this visit. No orders of the defined types were placed in this encounter. All medications reviewed and reconciled, including OTC and herbal medications. Updated list given to patient. 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 Txt with 6 months of coumadin. Provoked.  Heart failure with preserved ejection fraction (HCC)     History of alcohol abuse     History of traumatic brain injury     History of non-healing open leg wound of R leg. S/p skin graft. Healed. From chronic venous insuficiency. 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 S/p skin graft. Healed. From chronic venous insuficiency.      History of traumatic brain injury     Hyperlipidemia     Hypertension     Mentally challenged     \"slow\" states his sister    Morbid obesity ([facility]     Osteoarthritis     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2014    s/p fall down a [place], Dr. Francesco Montero follows, no seizure activity since    Se[facility] ([facility] 7/17/2014       Past Surgical History:   Procedure Laterality Date    [place] INCIS/DRAIN THIGH/KNEE pending    - pyridoxine (B-6) 25 MG tablet; Take 1 tablet by mouth once a week  Dispense: 12 tablet; Refill: 3    3. Mixed hyperlipidemia    Stable  con't lipitor  Labs UTD    4. Chronic heart failure with preserved ejection fraction (HCC)    Stable  Mild  Doing well  No sxs  Monitor BP's   Low salt diet  F/u cardio prn per their last note    5. 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 Acute pain of right knee    Improving  Neg exam  Should resolve soon  F/u if no better    7. Need for vaccination    - INFLUENZA, TRIV, INACTIVATED, SUBUNIT, ADJUVANTED, 65 YRS AND OLDER, IM, PREFILL SYR, 0.5ML (FLUAD TRIV)  - Pneumococcal polysaccharide vaccine 23-valent greater than or equal to 1yo subcutaneous/IM    ASSESSMENT & PLAN  1. Essential hypertension    At goal  con't meds  Labs UTD    2. Vitamin B6 deficiency    con't supplementation  Labs stable  Repeat before next visit    3. Mixed hyperlipidemia    Stable  con't lipitor  Labs UTD    4. Chronic heart failure with preserved ejection fraction (HCC)    Stable  Mild  Doing well  No sxs  Monitor BP's   Low salt diet  F/u cardio prn per their last note    5. 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 Morbid obesity ([facility]    wts improved  con't life style changes    8. Screening for colon cancer    - Cologuard; Future      DISPOSITION    Return in about 6 months (around 12/2/2020) for follow-up on chronic medical conditions, sooner as needed. Sarbjit Del Angel released without restrictions.     Future Appointments   Date Time Provider Brandon Lopez   11/23/2020 10:00 AM [facility]   12/3/2020  3:20 PM Leilani Jesus DO [facility]    Sarbjit Javikatlin received counseling on the following healthy behaviors: nutrition, exercise and 11/07/19 246 lb (111.6 kg)     Vitals:    06/02/20 1313   BP: (!) 122/58   Pulse: 84   Resp: 12   Temp: 97.9 °F (36.6 °C)   TempSrc: Oral   SpO2: 98%   Weight: 236 lb (107 kg)   Height: 5' 7\" (1.702 m)     Body mass index is 36.96 kg/m². Based upon direct observation of the patient, evaluation of cognition reveals remote memory intact, recent memory impaired. Patient's complete Health Risk Assessment and screening values have been reviewed and are found in Flowsheets. The following problems were reviewed today and where indicated follow up appointments were made and/or referrals ordered. Positive Risk Factor Screenings with Interventions:     Cognitive: Words recalled: 1 Word Recalled  Clock Drawing Test (CDT) Score: (!) Abnormal  Total Score Interpretation: Positive Mini-Cog  Cognitive Impairment Interventions:  · stable, see above    Health Habits/Nutrition:  Health Habits/Nutrition  Do you exercise for at least 20 minutes 2-3 times per week?: Yes  Have you lost any weight without trying in the past 3 months?: No  Do you eat fewer than 2 meals per day?: No  Have you seen a dentist within the past year?: Yes  Body mass index is 36.96 kg/m².   Health Habits/Nutrition Interventions:  · Nutritional issues:  educational materials for healthy, well-balanced diet provided    Hearing/Vision:  No exam data present  Hearing/Vision  Do you or your family notice any trouble with your hearing?: No  Do you have difficulty driving, watching TV, or doing any of your daily activities because of your eyesight?: No  Have you had an eye exam within the past year?: (!) No  Hearing/Vision Interventions:  · Vision concerns:  patient encouraged to make appointment with his/her eye specialist    Safety:  Safety  Do you have working smoke detectors?: Yes  Have all throw rugs been removed or fastened?: Yes  Do you have non-slip mats or surfaces in all bathtubs/showers?: (!) No  Do all of your stairways have a railing or

## 2020-06-02 ENCOUNTER — OFFICE VISIT (OUTPATIENT)
Dept: FAMILY MEDICINE CLINIC | Age: 70
End: 2020-06-02
Payer: MEDICARE

## 2020-06-02 VITALS
DIASTOLIC BLOOD PRESSURE: 58 MMHG | OXYGEN SATURATION: 98 % | BODY MASS INDEX: 37.04 KG/M2 | TEMPERATURE: 97.9 F | HEART RATE: 84 BPM | HEIGHT: 67 IN | RESPIRATION RATE: 12 BRPM | SYSTOLIC BLOOD PRESSURE: 122 MMHG | WEIGHT: 236 LBS

## 2020-06-02 PROCEDURE — 3017F COLORECTAL CA SCREEN DOC REV: CPT | Performed by: FAMILY MEDICINE

## 2020-06-02 PROCEDURE — G0438 PPPS, INITIAL VISIT: HCPCS | Performed by: FAMILY MEDICINE

## 2020-06-02 PROCEDURE — 1123F ACP DISCUSS/DSCN MKR DOCD: CPT | Performed by: FAMILY MEDICINE

## 2020-06-02 PROCEDURE — 4040F PNEUMOC VAC/ADMIN/RCVD: CPT | Performed by: FAMILY MEDICINE

## 2020-06-02 ASSESSMENT — PATIENT HEALTH QUESTIONNAIRE - PHQ9
SUM OF ALL RESPONSES TO PHQ QUESTIONS 1-9: 0
SUM OF ALL RESPONSES TO PHQ QUESTIONS 1-9: 0

## 2020-06-02 ASSESSMENT — LIFESTYLE VARIABLES
HOW OFTEN DO YOU HAVE A DRINK CONTAINING ALCOHOL: 0
HOW OFTEN DO YOU HAVE A DRINK CONTAINING ALCOHOL: 0

## 2020-06-11 ENCOUNTER — TELEPHONE (OUTPATIENT)
Dept: FAMILY MEDICINE CLINIC | Age: 70
End: 2020-06-11

## 2020-08-23 NOTE — PROGRESS NOTES
Chief Complaint   Patient presents with    Back Pain     lumbar     History obtained from the patient and sister (via phone today with sister). 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 Sister here to help with hx. Trouble reading and writing. SUBJECTIVE:  Kitty Troncoso is a 79 y.o. male that presents today for       -Low Back Pain:    HPI:   Low back pain  Chronic issue  Worse the last few months  Ok at rest  Worse with moving, walking, lifting, bending and twisting  Hard to mow the lawn at times  Denies radicular sxs  No groin numbness or bowel/bladder issues  Takes Aleve before mowing the yard, helps a little. He describes the pain as sharp, aching, throbbing  in the lumbar region. Inciting injury or history of trauma? No  Pain is relieved by - as above  Pain is aggravated by - as above  Radiation of the pain? No  Paresthesias of the extremities? No  Saddle anesthesia? No  Bowel or bladder incontinence? No  Treatments tried - as above.       Age/Gender Health Maintenance    Lipid -   Lab Results   Component Value Date    CHOL 133 11/05/2019    CHOL 121 04/29/2019    CHOL 191 10/29/2018     Lab Results   Component Value Date    TRIG 58 11/05/2019    TRIG 61 04/29/2019    TRIG 70 10/29/2018     Lab Results   Component Value Date    HDL 45 11/05/2019    HDL 39 04/29/2019    HDL 42 10/29/2018     Lab Results   Component Value Date    LDLCALC 76 11/05/2019    LDLCALC 70 04/29/2019    LDLCALC 135 10/29/2018       DM Screen -   Lab Results   Component Value Date    GLUCOSE 102 11/05/2019       Colon Cancer Screening - NEG FIT MAY 2019, cologuard ordered June 2020  Lung Cancer Screening (Age 54 to [de-identified] with 30 pack year hx, current smoker or quit within past 15 years) - never smoker    Tetanus - UTD 2013  Influenza Vaccine - UTD FALL 2019  Pneumonia Vaccine - UTD PPV 23 AUG 2014 and PCV 13 OCT 2017/and PPV 23 in NOV 2019  Zoster - to get at pharmacy per medicare     PSA testing discussion -   Lab Results Component Value Date    PSA 1.42 (H) 11/05/2019    PSA 1.62 (H) 10/29/2018    PSA 2.08 (H) 10/25/2017       AAA Screening - never smoker    Specialists List:  Sheree Obrien: wound care  Min: neuro for seizures  Cardio: Elisabet Doing       Current Outpatient Medications   Medication Sig Dispense Refill    tiZANidine (ZANAFLEX) 4 MG tablet Take 1 tablet by mouth 3 times daily as needed (low back pain) 45 tablet 0    Naproxen Sodium (ALEVE) 220 MG CAPS Take by mouth as needed for Pain      pyridoxine (B-6) 25 MG tablet Take 1 tablet by mouth once a week 12 tablet 3    amLODIPine (NORVASC) 2.5 MG tablet Take 1 tablet by mouth daily 90 tablet 3    atorvastatin (LIPITOR) 20 MG tablet take 1 tablet by mouth once daily 90 tablet 3    levETIRAcetam (KEPPRA) 1000 MG tablet take 1 tablet by mouth twice a day 60 tablet 11    aspirin 81 MG chewable tablet Take 81 mg by mouth daily       No current facility-administered medications for this visit. Orders Placed This Encounter   Medications    tiZANidine (ZANAFLEX) 4 MG tablet     Sig: Take 1 tablet by mouth 3 times daily as needed (low back pain)     Dispense:  45 tablet     Refill:  0         All medications reviewed and reconciled, including OTC and herbal medications. Updated list given to patient. 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 Txt with 6 months of coumadin. Provoked.  Heart failure with preserved ejection fraction (HCC)     History of alcohol abuse     History of traumatic brain injury     History of non-healing open leg wound of R leg. S/p skin graft. Healed.  From chronic venous insuficiency. 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 S/p skin graft. Healed. From chronic venous insuficiency.     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 Biju Thomas follows, no seizure activity since    Seizure disorder ([facility] 7/17/2014       Past Surgical History:   Procedure Laterality Date    MD INCIS/DRAIN THIGH/KNEE ABSCESS,DEEP Right 9/25/2017    RIGHT SERGIO LEG WOUND DEBRIDEMENT WITH SKIN GRAFT AND WOUND VAC APPLICATION performed by Dg Bower DPM at [address] [address] [address] Right 10/9/2017    SPLIT THICKNESS SKIN GRAFT FROM RIGHT LEG performed by Dg Bower DPM at . [facility]      vein stripping       No Known Allergies      Social History     Tobacco Use    Smoking status: Never Smoker    Smokeless tobacco: Never Used   Substance Use Topics    Alcohol use: No     Comment:           Family History   Problem Relation Age of Onset    Cancer Mother     Stroke Mother     Diabetes Mother     High Blood Pressure Mother     High Cholesterol Mother     Stroke Father     COPD Father     Diabetes Maternal Grandmother     Heart Disease Maternal Grandfather     Cancer Sister     Asthma Sister     High Blood Pressure Sister     High Cholesterol Sister  Arthritis Sister     Diabetes Sister     Diabetes Sister     Colon Cancer Neg Hx     Prostate Cancer Neg Hx          I have reviewed the patient's past medical history, past surgical history, allergies, medications, social and family history and I have made updates where appropriate. Review of Systems  Positive responses are highlighted in bold    Constitutional:  Fever, Chills, Night Sweats, Fatigue, Unexpected changes in weight  HENT:  Ear pain, Tinnitus, Nosebleeds, Trouble swallowing, Hearing loss, Sore throat  Cardiovascular:  Chest Pain, Palpitations, Orthopnea, Paroxysmal Nocturnal Dyspnea  Respiratory:  Cough, Wheezing, Shortness of breath, Chest tightness, Apnea  Gastrointestinal:  Nausea, Vomiting, Diarrhea, Constipation, Heartburn, Blood in stool  Genitourinary:  Difficulty or painful urination, Flank pain, Change in frequency, Urgency  Skin:  Color change, Rash, Itching, Wound  Musculoskeletal:  Joint pain, Back pain, Gait problems, Joint swelling, Myalgias  Neurological:  Dizziness, Headaches, Presyncope, Numbness, Seizures, Tremors  Endocrine:  Heat Intolerance, Cold Intolerance, Polydipsia, Polyphagia, Polyuria      PHYSICAL EXAM:  Vitals:    08/24/20 0918   BP: 122/60   Pulse: 66   Resp: 14   Temp: 97.8 °F (36.6 °C)   TempSrc: Temporal   SpO2: 97%   Weight: 238 lb 6.4 oz (108.1 kg)   Height: 5' 7.5\" (1.715 m)     Body mass index is 36.79 kg/m². Pain Score:    4(Low back when moving)    VS Reviewed  General Appearance: A&O x 3, No acute distress,well developed and well- nourished  Eyes: pupils equal, round, and reactive to light, extraocular eye movements intact, conjunctivae and eye lids without erythema  ENT: external ear and ear canal clear bilaterally, TMs intact and regular, nose without deformity, nasal mucosa and turbinates normal without polyps, oropharynx normal, dentition is normal for age  Neck: supple and non-tender without mass, no thyromegaly or thyroid nodules, no cervical lymphadenopathy  Pulmonary/Chest: clear to auscultation bilaterally- no wheezes, rales or rhonchi, normal air movement, no respiratory distress or retractions  Cardiovascular: S1 and S2 auscultated w/ RRR. No murmurs, rubs, clicks, or gallops, distal pulses intact. Abdomen: soft, non-tender, non-distended, bowl sounds physiologic,  no rebound or guarding, no masses or hernias noted. Liver and spleen without enlargement. Extremities: no cyanosis, clubbing or edema of the lower extremities. +2 PT/DP bilaterally. Psych: Affect appropriate. Mood normal. Thought process is normal without evidence of depression or psychosis. Good insight and appropriate interaction. Cognition and memory appear to be impaired. Skin: warm and dry, no rash or erythema  LUMBAR SPINE EXAM:  Examination of the Lumbar spine shows:  Deformity Absent . Soft Tissue Swelling Absent . Soft Tissue Tenderness Present. Midline Bone Tenderness Absent . Paraspinal Muscular Spasm Present. Previous Incisions Absent . Erythema Absent . Lumbar Flexion does not produce pain. Lumbar Extension does not produce pain. NEUROLOGICAL EXAM:  SLR     Left: Negative. Right Negative. DTR 2+ bilaterally  Oscar's Sign Present  Gait normal Heel/ Toe. Sensation to Touch normal    LE strength    Right Left   Hip Flexors 5/5 5/5   Hip Extensors 5/5 5/5   Knee Flexors 5/5 5/5   Knee Extensors 5/5 5/5   Ankle Flexors 5/5 5/5   Ankle Extensors 5/5 5/5   Extensor Hallicus Longus 5/5 5/5   Dorsiflexors 5/5 5/5     Sensation:  T12 100% 100%   L1 100% 100%   L2 100% 100%   L3 100% 100%   L4 100% 100%   L5 100% 100%   S1 100% 100%   S2 100% 100%   S3-S5 100% 100%       ASSESSMENT & PLAN  1. Chronic bilateral low back pain without sciatica    Prn tylenol  Heat  Prn zanaflex  PT consult  F/u 8 wks    - tiZANidine (ZANAFLEX) 4 MG tablet; Take 1 tablet by mouth 3 times daily as needed (low back pain)  Dispense: 45 tablet;  Refill: 0  - [facility]      DISPOSITION    Return in about 8 weeks (around 10/19/2020) for f/u low back pain, sooner as needed. Mark Mei released without restrictions. Future Appointments   Date Time Provider Brandon Lopez   11/23/2020 10:00 AM Apryl Mcmillan MD [facility] - [facility]   12/3/2020  3:20 PM Tammy Lacy DO [address]    Patient given educational materials on: See Attached    Barriers to learning and self management: Cognitive issues, sister present during appointment. Discussed use, benefit, and side effects of prescribed medications. Barriers to medication compliance addressed. All patient questions answered. Pt voiced understanding.        Electronically signed by Tammy Lacy DO on 8/24/2020 at 9:33 AM

## 2020-08-24 ENCOUNTER — OFFICE VISIT (OUTPATIENT)
Dept: FAMILY MEDICINE CLINIC | Age: 70
End: 2020-08-24
Payer: MEDICARE

## 2020-08-24 VITALS
TEMPERATURE: 97.8 F | HEIGHT: 68 IN | DIASTOLIC BLOOD PRESSURE: 60 MMHG | WEIGHT: 238.4 LBS | SYSTOLIC BLOOD PRESSURE: 122 MMHG | HEART RATE: 66 BPM | BODY MASS INDEX: 36.13 KG/M2 | OXYGEN SATURATION: 97 % | RESPIRATION RATE: 14 BRPM

## 2020-08-24 PROCEDURE — 1123F ACP DISCUSS/DSCN MKR DOCD: CPT | Performed by: FAMILY MEDICINE

## 2020-08-24 PROCEDURE — 3017F COLORECTAL CA SCREEN DOC REV: CPT | Performed by: FAMILY MEDICINE

## 2020-08-24 PROCEDURE — 99213 OFFICE O/P EST LOW 20 MIN: CPT | Performed by: FAMILY MEDICINE

## 2020-08-24 PROCEDURE — 1036F TOBACCO NON-USER: CPT | Performed by: FAMILY MEDICINE

## 2020-08-24 PROCEDURE — G8427 DOCREV CUR MEDS BY ELIG CLIN: HCPCS | Performed by: FAMILY MEDICINE

## 2020-08-24 PROCEDURE — G8417 CALC BMI ABV UP PARAM F/U: HCPCS | Performed by: FAMILY MEDICINE

## 2020-08-24 PROCEDURE — 4040F PNEUMOC VAC/ADMIN/RCVD: CPT | Performed by: FAMILY MEDICINE

## 2020-08-24 RX ORDER — ATORVASTATIN CALCIUM 20 MG/1
TABLET, FILM COATED ORAL
Qty: 90 TABLET | Refills: 3 | Status: SHIPPED | OUTPATIENT
Start: 2020-08-24 | End: 2021-09-22

## 2020-08-24 RX ORDER — AMLODIPINE BESYLATE 2.5 MG/1
2.5 TABLET ORAL DAILY
Qty: 90 TABLET | Refills: 3 | Status: SHIPPED | OUTPATIENT
Start: 2020-08-24 | End: 2020-10-19 | Stop reason: SDUPTHER

## 2020-08-24 RX ORDER — TIZANIDINE 4 MG/1
4 TABLET ORAL 3 TIMES DAILY PRN
Qty: 45 TABLET | Refills: 0 | Status: SHIPPED | OUTPATIENT
Start: 2020-08-24 | End: 2020-12-15 | Stop reason: SDUPTHER

## 2020-08-24 NOTE — PATIENT INSTRUCTIONS

## 2020-09-01 ENCOUNTER — HOSPITAL ENCOUNTER (OUTPATIENT)
Dept: PHYSICAL THERAPY | Age: 70
Setting detail: THERAPIES SERIES
Discharge: HOME | End: 2020-09-01
Payer: MEDICARE

## 2020-09-01 PROCEDURE — 97110 THERAPEUTIC EXERCISES: CPT

## 2020-09-01 PROCEDURE — 97161 PT EVAL LOW COMPLEX 20 MIN: CPT

## 2020-09-01 NOTE — PROGRESS NOTES
** PLEASE SIGN, DATE AND TIME CERTIFICATION BELOW AND RETURN TO [facility] OUTPATIENT REHABILITATION (FAX #: [phone]). ATTEST/CO-SIGN IF ACCESSING VIA [facility]ventional Imaging. THANK YOU.**    I certify that I have examined the patient below and determined that Physical Medicine and Rehabilitation service is necessary and that I approve the established plan of care for up to 90 days or as specifically noted. Attestation, signature or co-signature of physician indicates approval of certification requirements.    ________________________ ____________ __________  Physician Signature   Date   Time  [facility]  PHYSICAL THERAPY  [x] EVALUATION  [] DAILY NOTE (LAND) [] DAILY NOTE (AQUATIC ) [] PROGRESS NOTE [] DISCHARGE NOTE    [x] [facility]   [] [place] [address]    [] [facility]   [] [facility]    Date: 2020  Patient Name:  Rikki Nguyễn  : 1950  MRN: [id number]    Referring Practitioner Kimo Mckeon DO   Diagnosis Low back pain [M54.5]  Other chronic pain [G89.29]    Treatment Diagnosis Lumbago, difficulty with ambulation   Date of Evaluation 20    Additional Pertinent History Memory issues      Functional Outcome Measure Used Jeremy Tseey   Functional Outcome Score  (20)       Insurance: Primary: Payor: Eduin Coello /  /  / ,   Secondary:    Authorization Information: Unlimited visits. Aquatics and modalities except for Ionto and HP/CP covered. Visit # 1, 1/10 for progress note   Visits Allowed: Unlimited   Recertification Date:    Physician Follow-Up: Follow up with doctor in 6 months   Physician Orders:    History of Present Illness:      SUBJECTIVE: Patient reports has had back pain for years. States no injuries to back that he can remember. Reports also has knee issues. States played sports a lot which is wondering if could be part of the problem. States no testing done.  Reports also was told when was being evaluated for the [employer] that he had a \"bent back\" and he was not drafted. Reports feels better sitting but has pain with standing. States doctor wants to try therapy. Social/Functional History and Current Status:  Medications and Allergies have been reviewed and are listed on Medical History Questionnaire. Jass Loving lives alone in a multiple floor home with stairs and a handrail to enter. Task Previous Current   ADLs  Independent Independent   IADL's Independent Independent   Ambulation Independent Independent   Transfers Independent Independent   Recreation Independent Independent   Community Integration Independent Independent   Driving Active  Active    Work Retired from [employer] Retired     [facility] Disability Scale for Low Back Pain   I stay at home most of the time because of the pain in my back. No   I change position frequently to try and make my back comfortable. No   I walk more slowly than usual because of the pain in my back. Yes   Because of the pain in my back, I am not doing any of the jobs that I usually do around the house. No   Because of the pain in my back, I use a handrail to get upstairs. No   Because of the pain in my back, I lie down to rest more often. No   Because of the pain in my back, I have to hold onto something to get out of a reclining chair. No   Because of the pain in my back, I ask other people to do things for me. No   I get dressed more slowly than usual because of the pain in my back. Yes   I only stand up for short periods of time because of the pain in my back. No   Because of the pain in my back, I try not to bend or kneel down. Yes   I find it difficult to get out of a chair because of the pain in my back. No   My back hurts most of the time. No   I find it difficult to turn over in bed because of the pain in my back. No   My appetite is not very good because of the pain in my back.  No   I have trouble putting on my socks (or progressing to seated and standing, Nu-step, posture education, modalities or pool therapy as needed. Activity/Treatment Tolerance:  [x]  Patient tolerated treatment well  []  Patient limited by fatigue  []  Patient limited by pain   []  Patient limited by medical complications  []  Other:     Assessment: Patient with long term back pain without incident and no testing to show what could be causing the pain. Will start with core strengthening program and see if can increase stability for back to help improve endurance and decrease pain. Can trial pool therapy if needed to relieve stress on low back. Body Structures/Functions/Activity Limitations: impaired activity tolerance, impaired balance, impaired endurance, impaired ROM, impaired strength, pain and abnormal gait  Prognosis: good    GOALS:  Patient Goal: To have less back pain and walk longer. Short Term Goals:  Time Frame: 4 weeks  1) Patient to report 25-50% decrease in low back pain with walking to do all shopping  2) Patient to ambulate with more upright posture for at least 50% of the day to do all activity around home  3) Patient to demonstrate abdominal bracing with no more than 1-2 cues to perform all exercises to improved stability in spine for work around home  4) Patient to perform all balance activity with upright posture and no more than one hand assist to prevent falls      Long Term Goals:  Time Frame: 12 weeks  1) Patient to be independent with home program to perform all daily activity and walking with minimal to no back pain. Patient Education:   [x]  HEP/Education Completed: Plan of Care, Goals, Start HEP listed above   [facility]:  []  No new Education completed  []  Reviewed Prior HEP      [x]  Patient verbalized and/or demonstrated understanding of education provided. []  Patient unable to verbalize and/or demonstrate understanding of education provided. Will continue education.   []  Barriers to learning: None    PLAN:  Treatment Recommendations: Strengthening, Range of Motion, Balance Training, Functional Mobility Training, Endurance Training, Gait Training, Stair Training, Neuromuscular Re-education, Manual Therapy - Soft Tissue Mobilization, Pain Management, Home Exercise Program, Patient Education, Aquatics and Modalities    [x]  Plan of care initiated. Plan to see patient 2 times per week for 12 weeks to address the treatment planned outlined above.   []  Continue with current plan of care  []  Modify plan of care as follows:    []  Hold pending physician visit  []  Discharge    Time In 1030   Time Out 1130   Timed Code Minutes: 10 min   Total Treatment Time: 60 min       Electronically Signed by: [address] [place] [id number]

## 2020-09-03 ENCOUNTER — HOSPITAL ENCOUNTER (OUTPATIENT)
Dept: PHYSICAL THERAPY | Age: 70
Setting detail: THERAPIES SERIES
Discharge: HOME | End: 2020-09-03
Payer: MEDICARE

## 2020-09-03 PROCEDURE — 97110 THERAPEUTIC EXERCISES: CPT

## 2020-09-03 NOTE — PROGRESS NOTES
[address] [facility]  PHYSICAL THERAPY  [] EVALUATION  [x] DAILY NOTE (LAND) [] DAILY NOTE (AQUATIC ) [] PROGRESS NOTE [] DISCHARGE NOTE    [x] OUTPATIENT REHABILITATION CENTER [facility]   [] [place] [address]    [] [facility]   [] Yodit Valenzuela    Date: 9/3/2020  Patient Name:  Ariana Plummer  : 1950  MRN: [id number]    Referring Practitioner Royal Portia DO   Diagnosis Low back pain [M54.5]  Other chronic pain [G89.29]    Treatment Diagnosis Lumbago, difficulty with ambulation   Date of Evaluation 20    Additional Pertinent History Memory issues      Functional Outcome Measure Used [facility]   Functional Outcome Score  (20)       Insurance: Primary: Payor: Radha Valentine /  /  / ,   Secondary:    Authorization Information: Unlimited visits. Aquatics and modalities except for Ionto and HP/CP covered. Visit # 2, 2/10 for progress note   Visits Allowed: Unlimited   Recertification Date:    Physician Follow-Up: Follow up with doctor in 6 months   Physician Orders:    History of Present Illness:      SUBJECTIVE: Patient reporting he has stopped mowing as much and his pain is only 3/10. Patient late to appointment.       TREATMENT   Precautions: None   Pain: 3/10 low back with walking    X in shaded column indicates activity completed today   Modalities Parameters/  Location  Notes                     Manual Therapy Time/Technique  Notes                     Exercise/Intervention   Notes   Abdominal bracing  (knees bent) 10 5 seconds x                                SAQ, Hip adduction (ball)  10 5 seconds x                                Shoulder flexion, marching and Combo 10  x                                SLR, Bridges 10 3 seconds x                Side lying hip abduction and clam shells 10  x Some difficulty keeping hips stacked          Seated LAQ with bracing  10 5 seconds  x    Seated row and horizontal abduction 10  x           Heel raise, marching, squat, hamstring curl and hip abduction 10  x Cues for light UE support          Specific Interventions Next Treatment: progressed DLSP as tolerated and modalities or pool therapy as needed. Activity/Treatment Tolerance:  [x]  Patient tolerated treatment well  []  Patient limited by fatigue  []  Patient limited by pain   []  Patient limited by medical complications  []  Other:     Assessment: Progressed with exercises as noted with patient tolerating well. Patient denies having any pain at end of session. GOALS:  Patient Goal: To have less back pain and walk longer. Short Term Goals:  Time Frame: 4 weeks  1) Patient to report 25-50% decrease in low back pain with walking to do all shopping  2) Patient to ambulate with more upright posture for at least 50% of the day to do all activity around home  3) Patient to demonstrate abdominal bracing with no more than 1-2 cues to perform all exercises to improved stability in spine for work around home  4) Patient to perform all balance activity with upright posture and no more than one hand assist to prevent falls  Long Term Goals:  Time Frame: 12 weeks  1) Patient to be independent with home program to perform all daily activity and walking with minimal to no back pain. Patient Education:   []  HEP/Education Completed:    [employer] Access Code:  []  No new Education completed  [x]  Reviewed Prior HEP      []  Patient verbalized and/or demonstrated understanding of education provided. []  Patient unable to verbalize and/or demonstrate understanding of education provided. Will continue education. []  Barriers to learning: None    Plan:  []  Plan of care initiated. Plan to see patient 2 times per week for 12 weeks.   [x]  Continue with current plan of care  []  Modify plan of care as follows:    []  Hold pending physician visit  []  Discharge    Time In 1437   Time Out 1508   Timed Code Minutes: 31 min   Total Treatment Time: 31 min

## 2020-09-08 ENCOUNTER — HOSPITAL ENCOUNTER (OUTPATIENT)
Dept: PHYSICAL THERAPY | Age: 70
Setting detail: THERAPIES SERIES
Discharge: HOME | End: 2020-09-08
Payer: MEDICARE

## 2020-09-08 PROCEDURE — 97110 THERAPEUTIC EXERCISES: CPT

## 2020-09-08 NOTE — PROGRESS NOTES
[address] [facility]  PHYSICAL THERAPY  [] EVALUATION  [x] DAILY NOTE (LAND) [] DAILY NOTE (AQUATIC ) [] PROGRESS NOTE [] DISCHARGE NOTE    [x] OUTPATIENT REHABILITATION [facility]   [] [place] [address]    [] [facility]   [] [facility]    Date: 2020  Patient Name:  Catracho Pelayo  : 1950  MRN: [id number]    Referring Practitioner Gurjit Collins DO   Diagnosis Low back pain [M54.5]  Other chronic pain [G89.29]    Treatment Diagnosis Lumbago, difficulty with ambulation   Date of Evaluation 20    Additional Pertinent History Memory issues      Functional Outcome Measure Used Elsy Maderasaira   Functional Outcome Score  (20)       Insurance: Primary: Payor: Gerard Sierra /  /  / ,   Secondary:    Authorization Information: Unlimited visits. Aquatics and modalities except for Ionto and HP/CP covered. Visit # 3, 3/10 for progress note   Visits Allowed: Unlimited   Recertification Date:    Physician Follow-Up: Follow up with doctor in 6 months   Physician Orders:    History of Present Illness:      SUBJECTIVE: Patient reports doing well today and feels like back is starting to get better.     TREATMENT   Precautions: None   Pain: None    X in shaded column indicates activity completed today   Modalities Parameters/  Location  Notes                     Manual Therapy Time/Technique  Notes                     Exercise/Intervention   Notes   Abdominal bracing  (knees bent) 10 5 seconds x                                SAQ, Hip adduction (ball)  10 5 seconds x                                Shoulder flexion, marching and Combo 10  x                                SLR, Bridges 10 3 seconds x                Side lying hip abduction and clam shells 10  x Some difficulty keeping hips stacked          Seated LAQ with bracing  10 5 seconds  x    Seated row and horizontal abduction 10  x           Heel raise, marching, squat, hamstring curl and hip abduction 10  x Cues for light UE support          Specific Interventions Next Treatment: progressed DLSP as tolerated and modalities or pool therapy as needed. Activity/Treatment Tolerance:  [x]  Patient tolerated treatment well  []  Patient limited by fatigue  []  Patient limited by pain   []  Patient limited by medical complications  []  Other:     Assessment: Patient doing well with all exercises with just minimal cueing needed for performance. Patient with difficulty staying up on sides and wants to roll back. GOALS:  Patient Goal: To have less back pain and walk longer. Short Term Goals:  Time Frame: 4 weeks  1) Patient to report 25-50% decrease in low back pain with walking to do all shopping  2) Patient to ambulate with more upright posture for at least 50% of the day to do all activity around home  3) Patient to demonstrate abdominal bracing with no more than 1-2 cues to perform all exercises to improved stability in spine for work around home  4) Patient to perform all balance activity with upright posture and no more than one hand assist to prevent falls  Long Term Goals:  Time Frame: 12 weeks  1) Patient to be independent with home program to perform all daily activity and walking with minimal to no back pain. Patient Education:   []  HEP/Education Completed:    [employer] Access Code:  []  No new Education completed  [x]  Reviewed Prior HEP      [x]  Patient verbalized and/or demonstrated understanding of education provided. []  Patient unable to verbalize and/or demonstrate understanding of education provided. Will continue education. []  Barriers to learning: None    Plan:  []  Plan of care initiated. Plan to see patient 2 times per week for 12 weeks.   [x]  Continue with current plan of care  []  Modify plan of care as follows:    []  Hold pending physician visit  []  Discharge    Time In 1500   Time Out 1530   Timed Code Minutes: 30 min   Total Treatment Time: 30 min Electronically Signed by: Nilesh Bowser

## 2020-09-10 ENCOUNTER — HOSPITAL ENCOUNTER (OUTPATIENT)
Dept: PHYSICAL THERAPY | Age: 70
Setting detail: THERAPIES SERIES
Discharge: HOME | End: 2020-09-10
Payer: MEDICARE

## 2020-09-10 PROCEDURE — 97110 THERAPEUTIC EXERCISES: CPT

## 2020-09-10 NOTE — PROGRESS NOTES
abd, flexion and abduction 10  x    Heel raise, marching and squat 15  x Cues for light UE support   3 way hip and Hamstring curl 10  x    Specific Interventions Next Treatment: progressed DLSP as tolerated and modalities or pool therapy as needed. Activity/Treatment Tolerance:  [x]  Patient tolerated treatment well  []  Patient limited by fatigue  []  Patient limited by pain   []  Patient limited by medical complications  []  Other:     Assessment: Progressed with reps as noted and added standing exercises with back at wall with patient tolerating well. Patient with no complains at end of session. GOALS:  Patient Goal: To have less back pain and walk longer. Short Term Goals:  Time Frame: 4 weeks  1) Patient to report 25-50% decrease in low back pain with walking to do all shopping  2) Patient to ambulate with more upright posture for at least 50% of the day to do all activity around home  3) Patient to demonstrate abdominal bracing with no more than 1-2 cues to perform all exercises to improved stability in spine for work around home  4) Patient to perform all balance activity with upright posture and no more than one hand assist to prevent falls  Long Term Goals:  Time Frame: 12 weeks  1) Patient to be independent with home program to perform all daily activity and walking with minimal to no back pain. Patient Education:   []  HEP/Education Completed:    [employer] Access Code:  []  No new Education completed  [x]  Reviewed Prior HEP      [x]  Patient verbalized and/or demonstrated understanding of education provided. []  Patient unable to verbalize and/or demonstrate understanding of education provided. Will continue education. []  Barriers to learning: None    Plan:  Plan to see patient 2 times per week for 12 weeks.   [x]  Continue with current plan of care  []  Modify plan of care as follows:    []  Hold pending physician visit  []  Discharge    Time In 1438   Time Out 1514   Timed Code Minutes: 36 min   Total Treatment Time: 36 min       Electronically Signed by: Jada Arce

## 2020-09-14 ENCOUNTER — HOSPITAL ENCOUNTER (OUTPATIENT)
Dept: PHYSICAL THERAPY | Age: 70
Setting detail: THERAPIES SERIES
Discharge: HOME | End: 2020-09-14
Payer: MEDICARE

## 2020-09-14 ENCOUNTER — TELEPHONE (OUTPATIENT)
Dept: FAMILY MEDICINE CLINIC | Age: 70
End: 2020-09-14

## 2020-09-14 PROCEDURE — 97110 THERAPEUTIC EXERCISES: CPT

## 2020-09-14 NOTE — TELEPHONE ENCOUNTER
Patient came into the office and requested a new Handicap Placard prescription. Please advise and call the patient @[phone].

## 2020-09-14 NOTE — PROGRESS NOTES
[facility]  PHYSICAL THERAPY  [] EVALUATION  [x] DAILY NOTE (LAND) [] DAILY NOTE (AQUATIC ) [] PROGRESS NOTE [] DISCHARGE NOTE    [x] OUTPATIENT REHABILITATION CENTER [facility]   [] [place]     [] [facility]   [] Debbie Fierro    Date: 2020  Patient Name:  Jessica Flores  : 1950  MRN: [id number]    Referring Practitioner Tia Schilling DO   Diagnosis Low back pain [M54.5]  Other chronic pain [G89.29]    Treatment Diagnosis Lumbago, difficulty with ambulation   Date of Evaluation 20    Additional Pertinent History Memory issues      Functional Outcome Measure Used Filibertosaúl Solorio   Functional Outcome Score  (20)       Insurance: Primary: Payor: Melonie Sneed /  /  / ,   Secondary:    Authorization Information: Unlimited visits. Aquatics and modalities except for Ionto and HP/CP covered. Visit # 5, 5/10 for progress note   Visits Allowed: Unlimited   Recertification Date: 222   Physician Follow-Up: Follow up with doctor in 6 months   Physician Orders:    History of Present Illness:      SUBJECTIVE: Patient reports doing ok today. States having a little pain with walking.     TREATMENT   Precautions: None   Pain: None with sitting/laying down but 2/10 with walking    X in shaded column indicates activity completed today   Modalities Parameters/  Location  Notes                     Manual Therapy Time/Technique  Notes                     Exercise/Intervention   Notes   Abdominal bracing  (knees bent) 10 5 seconds x                                SAQ, Hip adduction (ball)  15 5 seconds x                                Shoulder flexion, marching and Combo 15  x                                SLR, Bridges 10 3 seconds x                Side lying hip abduction and clam shells 10  x Some difficulty keeping hips stacked          Seated LAQ with bracing  10 5 seconds  x    Standing tall with back to wall 1 minute  x    standing at wall with ball behind head for posture: horz abd, flexion and abduction 10  x Difficulty maintaining ball behind head   Heel raise, marching and squat 15  x Cues for light UE support   3 way hip and Hamstring curl 10  x    Hydrostick 4 directions 10 each way  x    Seated: scap squeezes and thoracic extension over ball 10x each  x    Specific Interventions Next Treatment: progressed DLSP as tolerated and modalities or pool therapy as needed. Activity/Treatment Tolerance:  [x]  Patient tolerated treatment well  []  Patient limited by fatigue  []  Patient limited by pain   []  Patient limited by medical complications  []  Other:     Assessment: Patient still needing occasional cueing for exercises on how to perform properly. Worked more on posture today and added in some seated mid back stretching/strengthening and hydrostick. Patient still walking hunched over. No pain reported at end of session. Patient difficulty holding ball on wall with head due to rounded posture. GOALS:  Patient Goal: To have less back pain and walk longer. Short Term Goals:  Time Frame: 4 weeks  1) Patient to report 25-50% decrease in low back pain with walking to do all shopping  2) Patient to ambulate with more upright posture for at least 50% of the day to do all activity around home  3) Patient to demonstrate abdominal bracing with no more than 1-2 cues to perform all exercises to improved stability in spine for work around home  4) Patient to perform all balance activity with upright posture and no more than one hand assist to prevent falls  Long Term Goals:  Time Frame: 12 weeks  1) Patient to be independent with home program to perform all daily activity and walking with minimal to no back pain. Patient Education:   []  HEP/Education Completed:    [employer] Access Code:  []  No new Education completed  [x]  Reviewed Prior HEP      [x]  Patient verbalized and/or demonstrated understanding of education provided.   []  Patient unable to verbalize and/or demonstrate understanding of education provided. Will continue education. []  Barriers to learning: None    Plan:  Plan to see patient 2 times per week for 12 weeks.   [x]  Continue with current plan of care  []  Modify plan of care as follows:    []  Hold pending physician visit  []  Discharge    Time In 1530   Time Out 1615   Timed Code Minutes: 45 min   Total Treatment Time: 45 min       Electronically Signed by: [place] [id number]

## 2020-09-17 ENCOUNTER — HOSPITAL ENCOUNTER (OUTPATIENT)
Dept: PHYSICAL THERAPY | Age: 70
Setting detail: THERAPIES SERIES
Discharge: HOME | End: 2020-09-17
Payer: MEDICARE

## 2020-09-17 PROCEDURE — 97110 THERAPEUTIC EXERCISES: CPT

## 2020-09-17 NOTE — PROGRESS NOTES
[address] [facility]  PHYSICAL THERAPY  [x] DAILY NOTE (LAND) [] DAILY NOTE (AQUATIC ) [] PROGRESS NOTE [] DISCHARGE NOTE    [x] OUTPATIENT REHABILITATION CENTER [facility]   [] [place] [address]    [] [facility]   [] Bridgett Reagan  Date: 2020  Patient Name:  Jairo Jorgensen  : 1950  MRN: [id number]  Referring Practitioner Tawana Hoskins DO   Diagnosis Low back pain [M54.5]  Other chronic pain [G89.29]    Treatment Diagnosis Lumbago, difficulty with ambulation   Date of Evaluation 20    Additional Pertinent History Memory issues      Functional Outcome Measure Used Molina Ace   Functional Outcome Score  (20)       Insurance: Primary: Payor: Leti Bolden /  /  / ,   Secondary:    Authorization Information: Unlimited visits. Aquatics and modalities except for Ionto and HP/CP covered. Visit # 6, 6/10 for progress note   Visits Allowed: Unlimited   Recertification Date:    Physician Follow-Up: Follow up with doctor in 6 months   Physician Orders:    History of Present Illness:      SUBJECTIVE: Patient denies having pain currently but reporting he goes get pain if he is carrying something.      TREATMENT   Precautions: None   Pain: Denies    X in shaded column indicates activity completed today   Modalities Parameters/  Location  Notes               Manual Therapy Time/Technique  Notes               Exercise/Intervention   Notes   Abdominal bracing  (knees bent) 10 5 seconds                                 SAQ, Hip adduction (ball)  15 5 seconds x                                Shoulder flexion, marching and Combo 15  x                                SLR, Bridges 15 3-5 seconds x                Side lying hip abduction and clam shells 15 3-5 seconds x Some difficulty keeping hips stacked                 Standing tall with back to wall 1 minute      standing at wall with ball behind head for posture: horz abd, flexion, abduction 15  x Difficulty maintaining ball behind head   Heel raise, marching and squat 15  x Cues for light UE support   3 way hip and Hamstring curl 15  x    Hydro stick 4 directions 10 each way  x    Seated: scap squeezes and thoracic extension over ball 10x each      Specific Interventions Next Treatment: progressed DLSP as tolerated and modalities or pool therapy as needed. Activity/Treatment Tolerance:  [x]  Patient tolerated treatment well  []  Patient limited by fatigue  []  Patient limited by pain   []  Patient limited by medical complications  []  Other:     Assessment: Progressed with reps with good tolerance but patient still needs just about constant cuing for posture during session. GOALS:  Patient Goal: To have less back pain and walk longer. Short Term Goals:  Time Frame: 4 weeks  1) Patient to report 25-50% decrease in low back pain with walking to do all shopping  2) Patient to ambulate with more upright posture for at least 50% of the day to do all activity around home  3) Patient to demonstrate abdominal bracing with no more than 1-2 cues to perform all exercises to improved stability in spine for work around home  4) Patient to perform all balance activity with upright posture and no more than one hand assist to prevent falls  Long Term Goals:  Time Frame: 12 weeks  1) Patient to be independent with home program to perform all daily activity and walking with minimal to no back pain. Patient Education:   []  HEP/Education Completed:    [employer] Access Code:  [x]  No new Education completed  []  Reviewed Prior HEP      []  Patient verbalized and/or demonstrated understanding of education provided. []  Patient unable to verbalize and/or demonstrate understanding of education provided. Will continue education. []  Barriers to learning: None    Plan:  Plan to see patient 2 times per week for 12 weeks.   [x]  Continue with current plan of care  []  Modify plan of care as follows:    []  Hold pending physician visit  []  Discharge    Time In 1332   Time Out 1408   Timed Code Minutes: 36 min   Total Treatment Time: 36 min       Electronically Signed by: Ivy Castro

## 2020-09-18 RX ORDER — LEVETIRACETAM 1000 MG/1
TABLET ORAL
Qty: 60 TABLET | Refills: 11 | Status: SHIPPED | OUTPATIENT
Start: 2020-09-18 | End: 2021-08-16

## 2020-09-22 ENCOUNTER — HOSPITAL ENCOUNTER (OUTPATIENT)
Dept: PHYSICAL THERAPY | Age: 70
Setting detail: THERAPIES SERIES
Discharge: HOME | End: 2020-09-22
Payer: MEDICARE

## 2020-09-22 PROCEDURE — 97110 THERAPEUTIC EXERCISES: CPT

## 2020-09-22 NOTE — PROGRESS NOTES
[address] [facility]  PHYSICAL THERAPY  [x] DAILY NOTE (LAND) [] DAILY NOTE (AQUATIC ) [] PROGRESS NOTE [] DISCHARGE NOTE    [x] OUTPATIENT REHABILITATION CENTER [facility]   [] [facility]    [] [facility]   [] Inocencia Medrano  Date: 2020  Patient Name:  Hill Cedillo  : 1950  MRN: [id number]  Referring Practitioner Theola Curling, DO   Diagnosis Low back pain [M54.5]  Other chronic pain [G89.29]    Treatment Diagnosis Lumbago, difficulty with ambulation   Date of Evaluation 20    Additional Pertinent History Memory issues      Functional Outcome Measure Used Gem Franz   Functional Outcome Score  (20)       Insurance: Primary: Payor: Wicho Tapia /  /  / ,   Secondary:    Authorization Information: Unlimited visits. Aquatics and modalities except for Ionto and HP/CP covered. Visit # 7, 7/10 for progress note   Visits Allowed: Unlimited   Recertification Date:    Physician Follow-Up: Follow up with doctor in 6 months   Physician Orders:    History of Present Illness:      SUBJECTIVE: Patient reports \"not much\" when asked about pain but then rates it at 8/10. States doing well today and had a good, quiet weekend.     TREATMENT   Precautions: None   Pain: 8/10 low back    X in shaded column indicates activity completed today   Modalities Parameters/  Location  Notes               Manual Therapy Time/Technique  Notes               Exercise/Intervention   Notes   Abdominal bracing  (knees bent) 10 5 seconds                                 SAQ, Hip adduction (ball)  15 5 seconds                                 Shoulder flexion, marching and Combo 15                                  SLR, Bridges 15 3-5 seconds                 Side lying hip abduction and clam shells 15  x Less difficulty keeping hips stacked   Standing HS stretch 3x 15 seconds x    Hydrapress 2x10  x Level 4   Nu-step seat 10/arms 10 5 minutes Level 1 x    Standing tall provided. []  Patient unable to verbalize and/or demonstrate understanding of education provided. Will continue education. []  Barriers to learning: None    Plan:  Plan to see patient 2 times per week for 12 weeks.   [x]  Continue with current plan of care  []  Modify plan of care as follows:    []  Hold pending physician visit  []  Discharge    Time In 1300   Time Out 1345   Timed Code Minutes:  45 min   Total Treatment Time: 45 min       Electronically Signed by: [place] [id number]

## 2020-09-24 ENCOUNTER — HOSPITAL ENCOUNTER (OUTPATIENT)
Dept: PHYSICAL THERAPY | Age: 70
Setting detail: THERAPIES SERIES
Discharge: HOME | End: 2020-09-24
Payer: MEDICARE

## 2020-09-24 PROCEDURE — 97110 THERAPEUTIC EXERCISES: CPT

## 2020-09-24 NOTE — PROGRESS NOTES
No   Because of the pain in my back, I have to hold onto something to get out of a reclining chair. No   Because of the pain in my back, I ask other people to do things for me. No   I get dressed more slowly than usual because of the pain in my back. No   I only stand up for short periods of time because of the pain in my back. No   Because of the pain in my back, I try not to bend or kneel down. No   I find it difficult to get out of a chair because of the pain in my back. No   My back hurts most of the time. No   I find it difficult to turn over in bed because of the pain in my back. No   My appetite is not very good because of the pain in my back. No   I have trouble putting on my socks (or stockings) because of the pain in my back. No   I only walk short distances because of the pain in my back. No   I sleep less because of the pain in my back. No   Because of the pain in my back, I get dressed with help from someone else. No   I sit down most of the day because of the pain in my back. No   I avoid heavy jobs around the house because of the pain in my back. No   Because of the pain in my back, I am more irritable and bad tempered with people. No   Because of the pain in my back, I go upstairs more slowly than usual. Yes   I stay in bed most of the time because of the pain in my back.  No   TOTAL NUMBER OF YES RESPONSES 2/24       TREATMENT   Precautions: None   Pain: None    X in shaded column indicates activity completed today   Modalities Parameters/  Location  Notes               Manual Therapy Time/Technique  Notes               Exercise/Intervention   Notes   Abdominal bracing  (knees bent) 10 5 seconds                                 SAQ, Hip adduction (ball)  15 5 seconds                                 Shoulder flexion, marching and Combo 15                                  SLR, Bridges 15 3-5 seconds                 Side lying hip abduction and clam shells 15   Less difficulty keeping hips stacked Standing HS stretch 3x 15 seconds     Hydrapress 2x10  x Level 4   Nu-step seat 10/arms 10 5 minutes Level 1 x    Standing tall with back to wall 1 minute  x           Heel raise, marching and squat 15   Cues for posture   3 way hip and Hamstring curl 15      Lunges onto BOSU 10 bilat  x    Hydro stick 4 directions 15 each way  x    Seated: scap squeezes and thoracic extension over ball                Shld: horz abd, flex and abduction 10x each  15x each  x    Specific Interventions Next Treatment: progressed DLSP as tolerated and modalities or pool therapy as needed. Activity/Treatment Tolerance:  [x]  Patient tolerated treatment well  []  Patient limited by fatigue  []  Patient limited by pain   []  Patient limited by medical complications  []  Other:     Assessment:  Patient has made great progress with therapy with overall back pain decrease and improved posture through more of the day. Patient has a HEP to continue with and will see how does with independent program to make sure pain continues to stay decreased. Patient to have follow up with therapist in 3 weeks to make sure no problems or questions. GOALS:  Patient Goal: To have less back pain and walk longer.   Short Term Goals:  Time Frame: 4 weeks  1) Patient to report 25-50% decrease in low back pain with walking to do all shopping  [x] Goal Met [] Goal Not Met [] Continue Goal [] Discontinue Goal  [x] Revise Goal  Goal Assessment: Patient reports is 80% better  New Goal:Patient to report >80% decrease in low back pain with walking to do all shopping  2) Patient to ambulate with more upright posture for at least 50% of the day to do all activity around home  [x] Goal Met [] Goal Not Met [] Continue Goal [x] Discontinue Goal  [] Revise Goal  3) Patient to demonstrate abdominal bracing with no more than 1-2 cues to perform all exercises to improved stability in spine for work around home  [x] Goal Met [] Goal Not Met [] Continue Goal [] Discontinue

## 2020-10-15 ENCOUNTER — HOSPITAL ENCOUNTER (OUTPATIENT)
Dept: PHYSICAL THERAPY | Age: 70
Setting detail: THERAPIES SERIES
Discharge: HOME | End: 2020-10-15
Payer: MEDICARE

## 2020-10-15 PROCEDURE — 97110 THERAPEUTIC EXERCISES: CPT

## 2020-10-15 NOTE — DISCHARGE SUMMARY
I ask other people to do things for me. No   I get dressed more slowly than usual because of the pain in my back. No   I only stand up for short periods of time because of the pain in my back. No   Because of the pain in my back, I try not to bend or kneel down. No   I find it difficult to get out of a chair because of the pain in my back. No   My back hurts most of the time. No   I find it difficult to turn over in bed because of the pain in my back. No   My appetite is not very good because of the pain in my back. No   I have trouble putting on my socks (or stockings) because of the pain in my back. No   I only walk short distances because of the pain in my back. No   I sleep less because of the pain in my back. No   Because of the pain in my back, I get dressed with help from someone else. No   I sit down most of the day because of the pain in my back. No   I avoid heavy jobs around the house because of the pain in my back. No   Because of the pain in my back, I am more irritable and bad tempered with people. No   Because of the pain in my back, I go upstairs more slowly than usual. Yes   I stay in bed most of the time because of the pain in my back.  No   TOTAL NUMBER OF YES RESPONSES 1/24       TREATMENT   Precautions: None   Pain: None    X in shaded column indicates activity completed today   Modalities Parameters/  Location  Notes               Manual Therapy Time/Technique  Notes               Exercise/Intervention   Notes   Abdominal bracing  (knees bent) 10 5 seconds                                 SAQ, Hip adduction (ball)  15 5 seconds                                 Shoulder flexion, marching and Combo 15                                  SLR, Bridges 15 3-5 seconds                 Side lying hip abduction and clam shells 15   Less difficulty keeping hips stacked   Standing HS stretch 3x 15 seconds     Hydrapress 2x10  x Level 5   Nu-step seat 10/arms 10 5 minutes Level 3 x    Standing tall with back to wall 1 minute  x           Heel raise, marching and squat 15x each  x Only 1 hand assist   3 way hip and Hamstring curl 15x each  x Only 1 hand assist   Lunges onto BOSU 15 bilat  x Only 1 hand assist   Hydro stick 4 directions 15 each way  x    Seated: scap squeezes and thoracic extension over ball                Shld: horz abd, flex and abduction 10x each  15x each      Specific Interventions Next Treatment: No further therapy needed. Activity/Treatment Tolerance:  [x]  Patient tolerated treatment well  []  Patient limited by fatigue  []  Patient limited by pain   []  Patient limited by medical complications  []  Other:     Assessment:  Patient has made great progress therapy and is doing well with HEP. Patient has met almost all goals, just needs one hand occasionally for balance safety, and no further therapy needed. GOALS:  Patient Goal: To have less back pain and walk longer. Short Term Goals:  Time Frame: 4 weeks  1) New Goal:Patient to report >80% decrease in low back pain with walking to do all shopping  [x] Goal Met [] Goal Not Met [] Continue Goal [x] Discontinue Goal  [] Revise Goal  Goal Assessment: Patient reports is >80% better  3)New Goal: Patient to demonstrate abdominal bracing with no cues to perform all exercises to improved stability in spine for work around home  [x] Goal Met [] Goal Not Met [] Continue Goal [x] Discontinue Goal  [] Revise Goal  4) New Goal: Patient to perform all balance activity with upright posture and no hand assist to prevent falls  [] Goal Met [x] Goal Not Met [] Continue Goal [x] Discontinue Goal  [] Revise Goal   Goal Assessment: Patient still needs one hand for certain activities for safety. Long Term Goals:  Time Frame: 12 weeks  1) Patient to be independent with home program to perform all daily activity and walking with minimal to no back pain.   [x] Goal Met [] Goal Not Met [] Continue Goal [x] Discontinue Goal  [] Revise Goal      Patient Education:   []  HEP/Education Completed: Importance of continuing with HEP and watching posture.  [employer] Access Code:  []  No new Education completed  [x]  Reviewed Prior HEP      [x]  Patient verbalized and/or demonstrated understanding of education provided. []  Patient unable to verbalize and/or demonstrate understanding of education provided. Will continue education. []  Barriers to learning: None    Plan:  Plan to see patient 2 times per week for 12 weeks. []  Continue with current plan of care  []  Modify plan of care as follows: Follow up in 3 weeks.    []  Hold pending physician visit  [x]  Discharge    Time In 1133   Time Out 1212   Timed Code Minutes:  39 min   Total Treatment Time: 39 min       Electronically Signed by: [place] [id number]

## 2020-10-18 NOTE — PROGRESS NOTES
2014 and PCV 13 OCT 2017/and PPV 23 in NOV 2019  Zoster - to get at pharmacy per medicare     PSA testing discussion -   Lab Results   Component Value Date    PSA 1.42 (H) 11/05/2019    PSA 1.62 (H) 10/29/2018    PSA 2.08 (H) 10/25/2017       AAA Screening - never smoker    Specialists List:  Jori Matos: wound care  Min: neuro for seizures  Cardio: Tashi Jesus       Current Outpatient Medications   Medication Sig Dispense Refill    levETIRAcetam (KEPPRA) 1000 MG tablet take 1 tablet by mouth twice a day 60 tablet 11    Handicap Placard MISC by Does not apply route Expires 15 SEPT 2025 1 each 0    atorvastatin (LIPITOR) 20 MG tablet take 1 tablet by mouth once daily 90 tablet 3    tiZANidine (ZANAFLEX) 4 MG tablet Take 1 tablet by mouth 3 times daily as needed (low back pain) 45 tablet 0    Naproxen Sodium (ALEVE) 220 MG CAPS Take by mouth as needed for Pain      pyridoxine (B-6) 25 MG tablet Take 1 tablet by mouth once a week 12 tablet 3    aspirin 81 MG chewable tablet Take 81 mg by mouth daily      amLODIPine (NORVASC) 2.5 MG tablet Take 1 tablet by mouth daily 90 tablet 3     No current facility-administered medications for this visit. No orders of the defined types were placed in this encounter. All medications reviewed and reconciled, including OTC and herbal medications. Updated list given to patient. 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 Txt with 6 months of coumadin. Provoked.        Heart failure with preserved ejection fraction (HCC)     History of alcohol abuse     History of traumatic brain injury     History of non-healing Sister     High Blood Pressure Sister     High Cholesterol Sister     Arthritis Sister     Diabetes Sister     Diabetes Sister     Colon Cancer Neg Hx     Prostate Cancer Neg Hx          I have reviewed the patient's past medical history, past surgical history, allergies, medications, social and family history and I have made updates where appropriate. Review of Systems  Positive responses are highlighted in bold    Constitutional:  Fever, Chills, Night Sweats, Fatigue, Unexpected changes in weight  HENT:  Ear pain, Tinnitus, Nosebleeds, Trouble swallowing, Hearing loss, Sore throat  Cardiovascular:  Chest Pain, Palpitations, Orthopnea, Paroxysmal Nocturnal Dyspnea  Respiratory:  Cough, Wheezing, Shortness of breath, Chest tightness, Apnea  Gastrointestinal:  Nausea, Vomiting, Diarrhea, Constipation, Heartburn, Blood in stool  Genitourinary:  Difficulty or painful urination, Flank pain, Change in frequency, Urgency  Skin:  Color change, Rash, Itching, Wound  Musculoskeletal:  Joint pain, Back pain, Gait problems, Joint swelling, Myalgias  Neurological:  Dizziness, Headaches, Presyncope, Numbness, Seizures, Tremors  Endocrine:  Heat Intolerance, Cold Intolerance, Polydipsia, Polyphagia, Polyuria      PHYSICAL EXAM:  Vitals:    10/19/20 1320   BP: (!) 128/56   Pulse: 58   Resp: 10   Temp: 97.6 °F (36.4 °C)   TempSrc: Oral   SpO2: 97%   Weight: 241 lb (109.3 kg)   Height: 5' 7\" (1.702 m)     Body mass index is 37.75 kg/m². Pain Score:    2(low back)    VS Reviewed  General Appearance: A&O x 3, No acute distress,well developed and well- nourished  Eyes: pupils equal, round, and reactive to light, extraocular eye movements intact, conjunctivae and eye lids without erythema  ENT: external ear and ear canal clear bilaterally, TMs intact and regular, nose without deformity, nasal mucosa and turbinates normal without polyps, oropharynx normal, dentition is normal for age  Neck: supple and non-tender without mass, no thyromegaly or thyroid nodules, no cervical lymphadenopathy  Pulmonary/Chest: clear to auscultation bilaterally- no wheezes, rales or rhonchi, normal air movement, no respiratory distress or retractions  Cardiovascular: S1 and S2 auscultated w/ RRR. No murmurs, rubs, clicks, or gallops, distal pulses intact. Abdomen: soft, non-tender, non-distended, bowl sounds physiologic,  no rebound or guarding, no masses or hernias noted. Liver and spleen without enlargement. Extremities: no cyanosis, clubbing or edema of the lower extremities. +2 PT/DP bilaterally. Psych: Affect appropriate. Mood normal. Thought process is normal without evidence of depression or psychosis. Good insight and appropriate interaction. Cognition and memory appear to be impaired. Skin: warm and dry, no rash or erythema      ASSESSMENT & PLAN  1. Chronic bilateral low back pain without sciatica    Much improved  Done with PT  con't HEP  F/u any changes    2. Needs flu shot    - INFLUENZA, QUADV, ADJUVANTED, 65 YRS =, IM, PF, PREFILL SYR, 0.5ML (FLUAD)      DISPOSITION    Return in 8 weeks (on 12/13/2020) for follow-up on chronic medical conditions, sooner as needed. Vickii Amend released without restrictions. Future Appointments   Date Time Provider Brandon Lopez   11/9/2020  8:00 AM Margarita Russell MD [address] [facility]   12/14/2020  1:00 PM Odilon Martniez 125    Patient given educational materials on: See Attached    Barriers to learning and self management: Cognitive issues, sister present during appointment. Discussed use, benefit, and side effects of prescribed medications. Barriers to medication compliance addressed. All patient questions answered. Pt voiced understanding.        Electronically signed by Ban Ko DO on 10/19/2020 at 1:49 PM

## 2020-10-19 ENCOUNTER — OFFICE VISIT (OUTPATIENT)
Dept: FAMILY MEDICINE CLINIC | Age: 70
End: 2020-10-19
Payer: MEDICARE

## 2020-10-19 VITALS
HEIGHT: 67 IN | DIASTOLIC BLOOD PRESSURE: 56 MMHG | WEIGHT: 241 LBS | TEMPERATURE: 97.6 F | HEART RATE: 58 BPM | OXYGEN SATURATION: 97 % | RESPIRATION RATE: 10 BRPM | BODY MASS INDEX: 37.83 KG/M2 | SYSTOLIC BLOOD PRESSURE: 128 MMHG

## 2020-10-19 PROCEDURE — 1123F ACP DISCUSS/DSCN MKR DOCD: CPT | Performed by: FAMILY MEDICINE

## 2020-10-19 PROCEDURE — 99213 OFFICE O/P EST LOW 20 MIN: CPT | Performed by: FAMILY MEDICINE

## 2020-10-19 PROCEDURE — 4040F PNEUMOC VAC/ADMIN/RCVD: CPT | Performed by: FAMILY MEDICINE

## 2020-10-19 PROCEDURE — 3017F COLORECTAL CA SCREEN DOC REV: CPT | Performed by: FAMILY MEDICINE

## 2020-10-19 PROCEDURE — G8427 DOCREV CUR MEDS BY ELIG CLIN: HCPCS | Performed by: FAMILY MEDICINE

## 2020-10-19 PROCEDURE — 1036F TOBACCO NON-USER: CPT | Performed by: FAMILY MEDICINE

## 2020-10-19 PROCEDURE — G0008 ADMIN INFLUENZA VIRUS VAC: HCPCS | Performed by: FAMILY MEDICINE

## 2020-10-19 PROCEDURE — 90694 VACC AIIV4 NO PRSRV 0.5ML IM: CPT | Performed by: FAMILY MEDICINE

## 2020-10-19 PROCEDURE — G8484 FLU IMMUNIZE NO ADMIN: HCPCS | Performed by: FAMILY MEDICINE

## 2020-10-19 PROCEDURE — G8417 CALC BMI ABV UP PARAM F/U: HCPCS | Performed by: FAMILY MEDICINE

## 2020-10-19 RX ORDER — PYRIDOXINE HCL (VITAMIN B6) 25 MG
25 TABLET ORAL WEEKLY
Qty: 12 TABLET | Refills: 3 | Status: SHIPPED | OUTPATIENT
Start: 2020-10-19 | End: 2021-09-20

## 2020-10-19 RX ORDER — AMLODIPINE BESYLATE 2.5 MG/1
2.5 TABLET ORAL DAILY
Qty: 90 TABLET | Refills: 3 | Status: SHIPPED | OUTPATIENT
Start: 2020-10-19 | End: 2021-09-29

## 2020-10-19 NOTE — PROGRESS NOTES
Vaccine Information Sheet, \"Influenza - Inactivated\"  given to Lamont Monroy, or parent/legal guardian of  Lamont Monroy and verbalized understanding. Patient responses:    Have you ever had a reaction to a flu vaccine? No  Do you have an allergy to eggs, neomycin or polymixin? No  Do you have an allergy to Thimerosal, contact lens solution, or Merthiolate? No  Have you ever had Guillian Creola Syndrome? No  Do you have any current illness? No  Do you have a temperature above 100 degrees? No  Are you pregnant? No  If pregnant, permission obtained from physician? No  Do you have an active neurological disorder? No      Flu vaccine given per order. Please see immunization tab.

## 2020-10-19 NOTE — PATIENT INSTRUCTIONS
LAB INSTRUCTIONS:    Please complete labs after 05 NOV 2020     Please fast for 8 hours prior to lab collection. The clinic will call you within 1 week of collection. If you have not heard from us within that amount of time, please call us at [phone]. Patient Education        Back Pain: Care Instructions  Your Care Instructions     Back pain has many possible causes. It is often related to problems with muscles and ligaments of the back. It may also be related to problems with the nerves, discs, or bones of the back. Moving, lifting, standing, sitting, or sleeping in an awkward way can strain the back. Sometimes you don't notice the injury until later. Arthritis is another common cause of back pain. Although it may hurt a lot, back pain usually improves on its own within several weeks. Most people recover in 12 weeks or less. Using good home treatment and being careful not to stress your back can help you feel better sooner. Follow-up care is a key part of your treatment and safety. Be sure to make and go to all appointments, and call your doctor if you are having problems. It's also a good idea to know your test results and keep a list of the medicines you take. How can you care for yourself at home? · Sit or lie in positions that are most comfortable and reduce your pain. Try one of these positions when you lie down:  ? Lie on your back with your knees bent and supported by large pillows. ? Lie on the floor with your legs on the seat of a sofa or chair. ? Lie on your side with your knees and hips bent and a pillow between your legs. ? Lie on your stomach if it does not make pain worse. · Do not sit up in bed, and avoid soft couches and twisted positions. Bed rest can help relieve pain at first, but it delays healing. Avoid bed rest after the first day of back pain. · Change positions every 30 minutes. If you must sit for long periods of time, take breaks from sitting.  Get up and walk around, or lie in a comfortable position. · Try using a heating pad on a low or medium setting for 15 to 20 minutes every 2 or 3 hours. Try a warm shower in place of one session with the heating pad. · You can also try an ice pack for 10 to 15 minutes every 2 to 3 hours. Put a thin cloth between the ice pack and your skin. · Take pain medicines exactly as directed. ? If the doctor gave you a prescription medicine for pain, take it as prescribed. ? If you are not taking a prescription pain medicine, ask your doctor if you can take an over-the-counter medicine. · Take short walks several times a day. You can start with 5 to 10 minutes, 3 or 4 times a day, and work up to longer walks. Walk on level surfaces and avoid hills and stairs until your back is better. · Return to work and other activities as soon as you can. Continued rest without activity is usually not good for your back. · To prevent future back pain, do exercises to stretch and strengthen your back and stomach. Learn how to use good posture, safe lifting techniques, and proper body mechanics. When should you call for help? Call your doctor now or seek immediate medical care if:    · You have new or worsening numbness in your legs.     · You have new or worsening weakness in your legs. (This could make it hard to stand up.)     · You lose control of your bladder or bowels. Watch closely for changes in your health, and be sure to contact your doctor if:    · You have a fever, lose weight, or don't feel well.     · You do not get better as expected. Where can you learn more? Go to [email or web address] and sign in to your [employer] account. Enter Q550 in the [facility] box to learn more about \"Back Pain: Care Instructions. \"     If you do not have an account, please click on the \"Sign Up Now\" link. Current as of: March 2, 2020               Content Version: 12.6  © 0084-2159 [employer].    Care instructions

## 2020-10-19 NOTE — PROGRESS NOTES
Immunization(s) given during visit:    Immunizations Administered     Name Date Dose Route    Influenza, Quadv, adjuvanted, 65 yrs +, IM, PF (Fluad) 10/19/2020 0.5 mL Intramuscular    Site: Deltoid- Right    Lot: [id number]    NDC: 47966-025-13          Most recent Vaccine Information Sheet dated 8/15/19 given to pt

## 2020-11-23 ENCOUNTER — HOSPITAL ENCOUNTER (OUTPATIENT)
Age: 70
Discharge: HOME | End: 2020-11-23
Payer: MEDICARE

## 2020-11-23 LAB
ALBUMIN SERPL-MCNC: 4.6 G/DL (ref 3.5–5.1)
ALP BLD-CCNC: 64 U/L (ref 38–126)
ALT SERPL-CCNC: 25 U/L (ref 11–66)
ANION GAP SERPL CALCULATED.3IONS-SCNC: 14 MEQ/L (ref 8–16)
AST SERPL-CCNC: 25 U/L (ref 5–40)
BASOPHILS # BLD: 0.9 %
BASOPHILS ABSOLUTE: 0 THOU/MM3 (ref 0–0.1)
BILIRUB SERPL-MCNC: 0.8 MG/DL (ref 0.3–1.2)
BUN BLDV-MCNC: 9 MG/DL (ref 7–22)
CALCIUM SERPL-MCNC: 9.8 MG/DL (ref 8.5–10.5)
CHLORIDE BLD-SCNC: 102 MEQ/L (ref 98–111)
CHOLESTEROL, TOTAL: 143 MG/DL (ref 100–199)
CO2: 24 MEQ/L (ref 23–33)
CREAT SERPL-MCNC: 0.9 MG/DL (ref 0.4–1.2)
CREATININE, URINE: 274.8 MG/DL
EOSINOPHIL # BLD: 2.4 %
EOSINOPHILS ABSOLUTE: 0.1 THOU/MM3 (ref 0–0.4)
ERYTHROCYTE [DISTWIDTH] IN BLOOD BY AUTOMATED COUNT: 13.2 % (ref 11.5–14.5)
ERYTHROCYTE [DISTWIDTH] IN BLOOD BY AUTOMATED COUNT: 44.9 FL (ref 35–45)
GFR SERPL CREATININE-BSD FRML MDRD: 83 ML/MIN/1.73M2
GLUCOSE BLD-MCNC: 96 MG/DL (ref 70–108)
HCT VFR BLD CALC: 44.9 % (ref 42–52)
HDLC SERPL-MCNC: 47 MG/DL
HEMOGLOBIN: 15.2 GM/DL (ref 14–18)
IMMATURE GRANS (ABS): 0.03 THOU/MM3 (ref 0–0.07)
IMMATURE GRANULOCYTES: 0.7 %
LDL CHOLESTEROL CALCULATED: 80 MG/DL
LYMPHOCYTES # BLD: 26.5 %
LYMPHOCYTES ABSOLUTE: 1.2 THOU/MM3 (ref 1–4.8)
MCH RBC QN AUTO: 31.3 PG (ref 26–33)
MCHC RBC AUTO-ENTMCNC: 33.9 GM/DL (ref 32.2–35.5)
MCV RBC AUTO: 92.6 FL (ref 80–94)
MICROALBUMIN UR-MCNC: 4.65 MG/DL
MICROALBUMIN/CREAT UR-RTO: 17 MG/G (ref 0–30)
MONOCYTES # BLD: 8.8 %
MONOCYTES ABSOLUTE: 0.4 THOU/MM3 (ref 0.4–1.3)
NUCLEATED RED BLOOD CELLS: 0 /100 WBC
PLATELET # BLD: 264 THOU/MM3 (ref 130–400)
PMV BLD AUTO: 9.2 FL (ref 9.4–12.4)
POTASSIUM SERPL-SCNC: 4.3 MEQ/L (ref 3.5–5.2)
PROSTATE SPECIFIC ANTIGEN: 1.53 NG/ML (ref 0–1)
RBC # BLD: 4.85 MILL/MM3 (ref 4.7–6.1)
SEG NEUTROPHILS: 60.7 %
SEGMENTED NEUTROPHILS ABSOLUTE COUNT: 2.8 THOU/MM3 (ref 1.8–7.7)
SODIUM BLD-SCNC: 140 MEQ/L (ref 135–145)
TOTAL PROTEIN: 7.8 G/DL (ref 6.1–8)
TRIGL SERPL-MCNC: 78 MG/DL (ref 0–199)
WBC # BLD: 4.6 THOU/MM3 (ref 4.8–10.8)

## 2020-11-23 PROCEDURE — 80061 LIPID PANEL: CPT

## 2020-11-23 PROCEDURE — 36415 COLL VENOUS BLD VENIPUNCTURE: CPT

## 2020-11-23 PROCEDURE — 84207 ASSAY OF VITAMIN B-6: CPT

## 2020-11-23 PROCEDURE — G0103 PSA SCREENING: HCPCS

## 2020-11-23 PROCEDURE — 82043 UR ALBUMIN QUANTITATIVE: CPT

## 2020-11-23 PROCEDURE — 85025 COMPLETE CBC W/AUTO DIFF WBC: CPT

## 2020-11-23 PROCEDURE — 80053 COMPREHEN METABOLIC PANEL: CPT

## 2020-11-24 ENCOUNTER — TELEPHONE (OUTPATIENT)
Dept: FAMILY MEDICINE CLINIC | Age: 70
End: 2020-11-24

## 2020-11-26 LAB — VITAMIN B6: 111.4 NMOL/L (ref 20–125)

## 2020-11-30 ENCOUNTER — TELEPHONE (OUTPATIENT)
Dept: FAMILY MEDICINE CLINIC | Age: 70
End: 2020-11-30

## 2020-11-30 NOTE — TELEPHONE ENCOUNTER
----- Message from Tashi Ríos DO sent at 11/29/2020  9:52 AM EST -----  Please let pt know that b6 level remains WNL  Let me know if questions, thanks!

## 2020-12-10 ENCOUNTER — TELEPHONE (OUTPATIENT)
Dept: FAMILY MEDICINE CLINIC | Age: 70
End: 2020-12-10

## 2020-12-11 ENCOUNTER — OFFICE VISIT (OUTPATIENT)
Dept: NEUROLOGY | Age: 70
End: 2020-12-11
Payer: MEDICARE

## 2020-12-11 VITALS
HEIGHT: 69 IN | DIASTOLIC BLOOD PRESSURE: 74 MMHG | SYSTOLIC BLOOD PRESSURE: 136 MMHG | WEIGHT: 243 LBS | OXYGEN SATURATION: 98 % | HEART RATE: 69 BPM | BODY MASS INDEX: 35.99 KG/M2

## 2020-12-11 PROCEDURE — 99213 OFFICE O/P EST LOW 20 MIN: CPT | Performed by: NURSE PRACTITIONER

## 2020-12-11 PROCEDURE — G8427 DOCREV CUR MEDS BY ELIG CLIN: HCPCS | Performed by: NURSE PRACTITIONER

## 2020-12-11 PROCEDURE — 4040F PNEUMOC VAC/ADMIN/RCVD: CPT | Performed by: NURSE PRACTITIONER

## 2020-12-11 PROCEDURE — 3017F COLORECTAL CA SCREEN DOC REV: CPT | Performed by: NURSE PRACTITIONER

## 2020-12-11 PROCEDURE — 1123F ACP DISCUSS/DSCN MKR DOCD: CPT | Performed by: NURSE PRACTITIONER

## 2020-12-11 PROCEDURE — 1036F TOBACCO NON-USER: CPT | Performed by: NURSE PRACTITIONER

## 2020-12-11 PROCEDURE — G8484 FLU IMMUNIZE NO ADMIN: HCPCS | Performed by: NURSE PRACTITIONER

## 2020-12-11 PROCEDURE — G8417 CALC BMI ABV UP PARAM F/U: HCPCS | Performed by: NURSE PRACTITIONER

## 2020-12-11 NOTE — PROGRESS NOTES
NEUROLOGY OUT PATIENT FOLLOW UP NOTE:  12/11/20208:31 AM    Ad Loving is here for follow up for seizure. No reported seizure. He is on keppra and is tolerating the medication well. He is compliant. He is off any ETOH for last 10 years. He is pleased with how he is doing. He comes in today by himself to discuss the plan of care going forward. ROS:  Respiratory : no cough, no shortness of breath  Cardiac: no chest pain. No palpitations. Renal : no flank pain, no hematuria    Skin: no rash      No Known Allergies    Current Outpatient Medications:     amLODIPine (NORVASC) 2.5 MG tablet, Take 1 tablet by mouth daily, Disp: 90 tablet, Rfl: 3    pyridoxine (B-6) 25 MG tablet, Take 1 tablet by mouth once a week, Disp: 12 tablet, Rfl: 3    levETIRAcetam (KEPPRA) 1000 MG tablet, take 1 tablet by mouth twice a day, Disp: 60 tablet, Rfl: 11    atorvastatin (LIPITOR) 20 MG tablet, take 1 tablet by mouth once daily, Disp: 90 tablet, Rfl: 3    aspirin 81 MG chewable tablet, Take 81 mg by mouth daily, Disp: , Rfl:     Handicap Placard MISC, by Does not apply route Expires 15 SEPT 2025, Disp: 1 each, Rfl: 0    tiZANidine (ZANAFLEX) 4 MG tablet, Take 1 tablet by mouth 3 times daily as needed (low back pain), Disp: 45 tablet, Rfl: 0    Naproxen Sodium (ALEVE) 220 MG CAPS, Take by mouth as needed for Pain, Disp: , Rfl:     I reviewed the past medical history, allergies, medications, social history and family history. PE:   Vitals:    12/11/20 0823   BP: 136/74   Site: Left Upper Arm   Position: Sitting   Cuff Size: Large Adult   Pulse: 69   SpO2: 98%   Weight: 243 lb (110.2 kg)   Height: 5' 9\" (1.753 m)     General Appearance:  awake, alert, oriented, in no acute distress  Gen: NAD, Language is Intact. Skin: no rash, lesion, dry  to touch. warm  Head: no rash, no icterus  Neck: There is no carotid bruits. The Neck is supple. There is no neck lymphadenopathy.    Neuro: CN 2-12 grossly intact with no focal Segs Absolute 2.8 1.8 - 7.7 thou/mm3    Lymphocytes Absolute 1.2 1.0 - 4.8 thou/mm3    Monocytes Absolute 0.4 0.4 - 1.3 thou/mm3    Eosinophils Absolute 0.1 0.0 - 0.4 thou/mm3    Basophils Absolute 0.0 0.0 - 0.1 thou/mm3    Immature Grans (Abs) 0.03 0.00 - 0.07 thou/mm3    nRBC 0 /100 wbc   Anion Gap   Result Value Ref Range    Anion Gap 14.0 8.0 - 16.0 meq/L   Glomerular Filtration Rate, Estimated   Result Value Ref Range    Est, Glom Filt Rate 83 (A) ml/min/1.73m2               Assessment:     Diagnosis Orders   1. Seizure disorder [facility])          He is doing well. No seizure or spells. No reported seizure. He is on keppra and is tolerating the medication well. He is compliant. He is off any ETOH for last 10 years. He is pleased with how he is doing. After detailed discussion with patient we agreed on the following plan. Plan:  1. Continue with Keppra 1000 mg twice a day. Refills given. 2. Report any new seizure/spells  3. Follow up in 1 year. 4. All patient's questions were answered. Please call if any questions. Time spent evaluating patient, reviewing records, counseling with more than 50% of the time spent for counseling was 15 min.     Lul Dean CNP

## 2020-12-14 NOTE — PROGRESS NOTES
Chief Complaint   Patient presents with    Follow-up     Chronic issues as noted below     History obtained from the patient and sister (via phone today with sister). 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 Sister here to help with hx. Trouble reading and writing. SUBJECTIVE:  Ceci Núñez is a 79 y.o. male that presents today for       -HTN:    HPI PRIOR VISIT: was on lisinopril. Stopped as above. BP's <140/90 since discharge w/o meds    UPDATE PRIOR VISIT: BP <140/90. No CP/SOB. con't to be off meds and BP's fine. UPDATE PRIOR VISIT: started on norvasc 2.5mg by nephro as BP's had started to go high. However, now BP <140/90. UPDATE PRIOR VISIT: BPs <140/90. No CP/SOB    UPDATE TODAY: BPs <140/90. On norvasc yet. Needs refilled     BP Readings from Last 3 Encounters:   12/15/20 120/74   12/11/20 136/74   10/19/20 (!) 128/56       -B 6 def PRIOR VISIT: on supplementation. When goes off all together, levels low. When takes daily high. currently on once per wk, due for labs soon. UPDATE TODAY: taking supplement once per wk (more frequent levels too high, not taking, levels too low). Tolerating well. B6 level pending today. -HLD:    HPI:     Taking meds as prescribed ?: yes  Tolerating well ?: yes  Side Effects ?: denies  Muscle Pain?: denies  Working on TLCS ?: yes      -HF with dec EF PRIOR VISIT: pt had echo done almost 2 years ago. Pt was unaware. No hx of CAD. Former heavy drinker. No CP, SOB, orthopnea or PND.      UPDATE PRIOR VISIT: had echo, EF up to 50% with grade 1 diastolic dys. No CP, SOB, orthopnea or PND. Has cardio apt upcoming.       UPDATE TODAY: echo stable. No CP/SOB. Following with cardio as well. No orthopnea, wts stable. At last cardio visit, was told could f/u prn       -Seizure d/o/Cog deficits: TBI 8+ years ago, seizure at the time. On keppra. Following with neuro. No seizure in 8 + years. Also has some mild cog deficits. pts sister helps care for home.  He completes most ADLs and iADLs      -Morbid obesity:  wts about the same last few months  Down 10 lbs from earlier this year.    Diet better  Exercises some    Wt Readings from Last 3 Encounters:   12/15/20 243 lb 3.2 oz (110.3 kg)   12/11/20 243 lb (110.2 kg)   10/19/20 241 lb (109.3 kg)     Age/Gender Health Maintenance    Lipid -   Lab Results   Component Value Date    CHOL 143 11/23/2020    CHOL 133 11/05/2019    CHOL 121 04/29/2019     Lab Results   Component Value Date    TRIG 78 11/23/2020    TRIG 58 11/05/2019    TRIG 61 04/29/2019     Lab Results   Component Value Date    HDL 47 11/23/2020    HDL 45 11/05/2019    HDL 39 04/29/2019     Lab Results   Component Value Date    LDLCALC 80 11/23/2020    [address] 76 11/05/2019    [address] 70 04/29/2019       DM Screen -   Lab Results   Component Value Date    GLUCOSE 96 11/23/2020       Colon Cancer Screening - NEG FIT MAY 2019, cologuard ordered June 2020 and again [id number] [place]  Lung Cancer Screening (Age 54 to [de-identified] with 30 pack year hx, current smoker or quit within past 15 years) - never smoker    Tetanus - UTD 2013  Influenza Vaccine - UTD FALL 2020  Pneumonia Vaccine - UTD PPV 23 AUG 2014 and PCV 13 OCT 2017/and PPV 23 in NOV 2019  Zoster - to get at pharmacy per medicare     PSA testing discussion -   Lab Results   Component Value Date    PSA 1.53 (H) 11/23/2020    PSA 1.42 (H) 11/05/2019    PSA 1.62 (H) 10/29/2018       AAA Screening - never smoker    Specialists List:  Beryl Muro: wound care  Min: neuro for seizures  Cardio: Blaine Gonzalez       Current Outpatient Medications   Medication Sig Dispense Refill    amLODIPine (NORVASC) 2.5 MG tablet Take 1 tablet by mouth daily 90 tablet 3    pyridoxine (B-6) 25 MG tablet Take 1 tablet by mouth once a week 12 tablet 3    levETIRAcetam (KEPPRA) 1000 MG tablet take 1 tablet by mouth twice a day 60 tablet 11    Handicap Placard MISC by Does not apply route Expires 15 SEPT 2025 1 each 0    atorvastatin (LIPITOR) 20 MG tablet take 1 tablet by mouth once daily 90 tablet 3    Naproxen Sodium (ALEVE) 220 MG CAPS Take by mouth as needed for Pain      aspirin 81 MG chewable tablet Take 81 mg by mouth daily      tiZANidine (ZANAFLEX) 4 MG tablet Take 1 tablet by mouth 3 times daily as needed (low back pain) 45 tablet 0     No current facility-administered medications for this visit. No orders of the defined types were placed in this encounter. All medications reviewed and reconciled, including OTC and herbal medications. Updated list given to patient. 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     Chronic bilateral low back pain without sciatica 09/10/2018    Vitamin B6 deficiency 10/29/2017    History of acute kidney injury from excessive NSAID use     Osteoarthritis     Hypertension     Hyperlipidemia     History of DVT of right lower extremity      2015. Txt with 6 months of coumadin. Provoked.  Heart failure with preserved ejection fraction (HCC)     History of alcohol abuse     History of traumatic brain injury     History of non-healing open leg wound of R leg. S/p skin graft. Healed. From chronic venous insuficiency. 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 S/p skin graft. Healed. From chronic venous insuficiency.     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 Dewayne Cohen follows, no seizure activity since    Seizure disorder ([facility] 7/17/2014       Past Surgical History:   Procedure Laterality Date    CO INCIS/DRAIN THIGH/KNEE ABSCESS,DEEP Right 9/25/2017    RIGHT SERGIO LEG WOUND DEBRIDEMENT WITH SKIN GRAFT AND WOUND VAC APPLICATION performed by Fitz Suarez DPM at [address] [address] [address] Right 10/9/2017    SPLIT THICKNESS SKIN GRAFT FROM RIGHT LEG performed by Fitz Suarez DPM at [facility] [address]      vein stripping       No Known Allergies      Social History     Tobacco Use    Smoking status: Never Smoker    Smokeless tobacco: Never Used   Substance Use Topics    Alcohol use: No     Comment:           Family History   Problem Relation Age of Onset    Cancer Mother     Stroke Mother     Diabetes Mother     High Blood Pressure Mother     High Cholesterol Mother     Stroke Father     COPD Father     Diabetes Maternal Grandmother     Heart Disease Maternal Grandfather     Cancer Sister     Asthma Sister     High Blood Pressure Sister     High Cholesterol Sister     Arthritis Sister     Diabetes Sister     Diabetes Sister     Colon Cancer Neg Hx     Prostate Cancer Neg Hx          I have reviewed the patient's past medical history, past surgical history, allergies, medications, social and family history and I have made updates where appropriate.       Review of Systems  Positive responses are highlighted in bold    Constitutional:  Fever, Chills, Night Sweats, Fatigue, Unexpected changes in weight  HENT:  Ear pain, Tinnitus, Nosebleeds, Trouble swallowing, Hearing loss, Sore throat  Cardiovascular:  Chest Pain, Palpitations, Orthopnea, Paroxysmal Nocturnal Dyspnea  Respiratory:  Cough, Wheezing, Shortness of breath, Chest tightness, Apnea  Gastrointestinal:  Nausea, Vomiting, Diarrhea, Constipation, Heartburn, Blood in stool  Genitourinary:  Difficulty or painful urination, Flank pain, Change in frequency, Urgency  Skin:  Color change, Rash, Itching, Wound  Musculoskeletal:  Joint pain, Back pain, Gait problems, Joint swelling, Myalgias  Neurological:  Dizziness, Headaches, Presyncope, Numbness, Seizures, Tremors  Endocrine:  Heat Intolerance, Cold Intolerance, Polydipsia, Polyphagia, Polyuria      PHYSICAL EXAM:  Vitals:    12/15/20 1046   BP: 120/74   Pulse: 58   Resp: 16   Temp: 97.4 °F (36.3 °C)   TempSrc: Temporal   SpO2: 96%   Weight: 243 lb 3.2 oz (110.3 kg)   Height: 5' 9\" (1.753 m)     Body mass index is 35.91 kg/m². Pain Score: 2(Chronic low back pain)    VS Reviewed  General Appearance: A&O x 3, No acute distress,well developed and well- nourished  Eyes: pupils equal, round, and reactive to light, extraocular eye movements intact, conjunctivae and eye lids without erythema  ENT: external ear and ear canal clear bilaterally, TMs intact and regular, nose without deformity, nasal mucosa and turbinates normal without polyps, oropharynx normal, dentition is normal for age  Neck: supple and non-tender without mass, no thyromegaly or thyroid nodules, no cervical lymphadenopathy  Pulmonary/Chest: clear to auscultation bilaterally- no wheezes, rales or rhonchi, normal air movement, no respiratory distress or retractions  Cardiovascular: S1 and S2 auscultated w/ RRR. No murmurs, rubs, clicks, or gallops, distal pulses intact. Abdomen: soft, non-tender, non-distended, bowl sounds physiologic,  no rebound or guarding, no masses or hernias noted. Liver and spleen without enlargement. Extremities: no cyanosis, clubbing or edema of the lower extremities. +2 PT/DP bilaterally. Psych: Affect appropriate. Mood normal. Thought process is normal without evidence of depression or psychosis. Good insight and appropriate interaction. Cognition and memory appear to be impaired.   Skin: warm and dry, no rash or erythema      Hospital Outpatient Visit on 11/23/2020   Component Date Value Ref Range Status    Vitamin B6, Plasma 11/23/2020 111.4  20.0 - 125.0 nmol/L Final    PSA 11/23/2020 1.53* 0.00 - 1.00 ng/ml Final    Microalbumin, Random Urine 11/23/2020 4.65  mg/dL Final    Creatinine, Urine 11/23/2020 274.8  mg/dL Final    Microalb/Creat Ratio 11/23/2020 17  0 - 30 mg/g Final    Cholesterol, Total 11/23/2020 143  100 - 199 mg/dL Final    Triglycerides 11/23/2020 78  0 - 199 mg/dL Final    HDL 11/23/2020 47  mg/dL Final    LDL Calculated 11/23/2020 80  mg/dL Final    Glucose 11/23/2020 96  70 - 108 mg/dL Final    CREATININE 11/23/2020 0.9  0.4 - 1.2 mg/dL Final    BUN 11/23/2020 9  7 - 22 mg/dL Final    Sodium 11/23/2020 140  135 - 145 meq/L Final    Potassium 11/23/2020 4.3  3.5 - 5.2 meq/L Final    Chloride 11/23/2020 102  98 - 111 meq/L Final    CO2 11/23/2020 24  23 - 33 meq/L Final    Calcium 11/23/2020 9.8  8.5 - 10.5 mg/dL Final    AST 11/23/2020 25  5 - 40 U/L Final    Alkaline Phosphatase 11/23/2020 64  38 - 126 U/L Final    Total Protein 11/23/2020 7.8  6.1 - 8.0 g/dL Final    Alb 11/23/2020 4.6  3.5 - 5.1 g/dL Final    Total Bilirubin 11/23/2020 0.8  0.3 - 1.2 mg/dL Final    ALT 11/23/2020 25  11 - 66 U/L Final    WBC 11/23/2020 4.6* 4.8 - 10.8 thou/mm3 Final    RBC 11/23/2020 4.85  4.70 - 6.10 mill/mm3 Final    Hemoglobin 11/23/2020 15.2  14.0 - 18.0 gm/dl Final    Hematocrit 11/23/2020 44.9  42.0 - 52.0 % Final    MCV 11/23/2020 92.6  80.0 - 94.0 fL Final    MCH 11/23/2020 31.3  26.0 - 33.0 pg Final    MCHC 11/23/2020 33.9  32.2 - 35.5 gm/dl Final    RDW-CV 11/23/2020 13.2  11.5 - 14.5 % Final    RDW-SD 11/23/2020 44.9  35.0 - 45.0 fL Final    Platelets 80/69/1799 264  130 - 400 thou/mm3 Final    MPV 11/23/2020 9.2* 9.4 - 12.4 fL Final    Seg Neutrophils 11/23/2020 60.7  % Final    Lymphocytes 11/23/2020 26.5  % Final    Monocytes 11/23/2020 8.8  % Final    Eosinophils 11/23/2020 2.4  % Final    Basophils 11/23/2020 0.9  % Final    Immature Granulocytes 11/23/2020 0.7  % Final    Segs Absolute 11/23/2020 2.8  1.8 - 7.7 thou/mm3 Final    Lymphocytes Absolute 11/23/2020 1.2  1.0 - 4.8 thou/mm3 Final    Monocytes Absolute 11/23/2020 0.4  0.4 - 1.3 thou/mm3 Final    Eosinophils Absolute 11/23/2020 0.1  0.0 - 0.4 thou/mm3 Final    Basophils Absolute 11/23/2020 0.0  0.0 - 0.1 thou/mm3 Final    Immature Grans (Abs) 11/23/2020 0.03  0.00 - 0.07 thou/mm3 Final    nRBC 11/23/2020 0  /100 wbc Final    Anion Gap 11/23/2020 14.0  8.0 - 16.0 meq/L Final    Est, Glom Filt Rate 11/23/2020 83* ml/min/1.73m2 Final       ASSESSMENT & PLAN  1. Essential hypertension    At goal  con't meds  Labs UTD    2. Vitamin B6 deficiency    con't supplementation  Labs stable  Repeat before next visit, in call back cue    3. Mixed hyperlipidemia    con't statin  Labs appropriate    4. Chronic heart failure with preserved ejection fraction (HCC)    Stable  Mild  Doing well  No sxs  Monitor BP's   Low salt diet  F/u cardio prn per their last note    5. 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 Morbid obesity ([facility]    Discussed wt loss strategies    8. Screening for colon cancer    - Cologuard; Future      DISPOSITION    Return in about 6 months (around 6/15/2021) for follow-up on chronic medical conditions, sooner as needed. Jonel Blancas released without restrictions.     Future Appointments   Date Time Provider Brandon Lopez   6/15/2021  2:00 PM Jaquan Quiñones DO [facility] - [address] [place] [address]   12/17/2021 10:30 AM Bonnie Khanna MD [facility] [address]     PATIENT COUNSELING    Jonel Blancas received counseling on the following healthy behaviors: nutrition, exercise and medication adherence    Patient given educational materials on: See Attached

## 2020-12-15 ENCOUNTER — OFFICE VISIT (OUTPATIENT)
Dept: FAMILY MEDICINE CLINIC | Age: 70
End: 2020-12-15
Payer: MEDICARE

## 2020-12-15 VITALS
BODY MASS INDEX: 36.02 KG/M2 | DIASTOLIC BLOOD PRESSURE: 74 MMHG | SYSTOLIC BLOOD PRESSURE: 120 MMHG | HEART RATE: 58 BPM | HEIGHT: 69 IN | OXYGEN SATURATION: 96 % | RESPIRATION RATE: 16 BRPM | TEMPERATURE: 97.4 F | WEIGHT: 243.2 LBS

## 2020-12-15 PROCEDURE — G8417 CALC BMI ABV UP PARAM F/U: HCPCS | Performed by: FAMILY MEDICINE

## 2020-12-15 PROCEDURE — 4040F PNEUMOC VAC/ADMIN/RCVD: CPT | Performed by: FAMILY MEDICINE

## 2020-12-15 PROCEDURE — 1036F TOBACCO NON-USER: CPT | Performed by: FAMILY MEDICINE

## 2020-12-15 PROCEDURE — 1123F ACP DISCUSS/DSCN MKR DOCD: CPT | Performed by: FAMILY MEDICINE

## 2020-12-15 PROCEDURE — G8427 DOCREV CUR MEDS BY ELIG CLIN: HCPCS | Performed by: FAMILY MEDICINE

## 2020-12-15 PROCEDURE — G8484 FLU IMMUNIZE NO ADMIN: HCPCS | Performed by: FAMILY MEDICINE

## 2020-12-15 PROCEDURE — 3017F COLORECTAL CA SCREEN DOC REV: CPT | Performed by: FAMILY MEDICINE

## 2020-12-15 PROCEDURE — 99214 OFFICE O/P EST MOD 30 MIN: CPT | Performed by: FAMILY MEDICINE

## 2020-12-15 RX ORDER — TIZANIDINE 4 MG/1
4 TABLET ORAL 3 TIMES DAILY PRN
Qty: 45 TABLET | Refills: 0 | Status: SHIPPED | OUTPATIENT
Start: 2020-12-15 | End: 2021-06-22

## 2020-12-15 NOTE — PATIENT INSTRUCTIONS

## 2021-05-14 ENCOUNTER — TELEPHONE (OUTPATIENT)
Dept: FAMILY MEDICINE CLINIC | Age: 71
End: 2021-05-14

## 2021-05-14 DIAGNOSIS — E53.1 VITAMIN B6 DEFICIENCY: Primary | ICD-10-CM

## 2021-05-14 NOTE — TELEPHONE ENCOUNTER
Pt due for fasting labs prior to next apt on 6/22/2021. Please call to have pt complete this. Thanks! ASSESSMENT & PLAN   Diagnosis Orders   1.  Vitamin B6 deficiency       Future Appointments   Date Time Provider Brandon Lopez   6/22/2021  2:00 PM Mayi Jackson DO [facility]   12/17/2021 10:30 AM Casey Rodriguez MD N [facility] - [facility] [address] [facility] [facility]

## 2021-06-17 ENCOUNTER — HOSPITAL ENCOUNTER (OUTPATIENT)
Age: 71
Discharge: HOME | End: 2021-06-17
Payer: MEDICARE

## 2021-06-17 DIAGNOSIS — E53.1 VITAMIN B6 DEFICIENCY: ICD-10-CM

## 2021-06-17 LAB
BASOPHILS # BLD: 1 %
BASOPHILS ABSOLUTE: 0 THOU/MM3 (ref 0–0.1)
EOSINOPHIL # BLD: 2.7 %
EOSINOPHILS ABSOLUTE: 0.1 THOU/MM3 (ref 0–0.4)
ERYTHROCYTE [DISTWIDTH] IN BLOOD BY AUTOMATED COUNT: 13.8 % (ref 11.5–14.5)
ERYTHROCYTE [DISTWIDTH] IN BLOOD BY AUTOMATED COUNT: 46.8 FL (ref 35–45)
HCT VFR BLD CALC: 42.3 % (ref 42–52)
HEMOGLOBIN: 13.7 GM/DL (ref 14–18)
IMMATURE GRANS (ABS): 0.02 THOU/MM3 (ref 0–0.07)
IMMATURE GRANULOCYTES: 0.4 %
LYMPHOCYTES # BLD: 28 %
LYMPHOCYTES ABSOLUTE: 1.3 THOU/MM3 (ref 1–4.8)
MCH RBC QN AUTO: 30.4 PG (ref 26–33)
MCHC RBC AUTO-ENTMCNC: 32.4 GM/DL (ref 32.2–35.5)
MCV RBC AUTO: 93.8 FL (ref 80–94)
MONOCYTES # BLD: 10.1 %
MONOCYTES ABSOLUTE: 0.5 THOU/MM3 (ref 0.4–1.3)
NUCLEATED RED BLOOD CELLS: 0 /100 WBC
PLATELET # BLD: 240 THOU/MM3 (ref 130–400)
PMV BLD AUTO: 9.2 FL (ref 9.4–12.4)
RBC # BLD: 4.51 MILL/MM3 (ref 4.7–6.1)
SEG NEUTROPHILS: 57.8 %
SEGMENTED NEUTROPHILS ABSOLUTE COUNT: 2.8 THOU/MM3 (ref 1.8–7.7)
WBC # BLD: 4.8 THOU/MM3 (ref 4.8–10.8)

## 2021-06-17 PROCEDURE — 85025 COMPLETE CBC W/AUTO DIFF WBC: CPT

## 2021-06-17 PROCEDURE — 36415 COLL VENOUS BLD VENIPUNCTURE: CPT

## 2021-06-17 PROCEDURE — 84207 ASSAY OF VITAMIN B-6: CPT

## 2021-06-21 LAB — VITAMIN B6: 129.3 NMOL/L (ref 20–125)

## 2021-06-21 NOTE — PROGRESS NOTES
Chief Complaint   Patient presents with    Medicare AW    Follow-up     labs, cbc and b6    Follow-up     Chronic issues as noted below     History obtained from the patient and sister (via phone today with sister). 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 Sister here to help with hx. Trouble reading and writing. SUBJECTIVE:  Spike Lynch is a 70 y.o. male that presents today for       -HTN:    HPI PRIOR VISIT: was on lisinopril. Stopped as above. BP's <140/90 since discharge w/o meds    UPDATE PRIOR VISIT: BP <140/90. No CP/SOB. con't to be off meds and BP's fine. UPDATE PRIOR VISIT: started on norvasc 2.5mg by nephro as BP's had started to go high. However, now BP <140/90. UPDATE PRIOR VISIT: BPs <140/90. No CP/SOB    UPDATE TODAY: BPs <140/90. On norvasc yet. Needs refilled     BP Readings from Last 3 Encounters:   06/22/21 (!) 114/58   12/15/20 120/74   12/11/20 136/74         -B 6 def PRIOR VISIT: on supplementation. When goes off all together, levels low. When takes daily high. currently on once per wk, due for labs soon. UPDATE TODAY: taking supplement once per wk (more frequent levels too high, not taking, levels too low). Tolerating well. b6 level a little high on last check with very mild anemia. No bleeding, melena or hematochezia. -HLD:    HPI:     Taking meds as prescribed ?: yes  Tolerating well ?: yes  Side Effects ?: denies  Muscle Pain?: denies  Working on TLCS ?: yes      -HF with dec EF PRIOR VISIT: pt had echo done almost 2 years ago. Pt was unaware. No hx of CAD. Former heavy drinker. No CP, SOB, orthopnea or PND.      UPDATE PRIOR VISIT: had echo, EF up to 50% with grade 1 diastolic dys. No CP, SOB, orthopnea or PND. Has cardio apt upcoming.       UPDATE TODAY: echo stable. No CP/SOB. Following with cardio as well. No orthopnea, wts stable. At last cardio visit, was told could f/u prn       -Seizure d/o/Cog deficits: TBI 8+ years ago, seizure at the time.  On keppra. Following with neuro. No seizure in 8 + years. Also has some mild cog deficits. pts sister helps care for home.  He completes most ADLs and iADLs      -Morbid obesity:  wts about the same last few months  Diet better  Exercises some    Wt Readings from Last 3 Encounters:   06/22/21 244 lb (110.7 kg)   12/15/20 243 lb 3.2 oz (110.3 kg)   12/11/20 243 lb (110.2 kg)       Age/Gender Health Maintenance    Lipid -   Lab Results   Component Value Date    CHOL 143 11/23/2020    CHOL 133 11/05/2019    CHOL 121 04/29/2019     Lab Results   Component Value Date    TRIG 78 11/23/2020    TRIG 58 11/05/2019    TRIG 61 04/29/2019     Lab Results   Component Value Date    HDL 47 11/23/2020    HDL 45 11/05/2019    HDL 39 04/29/2019     Lab Results   Component Value Date    LDLCALC 80 11/23/2020    [address] 76 11/05/2019    [address] 70 04/29/2019       DM Screen -   Lab Results   Component Value Date    GLUCOSE 96 11/23/2020       Colon Cancer Screening - NEG FIT MAY 2019, cologuard ordered June 2020 and again [facility] [address] 2020 and June 2021  Lung Cancer Screening (Age 54 to [de-identified] with 30 pack year hx, current smoker or quit within past 15 years) - never smoker    Tetanus - UTD 2013  Influenza Vaccine - UTD FALL 2020  Pneumonia Vaccine - UTD PPV 23 AUG 2014 and PCV 13 OCT 2017/and PPV 23 in NOV 2019  Zoster - to get at pharmacy per medicare     PSA testing discussion -   Lab Results   Component Value Date    PSA 1.53 (H) 11/23/2020    PSA 1.42 (H) 11/05/2019    PSA 1.62 (H) 10/29/2018       AAA Screening - never smoker    Specialists List:  Sofia Kitchen: wound care  Min: neuro for seizures  Cardio: Roxanne Morales       Current Outpatient Medications   Medication Sig Dispense Refill    amLODIPine (NORVASC) 2.5 MG tablet Take 1 tablet by mouth daily 90 tablet 3    pyridoxine (B-6) 25 MG tablet Take 1 tablet by mouth once a week 12 tablet 3    levETIRAcetam (KEPPRA) 1000 MG tablet take 1 tablet by mouth twice a day 60 tablet 11    Handicap Placard MISC by Does not apply route Expires 15 SEPT 2025 1 each 0    atorvastatin (LIPITOR) 20 MG tablet take 1 tablet by mouth once daily 90 tablet 3    Naproxen Sodium (ALEVE) 220 MG CAPS Take by mouth as needed for Pain      aspirin 81 MG chewable tablet Take 81 mg by mouth daily       No current facility-administered medications for this visit. No orders of the defined types were placed in this encounter. All medications reviewed and reconciled, including OTC and herbal medications. Updated list given to patient. 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 Txt with 6 months of coumadin. Provoked.  Heart failure with preserved ejection fraction (HCC)     History of alcohol abuse     History of traumatic brain injury     History of non-healing open leg wound of R leg. S/p skin graft. Healed. From chronic venous insuficiency. 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 S/p skin graft. Healed. From chronic venous insuficiency.     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 Everardo Guevara follows, no seizure activity since    [facility] 7/17/2014       Past Surgical History:   Procedure Laterality Date    SC INCIS/DRAIN THIGH/KNEE ABSCESS,DEEP Right 9/25/2017    RIGHT SERGIO LEG WOUND DEBRIDEMENT WITH SKIN GRAFT AND WOUND VAC APPLICATION performed by Luci Hodgson DPM at [address] [address] [address] Right 10/9/2017    SPLIT THICKNESS SKIN GRAFT FROM RIGHT LEG performed by Luci Hodgson DPM at [facility]      vein stripping       No Known Allergies      Social History     Tobacco Use    Smoking status: Never Smoker    Smokeless tobacco: Never Used   Substance Use Topics    Alcohol use: No     Comment:           Family History   Problem Relation Age of Onset    Cancer Mother     Stroke Mother     Diabetes Mother     High Blood Pressure Mother     High Cholesterol Mother     Stroke Father     COPD Father     Diabetes Maternal Grandmother     Heart Disease Maternal Grandfather     Cancer Sister     Asthma Sister     High Blood Pressure Sister     High Cholesterol Sister     Arthritis Sister     Diabetes Sister     Diabetes Sister     Colon Cancer Neg Hx     Prostate Cancer Neg Hx          I have reviewed the patient's past medical history, past surgical history, allergies, medications, social and family history and I have made updates where appropriate.       Review of Systems  Positive responses are highlighted in bold    Constitutional:  Fever, Chills, Night Sweats, Fatigue, Unexpected changes in weight  HENT:  Ear pain, Tinnitus, Nosebleeds, Trouble swallowing, Hearing loss, Sore throat  Cardiovascular:  Chest Pain, Palpitations, Orthopnea, Paroxysmal Nocturnal Dyspnea  Respiratory:  Cough, Wheezing, Shortness of breath, Chest tightness, Apnea  Gastrointestinal:  Nausea, Vomiting, Diarrhea, Constipation, Heartburn, Blood in stool  Genitourinary:  Difficulty or painful urination, Flank pain, Change in frequency, Urgency  Skin:  Color change, Rash, Itching, Wound  Musculoskeletal:  Joint pain, Back pain, Gait problems, Joint swelling, Myalgias  Neurological:  Dizziness, Headaches, Presyncope, Numbness, Seizures, Tremors  Endocrine:  Heat Intolerance, Cold Intolerance, Polydipsia, Polyphagia, Polyuria      PHYSICAL EXAM:  Vitals:    06/22/21 1338   BP: (!) 114/58   Pulse: 60   Resp: 12   Temp: 98.5 °F (36.9 °C)   TempSrc: Oral   SpO2: 99%   Weight: 244 lb (110.7 kg)   Height: 5' 7\" (1.702 m)     Body mass index is 38.22 kg/m². Pain Score:   3 (chronic low back pain)    VS Reviewed  General Appearance: A&O x 3, No acute distress,well developed and well- nourished  Eyes: pupils equal, round, and reactive to light, extraocular eye movements intact, conjunctivae and eye lids without erythema  ENT: external ear and ear canal clear bilaterally, TMs intact and regular, nose without deformity, nasal mucosa and turbinates normal without polyps, oropharynx normal, dentition is normal for age  Neck: supple and non-tender without mass, no thyromegaly or thyroid nodules, no cervical lymphadenopathy  Pulmonary/Chest: clear to auscultation bilaterally- no wheezes, rales or rhonchi, normal air movement, no respiratory distress or retractions  Cardiovascular: S1 and S2 auscultated w/ RRR. No murmurs, rubs, clicks, or gallops, distal pulses intact. Abdomen: soft, non-tender, non-distended, bowl sounds physiologic,  no rebound or guarding, no masses or hernias noted. Liver and spleen without enlargement. Extremities: no cyanosis, clubbing or edema of the lower extremities. +2 PT/DP bilaterally. Psych: Affect appropriate. Mood normal. Thought process is normal without evidence of depression or psychosis. Good insight and appropriate interaction. Cognition and memory appear to be impaired.   Skin: warm and dry, no rash or erythema      Hospital Outpatient Visit on 06/17/2021   Component Date Value Ref Range Status    WBC 06/17/2021 4.8  4.8 - 10.8 thou/mm3 Final    RBC 06/17/2021 4.51* 4.70 - 6.10 mill/mm3 Final    Hemoglobin 06/17/2021 13.7* 14.0 - 18.0 gm/dl Final    Hematocrit 06/17/2021 42.3  42.0 - 52.0 % Final    MCV 06/17/2021 93.8  80.0 - 94.0 fL Final    MCH 06/17/2021 30.4  26.0 - 33.0 pg Final    MCHC 06/17/2021 32.4  32.2 - 35.5 gm/dl Final    RDW-CV 06/17/2021 13.8  11.5 - 14.5 % Final    RDW-SD 06/17/2021 46.8* 35.0 - 45.0 fL Final    Platelets 95/13/4150 240  130 - 400 thou/mm3 Final    MPV 06/17/2021 9.2* 9.4 - 12.4 fL Final    Seg Neutrophils 06/17/2021 57.8  % Final    Lymphocytes 06/17/2021 28.0  % Final    Monocytes 06/17/2021 10.1  % Final    Eosinophils 06/17/2021 2.7  % Final    Basophils 06/17/2021 1.0  % Final    Immature Granulocytes 06/17/2021 0.4  % Final    Segs Absolute 06/17/2021 2.8  1 - 7 thou/mm3 Final    Lymphocytes Absolute 06/17/2021 1.3  1.0 - 4.8 thou/mm3 Final    Monocytes Absolute 06/17/2021 0.5  0.4 - 1.3 thou/mm3 Final    Eosinophils Absolute 06/17/2021 0.1  0.0 - 0.4 thou/mm3 Final    Basophils Absolute 06/17/2021 0.0  0.0 - 0.1 thou/mm3 Final    Immature Grans (Abs) 06/17/2021 0.02  0.00 - 0.07 thou/mm3 Final    nRBC 06/17/2021 0  /100 wbc Final    Vitamin B6, Plasma 06/17/2021 129.3* 20.0 - 125.0 nmol/L Final       ASSESSMENT & PLAN  1. Essential hypertension    Stable  At goal  con't norvasc  F/u 6 months    2. Vitamin B6 deficiency    b6 a little high this time, typically ok  con't taking b6 once per wk for now, repeat cbc and b6 in 4 wks to f/u on this and mild anemia  Further adjustment in meds and f/u labs based on results    - Vitamin B6; Future  - CBC Auto Differential; Future    3. Mixed hyperlipidemia    Stable  con't lip;itor  Labs UTD    4.  Chronic heart failure with preserved ejection fraction (HCC)    Stable  Mild  Doing well  No sxs Monitor BP's   Low salt diet  F/u cardio prn per their last note    5. 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 Morbid obesity ([address]    Discussed wt loss strategies    8. Screening for colon cancer    cologuard refaxed  Pt states will get done soon      DISPOSITION    Return in about 6 months (around 2021) for follow-up on chronic medical conditions, sooner as needed. Hakeem Whyte released without restrictions. Future Appointments   Date Time Provider Brandon Lopez   2021 10:30 AM Meghna Stevens MD [facility] [place]   2021  2:20 PM Nabila Sidhu DO [facility]     PATIENT COUNSELING    Hakeem Whyte received counseling on the following healthy behaviors: nutrition, exercise and medication adherence    Patient given educational materials on: See Attached    I have instructed Hakeem Whyte to complete a self tracking handout on Blood Pressures  and instructed them to bring it with them to his next appointment. Barriers to learning and self management: Cognitive issues, sister present during appointment. Discussed use, benefit, and side effects of prescribed medications. Barriers to medication compliance addressed. All patient questions answered. Pt voiced understanding. Electronically signed by Nabila Sidhu DO on 2021 at 2:00 PM        Medicare Annual Wellness Visit  Name: Karin Angulo Date: 2021   MRN: [id number] Sex: Male   Age: 70 y.o. Ethnicity: Non-/Non    : 1950 Race: Parker Escamilla is here for Medicare AWV, Follow-up (labs, cbc and b6), and Follow-up (Chronic issues as noted below)    Screenings for behavioral, psychosocial and functional/safety risks, and cognitive dysfunction are all negative except as indicated below.  These results, as well as other patient data from the Health Risk Assessment form, are documented in Flowsheets linked to this Encounter. No Known Allergies      Prior to Visit Medications    Medication Sig Taking? Authorizing Provider   amLODIPine (NORVASC) 2.5 MG tablet Take 1 tablet by mouth daily Yes Nabila Smoke, DO   pyridoxine (B-6) 25 MG tablet Take 1 tablet by mouth once a week Yes Nabila Smoke, DO   levETIRAcetam (KEPPRA) 1000 MG tablet take 1 tablet by mouth twice a day Yes PAUL Almaraz CNP   Handicap Placard MISC by Does not apply route Expires 15 SEPT 2025 Yes Nabila Smoke, DO   atorvastatin (LIPITOR) 20 MG tablet take 1 tablet by mouth once daily Yes Nabila Smoke, DO   Naproxen Sodium (ALEVE) 220 MG CAPS Take by mouth as needed for Pain Yes Historical Provider, MD   aspirin 81 MG chewable tablet Take 81 mg by mouth daily Yes Historical Provider, MD         Past Medical History:   Diagnosis Date    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 S/p skin graft. Healed. From chronic venous insuficiency.     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 Glynn Wayne follows, no seizure activity since    Seizure disorder ([facility] 7/17/2014       Past Surgical History:   Procedure Laterality Date    CA INCIS/DRAIN THIGH/KNEE ABSCESS,DEEP Right 9/25/2017    RIGHT SERGIO LEG WOUND DEBRIDEMENT WITH SKIN GRAFT AND WOUND VAC APPLICATION performed by Renee Polo DPM at [place] [address] Right 10/9/2017    SPLIT THICKNESS SKIN GRAFT FROM RIGHT LEG performed by Renee Polo DPM at [address] [facility] VASCULAR SURGERY      vein stripping         Family History   Problem Relation Age of Onset   [facility] Cancer Mother     Stroke Mother     Diabetes Mother     High Blood Pressure Mother     High Cholesterol Mother     Stroke Father     COPD Father     Diabetes Maternal Grandmother     Heart Disease Maternal Grandfather     Cancer Sister     Asthma Sister     High Blood Pressure Sister     High Cholesterol Sister     Arthritis Sister     Diabetes Sister     Diabetes Sister     Colon Cancer Neg Hx     Prostate Cancer Neg Hx        CareTeam (Including outside providers/suppliers regularly involved in providing care):   Patient Care Team:  Ish Sahu DO as PCP - General (Family Medicine)  Ish Sahu DO as PCP - REHABILITATION [facility] Empaneled Provider    Wt Readings from Last 3 Encounters:   06/22/21 244 lb (110.7 kg)   12/15/20 243 lb 3.2 oz (110.3 kg)   12/11/20 243 lb (110.2 kg)     Vitals:    06/22/21 1338   BP: (!) 114/58   Pulse: 60   Resp: 12   Temp: 98.5 °F (36.9 °C)   TempSrc: Oral   SpO2: 99%   Weight: 244 lb (110.7 kg)   Height: 5' 7\" (1.702 m)     Body mass index is 38.22 kg/m². Based upon direct observation of the patient, evaluation of cognition reveals recent and remote memory intact. Patient's complete Health Risk Assessment and screening values have been reviewed and are found in Flowsheets. The following problems were reviewed today and where indicated follow up appointments were made and/or referrals ordered. Positive Risk Factor Screenings with Interventions:            General Health and ACP:  General  In general, how would you say your health is?: Excellent  In the past 7 days, have you experienced any of the following?  New or Increased Pain, New or Increased Fatigue, Loneliness, Social Isolation, Stress or Anger?: None of These  Do you get the social and emotional support that you need?: (!) No  Do you have a Living Will?: Yes  Advance

## 2021-06-22 ENCOUNTER — OFFICE VISIT (OUTPATIENT)
Dept: FAMILY MEDICINE CLINIC | Age: 71
End: 2021-06-22
Payer: MEDICARE

## 2021-06-22 VITALS
WEIGHT: 244 LBS | BODY MASS INDEX: 38.3 KG/M2 | HEART RATE: 60 BPM | OXYGEN SATURATION: 99 % | DIASTOLIC BLOOD PRESSURE: 58 MMHG | SYSTOLIC BLOOD PRESSURE: 114 MMHG | TEMPERATURE: 98.5 F | HEIGHT: 67 IN | RESPIRATION RATE: 12 BRPM

## 2021-06-22 DIAGNOSIS — F09 COGNITIVE DEFICIT DUE TO OLD HEAD TRAUMA: ICD-10-CM

## 2021-06-22 DIAGNOSIS — S09.90XS COGNITIVE DEFICIT DUE TO OLD HEAD TRAUMA: ICD-10-CM

## 2021-06-22 DIAGNOSIS — I50.32 CHRONIC HEART FAILURE WITH PRESERVED EJECTION FRACTION (HCC): ICD-10-CM

## 2021-06-22 DIAGNOSIS — R56.1 SEIZURE AFTER HEAD INJURY (HCC): ICD-10-CM

## 2021-06-22 DIAGNOSIS — E78.2 MIXED HYPERLIPIDEMIA: ICD-10-CM

## 2021-06-22 DIAGNOSIS — I10 ESSENTIAL HYPERTENSION: ICD-10-CM

## 2021-06-22 DIAGNOSIS — Z12.11 SCREENING FOR COLON CANCER: ICD-10-CM

## 2021-06-22 DIAGNOSIS — E66.01 MORBID OBESITY (HCC): ICD-10-CM

## 2021-06-22 DIAGNOSIS — Z00.00 ROUTINE GENERAL MEDICAL EXAMINATION AT A HEALTH CARE FACILITY: Primary | ICD-10-CM

## 2021-06-22 DIAGNOSIS — E53.1 VITAMIN B6 DEFICIENCY: ICD-10-CM

## 2021-06-22 PROCEDURE — 1123F ACP DISCUSS/DSCN MKR DOCD: CPT | Performed by: FAMILY MEDICINE

## 2021-06-22 PROCEDURE — G8417 CALC BMI ABV UP PARAM F/U: HCPCS | Performed by: FAMILY MEDICINE

## 2021-06-22 PROCEDURE — 3017F COLORECTAL CA SCREEN DOC REV: CPT | Performed by: FAMILY MEDICINE

## 2021-06-22 PROCEDURE — 99213 OFFICE O/P EST LOW 20 MIN: CPT | Performed by: FAMILY MEDICINE

## 2021-06-22 PROCEDURE — G0439 PPPS, SUBSEQ VISIT: HCPCS | Performed by: FAMILY MEDICINE

## 2021-06-22 PROCEDURE — 1036F TOBACCO NON-USER: CPT | Performed by: FAMILY MEDICINE

## 2021-06-22 PROCEDURE — G8427 DOCREV CUR MEDS BY ELIG CLIN: HCPCS | Performed by: FAMILY MEDICINE

## 2021-06-22 PROCEDURE — 4040F PNEUMOC VAC/ADMIN/RCVD: CPT | Performed by: FAMILY MEDICINE

## 2021-06-22 ASSESSMENT — PATIENT HEALTH QUESTIONNAIRE - PHQ9
SUM OF ALL RESPONSES TO PHQ9 QUESTIONS 1 & 2: 0
SUM OF ALL RESPONSES TO PHQ QUESTIONS 1-9: 0
1. LITTLE INTEREST OR PLEASURE IN DOING THINGS: 0
2. FEELING DOWN, DEPRESSED OR HOPELESS: 0
SUM OF ALL RESPONSES TO PHQ QUESTIONS 1-9: 0
SUM OF ALL RESPONSES TO PHQ QUESTIONS 1-9: 0

## 2021-06-22 ASSESSMENT — LIFESTYLE VARIABLES: HOW OFTEN DO YOU HAVE A DRINK CONTAINING ALCOHOL: 0

## 2021-06-22 NOTE — PATIENT INSTRUCTIONS
LAB INSTRUCTIONS:    Please complete labs in 4 week(s). Please fast for 8 hours prior to lab collection. The clinic will call you within 1 week of collection. If you have not heard from us within that amount of time, please call us at [phone]. Personalized Preventive Plan for Elvis Gonsalez - 6/22/2021  Medicare offers a range of preventive health benefits. Some of the tests and screenings are paid in full while other may be subject to a deductible, co-insurance, and/or copay. Some of these benefits include a comprehensive review of your medical history including lifestyle, illnesses that may run in your family, and various assessments and screenings as appropriate. After reviewing your medical record and screening and assessments performed today your provider may have ordered immunizations, labs, imaging, and/or referrals for you. A list of these orders (if applicable) as well as your Preventive Care list are included within your After Visit Summary for your review. Other Preventive Recommendations:    · A preventive eye exam performed by an eye specialist is recommended every 1-2 years to screen for glaucoma; cataracts, macular degeneration, and other eye disorders. · A preventive dental visit is recommended every 6 months. · Try to get at least 150 minutes of exercise per week or 10,000 steps per day on a pedometer . · Order or download the FREE \"Exercise & Physical Activity: Your Everyday Guide\" from [employer]. Call 6-477.940.4228 or search [employer] online. · You need 5633-7486 mg of calcium and 0194-2608 IU of vitamin D per day. It is possible to meet your calcium requirement with diet alone, but a vitamin D supplement is usually necessary to meet this goal.  · When exposed to the sun, use a sunscreen that protects against both UVA and UVB radiation with an SPF of 30 or greater.  Reapply every 2 to 3 hours or after sweating, drying off with a towel, or swimming. · Always wear a seat belt when traveling in a car. Always wear a helmet when riding a bicycle or motorcycle.

## 2021-08-16 DIAGNOSIS — G40.909 SEIZURE DISORDER (HCC): Primary | ICD-10-CM

## 2021-08-16 RX ORDER — LEVETIRACETAM 1000 MG/1
TABLET ORAL
Qty: 60 TABLET | Refills: 11 | Status: SHIPPED | OUTPATIENT
Start: 2021-08-16 | End: 2022-08-29 | Stop reason: SDUPTHER

## 2021-09-18 DIAGNOSIS — E53.1 VITAMIN B6 DEFICIENCY: ICD-10-CM

## 2021-09-20 RX ORDER — DIPHENHYDRAMINE HYDROCHLORIDE 25 MG/1
CAPSULE ORAL
Qty: 12 TABLET | Refills: 3 | Status: SHIPPED | OUTPATIENT
Start: 2021-09-20 | End: 2021-12-26 | Stop reason: ALTCHOICE

## 2021-09-20 NOTE — TELEPHONE ENCOUNTER
Recent Visits  Date Type Provider Dept   06/22/21 Office Visit Sydnee Sharma, DO [facility]   12/15/20 Office Visit Sydnee Sharma [place] [facility]   10/19/20 Office Visit Sydnee Sharma, DO [facility]   08/24/20 Office Visit Sydnee Sharma, DO [facility]   06/02/20 Office Visit Sydnee Sharma, DO [facility]   Showing recent visits within past 540 days with a meds authorizing provider and meeting all other requirements  Future Appointments  Date Type Provider Dept   12/21/21 Appointment Sydnee Sharma, DO [facility]   Showing future appointments within next 150 days with a meds authorizing provider and meeting all other requirements    Future Appointments   Date Time Provider Brandon Lopez   12/17/2021 10:30 AM Hunter Espinoza [facility]   12/21/2021  2:20 PM Sydnee Sharma DO [address] [place] [address]

## 2021-09-22 DIAGNOSIS — E78.2 MIXED HYPERLIPIDEMIA: Chronic | ICD-10-CM

## 2021-09-22 RX ORDER — ATORVASTATIN CALCIUM 20 MG/1
TABLET, FILM COATED ORAL
Qty: 90 TABLET | Refills: 3 | Status: SHIPPED | OUTPATIENT
Start: 2021-09-22 | End: 2022-06-19

## 2021-09-29 DIAGNOSIS — I10 ESSENTIAL HYPERTENSION: ICD-10-CM

## 2021-09-29 RX ORDER — AMLODIPINE BESYLATE 2.5 MG/1
TABLET ORAL
Qty: 90 TABLET | Refills: 3 | Status: SHIPPED | OUTPATIENT
Start: 2021-09-29 | End: 2022-06-19

## 2021-10-12 ENCOUNTER — TELEPHONE (OUTPATIENT)
Dept: FAMILY MEDICINE CLINIC | Age: 71
End: 2021-10-12

## 2021-10-12 NOTE — LETTER
[facility]  [address] [place] [address]. [facility] [address]  Phone: [phone]  Fax: [phone]       October 12, 2021    Sean Loving  [address]      Dear Justin Santana:            Our office has received a fax from [address] [place] [address],[address] [facility] stating you have not completed your testing. If you have questions or have discarded the Cologuard kit,  please call customer service at [phone]. If there are no questions it would be greatly appreciated to have the testing completed. Thank you. Nicolás Cantu at [facility]      If you have any questions or concerns, please don't hesitate to call.     Sincerely,

## 2021-10-18 NOTE — PLAN OF CARE
Problem: Wound:  Goal: Will show signs of wound healing; wound closure and no evidence of infection  Will show signs of wound healing; wound closure and no evidence of infection  Outcome: Ongoing  Pt presents to clinic with ongoing care of right lower leg wound. Wound measures smaller in size. NO s/s of infection. Afebrile. Dressing changed today. Cuticerin, gauze, kerlix, ace applied per order. Pt to leave intact until appt on Monday follow up with Dr. Karan Mendoza. Comments: Care plan reviewed with patient. Patient verbalizes understanding of the plan of care and contribute to goal setting.
Low Risk (score 7-11)

## 2021-11-12 ENCOUNTER — TELEPHONE (OUTPATIENT)
Dept: FAMILY MEDICINE CLINIC | Age: 71
End: 2021-11-12

## 2021-11-12 DIAGNOSIS — E53.1 VITAMIN B6 DEFICIENCY: ICD-10-CM

## 2021-11-12 DIAGNOSIS — E78.2 MIXED HYPERLIPIDEMIA: ICD-10-CM

## 2021-11-12 DIAGNOSIS — I10 ESSENTIAL HYPERTENSION: Primary | ICD-10-CM

## 2021-11-12 NOTE — TELEPHONE ENCOUNTER
Pt due for fasting labs prior to next apt on 12/21/2021. Please call to have pt complete this. Thanks! ASSESSMENT & PLAN   Diagnosis Orders   1. Essential hypertension  CBC Auto Differential    Comprehensive Metabolic Panel    Lipid Panel    Microalbumin / Creatinine Urine Ratio    TSH with Reflex    Vitamin B6   2. Mixed hyperlipidemia  CBC Auto Differential    Comprehensive Metabolic Panel    Lipid Panel    Microalbumin / Creatinine Urine Ratio    TSH with Reflex    Vitamin B6   3.  Vitamin B6 deficiency  CBC Auto Differential    Comprehensive Metabolic Panel    Lipid Panel    Microalbumin / Creatinine Urine Ratio    TSH with Reflex    Vitamin B6     Future Appointments   Date Time Provider Brandon Lopez   12/17/2021 10:30 AM Sherrilyn Severin, MD [facility] [address]   12/21/2021  2:20 PM Keon Parekh DO [facility]

## 2021-12-17 ENCOUNTER — TELEPHONE (OUTPATIENT)
Dept: FAMILY MEDICINE CLINIC | Age: 71
End: 2021-12-17

## 2021-12-20 NOTE — PROGRESS NOTES
Chief Complaint   Patient presents with    Follow-up     Chronic issues as noted below     History obtained from the patient and sister (via phone today with sister). 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 Trouble reading and writing. SUBJECTIVE:  William Ford is a 70 y.o. male that presents today for     -HTN:    HPI PRIOR VISIT: was on lisinopril. Stopped as above. BP's <140/90 since discharge w/o meds    UPDATE PRIOR VISIT: BP <140/90. No CP/SOB. con't to be off meds and BP's fine. UPDATE PRIOR VISIT: started on norvasc 2.5mg by nephro as BP's had started to go high. However, now BP <140/90. UPDATE PRIOR VISIT: BPs <140/90. No CP/SOB    UPDATE TODAY: BPs <140/90. On norvasc yet. Needs refilled     BP Readings from Last 3 Encounters:   12/21/21 138/60   06/22/21 (!) 114/58   12/15/20 120/74         -B 6 def PRIOR VISIT: on supplementation. When goes off all together, levels low. When takes daily high. currently on once per wk, due for labs soon. UPDATE TODAY: taking supplement once per wk (more frequent levels too high, not taking, levels too low). Tolerating well. b6 level pending today. No bleeding, melena or hematochezia. -HLD:    HPI:     Taking meds as prescribed ?: yes  Tolerating well ?: yes  Side Effects ?: denies  Muscle Pain?: denies  Working on TLCS ?: yes      -HF with dec EF PRIOR VISIT: pt had echo done almost 2 years ago. Pt was unaware. No hx of CAD. Former heavy drinker. No CP, SOB, orthopnea or PND.      UPDATE PRIOR VISIT: had echo, EF up to 50% with grade 1 diastolic dys. No CP, SOB, orthopnea or PND. Has cardio apt upcoming.       UPDATE TODAY: echo stable. No CP/SOB. Following with cardio as well. No orthopnea, wts stable. At last cardio visit, was told could f/u prn       -Seizure d/o/Cog deficits: TBI 8+ years ago, seizure at the time. On keppra. Following with neuro. No seizure in 8 + years. Also has some mild cog deficits. pts sister helps care for home.  He completes most ADLs and iADLs      -Obesity:  wts about the same last few months  Diet better  Exercises some    Wt Readings from Last 3 Encounters:   12/21/21 238 lb (108 kg)   06/22/21 244 lb (110.7 kg)   12/15/20 243 lb 3.2 oz (110.3 kg)       Age/Gender Health Maintenance    Lipid -   Lab Results   Component Value Date    CHOL 140 12/21/2021    CHOL 143 11/23/2020    CHOL 133 11/05/2019     Lab Results   Component Value Date    TRIG 77 12/21/2021    TRIG 78 11/23/2020    TRIG 58 11/05/2019     Lab Results   Component Value Date    HDL 46 12/21/2021    HDL 47 11/23/2020    HDL 45 11/05/2019     Lab Results   Component Value Date    LDLCALC 79 12/21/2021    [facility] 80 11/23/2020    LDLCALC 76 11/05/2019       DM Screen -   Lab Results   Component Value Date    GLUCOSE 99 12/21/2021       Colon Cancer Screening - NEG FIT MAY 2019, cologuard ordered June 2020 and again [address] 2020 and June 2021 and DEC 2021  Lung Cancer Screening (Age 54 to [de-identified] with 30 pack year hx, current smoker or quit within past 15 years) - never smoker    Tetanus - UTD 2013  Influenza Vaccine - UTD FALL 2020/declines DEC 2021, will get at pharmacy  Pneumonia Vaccine - UTD PPV 23 AUG 2014 and PCV 13 OCT 2017/and PPV 23 in NOV 2019  Zoster - to get at pharmacy per medicare     PSA testing discussion -   Lab Results   Component Value Date    PSA 1.53 (H) 11/23/2020    PSA 1.42 (H) 11/05/2019    PSA 1.62 (H) 10/29/2018       AAA Screening - never smoker    Specialists List:  Kelsey Dunlap: wound care  Min: neuro for seizures  Cardio: [facility]       Current Outpatient Medications   Medication Sig Dispense Refill    amLODIPine (NORVASC) 2.5 MG tablet take 1 tablet by mouth once daily 90 tablet 3    atorvastatin (LIPITOR) 20 MG tablet take 1 tablet by mouth once daily 90 tablet 3    vitamin B-6 (PYRIDOXINE) 25 MG tablet take 1 tablet by mouth ONCE A WEEK 12 tablet 3    levETIRAcetam (KEPPRA) 1000 MG tablet take 1 tablet by mouth twice a day 60 tablet 11  Handicap Placard MISC by Does not apply route Expires 15 SEPT 2025 1 each 0    Naproxen Sodium (ALEVE) 220 MG CAPS Take by mouth as needed for Pain      aspirin 81 MG chewable tablet Take 81 mg by mouth daily       No current facility-administered medications for this visit. No orders of the defined types were placed in this encounter. All medications reviewed and reconciled, including OTC and herbal medications. Updated list given to patient. 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 Txt with 6 months of coumadin. Provoked.  Heart failure with preserved ejection fraction (HCC)     History of alcohol abuse     History of traumatic brain injury     History of non-healing open leg wound of R leg. S/p skin graft. Healed. From chronic venous insuficiency. 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 S/p skin graft. Healed. From chronic venous insuficiency.      History of traumatic brain injury     Hyperlipidemia     Hypertension     Mentally challenged     \"slow\" states his sister    Morbid obesity ([facility]     Osteoarthritis     Seizure painful urination, Flank pain, Change in frequency, Urgency  Skin:  Color change, Rash, Itching, Wound  Musculoskeletal:  Joint pain, Back pain, Gait problems, Joint swelling, Myalgias  Neurological:  Dizziness, Headaches, Presyncope, Numbness, Seizures, Tremors  Endocrine:  Heat Intolerance, Cold Intolerance, Polydipsia, Polyphagia, Polyuria      PHYSICAL EXAM:  Vitals:    12/21/21 1426   BP: 138/60   Pulse: 65   Resp: 12   Temp: 97.7 °F (36.5 °C)   TempSrc: Oral   SpO2: 99%   Weight: 238 lb (108 kg)   Height: 5' 7\" (1.702 m)     Body mass index is 37.28 kg/m². Pain Score:   0 - No pain    VS Reviewed  General Appearance: A&O x 3, No acute distress,well developed and well- nourished  Eyes: pupils equal, round, and reactive to light, extraocular eye movements intact, conjunctivae and eye lids without erythema  ENT: external ear and ear canal clear bilaterally, TMs intact and regular, nose without deformity, nasal mucosa and turbinates normal without polyps, oropharynx normal, dentition is normal for age  Neck: supple and non-tender without mass, no thyromegaly or thyroid nodules, no cervical lymphadenopathy  Pulmonary/Chest: clear to auscultation bilaterally- no wheezes, rales or rhonchi, normal air movement, no respiratory distress or retractions  Cardiovascular: S1 and S2 auscultated w/ RRR. No murmurs, rubs, clicks, or gallops, distal pulses intact. Abdomen: soft, non-tender, non-distended, bowl sounds physiologic,  no rebound or guarding, no masses or hernias noted. Liver and spleen without enlargement. Extremities: no cyanosis, clubbing or edema of the lower extremities. +2 PT/DP bilaterally. Psych: Affect appropriate. Mood normal. Thought process is normal without evidence of depression or psychosis. Good insight and appropriate interaction. Cognition and memory appear to be impaired.   Skin: warm and dry, no rash or erythema      Hospital Outpatient Visit on 12/21/2021   Component Date Value Ref Range Status    WBC 12/21/2021 4.9  4.8 - 10.8 thou/mm3 Final    RBC 12/21/2021 4.65* 4.70 - 6.10 mill/mm3 Final    Hemoglobin 12/21/2021 14.5  14.0 - 18.0 gm/dl Final    Hematocrit 12/21/2021 43.0  42.0 - 52.0 % Final    MCV 12/21/2021 92.5  80.0 - 94.0 fL Final    MCH 12/21/2021 31.2  26.0 - 33.0 pg Final    MCHC 12/21/2021 33.7  32.2 - 35.5 gm/dl Final    RDW-CV 12/21/2021 13.0  11.5 - 14.5 % Final    RDW-SD 12/21/2021 43.8  35.0 - 45.0 fL Final    Platelets 09/35/4275 254  130 - 400 thou/mm3 Final    MPV 12/21/2021 8.8* 9.4 - 12.4 fL Final    Seg Neutrophils 12/21/2021 64.1  % Final    Lymphocytes 12/21/2021 22.5  % Final    Monocytes 12/21/2021 9.9  % Final    Eosinophils 12/21/2021 2.3  % Final    Basophils 12/21/2021 1.0  % Final    Immature Granulocytes 12/21/2021 0.2  % Final    Segs Absolute 12/21/2021 3.1  1.8 - 7.7 thou/mm3 Final    Lymphocytes Absolute 12/21/2021 1.1  1.0 - 4.8 thou/mm3 Final    Monocytes Absolute 12/21/2021 0.5  0.4 - 1.3 thou/mm3 Final    Eosinophils Absolute 12/21/2021 0.1  0.0 - 0.4 thou/mm3 Final    Basophils Absolute 12/21/2021 0.0  0.0 - 0.1 thou/mm3 Final    Immature Grans (Abs) 12/21/2021 0.01  0.00 - 0.07 thou/mm3 Final    nRBC 12/21/2021 0  /100 wbc Final    Glucose 12/21/2021 99  70 - 108 mg/dL Final    CREATININE 12/21/2021 1.1  0.4 - 1.2 mg/dL Final    BUN 12/21/2021 14  7 - 22 mg/dL Final    Sodium 12/21/2021 140  135 - 145 meq/L Final    Potassium 12/21/2021 4.9  3.5 - 5.2 meq/L Final    Chloride 12/21/2021 104  98 - 111 meq/L Final    CO2 12/21/2021 25  23 - 33 meq/L Final    Calcium 12/21/2021 9.7  8.5 - 10.5 mg/dL Final    AST 12/21/2021 28  5 - 40 U/L Final    Alkaline Phosphatase 12/21/2021 74  38 - 126 U/L Final    Total Protein 12/21/2021 7.4  6.1 - 8.0 g/dL Final    Albumin 12/21/2021 4.6  3.5 - 5.1 g/dL Final    Total Bilirubin 12/21/2021 1.1  0.3 - 1.2 mg/dL Final    ALT 12/21/2021 23  11 - 66 U/L Final    Cholesterol, Total 12/21/2021 140  100 - 199 mg/dL Final    Triglycerides 12/21/2021 77  0 - 199 mg/dL Final    HDL 12/21/2021 46  mg/dL Final    LDL Calculated 12/21/2021 79  mg/dL Final    TSH 12/21/2021 2.580  0.400 - 4.200 uIU/mL Final    Microalbumin, Random Urine 12/21/2021 5.81  mg/dL Final    Creatinine, Urine 12/21/2021 248.6  mg/dL Final    Microalb/Creat Ratio 12/21/2021 23  0 - 30 mg/g Final    Anion Gap 12/21/2021 11.0  8.0 - 16.0 meq/L Final    Est, Glom Filt Rate 12/21/2021 66* ml/min/1.73m2 Final       ASSESSMENT & PLAN  1. Essential hypertension    Stable  At goal  con't norvasc  F/u 6 months    2. Vitamin B6 deficiency    con't taking b6 once per wk   Labs pending    3. Mixed hyperlipidemia    Stable  con't lipitor     4. Chronic heart failure with preserved ejection fraction (HCC)    Stable  Mild  Doing well  No sxs  Monitor BP's   Low salt diet  F/u cardio prn per their last note    5. 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 Obesity (BMI 30-39. 9)    Discussed wt loss strategies    8. Screening for colon cancer    cologuard reordered    - Cologuard; Future      DISPOSITION    Return in about 6 months (around 6/21/2022) for AWV, follow-up on chronic medical conditions, sooner as needed. Denia Adames released without restrictions. Future Appointments   Date Time Provider Brandon Lopez   6/21/2022  2:00 PM [email or web address]     PATIENT COUNSELING    Denia Adames received counseling on the following healthy behaviors: nutrition, exercise and medication adherence    Barriers to learning and self management: Cognitive issues, sister available during appointment. Discussed use, benefit, and side effects of prescribed medications. Barriers to medication compliance addressed. All patient questions answered. Pt voiced understanding. Electronically signed by Gurjit Romero DO on 12/21/2021 at 2:38 PM

## 2021-12-21 ENCOUNTER — OFFICE VISIT (OUTPATIENT)
Dept: FAMILY MEDICINE CLINIC | Age: 71
End: 2021-12-21
Payer: MEDICARE

## 2021-12-21 ENCOUNTER — HOSPITAL ENCOUNTER (OUTPATIENT)
Age: 71
Discharge: HOME | End: 2021-12-21
Payer: MEDICARE

## 2021-12-21 VITALS
TEMPERATURE: 97.7 F | WEIGHT: 238 LBS | RESPIRATION RATE: 12 BRPM | HEIGHT: 67 IN | SYSTOLIC BLOOD PRESSURE: 138 MMHG | OXYGEN SATURATION: 99 % | HEART RATE: 65 BPM | BODY MASS INDEX: 37.35 KG/M2 | DIASTOLIC BLOOD PRESSURE: 60 MMHG

## 2021-12-21 DIAGNOSIS — I50.32 CHRONIC HEART FAILURE WITH PRESERVED EJECTION FRACTION (HCC): ICD-10-CM

## 2021-12-21 DIAGNOSIS — S09.90XS COGNITIVE DEFICIT DUE TO OLD HEAD TRAUMA: ICD-10-CM

## 2021-12-21 DIAGNOSIS — F09 COGNITIVE DEFICIT DUE TO OLD HEAD TRAUMA: ICD-10-CM

## 2021-12-21 DIAGNOSIS — I10 ESSENTIAL HYPERTENSION: Primary | ICD-10-CM

## 2021-12-21 DIAGNOSIS — R56.1 SEIZURE AFTER HEAD INJURY (HCC): ICD-10-CM

## 2021-12-21 DIAGNOSIS — I10 ESSENTIAL HYPERTENSION: ICD-10-CM

## 2021-12-21 DIAGNOSIS — Z12.11 SCREENING FOR COLON CANCER: ICD-10-CM

## 2021-12-21 DIAGNOSIS — E78.2 MIXED HYPERLIPIDEMIA: ICD-10-CM

## 2021-12-21 DIAGNOSIS — E53.1 VITAMIN B6 DEFICIENCY: ICD-10-CM

## 2021-12-21 DIAGNOSIS — E66.9 OBESITY (BMI 30-39.9): ICD-10-CM

## 2021-12-21 LAB
ALBUMIN SERPL-MCNC: 4.6 G/DL (ref 3.5–5.1)
ALP BLD-CCNC: 74 U/L (ref 38–126)
ALT SERPL-CCNC: 23 U/L (ref 11–66)
ANION GAP SERPL CALCULATED.3IONS-SCNC: 11 MEQ/L (ref 8–16)
AST SERPL-CCNC: 28 U/L (ref 5–40)
BASOPHILS # BLD: 1 %
BASOPHILS ABSOLUTE: 0 THOU/MM3 (ref 0–0.1)
BILIRUB SERPL-MCNC: 1.1 MG/DL (ref 0.3–1.2)
BUN BLDV-MCNC: 14 MG/DL (ref 7–22)
CALCIUM SERPL-MCNC: 9.7 MG/DL (ref 8.5–10.5)
CHLORIDE BLD-SCNC: 104 MEQ/L (ref 98–111)
CHOLESTEROL, TOTAL: 140 MG/DL (ref 100–199)
CO2: 25 MEQ/L (ref 23–33)
CREAT SERPL-MCNC: 1.1 MG/DL (ref 0.4–1.2)
CREATININE, URINE: 248.6 MG/DL
EOSINOPHIL # BLD: 2.3 %
EOSINOPHILS ABSOLUTE: 0.1 THOU/MM3 (ref 0–0.4)
ERYTHROCYTE [DISTWIDTH] IN BLOOD BY AUTOMATED COUNT: 13 % (ref 11.5–14.5)
ERYTHROCYTE [DISTWIDTH] IN BLOOD BY AUTOMATED COUNT: 43.8 FL (ref 35–45)
GFR SERPL CREATININE-BSD FRML MDRD: 66 ML/MIN/1.73M2
GLUCOSE BLD-MCNC: 99 MG/DL (ref 70–108)
HCT VFR BLD CALC: 43 % (ref 42–52)
HDLC SERPL-MCNC: 46 MG/DL
HEMOGLOBIN: 14.5 GM/DL (ref 14–18)
IMMATURE GRANS (ABS): 0.01 THOU/MM3 (ref 0–0.07)
IMMATURE GRANULOCYTES: 0.2 %
LDL CHOLESTEROL CALCULATED: 79 MG/DL
LYMPHOCYTES # BLD: 22.5 %
LYMPHOCYTES ABSOLUTE: 1.1 THOU/MM3 (ref 1–4.8)
MCH RBC QN AUTO: 31.2 PG (ref 26–33)
MCHC RBC AUTO-ENTMCNC: 33.7 GM/DL (ref 32.2–35.5)
MCV RBC AUTO: 92.5 FL (ref 80–94)
MICROALBUMIN UR-MCNC: 5.81 MG/DL
MICROALBUMIN/CREAT UR-RTO: 23 MG/G (ref 0–30)
MONOCYTES # BLD: 9.9 %
MONOCYTES ABSOLUTE: 0.5 THOU/MM3 (ref 0.4–1.3)
NUCLEATED RED BLOOD CELLS: 0 /100 WBC
PLATELET # BLD: 254 THOU/MM3 (ref 130–400)
PMV BLD AUTO: 8.8 FL (ref 9.4–12.4)
POTASSIUM SERPL-SCNC: 4.9 MEQ/L (ref 3.5–5.2)
RBC # BLD: 4.65 MILL/MM3 (ref 4.7–6.1)
SEG NEUTROPHILS: 64.1 %
SEGMENTED NEUTROPHILS ABSOLUTE COUNT: 3.1 THOU/MM3 (ref 1.8–7.7)
SODIUM BLD-SCNC: 140 MEQ/L (ref 135–145)
TOTAL PROTEIN: 7.4 G/DL (ref 6.1–8)
TRIGL SERPL-MCNC: 77 MG/DL (ref 0–199)
TSH SERPL DL<=0.05 MIU/L-ACNC: 2.58 UIU/ML (ref 0.4–4.2)
WBC # BLD: 4.9 THOU/MM3 (ref 4.8–10.8)

## 2021-12-21 PROCEDURE — 84443 ASSAY THYROID STIM HORMONE: CPT

## 2021-12-21 PROCEDURE — 3017F COLORECTAL CA SCREEN DOC REV: CPT | Performed by: FAMILY MEDICINE

## 2021-12-21 PROCEDURE — 84207 ASSAY OF VITAMIN B-6: CPT

## 2021-12-21 PROCEDURE — G8427 DOCREV CUR MEDS BY ELIG CLIN: HCPCS | Performed by: FAMILY MEDICINE

## 2021-12-21 PROCEDURE — 1036F TOBACCO NON-USER: CPT | Performed by: FAMILY MEDICINE

## 2021-12-21 PROCEDURE — 80053 COMPREHEN METABOLIC PANEL: CPT

## 2021-12-21 PROCEDURE — 36415 COLL VENOUS BLD VENIPUNCTURE: CPT

## 2021-12-21 PROCEDURE — 80061 LIPID PANEL: CPT

## 2021-12-21 PROCEDURE — 4040F PNEUMOC VAC/ADMIN/RCVD: CPT | Performed by: FAMILY MEDICINE

## 2021-12-21 PROCEDURE — 85025 COMPLETE CBC W/AUTO DIFF WBC: CPT

## 2021-12-21 PROCEDURE — 99214 OFFICE O/P EST MOD 30 MIN: CPT | Performed by: FAMILY MEDICINE

## 2021-12-21 PROCEDURE — 82043 UR ALBUMIN QUANTITATIVE: CPT

## 2021-12-21 PROCEDURE — G8484 FLU IMMUNIZE NO ADMIN: HCPCS | Performed by: FAMILY MEDICINE

## 2021-12-21 PROCEDURE — G8417 CALC BMI ABV UP PARAM F/U: HCPCS | Performed by: FAMILY MEDICINE

## 2021-12-21 PROCEDURE — 1123F ACP DISCUSS/DSCN MKR DOCD: CPT | Performed by: FAMILY MEDICINE

## 2021-12-25 LAB — VITAMIN B6: 207 NMOL/L (ref 20–125)

## 2021-12-26 DIAGNOSIS — E53.1 VITAMIN B6 DEFICIENCY: Primary | ICD-10-CM

## 2021-12-27 ENCOUNTER — TELEPHONE (OUTPATIENT)
Dept: FAMILY MEDICINE CLINIC | Age: 71
End: 2021-12-27

## 2021-12-27 NOTE — TELEPHONE ENCOUNTER
----- Message from Heidi Merchant DO sent at 12/26/2021  8:39 AM EST -----  Please let pt know that Vit B 6 is a bit high  Have him old this  Repeat labs in 6 wks, fasting  Let me know if questions, thanks!

## 2022-06-18 DIAGNOSIS — E78.2 MIXED HYPERLIPIDEMIA: Chronic | ICD-10-CM

## 2022-06-18 DIAGNOSIS — I10 ESSENTIAL HYPERTENSION: ICD-10-CM

## 2022-06-19 RX ORDER — ATORVASTATIN CALCIUM 20 MG/1
TABLET, FILM COATED ORAL
Qty: 90 TABLET | Refills: 3 | Status: SHIPPED | OUTPATIENT
Start: 2022-06-19

## 2022-06-19 RX ORDER — AMLODIPINE BESYLATE 2.5 MG/1
TABLET ORAL
Qty: 90 TABLET | Refills: 3 | Status: SHIPPED | OUTPATIENT
Start: 2022-06-19

## 2022-06-21 ENCOUNTER — OFFICE VISIT (OUTPATIENT)
Dept: FAMILY MEDICINE CLINIC | Age: 72
End: 2022-06-21
Payer: MEDICARE

## 2022-06-21 VITALS
HEART RATE: 92 BPM | RESPIRATION RATE: 12 BRPM | DIASTOLIC BLOOD PRESSURE: 76 MMHG | WEIGHT: 240 LBS | SYSTOLIC BLOOD PRESSURE: 122 MMHG | TEMPERATURE: 97.9 F | HEIGHT: 67 IN | BODY MASS INDEX: 37.67 KG/M2 | OXYGEN SATURATION: 96 %

## 2022-06-21 DIAGNOSIS — R56.1 SEIZURE AFTER HEAD INJURY (HCC): ICD-10-CM

## 2022-06-21 DIAGNOSIS — S09.90XS COGNITIVE DEFICIT DUE TO OLD HEAD TRAUMA: ICD-10-CM

## 2022-06-21 DIAGNOSIS — F09 COGNITIVE DEFICIT DUE TO OLD HEAD TRAUMA: ICD-10-CM

## 2022-06-21 DIAGNOSIS — E78.2 MIXED HYPERLIPIDEMIA: ICD-10-CM

## 2022-06-21 DIAGNOSIS — I10 ESSENTIAL HYPERTENSION: ICD-10-CM

## 2022-06-21 DIAGNOSIS — E66.9 OBESITY (BMI 30-39.9): ICD-10-CM

## 2022-06-21 DIAGNOSIS — E53.1 VITAMIN B6 DEFICIENCY: ICD-10-CM

## 2022-06-21 DIAGNOSIS — I50.32 CHRONIC HEART FAILURE WITH PRESERVED EJECTION FRACTION (HCC): ICD-10-CM

## 2022-06-21 DIAGNOSIS — Z00.00 MEDICARE ANNUAL WELLNESS VISIT, SUBSEQUENT: Primary | ICD-10-CM

## 2022-06-21 PROCEDURE — G8417 CALC BMI ABV UP PARAM F/U: HCPCS | Performed by: FAMILY MEDICINE

## 2022-06-21 PROCEDURE — G0439 PPPS, SUBSEQ VISIT: HCPCS | Performed by: FAMILY MEDICINE

## 2022-06-21 PROCEDURE — 99213 OFFICE O/P EST LOW 20 MIN: CPT | Performed by: FAMILY MEDICINE

## 2022-06-21 PROCEDURE — 3017F COLORECTAL CA SCREEN DOC REV: CPT | Performed by: FAMILY MEDICINE

## 2022-06-21 PROCEDURE — 1123F ACP DISCUSS/DSCN MKR DOCD: CPT | Performed by: FAMILY MEDICINE

## 2022-06-21 PROCEDURE — G8427 DOCREV CUR MEDS BY ELIG CLIN: HCPCS | Performed by: FAMILY MEDICINE

## 2022-06-21 PROCEDURE — 1036F TOBACCO NON-USER: CPT | Performed by: FAMILY MEDICINE

## 2022-06-21 ASSESSMENT — PATIENT HEALTH QUESTIONNAIRE - PHQ9
SUM OF ALL RESPONSES TO PHQ QUESTIONS 1-9: 0
SUM OF ALL RESPONSES TO PHQ9 QUESTIONS 1 & 2: 0
SUM OF ALL RESPONSES TO PHQ QUESTIONS 1-9: 0
SUM OF ALL RESPONSES TO PHQ QUESTIONS 1-9: 0
2. FEELING DOWN, DEPRESSED OR HOPELESS: 0
SUM OF ALL RESPONSES TO PHQ QUESTIONS 1-9: 0
1. LITTLE INTEREST OR PLEASURE IN DOING THINGS: 0

## 2022-06-21 ASSESSMENT — LIFESTYLE VARIABLES: HOW OFTEN DO YOU HAVE A DRINK CONTAINING ALCOHOL: NEVER

## 2022-06-21 NOTE — PATIENT INSTRUCTIONS
LAB INSTRUCTIONS:    Please complete labs within 4 week(s). Please fast for 8 hours prior to lab collection. The clinic will call you within 1 week of collection. If you have not heard from us within that amount of time, please call us at [phone]. Personalized Preventive Plan for Chelsea Arita - 6/21/2022  Medicare offers a range of preventive health benefits. Some of the tests and screenings are paid in full while other may be subject to a deductible, co-insurance, and/or copay. Some of these benefits include a comprehensive review of your medical history including lifestyle, illnesses that may run in your family, and various assessments and screenings as appropriate. After reviewing your medical record and screening and assessments performed today your provider may have ordered immunizations, labs, imaging, and/or referrals for you. A list of these orders (if applicable) as well as your Preventive Care list are included within your After Visit Summary for your review. Other Preventive Recommendations:    · A preventive eye exam performed by an eye specialist is recommended every 1-2 years to screen for glaucoma; cataracts, macular degeneration, and other eye disorders. · A preventive dental visit is recommended every 6 months. · Try to get at least 150 minutes of exercise per week or 10,000 steps per day on a pedometer . · Order or download the FREE \"Exercise & Physical Activity: Your Everyday Guide\" from [employer]. Call 3-934.437.9396 or search [employer] online. · You need 7434-6424 mg of calcium and 7886-8966 IU of vitamin D per day. It is possible to meet your calcium requirement with diet alone, but a vitamin D supplement is usually necessary to meet this goal.  · When exposed to the sun, use a sunscreen that protects against both UVA and UVB radiation with an SPF of 30 or greater.  Reapply every 2 to 3 hours or after sweating, drying off with a towel, or swimming. · Always wear a seat belt when traveling in a car. Always wear a helmet when riding a bicycle or motorcycle.

## 2022-06-21 NOTE — PROGRESS NOTES
Chief Complaint   Patient presents with    Medicare AWV    Follow-up     Chronic issues as noted below    Other     B6 Deficiency     History obtained from the patient and sister (via phone today with sister). 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 Trouble reading and writing. SUBJECTIVE:  Dunia Mayers is a 67 y.o. male that presents today for     -HTN:    HPI PRIOR VISIT: was on lisinopril. Stopped as above. BP's <140/90 since discharge w/o meds    UPDATE PRIOR VISIT: BP <140/90. No CP/SOB. con't to be off meds and BP's fine. UPDATE PRIOR VISIT: started on norvasc 2.5mg by nephro as BP's had started to go high. However, now BP <140/90. UPDATE PRIOR VISIT: BPs <140/90. No CP/SOB    UPDATE TODAY: BPs <140/90. On norvasc yet. Needs refilled     BP Readings from Last 3 Encounters:   06/21/22 122/76   12/21/21 138/60   06/22/21 (!) 114/58         -B 6 def PRIOR VISIT: on supplementation. When goes off all together, levels low. When takes daily high. currently on once per wk, due for labs soon. UPDATE LAST VISIT: taking supplement once per wk (more frequent levels too high, not taking, levels too low). Tolerating well. b6 level pending today. No bleeding, melena or hematochezia. UPDATE TODAY:  Level high last check in DEC  Was to hold b6 supplement and repeat labs in 6 wks  He is holding the supplement, but never did labs  Needs new order. -HLD:    HPI:     Taking meds as prescribed ?: yes  Tolerating well ?: yes  Side Effects ?: denies  Muscle Pain?: denies  Working on TLCS ?: yes      -HF with dec EF PRIOR VISIT: pt had echo done almost 2 years ago. Pt was unaware. No hx of CAD. Former heavy drinker. No CP, SOB, orthopnea or PND.      UPDATE PRIOR VISIT: had echo, EF up to 50% with grade 1 diastolic dys. No CP, SOB, orthopnea or PND. Has cardio apt upcoming.       UPDATE TODAY: echo stable. No CP/SOB. Following with cardio as well. No orthopnea, wts stable.  At last cardio visit, was told could f/u prn. No changes.       -Seizure d/o/Cog deficits: TBI 9+ years ago, seizure at the time. On keppra. Following with neuro. No seizure in 9+ years. Also has some mild cog deficits. pts sister helps care for home.  He completes most ADLs and iADLs      -Obesity:  wts about the same last few months  Diet better  Exercises some    Wt Readings from Last 3 Encounters:   06/21/22 240 lb (108.9 kg)   12/21/21 238 lb (108 kg)   06/22/21 244 lb (110.7 kg)       Age/Gender Health Maintenance    Lipid -   Lab Results   Component Value Date    CHOL 140 12/21/2021    CHOL 143 11/23/2020    CHOL 133 11/05/2019     Lab Results   Component Value Date    TRIG 77 12/21/2021    TRIG 78 11/23/2020    TRIG 58 11/05/2019     Lab Results   Component Value Date    HDL 46 12/21/2021    HDL 47 11/23/2020    HDL 45 11/05/2019     Lab Results   Component Value Date    LDLCALC 79 12/21/2021    LDLCALC 80 11/23/2020    LDLCALC 76 11/05/2019       DM Screen -   Lab Results   Component Value Date    GLUCOSE 99 12/21/2021       Colon Cancer Screening - NEG Cologuard DEC 2021  Lung Cancer Screening - never smoker    Tetanus - UTD 2013  Influenza Vaccine - Candidate FALL 2022  Pneumonia Vaccine - UTD PPV 23 AUG 2014 and PCV 13 OCT 2017/and PPV 23 in NOV 2019  Zoster - to get at pharmacy per medicare     PSA testing discussion -   Lab Results   Component Value Date    PSA 1.53 (H) 11/23/2020    PSA 1.42 (H) 11/05/2019    PSA 1.62 (H) 10/29/2018       AAA Screening - never smoker      Specialists List:  Pryor Miners: wound care  Min: neuro for seizures  Cardio: Wade Barger       Current Outpatient Medications   Medication Sig Dispense Refill    amLODIPine (NORVASC) 2.5 MG tablet take 1 tablet by mouth once daily 90 tablet 3    atorvastatin (LIPITOR) 20 MG tablet take 1 tablet by mouth once daily 90 tablet 3    levETIRAcetam (KEPPRA) 1000 MG tablet take 1 tablet by mouth twice a day 60 tablet 11    Handicap Placard MISC by Does not apply route Expires 15 SEPT 2025 1 each 0    Naproxen Sodium (ALEVE) 220 MG CAPS Take by mouth as needed for Pain      aspirin 81 MG chewable tablet Take 81 mg by mouth daily       No current facility-administered medications for this visit. No orders of the defined types were placed in this encounter. All medications reviewed and reconciled, including OTC and herbal medications. Updated list given to patient. 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 Txt with 6 months of coumadin. Provoked.  Heart failure with preserved ejection fraction (HCC)     History of alcohol abuse     History of traumatic brain injury     History of non-healing open leg wound of R leg. S/p skin graft. Healed. From chronic venous insuficiency. 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 S/p skin graft. Healed. From chronic venous insuficiency.      History of traumatic brain injury     Hyperlipidemia     Hypertension     Mentally challenged     \"slow\" states his sister    Morbid obesity ([facility]     Osteoarthritis     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 ([facility] 07/2014    s/p fall down a hill, Dr. Kita Preciado follows, no seizure activity since    Seizure disorder ([facility] 7/17/2014       Past Surgical History:   Procedure Laterality Date    TN INCIS/DRAIN THIGH/KNEE ABSCESS,DEEP Right 9/25/2017    RIGHT SERGIO LEG WOUND DEBRIDEMENT WITH SKIN GRAFT AND WOUND VAC APPLICATION performed by Todd Dominguez DPM at [address] [place] [address],  [address] Right 10/9/2017    SPLIT THICKNESS SKIN GRAFT FROM RIGHT LEG performed by Todd Dominguez DPM at . [facility]      vein stripping       No Known Allergies      Social History     Tobacco Use    Smoking status: Never Smoker    Smokeless tobacco: Never Used   Substance Use Topics    Alcohol use: No     Comment:           Family History   Problem Relation Age of Onset    Cancer Mother     Stroke Mother     Diabetes Mother     High Blood Pressure Mother     High Cholesterol Mother     Stroke Father     COPD Father     Diabetes Maternal Grandmother     Heart Disease Maternal Grandfather     Cancer Sister     Asthma Sister     High Blood Pressure Sister     High Cholesterol Sister     Arthritis Sister     Diabetes Sister     Diabetes Sister     Colon Cancer Neg Hx     Prostate Cancer Neg Hx          I have reviewed the patient's past medical history, past surgical history, allergies, medications, social and family history and I have made updates where appropriate.       Review of Systems  Positive responses are highlighted in bold    Constitutional:  Fever, Chills, Night Sweats, Fatigue, Unexpected changes in weight  HENT:  Ear pain, Tinnitus, Nosebleeds, Trouble swallowing, Hearing loss, Sore throat  Cardiovascular:  Chest Pain, Palpitations, Orthopnea, Paroxysmal Nocturnal Dyspnea  Respiratory:  Cough, Wheezing, Shortness of breath, Chest tightness, Apnea  Gastrointestinal:  Nausea, Vomiting, Diarrhea, Constipation, Heartburn, Blood in stool  Genitourinary:  Difficulty or painful urination, Flank pain, Change in frequency, Urgency  Skin:  Color change, Rash, Itching, Wound  Musculoskeletal:  Joint pain, Back pain, Gait problems, Joint swelling, Myalgias  Neurological:  Dizziness, Headaches, Presyncope, Numbness, Seizures, Tremors  Endocrine:  Heat Intolerance, Cold Intolerance, Polydipsia, Polyphagia, Polyuria      PHYSICAL EXAM:  Vitals:    06/21/22 1512   BP: 122/76   Pulse: 92   Resp: 12   Temp: 97.9 °F (36.6 °C)   TempSrc: Oral   SpO2: 96%   Weight: 240 lb (108.9 kg)   Height: 5' 6.5\" (1.689 m)     Body mass index is 38.16 kg/m². VS Reviewed  General Appearance: A&O x 3, No acute distress,well developed and well- nourished  Eyes: pupils equal, round, and reactive to light, extraocular eye movements intact, conjunctivae and eye lids without erythema  ENT: external ear and ear canal clear bilaterally, TMs intact and regular, nose without deformity, nasal mucosa and turbinates normal without polyps, oropharynx normal, dentition is normal for age  Neck: supple and non-tender without mass, no thyromegaly or thyroid nodules, no cervical lymphadenopathy  Pulmonary/Chest: clear to auscultation bilaterally- no wheezes, rales or rhonchi, normal air movement, no respiratory distress or retractions  Cardiovascular: S1 and S2 auscultated w/ RRR. No murmurs, rubs, clicks, or gallops, distal pulses intact. Abdomen: soft, non-tender, non-distended, bowl sounds physiologic,  no rebound or guarding, no masses or hernias noted. Liver and spleen without enlargement. Extremities: no cyanosis, clubbing or edema of the lower extremities. +2 PT/DP bilaterally. Psych: Affect appropriate. Mood normal. Thought process is normal without evidence of depression or psychosis. Good insight and appropriate interaction. Cognition and memory appear to be impaired. Skin: warm and dry, no rash or erythema      ASSESSMENT & PLAN  1. Essential hypertension    Stable  At goal  con't norvasc  F/u 6 months    2.  Vitamin B6 deficiency    High on last check  con't to hold b6  New order for b6 level written  F/u based on results    - Vitamin B6; Future    3. Mixed hyperlipidemia    Stable  con't lipitor     4. Chronic heart failure with preserved ejection fraction (HCC)    Stable  Mild  No sxs  Monitor BP's   Low salt diet  F/u cardio prn per their last note    5. 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 Obesity (BMI 30-39. 9)    Discussed wt loss strategies      DISPOSITION    Return in about 6 months (around 12/21/2022) for follow-up on chronic medical conditions, sooner as needed. Sherri Munoz released without restrictions. Future Appointments   Date Time Provider Brandon Lopez   12/22/2022  2:00 PM Judah Castrejon DO [facility] [address]     PATIENT COUNSELING    Sherri Munoz received counseling on the following healthy behaviors: nutrition, exercise and medication adherence    Barriers to learning and self management: Cognitive issues, sister available during appointment. Discussed use, benefit, and side effects of prescribed medications. Barriers to medication compliance addressed. All patient questions answered. Pt voiced understanding. Electronically signed by Judah Castrejon DO on 6/21/2022 at 3:29 PM      Medicare Annual Wellness Visit    Dacia Loving is here for Medicare AWV, Follow-up (Chronic issues as noted below), and Other (B6 Deficiency)    Assessment & Plan   Medicare annual wellness visit, subsequent     Recommendations for Preventive Services Due: see orders and patient instructions/AVS.  Recommended screening schedule for the next 5-10 years is provided to the patient in written form: see Patient Instructions/AVS.     Return in about 6 months (around 12/21/2022) for follow-up on chronic medical conditions, sooner as needed.      Subjective     Patient's complete Health Risk Assessment and screening values have been reviewed and are found in Flowsheets. The following problems were reviewed today and where indicated follow up appointments were made and/or referrals ordered. Positive Risk Factor Screenings with Interventions:     Cognitive:   Words recalled: 2 Words Recalled  Clock Drawing Test (CDT): (!) Abnormal  Total Score Interpretation: Abnormal Mini-Cog    Cognitive Impairment Interventions:  · known issue, stable             General Health and ACP:  General  In general, how would you say your health is?: Good  In the past 7 days, have you experienced any of the following: New or Increased Pain, New or Increased Fatigue, Loneliness, Social Isolation, Stress or Anger?: No  Do you get the social and emotional support that you need?: Yes  Do you have a Living Will?: (!) No    Advance Directives     Power of  Living Will ACP-Advance Directive ACP-Power of     Not on File [place] on 07/06/17 Filed [address] [facility] [place] Risk Interventions:  · No Living Will: Patient declines ACP discussion/assistance    Health Habits/Nutrition:     Physical Activity: Insufficiently Active    Days of Exercise per Week: 5 days    Minutes of Exercise per Session: 20 min     Have you lost any weight without trying in the past 3 months?: No  Body mass index: (!) 38.15  Have you seen the dentist within the past year?: Yes    Health Habits/Nutrition Interventions:  · Nutritional issues:  educational materials for healthy, well-balanced diet provided    Hearing/Vision:  Do you or your family notice any trouble with your hearing that hasn't been managed with hearing aids?: No  Do you have difficulty driving, watching TV, or doing any of your daily activities because of your eyesight?: No  Have you had an eye exam within the past year?: (!) No  No exam data present    Hearing/Vision Interventions:  · Vision concerns:  patient encouraged to make appointment with his/her eye specialist    Safety:  Do you have working smoke detectors?: Yes  Do you have any tripping hazards - loose or unsecured carpets or rugs?: No  Do you have any tripping hazards - clutter in doorways, halls, or stairs?: No  Do you have either shower bars, grab bars, non-slip mats or non-slip surfaces in your shower or bathtub?: (!) No  Do all of your stairways have a railing or banister?: Yes  Do you always fasten your seatbelt when you are in a car?: Yes    Safety Interventions:  · Home safety tips provided           Objective   Vitals:    06/21/22 1512   BP: 122/76   Pulse: 92   Resp: 12   Temp: 97.9 °F (36.6 °C)   TempSrc: Oral   SpO2: 96%   Weight: 240 lb (108.9 kg)   Height: 5' 6.5\" (1.689 m)      Body mass index is 38.16 kg/m². No Known Allergies  Prior to Visit Medications    Medication Sig Taking? Authorizing Provider   amLODIPine (NORVASC) 2.5 MG tablet take 1 tablet by mouth once daily Yes Kar Valdez DO   atorvastatin (LIPITOR) 20 MG tablet take 1 tablet by mouth once daily Yes Cristina Roots, DO   levETIRAcetam (KEPPRA) 1000 MG tablet take 1 tablet by mouth twice a day Yes PAUL Monk - LIVE   Handicap Placard MISC by Does not apply route Expires 15 SEPT 2025 Yes Cristina Bustillos, DO   Naproxen Sodium (ALEVE) 220 MG CAPS Take by mouth as needed for Pain Yes Historical Provider, MD   aspirin 81 MG chewable tablet Take 81 mg by mouth daily Yes Historical Provider, MD Rivera (Including outside providers/suppliers regularly involved in providing care):   Patient Care Team:  Cristina Bustillos DO as PCP - General (Family Medicine)  Cristina Bustillos DO as PCP - [facility] Empaneled Provider     Reviewed and updated this visit:  Tobacco  Allergies  Meds  Problems  Med Hx  Surg Hx  Soc Hx  Fam Hx                    Care plan reviewed with and given to patient.        Electronically signed by Cristina Bustillos DO on 6/21/2022 at 3:29 PM

## 2022-08-29 ENCOUNTER — TELEPHONE (OUTPATIENT)
Dept: FAMILY MEDICINE CLINIC | Age: 72
End: 2022-08-29

## 2022-08-29 DIAGNOSIS — G40.909 SEIZURE DISORDER (HCC): Primary | ICD-10-CM

## 2022-08-29 RX ORDER — LEVETIRACETAM 1000 MG/1
TABLET ORAL
Qty: 180 TABLET | Refills: 3 | Status: SHIPPED | OUTPATIENT
Start: 2022-08-29

## 2022-08-29 NOTE — TELEPHONE ENCOUNTER
Patient came into the office today he was wondering if he should still be taking Keppra 1000 mg.   If he needs to continue this medication he would like it to be called into [facility]

## 2022-08-29 NOTE — TELEPHONE ENCOUNTER
Yes  Though neurology has been writing for this  Last seen there [address] [address]  Overdue for f/u there  RF sent, but needs to make f/u with them'     Diagnosis Orders   1.  Seizure disorder (HCC)  levETIRAcetam (KEPPRA) 1000 MG tablet

## 2022-10-27 ENCOUNTER — APPOINTMENT (OUTPATIENT)
Dept: GENERAL RADIOLOGY | Age: 72
End: 2022-10-27
Payer: MEDICARE

## 2022-10-27 ENCOUNTER — APPOINTMENT (OUTPATIENT)
Dept: CT IMAGING | Age: 72
End: 2022-10-27
Payer: MEDICARE

## 2022-10-27 ENCOUNTER — HOSPITAL ENCOUNTER (EMERGENCY)
Age: 72
Discharge: HOME | End: 2022-10-28
Attending: STUDENT IN AN ORGANIZED HEALTH CARE EDUCATION/TRAINING PROGRAM
Payer: MEDICARE

## 2022-10-27 VITALS
HEART RATE: 94 BPM | DIASTOLIC BLOOD PRESSURE: 66 MMHG | SYSTOLIC BLOOD PRESSURE: 156 MMHG | TEMPERATURE: 98 F | OXYGEN SATURATION: 99 % | HEIGHT: 68 IN | RESPIRATION RATE: 16 BRPM | BODY MASS INDEX: 36.37 KG/M2 | WEIGHT: 240 LBS

## 2022-10-27 DIAGNOSIS — S59.902A INJURY OF LEFT ELBOW, INITIAL ENCOUNTER: Primary | ICD-10-CM

## 2022-10-27 PROCEDURE — 80048 BASIC METABOLIC PNL TOTAL CA: CPT

## 2022-10-27 PROCEDURE — 99284 EMERGENCY DEPT VISIT MOD MDM: CPT

## 2022-10-27 PROCEDURE — 70450 CT HEAD/BRAIN W/O DYE: CPT

## 2022-10-27 PROCEDURE — 85730 THROMBOPLASTIN TIME PARTIAL: CPT

## 2022-10-27 PROCEDURE — 73060 X-RAY EXAM OF HUMERUS: CPT

## 2022-10-27 PROCEDURE — 85025 COMPLETE CBC W/AUTO DIFF WBC: CPT

## 2022-10-27 PROCEDURE — 36415 COLL VENOUS BLD VENIPUNCTURE: CPT

## 2022-10-27 PROCEDURE — 84484 ASSAY OF TROPONIN QUANT: CPT

## 2022-10-27 PROCEDURE — 73080 X-RAY EXAM OF ELBOW: CPT

## 2022-10-27 PROCEDURE — 85610 PROTHROMBIN TIME: CPT

## 2022-10-27 ASSESSMENT — PAIN - FUNCTIONAL ASSESSMENT: PAIN_FUNCTIONAL_ASSESSMENT: 0-10

## 2022-10-27 ASSESSMENT — PAIN SCALES - GENERAL: PAINLEVEL_OUTOF10: 8

## 2022-10-27 ASSESSMENT — PAIN DESCRIPTION - ORIENTATION: ORIENTATION: LEFT;UPPER

## 2022-10-27 ASSESSMENT — PAIN DESCRIPTION - LOCATION: LOCATION: ARM

## 2022-10-28 LAB
ANION GAP SERPL CALCULATED.3IONS-SCNC: 12 MEQ/L (ref 8–16)
APTT: 30.9 SECONDS (ref 22–38)
BASOPHILS # BLD: 0.3 %
BASOPHILS ABSOLUTE: 0 THOU/MM3 (ref 0–0.1)
BUN BLDV-MCNC: 12 MG/DL (ref 7–22)
CALCIUM SERPL-MCNC: 9.1 MG/DL (ref 8.5–10.5)
CHLORIDE BLD-SCNC: 98 MEQ/L (ref 98–111)
CO2: 25 MEQ/L (ref 23–33)
CREAT SERPL-MCNC: 1 MG/DL (ref 0.4–1.2)
EOSINOPHIL # BLD: 0.2 %
EOSINOPHILS ABSOLUTE: 0 THOU/MM3 (ref 0–0.4)
ERYTHROCYTE [DISTWIDTH] IN BLOOD BY AUTOMATED COUNT: 13.2 % (ref 11.5–14.5)
ERYTHROCYTE [DISTWIDTH] IN BLOOD BY AUTOMATED COUNT: 43.2 FL (ref 35–45)
GFR SERPL CREATININE-BSD FRML MDRD: > 60 ML/MIN/1.73M2
GLUCOSE BLD-MCNC: 132 MG/DL (ref 70–108)
HCT VFR BLD CALC: 42.6 % (ref 42–52)
HEMOGLOBIN: 14.8 GM/DL (ref 14–18)
IMMATURE GRANS (ABS): 0.04 THOU/MM3 (ref 0–0.07)
IMMATURE GRANULOCYTES: 0.4 %
INR BLD: 1.05 (ref 0.85–1.13)
LYMPHOCYTES # BLD: 7.9 %
LYMPHOCYTES ABSOLUTE: 0.8 THOU/MM3 (ref 1–4.8)
MCH RBC QN AUTO: 31.2 PG (ref 26–33)
MCHC RBC AUTO-ENTMCNC: 34.7 GM/DL (ref 32.2–35.5)
MCV RBC AUTO: 89.7 FL (ref 80–94)
MONOCYTES # BLD: 11.1 %
MONOCYTES ABSOLUTE: 1.2 THOU/MM3 (ref 0.4–1.3)
NUCLEATED RED BLOOD CELLS: 0 /100 WBC
PLATELET # BLD: 245 THOU/MM3 (ref 130–400)
PMV BLD AUTO: 9 FL (ref 9.4–12.4)
POTASSIUM REFLEX MAGNESIUM: 4.1 MEQ/L (ref 3.5–5.2)
RBC # BLD: 4.75 MILL/MM3 (ref 4.7–6.1)
SEG NEUTROPHILS: 80.1 %
SEGMENTED NEUTROPHILS ABSOLUTE COUNT: 8.4 THOU/MM3 (ref 1.8–7.7)
SODIUM BLD-SCNC: 135 MEQ/L (ref 135–145)
TROPONIN T: < 0.01 NG/ML
WBC # BLD: 10.5 THOU/MM3 (ref 4.8–10.8)

## 2022-10-28 PROCEDURE — 6370000000 HC RX 637 (ALT 250 FOR IP): Performed by: EMERGENCY MEDICINE

## 2022-10-28 RX ORDER — LIDOCAINE 4 G/G
1 PATCH TOPICAL DAILY
Status: DISCONTINUED | OUTPATIENT
Start: 2022-10-28 | End: 2022-10-28 | Stop reason: HOSPADM

## 2022-10-28 RX ORDER — ACETAMINOPHEN 500 MG
1000 TABLET ORAL 3 TIMES DAILY
Qty: 60 TABLET | Refills: 0 | Status: SHIPPED | OUTPATIENT
Start: 2022-10-28 | End: 2022-11-07

## 2022-10-28 RX ORDER — ACETAMINOPHEN 500 MG
1000 TABLET ORAL ONCE
Status: COMPLETED | OUTPATIENT
Start: 2022-10-28 | End: 2022-10-28

## 2022-10-28 RX ORDER — LIDOCAINE 50 MG/G
1 PATCH TOPICAL DAILY
Qty: 10 PATCH | Refills: 0 | Status: SHIPPED | OUTPATIENT
Start: 2022-10-28 | End: 2022-11-07

## 2022-10-28 RX ADMIN — ACETAMINOPHEN 1000 MG: 500 TABLET ORAL at 00:26

## 2022-10-28 ASSESSMENT — ENCOUNTER SYMPTOMS
WHEEZING: 0
TROUBLE SWALLOWING: 0
VOMITING: 0
EYE REDNESS: 0
STRIDOR: 0
CONSTIPATION: 0
EYE PAIN: 0
BACK PAIN: 0
PHOTOPHOBIA: 0
ABDOMINAL DISTENTION: 0
EYE ITCHING: 0
NAUSEA: 0
CHOKING: 0
SINUS PAIN: 0
SORE THROAT: 0
BLOOD IN STOOL: 0
CHEST TIGHTNESS: 0
SHORTNESS OF BREATH: 0
COUGH: 0
DIARRHEA: 0
ABDOMINAL PAIN: 0

## 2022-10-28 ASSESSMENT — PAIN DESCRIPTION - ORIENTATION: ORIENTATION: LEFT

## 2022-10-28 ASSESSMENT — PAIN DESCRIPTION - LOCATION: LOCATION: ARM

## 2022-10-28 ASSESSMENT — PAIN DESCRIPTION - DESCRIPTORS: DESCRIPTORS: THROBBING

## 2022-10-28 ASSESSMENT — PAIN SCALES - GENERAL: PAINLEVEL_OUTOF10: 8

## 2022-10-28 NOTE — ED PROVIDER NOTES
[facility] ENCOUNTER          Pt Name: Olivia Junior  MRN: [id number]  [place] 1950  Date of evaluation: 10/27/2022  Treating Resident Physician: Sisi Vasquez MD  Supervising Physician: Eliazar Amezquita MD    History obtained from chart review and the patient. CHIEF COMPLAINT       Chief Complaint   Patient presents with    Arm Injury     Left upper arm           HISTORY OF PRESENT ILLNESS    HPI  Olivia Junior is a 67 y.o. male who presents to the emergency department for evaluation of left elbow pain. Patient stated that 2 days ago he was walking up the steps of his patio, that is under construction, and slipped on a wet brick that someone had left on the stairs. Patient denied hitting his head, denies loss of consciousness, denied any dizziness or lightheadedness prior to fall. Patient did state that he had difficulty getting off the ground but did get up on his own. Patient is describing the pain in his anterior lateral elbow. Patient stated prior to coming into the ED his neighbor gave him 3 blue pills to help with the pain but he is unaware of what the medicine was. Patient denied any other injuries from the fall, had no obvious deformities, no bleeding, shortness of breath, nausea, vomiting. The patient has no other acute complaints at this time. REVIEW OF SYSTEMS   Review of Systems   Constitutional:  Negative for appetite change, chills, diaphoresis, fatigue, fever and unexpected weight change. HENT:  Negative for dental problem, drooling, ear discharge, ear pain, hearing loss, mouth sores, sinus pain, sneezing, sore throat and trouble swallowing. Eyes:  Negative for photophobia, pain, redness and itching. Respiratory:  Negative for cough, choking, chest tightness, shortness of breath, wheezing and stridor. Cardiovascular:  Negative for chest pain, palpitations and leg swelling.    Gastrointestinal:  Negative for abdominal distention, abdominal pain, blood in stool, constipation, diarrhea, nausea and vomiting. Endocrine: Negative for polydipsia, polyphagia and polyuria. Genitourinary:  Negative for difficulty urinating, dysuria, flank pain, genital sores, penile discharge, testicular pain and urgency. Musculoskeletal:  Negative for back pain, myalgias and neck pain. Skin:  Negative for pallor, rash and wound. Neurological:  Positive for tremors. Negative for dizziness, seizures, syncope, numbness and headaches. Psychiatric/Behavioral:  Negative for confusion, dysphoric mood, self-injury and suicidal ideas. The patient is not nervous/anxious and is not hyperactive. 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 S/p skin graft. Healed. From chronic venous insuficiency.      History of traumatic brain injury     Hyperlipidemia     Hypertension     Mentally challenged     \"slow\" states his sister    Morbid obesity ([facility] [address]     Osteoarthritis     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 ([facility] [address] 07/2014    s/p fall down a hill, Dr. Addis Chen follows, no seizure activity since    Seizure disorder ([facility] [address] 7/17/2014     Past Surgical History:   Procedure Laterality Date    SD INCIS/DRAIN THIGH/KNEE ABSCESS,DEEP Right 9/25/2017    RIGHT SERGIO LEG WOUND DEBRIDEMENT WITH SKIN GRAFT AND WOUND VAC APPLICATION performed by Krish Bauer DPM at [address] [place] [address] [address] Right 10/9/2017    SPLIT THICKNESS SKIN GRAFT FROM RIGHT LEG performed by Krish Bauer DPM at [place] [address]      vein stripping         MEDICATIONS     Current Facility-Administered Medications:     lidocaine 4 % external patch 1 patch, 1 patch, TransDERmal, Daily, Ed Grider MD    Current Outpatient Medications:     lidocaine (LIDODERM) 5 %, Place 1 patch onto the skin daily for 10 days 12 hours on, 12 hours off., Disp: 10 patch, Rfl: 0    acetaminophen (TYLENOL) 500 MG tablet, Take 2 tablets by mouth 3 times daily for 10 days, Disp: 60 tablet, Rfl: 0    levETIRAcetam (KEPPRA) 1000 MG tablet, take 1 tablet by mouth twice a day, Disp: 180 tablet, Rfl: 3    amLODIPine (NORVASC) 2.5 MG tablet, take 1 tablet by mouth once daily, Disp: 90 tablet, Rfl: 3    atorvastatin (LIPITOR) 20 MG tablet, take 1 tablet by mouth once daily, Disp: 90 tablet, Rfl: 3    Handicap Placard MISC, by Does not apply route Expires 15 SEPT 2025, Disp: 1 each, Rfl: 0    aspirin 81 MG chewable tablet, Take 81 mg by mouth daily, Disp: , Rfl:       SOCIAL HISTORY     Social History     Social History Narrative    ** Merged History Encounter **          Social History     Tobacco Use    Smoking status: Never    Smokeless tobacco: Never   Vaping Use    Vaping Use: Never used   Substance Use Topics    Alcohol use: No     Comment:      Drug use: No         ALLERGIES   No Known Allergies      FAMILY HISTORY     Family History   Problem Relation Age of Onset    Cancer Mother     Stroke Mother     Diabetes Mother     High Blood Pressure Mother     High Cholesterol Mother     Stroke Father     COPD Father     Diabetes Maternal Grandmother     Heart Disease Maternal Grandfather     Cancer Sister     Asthma Sister     High Blood Pressure Sister     High Cholesterol Sister     Arthritis Sister     Diabetes Sister     Diabetes Sister     Colon Cancer Neg Hx     Prostate Cancer Neg Hx          PREVIOUS RECORDS   Previous records reviewed:       PHYSICAL EXAM     ED Triage Vitals [10/27/22 2216]   BP Temp Temp Source Heart Rate Resp SpO2 Height Weight   (!) 156/66 98 °F (36.7 °C) Oral 94 16 99 % 5' 8\" (1.727 m) 240 lb (108.9 kg)     Initial vital signs and nursing assessment reviewed and abnormal from hypertension . Body mass index is 36.49 kg/m². Pulsoximetry is normal per my interpretation. Additional Vital Signs:  Vitals:    10/27/22 2216   BP: (!) 156/66   Pulse: 94   Resp: 16   Temp: 98 °F (36.7 °C)   SpO2: 99%       Physical Exam  Vitals and nursing note reviewed. Constitutional:       General: He is not in acute distress. Appearance: Normal appearance. He is not ill-appearing, toxic-appearing or diaphoretic. HENT:      Head: Normocephalic and atraumatic. Right Ear: External ear normal.      Left Ear: External ear normal.      Nose: Nose normal. No congestion or rhinorrhea. Mouth/Throat:      Mouth: Mucous membranes are moist.      Pharynx: Oropharynx is clear. No oropharyngeal exudate or posterior oropharyngeal erythema. Eyes:      General: No scleral icterus. Right eye: No discharge. Left eye: No discharge. Extraocular Movements: Extraocular movements intact. Conjunctiva/sclera: Conjunctivae normal.      Pupils: Pupils are equal, round, and reactive to light. Cardiovascular:      Rate and Rhythm: Normal rate and regular rhythm. Pulses: Normal pulses. Heart sounds: Normal heart sounds. No murmur heard. No gallop. Pulmonary:      Effort: Pulmonary effort is normal. No respiratory distress. Breath sounds: Normal breath sounds. No stridor. No wheezing, rhonchi or rales. Abdominal:      General: Bowel sounds are normal. There is no distension. Palpations: Abdomen is soft. Tenderness: There is no abdominal tenderness. There is no right CVA tenderness, left CVA tenderness, guarding or rebound. Musculoskeletal:         General: No swelling, tenderness, deformity or signs of injury. Cervical back: Neck supple. No rigidity or tenderness. Right lower leg: No edema. Left lower leg: No edema. Lymphadenopathy:      Cervical: No cervical adenopathy. Skin:     General: Skin is warm and dry. Coloration: Skin is not jaundiced or pale. Findings: No bruising, erythema, lesion or rash. Neurological:      General: No focal deficit present. Mental Status: He is alert and oriented to person, place, and time. Mental status is at baseline. Psychiatric:         Mood and Affect: Mood normal.         Behavior: Behavior normal.         Thought Content: Thought content normal.         Judgment: Judgment normal.       MEDICAL DECISION MAKING   Initial Assessment:   79-year-old male come to the ED for evaluation of left elbow pain. Patient described the fall as mechanical and denied any loss of consciousness or initial medical cause leading to the fall. Patient's left arm was neurovascularly intact, no obvious deformities, decreased range of motion due to pain. Patient initially denying hitting his head but then later stated he may have hit his head. CT head was ordered showing no acute bleeds. Concern for electrolyte abnormality due to patient having tremors during neuro exam.  Patient blames it on feeling anxious due to the pain. Electrolytes showing abnormal.  Discussed pain management with patient and advised lidocaine patch as well as Tylenol to manage pain and to put his arm in sling as needed for pain management. Advised him to follow-up with [facility] as soon as possible for further evaluation. Spoke with patient's neighbor on the phone after patient stated his neighbor's son gave him pain management prior to coming in but he did not know what it was. The neighbor advised that the medicine was naproxen. He also advised that the patient does not usually go to the hospital and that he is likely in considerable pain to cause him to go to the ED. Plan:   X-ray left elbow  CBC, BMP, troponin, coags, CT head  1 g Tylenol for pain  Lidocaine patch  Left arm sling  Discharged with med follow-up at [facility].       ED RESULTS   Laboratory results:  Labs Reviewed   CBC WITH AUTO DIFFERENTIAL - Abnormal; Notable for the following components:       Result Value    MPV 9.0 (*)     Segs Absolute 8.4 (*)     Lymphocytes Absolute 0.8 (*)     All other components within normal limits   BASIC METABOLIC PANEL W/ REFLEX TO MG FOR LOW K - Abnormal; Notable for the following components:    Glucose 132 (*)     All other components within normal limits   APTT   PROTIME-INR   GLOMERULAR FILTRATION RATE, ESTIMATED   TROPONIN   ANION GAP       Radiologic studies results:  CT Head WO Contrast   Final Result   Impression:   Negative for acute intracranial abnormality. This document has been electronically signed by: Hilaria Flanagan MD on    10/28/2022 12:54 AM      All CTs at this facility use dose modulation techniques and iterative    reconstructions, and/or weight-based dosing   when appropriate to reduce radiation to a low as reasonably achievable. XR HUMERUS LEFT (MIN 2 VIEWS)   Final Result   Impression:   Negative for acute osseous abnormality. This document has been electronically signed by: Hilaria Flanagan MD on    10/27/2022 11:50 PM      XR ELBOW LEFT (MIN 3 VIEWS)   Final Result   Impression:   Limited study. Negative for acute osseous abnormality. This document has been electronically signed by: Hilaria Flanagan MD on    10/27/2022 11:48 PM          ED Medications administered this visit:   Medications   lidocaine 4 % external patch 1 patch (has no administration in time range)   acetaminophen (TYLENOL) tablet 1,000 mg (1,000 mg Oral Given 10/28/22 0026)         ED COURSE      Strict return precautions and follow up instructions were discussed with the patient prior to discharge, with which the patient agrees.       MEDICATION CHANGES     Discharge Medication List as of 10/28/2022  1:21 AM        START taking these medications    Details   lidocaine (LIDODERM) 5 % Place 1 patch onto the skin daily for 10 days 12 hours on, 12 hours off., Disp-10 patch, R-0Normal      acetaminophen (TYLENOL) 500 MG tablet Take 2 tablets by mouth 3 times daily for 10 days, Disp-60 tablet, R-0Normal               FINAL DISPOSITION     Final diagnoses:   Injury of left elbow, initial encounter     Condition: condition: fair  Dispo: Discharge to home      This transcription was electronically signed. Parts of this transcriptions may have been dictated by use of voice recognition software and electronically transcribed, and parts may have been transcribed with the assistance of an ED scribe. The transcription may contain errors not detected in proofreading. Please refer to my supervising physician's documentation if my documentation differs.     Electronically Signed: Yaquelin Espinosa MD, 10/28/22, 1:34 AM        Yaquelin Espinosa MD  Resident  10/28/22 0326

## 2022-10-28 NOTE — DISCHARGE INSTRUCTIONS
No fractures were noted on exam, and no abnormalities of blood work noted. Due to the concern for your pain I would recommend that you follow-up at [facility] in their walk-in clinic as soon as possible. If the spine pain continues to worsen, you lose feeling in your arm, inability to move your arm, or discoloration of your lower arm please return to the ER for further evaluation.

## 2022-10-28 NOTE — ED TRIAGE NOTES
Pt presents to the ED via lobby with c/o left arm injury. Pt states he slipped on a wet step and fell 2 days ago. Pt rates pain at an 8/10. No bruising or deformity noted.  Pt reports limited ROM

## 2022-11-17 ENCOUNTER — TELEPHONE (OUTPATIENT)
Dept: FAMILY MEDICINE CLINIC | Age: 72
End: 2022-11-17

## 2022-11-17 DIAGNOSIS — I10 PRIMARY HYPERTENSION: Primary | Chronic | ICD-10-CM

## 2022-11-17 DIAGNOSIS — E53.1 VITAMIN B6 DEFICIENCY: ICD-10-CM

## 2022-11-17 NOTE — TELEPHONE ENCOUNTER
Pt due for fasting labs prior to next apt on 12/22/2022. Please call to have pt complete this. Thanks! ASSESSMENT & PLAN   Diagnosis Orders   1. Primary hypertension  CBC with Auto Differential    Comprehensive Metabolic Panel    Lipid Panel    TSH with Reflex    Vitamin B6    Microalbumin / Creatinine Urine Ratio      2.  Vitamin B6 deficiency  Vitamin B6        Future Appointments   Date Time Provider Brandon Lopez   12/22/2022  2:00 PM Robert Rojo DO [address] [place] [address]

## 2022-11-21 ENCOUNTER — CARE COORDINATION (OUTPATIENT)
Dept: CARE COORDINATION | Age: 72
End: 2022-11-21

## 2022-11-21 NOTE — CARE COORDINATION
Left message to return phone call to complete Care Coordination call.   [facility] eligible for enrollment

## 2022-11-21 NOTE — CARE COORDINATION
Received a call back from Rocio Burton. States he is doing well now and no further issues with elbow. No new falls. Discussed support and chronic health conditions. States he made appt with neurology for Dec 22nd. He declined further follow up from Care Coordination at this time. Will not enroll due to patient declining support.

## 2022-11-22 ENCOUNTER — CARE COORDINATION (OUTPATIENT)
Dept: CARE COORDINATION | Age: 72
End: 2022-11-22

## 2022-11-22 NOTE — CARE COORDINATION
Marino Hester called back and wanted to confirm his PCP appt date and time and wanted to make sure there was lab work that needed completed. I confirmed date and time of PCP appt for 12-22-22 and informed him that there are 6 labs due. He will be going to [facility]) lab to complete before appt.

## 2022-11-22 NOTE — CARE COORDINATION
BERNIE received a missed call from Verlie Crew. Attempted to call him back and left detailed message to return phone call as he stated he had a question.

## 2022-12-05 ENCOUNTER — HOSPITAL ENCOUNTER (OUTPATIENT)
Age: 72
Discharge: HOME | End: 2022-12-05
Payer: MEDICARE

## 2022-12-05 DIAGNOSIS — E53.1 VITAMIN B6 DEFICIENCY: ICD-10-CM

## 2022-12-05 DIAGNOSIS — I10 PRIMARY HYPERTENSION: Chronic | ICD-10-CM

## 2022-12-05 LAB
ALBUMIN SERPL-MCNC: 4.2 G/DL (ref 3.5–5.1)
ALP BLD-CCNC: 83 U/L (ref 38–126)
ALT SERPL-CCNC: 13 U/L (ref 11–66)
ANION GAP SERPL CALCULATED.3IONS-SCNC: 10 MEQ/L (ref 8–16)
AST SERPL-CCNC: 18 U/L (ref 5–40)
BASOPHILS # BLD: 1 %
BASOPHILS ABSOLUTE: 0 THOU/MM3 (ref 0–0.1)
BILIRUB SERPL-MCNC: 0.9 MG/DL (ref 0.3–1.2)
BUN BLDV-MCNC: 11 MG/DL (ref 7–22)
CALCIUM SERPL-MCNC: 9.9 MG/DL (ref 8.5–10.5)
CHLORIDE BLD-SCNC: 101 MEQ/L (ref 98–111)
CHOLESTEROL, TOTAL: 141 MG/DL (ref 100–199)
CO2: 26 MEQ/L (ref 23–33)
CREAT SERPL-MCNC: 1 MG/DL (ref 0.4–1.2)
CREATININE, URINE: 279.3 MG/DL
EOSINOPHIL # BLD: 2 %
EOSINOPHILS ABSOLUTE: 0.1 THOU/MM3 (ref 0–0.4)
ERYTHROCYTE [DISTWIDTH] IN BLOOD BY AUTOMATED COUNT: 13.1 % (ref 11.5–14.5)
ERYTHROCYTE [DISTWIDTH] IN BLOOD BY AUTOMATED COUNT: 42.8 FL (ref 35–45)
GFR SERPL CREATININE-BSD FRML MDRD: > 60 ML/MIN/1.73M2
GLUCOSE BLD-MCNC: 108 MG/DL (ref 70–108)
HCT VFR BLD CALC: 43.3 % (ref 42–52)
HDLC SERPL-MCNC: 42 MG/DL
HEMOGLOBIN: 14.7 GM/DL (ref 14–18)
IMMATURE GRANS (ABS): 0.03 THOU/MM3 (ref 0–0.07)
IMMATURE GRANULOCYTES: 0.6 %
LDL CHOLESTEROL CALCULATED: 85 MG/DL
LYMPHOCYTES # BLD: 23 %
LYMPHOCYTES ABSOLUTE: 1.1 THOU/MM3 (ref 1–4.8)
MCH RBC QN AUTO: 30.5 PG (ref 26–33)
MCHC RBC AUTO-ENTMCNC: 33.9 GM/DL (ref 32.2–35.5)
MCV RBC AUTO: 89.8 FL (ref 80–94)
MICROALBUMIN UR-MCNC: 1.94 MG/DL
MICROALBUMIN/CREAT UR-RTO: 7 MG/G (ref 0–30)
MONOCYTES # BLD: 9.1 %
MONOCYTES ABSOLUTE: 0.4 THOU/MM3 (ref 0.4–1.3)
NUCLEATED RED BLOOD CELLS: 0 /100 WBC
PLATELET # BLD: 294 THOU/MM3 (ref 130–400)
PMV BLD AUTO: 8.6 FL (ref 9.4–12.4)
POTASSIUM SERPL-SCNC: 4.3 MEQ/L (ref 3.5–5.2)
RBC # BLD: 4.82 MILL/MM3 (ref 4.7–6.1)
SEG NEUTROPHILS: 64.3 %
SEGMENTED NEUTROPHILS ABSOLUTE COUNT: 3.2 THOU/MM3 (ref 1.8–7.7)
SODIUM BLD-SCNC: 137 MEQ/L (ref 135–145)
TOTAL PROTEIN: 8 G/DL (ref 6.1–8)
TRIGL SERPL-MCNC: 69 MG/DL (ref 0–199)
TSH SERPL DL<=0.05 MIU/L-ACNC: 2.96 UIU/ML (ref 0.4–4.2)
WBC # BLD: 4.9 THOU/MM3 (ref 4.8–10.8)

## 2022-12-05 PROCEDURE — 80061 LIPID PANEL: CPT

## 2022-12-05 PROCEDURE — 36415 COLL VENOUS BLD VENIPUNCTURE: CPT

## 2022-12-05 PROCEDURE — 84207 ASSAY OF VITAMIN B-6: CPT

## 2022-12-05 PROCEDURE — 82043 UR ALBUMIN QUANTITATIVE: CPT

## 2022-12-05 PROCEDURE — 85025 COMPLETE CBC W/AUTO DIFF WBC: CPT

## 2022-12-05 PROCEDURE — 80053 COMPREHEN METABOLIC PANEL: CPT

## 2022-12-05 PROCEDURE — 84443 ASSAY THYROID STIM HORMONE: CPT

## 2022-12-06 ENCOUNTER — TELEPHONE (OUTPATIENT)
Dept: FAMILY MEDICINE CLINIC | Age: 72
End: 2022-12-06

## 2022-12-06 NOTE — TELEPHONE ENCOUNTER
----- Message from Robert Rojo, DO sent at 12/5/2022  6:53 PM EST -----  Please let pt know that not all labs are back, but most are  What is back looks good  Will call with final lab result once available. Let me know if questions, thanks!

## 2022-12-08 LAB — VITAMIN B6: 23.8 NMOL/L (ref 20–125)

## 2022-12-09 ENCOUNTER — TELEPHONE (OUTPATIENT)
Dept: FAMILY MEDICINE CLINIC | Age: 72
End: 2022-12-09

## 2022-12-09 NOTE — TELEPHONE ENCOUNTER
----- Message from Scott Favre, DO sent at 12/8/2022  7:28 PM EST -----  Please let pt know that B6 looks good. Let me know if questions, thanks!

## 2022-12-22 ENCOUNTER — OFFICE VISIT (OUTPATIENT)
Dept: FAMILY MEDICINE CLINIC | Age: 72
End: 2022-12-22

## 2022-12-22 VITALS
RESPIRATION RATE: 16 BRPM | HEIGHT: 68 IN | OXYGEN SATURATION: 98 % | TEMPERATURE: 98.2 F | DIASTOLIC BLOOD PRESSURE: 76 MMHG | WEIGHT: 242.6 LBS | HEART RATE: 68 BPM | BODY MASS INDEX: 36.77 KG/M2 | SYSTOLIC BLOOD PRESSURE: 130 MMHG

## 2022-12-22 DIAGNOSIS — F09 COGNITIVE DEFICIT DUE TO OLD HEAD TRAUMA: ICD-10-CM

## 2022-12-22 DIAGNOSIS — R56.1 SEIZURE AFTER HEAD INJURY (HCC): ICD-10-CM

## 2022-12-22 DIAGNOSIS — E78.2 MIXED HYPERLIPIDEMIA: ICD-10-CM

## 2022-12-22 DIAGNOSIS — E53.1 VITAMIN B6 DEFICIENCY: ICD-10-CM

## 2022-12-22 DIAGNOSIS — G40.909 SEIZURE DISORDER (HCC): Chronic | ICD-10-CM

## 2022-12-22 DIAGNOSIS — I10 ESSENTIAL HYPERTENSION: Primary | ICD-10-CM

## 2022-12-22 DIAGNOSIS — E66.9 OBESITY (BMI 30-39.9): ICD-10-CM

## 2022-12-22 DIAGNOSIS — S09.90XS COGNITIVE DEFICIT DUE TO OLD HEAD TRAUMA: ICD-10-CM

## 2022-12-22 DIAGNOSIS — I50.32 CHRONIC HEART FAILURE WITH PRESERVED EJECTION FRACTION (HCC): ICD-10-CM

## 2022-12-22 SDOH — ECONOMIC STABILITY: FOOD INSECURITY: WITHIN THE PAST 12 MONTHS, THE FOOD YOU BOUGHT JUST DIDN'T LAST AND YOU DIDN'T HAVE MONEY TO GET MORE.: NEVER TRUE

## 2022-12-22 SDOH — ECONOMIC STABILITY: FOOD INSECURITY: WITHIN THE PAST 12 MONTHS, YOU WORRIED THAT YOUR FOOD WOULD RUN OUT BEFORE YOU GOT MONEY TO BUY MORE.: NEVER TRUE

## 2022-12-22 ASSESSMENT — SOCIAL DETERMINANTS OF HEALTH (SDOH): HOW HARD IS IT FOR YOU TO PAY FOR THE VERY BASICS LIKE FOOD, HOUSING, MEDICAL CARE, AND HEATING?: NOT HARD AT ALL

## 2022-12-22 NOTE — PROGRESS NOTES
Chief Complaint   Patient presents with    Follow-up     6 mo follow up chronic issues as noted below       History obtained from the patient and sister (via phone today with sister). 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 Trouble reading and writing. SUBJECTIVE:  Shannan Gaucher is a 67 y.o. male that presents today for     -HTN:    HPI PRIOR VISIT: was on lisinopril. Stopped as above. BP's <140/90 since discharge w/o meds    UPDATE PRIOR VISIT: BP <140/90. No CP/SOB. con't to be off meds and BP's fine. UPDATE PRIOR VISIT: started on norvasc 2.5mg by nephro as BP's had started to go high. However, now BP <140/90. UPDATE PRIOR VISIT: BPs <140/90. No CP/SOB    UPDATE TODAY: BPs <140/90. On norvasc yet. Needs refilled     BP Readings from Last 3 Encounters:   12/22/22 130/76   10/27/22 (!) 156/66   06/21/22 122/76         -B 6 def PRIOR VISIT: on supplementation. When goes off all together, levels low. When takes daily high. currently on once per wk, due for labs soon. UPDATE PRIOR VISIT: taking supplement once per wk (more frequent levels too high, not taking, levels too low). Tolerating well. b6 level pending today. No bleeding, melena or hematochezia. UPDATE LAST VISIT:  Level high last check in DEC  Was to hold b6 supplement and repeat labs in 6 wks  He is holding the supplement, but never did labs  Needs new order. UPDATE TODAY:   Recent levels back to WNL  Off supplementation now       -HLD:    HPI:     Taking meds as prescribed ?: yes  Tolerating well ?: yes  Side Effects ?: denies  Muscle Pain?: denies  Working on TLCS ?: yes      -HF with dec EF PRIOR VISIT: pt had echo done almost 2 years ago. Pt was unaware. No hx of CAD. Former heavy drinker. No CP, SOB, orthopnea or PND. UPDATE PRIOR VISIT: had echo, EF up to 50% with grade 1 diastolic dys. No CP, SOB, orthopnea or PND. Has cardio apt upcoming. UPDATE TODAY: echo stable. No CP/SOB. Following with cardio as well.  No orthopnea, wts stable. At last cardio visit, was told could f/u prn. No changes.       -Seizure d/o/Cog deficits: TBI 10+ years ago, seizure at the time. On keppra. Following with neuro. No seizure in 10+ years. Also has some mild cog deficits. pts sister helps care for home.  He completes most ADLs and iADLs      -Obesity:  wts about the same last few months  Diet better  Exercises some    Wt Readings from Last 3 Encounters:   12/22/22 242 lb 9.6 oz (110 kg)   10/27/22 240 lb (108.9 kg)   06/21/22 240 lb (108.9 kg)       Age/Gender Health Maintenance    Lipid -   Lab Results   Component Value Date    CHOL 141 12/05/2022    CHOL 140 12/21/2021    CHOL 143 11/23/2020     Lab Results   Component Value Date    TRIG 69 12/05/2022    TRIG 77 12/21/2021    TRIG 78 11/23/2020     Lab Results   Component Value Date    HDL 42 12/05/2022    HDL 46 12/21/2021    HDL 47 11/23/2020     Lab Results   Component Value Date    LDLCALC 85 12/05/2022    LDLCALC 79 12/21/2021    LDLCALC 80 11/23/2020       DM Screen -   Lab Results   Component Value Date/Time    GLUCOSE 108 12/05/2022 06:53 AM       Colon Cancer Screening - NEG Cologuard DEC 2021  Lung Cancer Screening - never smoker    Tetanus - UTD 2013  Influenza Vaccine - UTD FALL 2022  Pneumonia Vaccine - UTD PPV 23 AUG 2014 and PCV 13 OCT 2017/and PPV 23 in NOV 2019  Zoster - to get at pharmacy per medicare     PSA testing discussion -   Lab Results   Component Value Date    PSA 1.53 (H) 11/23/2020    PSA 1.42 (H) 11/05/2019    PSA 1.62 (H) 10/29/2018       AAA Screening - never smoker      Specialists List:  Minerva Louise: wound care  Min: neuro for seizures  Cardio: Walterine Babinski       Current Outpatient Medications   Medication Sig Dispense Refill    levETIRAcetam (KEPPRA) 1000 MG tablet take 1 tablet by mouth twice a day 180 tablet 3    amLODIPine (NORVASC) 2.5 MG tablet take 1 tablet by mouth once daily 90 tablet 3    atorvastatin (LIPITOR) 20 MG tablet take 1 tablet by mouth once daily 90 tablet 3    Handicap Placard MISC by Does not apply route Expires 15 SEPT 2025 1 each 0    aspirin 81 MG chewable tablet Take 81 mg by mouth daily       No current facility-administered medications for this visit. No orders of the defined types were placed in this encounter. All medications reviewed and reconciled, including OTC and herbal medications. Updated list given to patient. 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 9)     Chronic bilateral low back pain without sciatica 09/10/2018    Vitamin B6 deficiency 10/29/2017    History of acute kidney injury from excessive NSAID use     Osteoarthritis     Hypertension     Hyperlipidemia     History of DVT of right lower extremity      2015. Txt with 6 months of coumadin. Provoked. Heart failure with preserved ejection fraction (HCC)     History of alcohol abuse     History of traumatic brain injury     History of non-healing open leg wound of R leg. S/p skin graft. Healed. From chronic venous insuficiency. 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 S/p skin graft. Healed. From chronic venous insuficiency. History of traumatic brain injury     Hyperlipidemia     Hypertension     Mentally challenged     \"slow\" states his sister    Obesity (BMI 30-39. 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 Mulu Shafer follows, no seizure activity since    Seizure disorder ([facility] [address] 07/17/2014       Past Surgical History:   Procedure Laterality Date    MO INCIS/DRAIN THIGH/KNEE ABSCESS,DEEP Right 9/25/2017    RIGHT SERGIO LEG WOUND DEBRIDEMENT WITH SKIN GRAFT AND WOUND VAC APPLICATION performed by Carlene Braun DPM at [facility] Right 10/9/2017    SPLIT THICKNESS SKIN GRAFT FROM RIGHT LEG performed by Carlene Braun DPM at [facility]      vein stripping       No Known Allergies      Social History     Tobacco Use    Smoking status: Never    Smokeless tobacco: Never   Substance Use Topics    Alcohol use: No     Comment:           Family History   Problem Relation Age of Onset    Cancer Mother     Stroke Mother     Diabetes Mother     High Blood Pressure Mother     High Cholesterol Mother     Stroke Father     COPD Father     Diabetes Maternal Grandmother     Heart Disease Maternal Grandfather     Cancer Sister     Asthma Sister     High Blood Pressure Sister     High Cholesterol Sister     Arthritis Sister     Diabetes Sister     Diabetes Sister     Colon Cancer Neg Hx     Prostate Cancer Neg Hx          I have reviewed the patient's past medical history, past surgical history, allergies, medications, social and family history and I have made updates where appropriate.       Review of Systems  Positive responses are highlighted in bold    Constitutional:  Fever, Chills, Night Sweats, Fatigue, Unexpected changes in weight  HENT:  Ear pain, Tinnitus, Nosebleeds, Trouble swallowing, Hearing loss, Sore throat  Cardiovascular:  Chest Pain, Palpitations, Orthopnea, Paroxysmal Nocturnal Dyspnea  Respiratory:  Cough, Wheezing, Shortness of breath, Chest tightness, Apnea  Gastrointestinal:  Nausea, Vomiting, Diarrhea, Constipation, Heartburn, Blood in stool  Genitourinary:  Difficulty or painful urination, Flank pain, Change in frequency, Urgency  Skin:  Color change, Rash, Itching, Wound  Musculoskeletal:  Joint pain, Back pain, Gait problems, Joint swelling, Myalgias  Neurological:  Dizziness, Headaches, Presyncope, Numbness, Seizures, Tremors  Endocrine:  Heat Intolerance, Cold Intolerance, Polydipsia, Polyphagia, Polyuria      PHYSICAL EXAM:  Vitals:    12/22/22 1352   BP: 130/76   Pulse: 68   Resp: 16   Temp: 98.2 °F (36.8 °C)   SpO2: 98%   Weight: 242 lb 9.6 oz (110 kg)   Height: 5' 8\" (1.727 m)     Body mass index is 36.89 kg/m². VS Reviewed  General Appearance: A&O x 3, No acute distress,well developed and well- nourished  Eyes: pupils equal, round, and reactive to light, extraocular eye movements intact, conjunctivae and eye lids without erythema  ENT: external ear and ear canal clear bilaterally, TMs intact and regular, nose without deformity, nasal mucosa and turbinates normal without polyps, oropharynx normal, dentition is normal for age  Neck: supple and non-tender without mass, no thyromegaly or thyroid nodules, no cervical lymphadenopathy  Pulmonary/Chest: clear to auscultation bilaterally- no wheezes, rales or rhonchi, normal air movement, no respiratory distress or retractions  Cardiovascular: S1 and S2 auscultated w/ RRR. No murmurs, rubs, clicks, or gallops, distal pulses intact. Abdomen: soft, non-tender, non-distended, bowl sounds physiologic,  no rebound or guarding, no masses or hernias noted. Liver and spleen without enlargement. Extremities: no cyanosis, clubbing or edema of the lower extremities. +2 PT/DP bilaterally. Psych: Affect appropriate. Mood normal. Thought process is normal without evidence of depression or psychosis. Good insight and appropriate interaction. Cognition and memory appear to be impaired. Skin: warm and dry, no rash or erythema      ASSESSMENT & PLAN  1. Essential hypertension    Stable  At goal  Labs UTD  con't norvasc  F/u 6 months    2.  Vitamin B6 deficiency    High previously  Back to WNL off supplementation  Will repeat labs in 6 months, in call back cue    3. Mixed hyperlipidemia    Stable  con't lipitor     4. Chronic heart failure with preserved ejection fraction (HCC)    Stable  Mild  No sxs  Monitor BP's   Low salt diet  F/u cardio prn per their last note    5. 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 Seizure disorder ([facility] [address]      7. 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 Obesity (BMI 30-39. 9)    Discussed wt loss strategies again today      DISPOSITION    Return in about 6 months (around 6/22/2023) for AWV, follow-up on chronic medical conditions, sooner as needed. Merlinda Rooks released without restrictions. Future Appointments   Date Time Provider Brandon Lopez   6/22/2023  2:00 PM Anatoly Almazan DO [facility] [address] [facility]     PATIENT COUNSELING    Merlinda Rooks received counseling on the following healthy behaviors: nutrition, exercise and medication adherence    Barriers to learning and self management: Cognitive issues, sister available during appointment. Discussed use, benefit, and side effects of prescribed medications. Barriers to medication compliance addressed. All patient questions answered. Pt voiced understanding.        Electronically signed by Anatoly Almazan DO on 12/22/2022 at 2:19 PM

## 2022-12-22 NOTE — PROGRESS NOTES
Vaccine Information Sheet, \"Influenza - Inactivated\"  given to Bell Caruso, or parent/legal guardian of  Bell Caruso and verbalized understanding. Patient responses:    Have you ever had a reaction to a flu vaccine? No  Do you have an allergy to eggs, neomycin or polymixin? No  Do you have an allergy to Thimerosal, contact lens solution, or Merthiolate? No  Have you ever had Guillian Ducor Syndrome? No  Do you have any current illness? No  Do you have a temperature above 100 degrees? No  Are you pregnant? No  If pregnant, permission obtained from physician? No  Do you have an active neurological disorder? No      Flu vaccine given per order. Please see immunization tab.

## 2023-02-20 NOTE — H&P
Sister      Diabetes Sister      Diabetes Sister              SOCIAL HISTORY            Social History   Substance Use Topics    Smoking status: Never Smoker    Smokeless tobacco: Never Used    Alcohol use No         Comment:           ALLERGIES     No Known Allergies     MEDICATIONS            Current Outpatient Prescriptions on File Prior to Encounter   Medication Sig Dispense Refill    HYDROcodone-acetaminophen (NORCO) 5-325 MG per tablet Take 1 tablet by mouth every 4 hours as needed for Pain .  42 tablet 0    sulfamethoxazole-trimethoprim (BACTRIM DS;SEPTRA DS) 800-160 MG per tablet Take 1 tablet by mouth 2 times daily Indications: 28 days        vitamin B-12 (CYANOCOBALAMIN) 1000 MCG tablet Take 1,000 mcg by mouth daily Indications: doesnt know when he ran out         pyridoxine (B-6) 25 MG tablet Take 25 mg by mouth daily Indications: doesnt when he ran out         lisinopril (PRINIVIL;ZESTRIL) 20 MG tablet Take 20 mg by mouth daily Indications: Doesn't know when he ran out         levETIRAcetam (KEPPRA) 1000 MG tablet Take 1 tablet by mouth 2 times daily 60 tablet 11      No current facility-administered medications on file prior to encounter.          REVIEW OF SYSTEMS     A comprehensive review of systems was negative.     Objective:       There were no vitals taken for this visit.         Wt Readings from Last 3 Encounters:   09/25/17 225 lb 1.4 oz (102.1 kg)   09/21/17 230 lb (104.3 kg)   09/11/17 233 lb (105.7 kg)      PHYSICAL EXAMINATION  CONSTITUTIONAL:  awake, alert, cooperative, no apparent distress, and appears stated age  EYES:  pupils equal, round and reactive to light, extra ocular muscles intact, sclera clear, conjunctiva normal  ENT:  normocepalic, without obvious abnormality  NECK:  Supple, symmetrical, trachea midline, no adenopathy  LUNGS:  No increased work of breathing, good air exchange, clear to auscultation bilaterally, no crackles or wheezing  CARDIOVASCULAR:  Normal apical impulse, regular rate and rhythm  ABDOMEN:  normal bowel sounds, soft, non-distended  Extremities (lower extremity examination in detail)  Vascular: Dorsalis pedis and posterior tibial pulses are weakly bilaterally. Skin temperature is warm to warm from proximal tibial tuberosity to distal digits. CFT sluggish but intact to stacia-wound site. Edema nonpitting in nature. Hair growth absent. Quality of skin atrophic/brawny. Dermatologic: See wound documentation for further details. Graft well adhered to site. No signs of infection present. Silicone bilayer removed + staples. NPWT reapplied to site at 125mmhg continuous. Neurovascular: Epicritic and protopathic sensations are grossly intact. Musculoskeletal: Muscle strength 5/5 for all plantarflexors, dorsiflexors, inverters and everters examined. Mild pain with palpation of right ankle. No further acute bony abnormalities of note.                   Wound Care Documentation:       Negative Pressure Wound Therapy Leg Right (Active)   Wound Type Surgical 9/25/2017  4:05 PM   Wound Assessment Edges well approximated;Yellow;Red 10/2/2017 11:33 AM   Stacia-wound Assessment Maceration; White 10/2/2017 11:33 AM   Unit Type KCI 10/2/2017 11:33 AM   Dressing Type Black foam 10/2/2017 11:33 AM   Number of pieces used 1 10/2/2017 11:33 AM   Cycle Continuous 10/2/2017 11:33 AM   Target Pressure (mmHg) 125 10/2/2017 11:33 AM   Irrigation Solution Normal Saline 10/2/2017 11:33 AM   Canister changed? Yes 10/2/2017 11:33 AM   Dressing Status Dry; Intact; Changed 10/2/2017 11:33 AM   Dressing Changed Changed/New 10/2/2017 11:33 AM   Drainage Amount Moderate 10/2/2017 11:33 AM   Drainage Description Serosanguinous 10/2/2017 11:33 AM   Odor None 10/2/2017 11:33 AM   Number of days:7       Wound 10/28/16 Leg Right;Lateral;Lower;Proximal #1 (Active)   Wound Type Wound 10/2/2017 11:33 AM   Dressing Status Intact 10/2/2017 11:33 AM   Dressing Changed Changed/New 10/2/2017 11:33 AM AM   Drainage Amount Moderate 9/18/2017 10:29 AM   Drainage Description Serosanguinous 9/18/2017 10:29 AM   Odor None 9/18/2017 10:29 AM   Culture Taken Yes 5/25/2017 12:04 PM   Time out Yes 9/15/2017 10:21 AM   Procedural Pain 0 9/15/2017 10:21 AM   Post procedural Pain 0 9/15/2017 10:21 AM   Op First Treatment Date 10/28/16 10/28/2016 11:35 AM   Number of days:324         LABS                 CBC:         Lab Results   Component Value Date     WBC 5.8 09/18/2017     HGB 13.8 09/18/2017     HCT 40.3 09/18/2017     MCV 88.7 09/18/2017      09/18/2017      BMP:         Lab Results   Component Value Date      09/18/2017     K 4.2 09/18/2017     CL 98 09/18/2017     CO2 28 09/18/2017     PHOS 2.6 07/17/2014     BUN 9 09/18/2017     CREATININE 1.0 09/18/2017      PT/INR:         Lab Results   Component Value Date     PROTIME 24.9 10/01/2014     INR 2.29 10/01/2014      Prealbumin: No results found for: PREALBUMIN  Albumin:        Lab Results   Component Value Date     LABALBU 3.1 07/22/2014      Sed Rate:No results found for: SEDRATE  CRP: No results found for: CRP  Micro:         Lab Results   Component Value Date     BC No growth-preliminary  No growth 07/18/2014      Hemoglobin A1C: No results found for: VGAX7D2        Wound Culture                  Gram Stain Result 09/15/2017 10:38 AM [facility]     No segmented neutrophils observed. Rare epithelial cells observed. Few gram positive cocci occurring singly and in pairs.       Organism (Abnormal) 09/15/2017 10:38 AM [facility]     Staphylococcus aureus     Aerobic Culture 09/15/2017 10:38 AM [address] [facility]     moderate growth   In the treatment of gram positive infections, GENTAMICIN   should be CONSIDERED a SYNERGYSTIC agent ONLY. Ciprofloxacin and Levofloxacin, regardless of in vitro   sensitivity, should not be used for staphylococcal infections   other than uncomplicated lower UTIs. INTERVENTIONS: None. FLUOROSCOPY TIME: 9 minutes 56 seconds   FLUOROSCOPIC IMAGES: 40   SEDATION: Versed 2.0mg ; fentanyl 100mcg , IV; the patient was sedated for 45 minutes during this procedure and monitored with EKG and pulse ox monitoring devices by registered nurse.       OTHER MEDICATIONS: None . CONTRAST: 114 ml, Optiray-320. CLOSURE: Angio-Seal device, successful without complication.           TECHNIQUE: Signed informed consent was obtained prior to performing this procedure. The patient was sedated, as indicated above. Access was obtained and a 5 [employer] vascular sheath was inserted. The procedures as above were then performed.        FINDINGS:        AORTA: Aorta is mildly tortuous. Both renal arteries, SMA, and celiac artery are widely patent.       PELVIC VESSELS: Both common, internal, and external iliac arteries are widely patent.       Angiogram RIGHT leg:   CFA AND PROFUNDA: The common and deep femoral arteries are widely patent.       SFA AND POPLITEAL ARTERY:The superficial femoral artery and popliteal artery are widely patent. TRIFURCATION VESSELS: All 3 trifurcation vessels are widely patent. Anterior tibial artery is the main runoff vessel. .       Angiogram LEFT leg:   CFA AND PROFUNDA: The common and deep femoral arteries are widely patent.       SFA AND POPLITEAL ARTERY:The superficial femoral artery and popliteal artery are widely patent. TRIFURCATION VESSELS: All 3 trifurcation vessels are patent proximally. There appears be occlusion of the posterior tibial artery slightly above the ankle with reconstitution distally. The main runoff vessel in the left calf is the anterior tibial    artery.               Impression   Unremarkable study. No significant abnormality.         NIVES  FINDINGS:   Right GENEVIEVE: Noncompressible calcified vessels. Left GENEIVEVE: 1.75, markedly elevated, no doubt related to calcified vessels.  Right toe pressure: 59 mmHg Left toe pressure: 84 mmHg   Pressure right leg I87.2    Dystrophic nail L60.3    Nonhealing ulcer of right lower leg ([facility]) L97.819    PVD (peripheral vascular disease) ([facility]) I73.9         Procedure Note  Indications:  Based on my examination of this patient's wound(s)/ulcer(s) today, debridement is not required to promote healing and evaluate the extent healing.     Plan:      Patient examined and evaluated  Graft appears to have good take, well maintained  Silicone layer removed, NPWT reapplied at 125mmhg continuous  Rx updated for Bactrim  Plan for harvest STSG 10.9.17     Treatment:   No orders of the defined types were placed in this encounter.     Antibiotics: Yes (bactrim)  Follow up: 1 weeks  Please see attached Discharge Instructions  Written patient dismissal instructions given to patient and signed by patient or POA.          Visit Discharge/Physician Orders:  -Continue antibiotic as prescribed.  -Staples removed from skin graft today.  -Skin graft surgery in 1 week on Monday October 9th. Wound Location: Right lower leg   Do not shower, take baths or get wound wet, unless otherwise instructed by your Wound Care doctor. Keep all dressings clean & dry. Dressing orders: Wound dressed with cuticerin, wound vac, and wrapped with kerlix and ace bandage. Leave intact until surgery in 1 week. Follow up visit:   1 week- surgery at [facility] on Monday October 9th at 1:00 pm, arrive at 11:00 am  2 weeks at [address] [place] [address] on Monday October 16th at 10:15 am  Keep next scheduled appointment. Please give 24 hour notice if unable to keep appointment.  [phone]  If you experience any of the following, please call the Wound Care Service during business hours: [phone].                        *Increase in pain                        *Temperature over 101                        *Increase in drainage from your wound or a foul odor                        *Uncontrolled swelling                        *Need for compression bandage changes due to slippage, breakthrough drainage  If you need medical attention outside of business hours, please contact your Primary Care Doctor or go to the nearest emergency room.   2021 Viviane Valentino   Electronically signed by Calli Calderón DPM on 10/2/2017 at 11:06 AM No

## 2023-03-28 ENCOUNTER — HOSPITAL ENCOUNTER (EMERGENCY)
Age: 73
Discharge: HOME | End: 2023-03-28
Attending: EMERGENCY MEDICINE
Payer: MEDICARE

## 2023-03-28 ENCOUNTER — APPOINTMENT (OUTPATIENT)
Dept: CT IMAGING | Age: 73
End: 2023-03-28
Payer: MEDICARE

## 2023-03-28 VITALS
TEMPERATURE: 97.8 F | HEART RATE: 70 BPM | OXYGEN SATURATION: 98 % | RESPIRATION RATE: 16 BRPM | DIASTOLIC BLOOD PRESSURE: 80 MMHG | SYSTOLIC BLOOD PRESSURE: 161 MMHG

## 2023-03-28 DIAGNOSIS — S01.01XA LACERATION OF SCALP, INITIAL ENCOUNTER: ICD-10-CM
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
## 2023-03-28 PROCEDURE — 90715 TDAP VACCINE 7 YRS/> IM: CPT

## 2023-03-28 PROCEDURE — 90471 IMMUNIZATION ADMIN: CPT

## 2023-03-28 PROCEDURE — 12002 RPR S/N/AX/GEN/TRNK2.6-7.5CM: CPT

## 2023-03-28 PROCEDURE — 72125 CT NECK SPINE W/O DYE: CPT

## 2023-03-28 PROCEDURE — 70450 CT HEAD/BRAIN W/O DYE: CPT

## 2023-03-28 PROCEDURE — 99284 EMERGENCY DEPT VISIT MOD MDM: CPT

## 2023-03-28 PROCEDURE — 6360000002 HC RX W HCPCS

## 2023-03-28 RX ADMIN — TETANUS TOXOID, REDUCED DIPHTHERIA TOXOID AND ACELLULAR PERTUSSIS VACCINE, ADSORBED 0.5 ML: 5; 2.5; 8; 8; 2.5 SUSPENSION INTRAMUSCULAR at 20:06

## 2023-03-28 ASSESSMENT — PAIN - FUNCTIONAL ASSESSMENT
PAIN_FUNCTIONAL_ASSESSMENT: NONE - DENIES PAIN
PAIN_FUNCTIONAL_ASSESSMENT: NONE - DENIES PAIN

## 2023-03-28 NOTE — ED PROVIDER NOTES

## 2023-03-28 NOTE — DISCHARGE INSTRUCTIONS
You were seen in the emergency department today. The laceration to your head was repaired with staples. These need to be removed in 7 to 10 days by your primary care provider or back here in the emergency department. Imaging of your head showed no abnormalities. For pain use acetaminophen (Tylenol) or ibuprofen (Motrin / Advil), unless prescribed medications that have acetaminophen or ibuprofen (or similar medications) in it. You can take over the counter acetaminophen tablets (1 - 2 tablets of the 500-mg strength every 6 hours) or ibuprofen tablets (2 tablets every 4 hours). You can shower with the laceration, would avoid baths or swimming in lakes / rivers. PLEASE RETURN TO THE EMERGENCY DEPARTMENT IMMEDIATELY for worsening symptoms, redness around the wound or redness streaking up the body part, white drainage from the wound, or if you develop any concerning symptoms such as: high fever not relieved by acetaminophen (Tylenol) and/or ibuprofen (Motrin / Advil), chills, shortness of breath, chest pain, feeling of your heart fluttering or racing, persistent nausea and/or vomiting, vomiting up blood, blood in your stool, numbness, loss of consciousness, weakness or tingling in the arms or legs or change in color of the extremities, changes in mental status, persistent headache, blurry vision, loss of bladder / bowel control, unable to follow up with your physician, or other any other care or concern.

## 2023-03-28 NOTE — ED NOTES
Resting in bed. Reports pain under control. Call light within reach.  Will monitor      Alba Hurtado RN  03/28/23 [id number]

## 2023-03-28 NOTE — ED PROVIDER NOTES
[facility] [address] [address] EMERGENCY DEPT    EMERGENCY MEDICINE     Pt Name: Tammy Foster  MRN: [id number]  [place] 1950  Date of evaluation: 3/28/2023  Resident Physician: Jenifer Schulz MD  Supervising Physician: Jake Kendall MD    CHIEF COMPLAINT       Chief Complaint   Patient presents with    Fall    Head Injury     HISTORY OF PRESENT ILLNESS   Tammy Foster is a pleasant 68 y.o. male who presents to the emergency department from home for fall. Patient states he was walking up his stairs when he tripped. He states this was because he was wearing his slippers. He hit his head on a picture frame but did not lose consciousness. He denies lightheadedness, chest pain, palpitations prior to the fall. It did bleed but was controlled at home. He does not take a blood thinner. He is not complaining of pain. He has not had any nausea, vomiting, confusion. Last Tdap was 4/12/2013. He has been eating and drinking normally. 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 S/p skin graft. Healed. From chronic venous insuficiency. History of traumatic brain injury     Hyperlipidemia     Hypertension     Mentally challenged     \"slow\" states his sister    Obesity (BMI 30-39. 9)     Osteoarthritis     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 ([facility] 07/2014    s/p fall down a hill, Dr. Chrissy Henderson follows, no seizure activity since    Seizure disorder ([facility] 07/17/2014       Patient Active Problem List   Diagnosis Code    [facility] G40.909    Venous insufficiency of right leg I87.2    History of non-healing open leg wound of R leg. S/p skin graft. Healed. From chronic venous insuficiency.  M88.167    Seizure after head

## 2023-03-28 NOTE — ED TRIAGE NOTES
Presents to ER with concern of head injury after falling up the stairs. Denies any LOC. Reports no pain. There is approx 9 cm lac noted to right side of scalp. Bleeding controlled at this time. No blood thinners.  Will monitor

## 2023-03-29 ENCOUNTER — CARE COORDINATION (OUTPATIENT)
Dept: CARE COORDINATION | Age: 73
End: 2023-03-29

## 2023-03-29 NOTE — CARE COORDINATION
Appt scheduled  Future Appointments   Date Time Provider Brandon Lopez   4/6/2023  9:40 AM DO Thony Warren [facility] - [address]   6/22/2023  2:00 PM DO Thony Warren Med [address]
Attempted to reach pt  No answer/LMOM to return call
Could office staff assist with setting up appt with PCP office for Tae Dueñas. ED followup and needs removal of staples on head laceration from fall. Thank you!
patients mental well-being? How would you rate their severity and impact on the patient?: Mild problems - don't interfere with function   How would you rate their home environment in terms of safety and stability (including domestic violence, insecure housing, neighbor harassment)?: Consistently safe, supportive, stable, no identified problems   How do daily activities impact on the patient's well-being? (include current or anticipated unemployment, work, caregiving, access to transportation or other): No identified problems or perceived positive benefits   How would you rate their social network (family, work, friends)?: Good participation with social networks   How would you rate their financial resources (including ability to afford all required medical care)?: Financially secure, resources adequate, no identified problems   How wells does the patient now understand their health and well-being (symptoms, signs or risk factors) and what they need to do to manage their health?: Reasonable to good understanding but do not feel able to engage with advice at this time   How well do you think your patient can engage in healthcare discussions? (Barriers include language, deafness, aphasia, alcohol or drug problems, learning difficulties, concentration): Adequate communication, with or without minor barriers   Do other services need to be involved to help this patient?: Other care/services not required at this time   Suggested Interventions and Community Resources  Fall Risk Prevention:  In Process Home Health Services: Not Started   Occupational Therapy: Not Started   Physical Therapy: Not Started   Transportation Services: Declined                  Future Appointments   Date Time Provider Brandon Lopez   6/22/2023  2:00 PM Karen Escobar, [address] [place] [address]

## 2023-03-29 NOTE — ED NOTES
Resting in bed. Voiced no concerns. Call light within reach.  Will monitor      Alba Hurtado RN  03/28/23 2054

## 2023-04-05 ENCOUNTER — CARE COORDINATION (OUTPATIENT)
Dept: CARE COORDINATION | Age: 73
End: 2023-04-05

## 2023-04-05 NOTE — CARE COORDINATION
9/10  Anticipated Goal Completion Date: 6-29-23              Future Appointments   Date Time Provider Brandon Lopez   4/6/2023  9:40 AM Scott Favre, DO [facility] - [facility]   6/22/2023  2:00 PM Scott Favre, DO [facility] Med [address] [facility]

## 2023-04-05 NOTE — PROGRESS NOTES
Hx     Prostate Cancer Neg Hx        I have reviewed the patient's past medical history, past surgical history, allergies, medications, social and family history and I have made updates where appropriate. Review of Systems  Positive responses are highlighted in bold    Constitutional:  Fever, Chills, Night Sweats, Fatigue, Unexpected changes in weight  HENT:  Ear pain, Tinnitus, Nosebleeds, Trouble swallowing, Hearing loss, Sore throat  Cardiovascular:  Chest Pain, Palpitations, Orthopnea, Paroxysmal Nocturnal Dyspnea  Respiratory:  Cough, Wheezing, Shortness of breath, Chest tightness, Apnea  Gastrointestinal:  Nausea, Vomiting, Diarrhea, Constipation, Heartburn, Blood in stool  Genitourinary:  Difficulty or painful urination, Flank pain, Change in frequency, Urgency  Skin:  Color change, Rash, Itching, Wound  Musculoskeletal:  Joint pain, Back pain, Gait problems, Joint swelling, Myalgias  Neurological:  Dizziness, Headaches, Presyncope, Numbness, Seizures, Tremors  Endocrine:  Heat Intolerance, Cold Intolerance, Polydipsia, Polyphagia, Polyuria      PHYSICAL EXAM:  Vitals:    04/06/23 0941   BP: 124/60   Pulse: 68   Resp: 16   Temp: 97.3 °F (36.3 °C)   SpO2: 96%   Weight: 244 lb 9.6 oz (110.9 kg)   Height: 5' 9\" (1.753 m)     Body mass index is 36.12 kg/m².        VS Reviewed  General Appearance: A&O x 3, No acute distress,well developed and well- nourished  Eyes: pupils equal, round, and reactive to light, extraocular eye movements intact, conjunctivae and eye lids without erythema  ENT: external ear and ear canal clear bilaterally, TMs intact and regular, nose without deformity, nasal mucosa and turbinates normal without polyps, oropharynx normal, dentition is normal for age  Neck: supple and non-tender without mass, no thyromegaly or thyroid nodules, no cervical lymphadenopathy  Pulmonary/Chest: clear to auscultation bilaterally- no wheezes, rales or rhonchi, normal air movement, no respiratory distress or

## 2023-04-06 ENCOUNTER — HOSPITAL ENCOUNTER (OUTPATIENT)
Age: 73
Discharge: HOME | End: 2023-04-06
Payer: MEDICARE

## 2023-04-06 ENCOUNTER — OFFICE VISIT (OUTPATIENT)
Dept: FAMILY MEDICINE CLINIC | Age: 73
End: 2023-04-06

## 2023-04-06 ENCOUNTER — TELEPHONE (OUTPATIENT)
Dept: WOUND CARE | Age: 73
End: 2023-04-06

## 2023-04-06 VITALS
HEART RATE: 68 BPM | BODY MASS INDEX: 36.23 KG/M2 | DIASTOLIC BLOOD PRESSURE: 60 MMHG | TEMPERATURE: 97.3 F | OXYGEN SATURATION: 96 % | WEIGHT: 244.6 LBS | SYSTOLIC BLOOD PRESSURE: 124 MMHG | HEIGHT: 69 IN | RESPIRATION RATE: 16 BRPM

## 2023-04-06 DIAGNOSIS — S81.801A MULTIPLE OPEN WOUNDS OF LOWER EXTREMITY, RIGHT, INITIAL ENCOUNTER: ICD-10-CM
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
## 2023-04-06 DIAGNOSIS — L98.499 ULCER OF EXTREMITY DUE TO CHRONIC VENOUS INSUFFICIENCY (HCC): ICD-10-CM

## 2023-04-06 DIAGNOSIS — I87.2 ULCER OF EXTREMITY DUE TO CHRONIC VENOUS INSUFFICIENCY (HCC): ICD-10-CM

## 2023-04-06 DIAGNOSIS — S01.01XA LACERATION OF SCALP, INITIAL ENCOUNTER: ICD-10-CM

## 2023-04-06 LAB
ANION GAP SERPL CALC-SCNC: 12 MEQ/L (ref 8–16)
BASOPHILS ABSOLUTE: 0.1 THOU/MM3 (ref 0–0.1)
BASOPHILS NFR BLD AUTO: 1 %
BUN SERPL-MCNC: 9 MG/DL (ref 7–22)
CALCIUM SERPL-MCNC: 9.6 MG/DL (ref 8.5–10.5)
CHLORIDE SERPL-SCNC: 101 MEQ/L (ref 98–111)
CO2 SERPL-SCNC: 26 MEQ/L (ref 23–33)
CREAT SERPL-MCNC: 0.8 MG/DL (ref 0.4–1.2)
CRP SERPL-MCNC: 1.11 MG/DL (ref 0–1)
DEPRECATED RDW RBC AUTO: 45.3 FL (ref 35–45)
EOSINOPHIL NFR BLD AUTO: 2.4 %
EOSINOPHILS ABSOLUTE: 0.1 THOU/MM3 (ref 0–0.4)
ERYTHROCYTE [DISTWIDTH] IN BLOOD BY AUTOMATED COUNT: 13.8 % (ref 11.5–14.5)
ERYTHROCYTE [SEDIMENTATION RATE] IN BLOOD BY WESTERGREN METHOD: 41 MM/HR (ref 0–10)
GFR SERPL CREATININE-BSD FRML MDRD: > 60 ML/MIN/1.73M2
GLUCOSE SERPL-MCNC: 111 MG/DL (ref 70–108)
HCT VFR BLD AUTO: 42.6 % (ref 42–52)
HGB BLD-MCNC: 13.7 GM/DL (ref 14–18)
IMM GRANULOCYTES # BLD AUTO: 0.02 THOU/MM3 (ref 0–0.07)
IMM GRANULOCYTES NFR BLD AUTO: 0.3 %
LYMPHOCYTES ABSOLUTE: 1.3 THOU/MM3 (ref 1–4.8)
LYMPHOCYTES NFR BLD AUTO: 21.7 %
MCH RBC QN AUTO: 29.2 PG (ref 26–33)
MCHC RBC AUTO-ENTMCNC: 32.2 GM/DL (ref 32.2–35.5)
MCV RBC AUTO: 90.8 FL (ref 80–94)
MONOCYTES ABSOLUTE: 0.7 THOU/MM3 (ref 0.4–1.3)
MONOCYTES NFR BLD AUTO: 11.1 %
NEUTROPHILS NFR BLD AUTO: 63.5 %
NRBC BLD AUTO-RTO: 0 /100 WBC
PLATELET # BLD AUTO: 290 THOU/MM3 (ref 130–400)
PMV BLD AUTO: 9 FL (ref 9.4–12.4)
POTASSIUM SERPL-SCNC: 4.3 MEQ/L (ref 3.5–5.2)
RBC # BLD AUTO: 4.69 MILL/MM3 (ref 4.7–6.1)
SEGMENTED NEUTROPHILS ABSOLUTE COUNT: 3.9 THOU/MM3 (ref 1.8–7.7)
SODIUM SERPL-SCNC: 139 MEQ/L (ref 135–145)
WBC # BLD AUTO: 6.2 THOU/MM3 (ref 4.8–10.8)

## 2023-04-06 PROCEDURE — 85651 RBC SED RATE NONAUTOMATED: CPT

## 2023-04-06 PROCEDURE — 80048 BASIC METABOLIC PNL TOTAL CA: CPT

## 2023-04-06 PROCEDURE — 85025 COMPLETE CBC W/AUTO DIFF WBC: CPT

## 2023-04-06 PROCEDURE — 86140 C-REACTIVE PROTEIN: CPT

## 2023-04-06 PROCEDURE — 36415 COLL VENOUS BLD VENIPUNCTURE: CPT

## 2023-04-06 RX ORDER — DOXYCYCLINE HYCLATE 100 MG
100 TABLET ORAL 2 TIMES DAILY
Qty: 20 TABLET | Refills: 0 | Status: SHIPPED | OUTPATIENT
Start: 2023-04-06 | End: 2023-04-16

## 2023-04-06 SDOH — ECONOMIC STABILITY: HOUSING INSECURITY
IN THE LAST 12 MONTHS, WAS THERE A TIME WHEN YOU DID NOT HAVE A STEADY PLACE TO SLEEP OR SLEPT IN A SHELTER (INCLUDING NOW)?: NO

## 2023-04-06 SDOH — ECONOMIC STABILITY: FOOD INSECURITY: WITHIN THE PAST 12 MONTHS, THE FOOD YOU BOUGHT JUST DIDN'T LAST AND YOU DIDN'T HAVE MONEY TO GET MORE.: NEVER TRUE

## 2023-04-06 SDOH — ECONOMIC STABILITY: INCOME INSECURITY: HOW HARD IS IT FOR YOU TO PAY FOR THE VERY BASICS LIKE FOOD, HOUSING, MEDICAL CARE, AND HEATING?: NOT HARD AT ALL

## 2023-04-06 SDOH — ECONOMIC STABILITY: FOOD INSECURITY: WITHIN THE PAST 12 MONTHS, YOU WORRIED THAT YOUR FOOD WOULD RUN OUT BEFORE YOU GOT MONEY TO BUY MORE.: NEVER TRUE

## 2023-04-06 ASSESSMENT — PATIENT HEALTH QUESTIONNAIRE - PHQ9
SUM OF ALL RESPONSES TO PHQ QUESTIONS 1-9: 0
SUM OF ALL RESPONSES TO PHQ9 QUESTIONS 1 & 2: 0
SUM OF ALL RESPONSES TO PHQ QUESTIONS 1-9: 0
SUM OF ALL RESPONSES TO PHQ QUESTIONS 1-9: 0
1. LITTLE INTEREST OR PLEASURE IN DOING THINGS: 0
2. FEELING DOWN, DEPRESSED OR HOPELESS: 0
SUM OF ALL RESPONSES TO PHQ QUESTIONS 1-9: 0

## 2023-04-06 NOTE — PATIENT INSTRUCTIONS
LAB INSTRUCTIONS:    Please complete labs today or tomorrow. Non-fastingn ok. The clinic will call you within 1 week of collection. If you have not heard from us within that amount of time, please call us at [phone].

## 2023-04-07 ENCOUNTER — HOSPITAL ENCOUNTER (OUTPATIENT)
Age: 73
Discharge: HOME | End: 2023-04-07
Payer: MEDICARE

## 2023-04-07 ENCOUNTER — HOSPITAL ENCOUNTER (OUTPATIENT)
Dept: GENERAL RADIOLOGY | Age: 73
Discharge: HOME | End: 2023-04-07
Payer: MEDICARE

## 2023-04-07 ENCOUNTER — TELEPHONE (OUTPATIENT)
Dept: FAMILY MEDICINE CLINIC | Age: 73
End: 2023-04-07

## 2023-04-07 DIAGNOSIS — S81.801A MULTIPLE OPEN WOUNDS OF LOWER EXTREMITY, RIGHT, INITIAL ENCOUNTER: ICD-10-CM

## 2023-04-07 DIAGNOSIS — L98.499 ULCER OF EXTREMITY DUE TO CHRONIC VENOUS INSUFFICIENCY (HCC): ICD-10-CM

## 2023-04-07 DIAGNOSIS — I87.2 ULCER OF EXTREMITY DUE TO CHRONIC VENOUS INSUFFICIENCY (HCC): ICD-10-CM

## 2023-04-07 PROCEDURE — 73630 X-RAY EXAM OF FOOT: CPT

## 2023-04-07 NOTE — TELEPHONE ENCOUNTER
Patient has been informed and voiced understanding   Patient to have Xray done today  Future Appointments   Date Time Provider Brandon Lopez   4/13/2023  9:00 AM Robert Rojo [address] [place] [address]   6/22/2023  2:00 PM [address] [facility]

## 2023-04-07 NOTE — TELEPHONE ENCOUNTER
----- Message from Shelly Velez, DO sent at 4/7/2023  7:13 AM EDT -----  Please let pt know that labs work ok other than mild elevation in inflammatory markers. Still needs to do xray ordered yesterday  Also was to make a f/u apt with me for next wk, but this was not scheduled at time of discharge yesterday for some reason. Please remind him to get xray done and please get setup for an apt for mid next wk, thank you!

## 2023-04-14 PROBLEM — I83.029 VENOUS STASIS ULCER OF LEFT LOWER LEG WITH EDEMA OF LEFT LOWER LEG (HCC): Status: ACTIVE | Noted: 2023-04-14

## 2023-04-14 PROBLEM — I83.892 VENOUS STASIS ULCER OF LEFT LOWER LEG WITH EDEMA OF LEFT LOWER LEG (HCC): Status: ACTIVE | Noted: 2023-04-14

## 2023-04-14 PROBLEM — I89.0 LYMPHEDEMA OF BOTH LOWER EXTREMITIES: Status: ACTIVE | Noted: 2023-04-14

## 2023-04-14 PROBLEM — R60.0 VENOUS STASIS ULCER OF LEFT LOWER LEG WITH EDEMA OF LEFT LOWER LEG (HCC): Status: ACTIVE | Noted: 2023-04-14

## 2023-04-14 PROBLEM — L97.929 VENOUS STASIS ULCER OF LEFT LOWER LEG WITH EDEMA OF LEFT LOWER LEG (HCC): Status: ACTIVE | Noted: 2023-04-14

## 2023-04-17 ENCOUNTER — HOSPITAL ENCOUNTER (OUTPATIENT)
Dept: WOUND CARE | Age: 73
Discharge: HOME | End: 2023-04-17
Payer: MEDICARE

## 2023-04-17 VITALS
TEMPERATURE: 97.2 F | RESPIRATION RATE: 16 BRPM | DIASTOLIC BLOOD PRESSURE: 72 MMHG | HEART RATE: 56 BPM | SYSTOLIC BLOOD PRESSURE: 167 MMHG | OXYGEN SATURATION: 97 %

## 2023-04-17 DIAGNOSIS — L97.311 VENOUS STASIS ULCER OF RIGHT ANKLE LIMITED TO BREAKDOWN OF SKIN WITH VARICOSE VEINS (HCC): ICD-10-CM

## 2023-04-17 DIAGNOSIS — I87.2 VENOUS INSUFFICIENCY OF RIGHT LEG: Primary | ICD-10-CM

## 2023-04-17 DIAGNOSIS — I83.013 VENOUS STASIS ULCER OF RIGHT ANKLE LIMITED TO BREAKDOWN OF SKIN WITH VARICOSE VEINS (HCC): ICD-10-CM

## 2023-04-17 PROCEDURE — 97597 DBRDMT OPN WND 1ST 20 CM/<: CPT | Performed by: NURSE PRACTITIONER

## 2023-04-17 PROCEDURE — 29580 STRAPPING UNNA BOOT: CPT

## 2023-04-17 PROCEDURE — 6370000000 HC RX 637 (ALT 250 FOR IP): Performed by: NURSE PRACTITIONER

## 2023-04-17 PROCEDURE — 97597 DBRDMT OPN WND 1ST 20 CM/<: CPT

## 2023-04-17 RX ORDER — LIDOCAINE HYDROCHLORIDE 40 MG/ML
SOLUTION TOPICAL ONCE
OUTPATIENT
Start: 2023-04-17 | End: 2023-04-17

## 2023-04-17 RX ORDER — LIDOCAINE 50 MG/G
OINTMENT TOPICAL ONCE
OUTPATIENT
Start: 2023-04-17 | End: 2023-04-17

## 2023-04-17 RX ORDER — CLOBETASOL PROPIONATE 0.5 MG/G
OINTMENT TOPICAL ONCE
OUTPATIENT
Start: 2023-04-17 | End: 2023-04-17

## 2023-04-17 RX ORDER — BETAMETHASONE DIPROPIONATE 0.05 %
OINTMENT (GRAM) TOPICAL ONCE
OUTPATIENT
Start: 2023-04-17 | End: 2023-04-17

## 2023-04-17 RX ORDER — LIDOCAINE HYDROCHLORIDE 20 MG/ML
JELLY TOPICAL ONCE
OUTPATIENT
Start: 2023-04-17 | End: 2023-04-17

## 2023-04-17 RX ORDER — GENTAMICIN SULFATE 1 MG/G
OINTMENT TOPICAL ONCE
OUTPATIENT
Start: 2023-04-17 | End: 2023-04-17

## 2023-04-17 RX ORDER — BACITRACIN ZINC AND POLYMYXIN B SULFATE 500; 1000 [USP'U]/G; [USP'U]/G
OINTMENT TOPICAL ONCE
OUTPATIENT
Start: 2023-04-17 | End: 2023-04-17

## 2023-04-17 RX ORDER — LIDOCAINE 40 MG/G
CREAM TOPICAL ONCE
OUTPATIENT
Start: 2023-04-17 | End: 2023-04-17

## 2023-04-17 RX ORDER — GINSENG 100 MG
CAPSULE ORAL ONCE
OUTPATIENT
Start: 2023-04-17 | End: 2023-04-17

## 2023-04-17 RX ORDER — BACITRACIN, NEOMYCIN, POLYMYXIN B 400; 3.5; 5 [USP'U]/G; MG/G; [USP'U]/G
OINTMENT TOPICAL ONCE
OUTPATIENT
Start: 2023-04-17 | End: 2023-04-17

## 2023-04-17 RX ORDER — LIDOCAINE HYDROCHLORIDE 20 MG/ML
JELLY TOPICAL ONCE
Status: COMPLETED | OUTPATIENT
Start: 2023-04-17 | End: 2023-04-17

## 2023-04-17 RX ADMIN — LIDOCAINE HYDROCHLORIDE: 20 JELLY TOPICAL at 13:40

## 2023-04-17 NOTE — PLAN OF CARE
Problem: Wound:  Goal: Will show signs of wound healing; wound closure and no evidence of infection  Description: Will show signs of wound healing; wound closure and no evidence of infection  Outcome: Progressing   Patient presents to wound clinic for follow up of bilateral leg wounds. Left leg is healed. Right ankle wound debrided today. Left leg- Apply compression stocking to left leg daily in the morning and remove at bedtime. Right ankle, leg wounds- Applied triad paste around wounds. Applied alginate to wound beds. Wrapped with unna boot, ABD over wound area, roll gauze, and coban from base of toes to 1-2 inches below bend of knee. Dressing to be changed in wound clinic at next appt. Follow up visits: Thursday 4/20/23 at 1pm, Monday 4/24/23 at 1pm.    Care plan reviewed with patient. Patient verbalize understanding of the plan of care and contribute to goal setting.

## 2023-04-17 NOTE — PROGRESS NOTES
12/22/22 242 lb 9.6 oz (110 kg)     General:  Awake, alert, no apparent distress. Appears stated age  [de-identified]: conjuctivae are clear without exudate or hemorrhage, anicteric sclera, moist oral mucosa. Chest:  Respirations regular, non-labored. Chest rise and fall equal bilaterally. Neurological: Awake, alert  Psychiatric:  Appropriate mood and affect  Extremities: non-traumatic in appearance. 4+ pitting edema to bilateral lower legs. Varicose veins, teleangiectasis, hemosiderin staining noted bilaterally. Skin:  Warm and dry  Wound:    Right lateral leg, proximal wound bed granular with slough. Moderate amount of drainage noted. Right lateral leg, distal wound bed granular with slough. Moderate amount of drainage noted. Left leg wound has healed. Right medial ankle wound bed granular with slough. Moderate amount of drainage noted. Wound 10/28/16 Leg Right;Lateral;Lower;Proximal #1 (Active)   Number of days: 7918       Wound 04/14/23 Leg Right;Lateral;Proximal #1 (Active)   Wound Image   04/17/23 1319   Wound Etiology Venous 04/17/23 1319   Dressing Status Intact; New dressing applied 04/17/23 1319   Wound Cleansed Cleansed with saline 04/17/23 1319   Dressing/Treatment Alginate;ABD;Roll gauze;Coban/self-adherent bandages 04/14/23 1504   Offloading for Diabetic Foot Ulcers Offloading not required 04/17/23 1319   Wound Length (cm) 1.6 cm 04/17/23 1319   Wound Width (cm) 1.5 cm 04/17/23 1319   Wound Depth (cm) 0.05 cm 04/17/23 1319   Wound Surface Area (cm^2) 2.4 cm^2 04/17/23 1319   Change in Wound Size % (l*w) 2.83 04/17/23 1319   Wound Volume (cm^3) 0.12 cm^3 04/17/23 1319   Wound Healing % 51 04/17/23 1319   Wound Assessment Granulation tissue;Slough 04/17/23 1319   Drainage Amount Moderate 04/17/23 1319   Drainage Description Serosanguinous 04/17/23 1319   Odor None 04/17/23 1319   Renate-wound Assessment Blanchable erythema;Dry/flaky; Intact 04/17/23 1319   Margins Attached edges

## 2023-04-19 ENCOUNTER — CARE COORDINATION (OUTPATIENT)
Dept: CARE COORDINATION | Age: 73
End: 2023-04-19

## 2023-04-19 NOTE — CARE COORDINATION
Left message to return phone call to complete Care coordination follow up
reduce my risk of falls by the following:  be proactive with fall interventions and prevention measure to reduce risk for injury related to falls    Barriers: impairment:  cognitive  Plan for overcoming my barriers: family and ACM support  Confidence: 9/10  Anticipated Goal Completion Date: 6-29-23              Future Appointments   Date Time Provider Brandon Lopez   4/20/2023  1:00  [facility], [facility] [address] HOD   4/24/2023  1:00 PM [address] [place] [address], [facility] [address] [facility]   6/22/2023  2:00 PM Mira Waggoner, [address] [place] [address]

## 2023-04-20 ENCOUNTER — HOSPITAL ENCOUNTER (OUTPATIENT)
Dept: WOUND CARE | Age: 73
Discharge: HOME | End: 2023-04-20
Payer: MEDICARE

## 2023-04-20 VITALS
SYSTOLIC BLOOD PRESSURE: 153 MMHG | RESPIRATION RATE: 16 BRPM | HEART RATE: 56 BPM | TEMPERATURE: 98.3 F | OXYGEN SATURATION: 100 % | DIASTOLIC BLOOD PRESSURE: 71 MMHG

## 2023-04-20 DIAGNOSIS — I87.2 VENOUS INSUFFICIENCY OF RIGHT LEG: Primary | ICD-10-CM

## 2023-04-20 PROBLEM — I83.013 VENOUS STASIS ULCER OF RIGHT ANKLE LIMITED TO BREAKDOWN OF SKIN (HCC): Status: RESOLVED | Noted: 2023-04-17 | Resolved: 2023-04-20

## 2023-04-20 PROBLEM — L97.311 VENOUS STASIS ULCER OF RIGHT ANKLE LIMITED TO BREAKDOWN OF SKIN (HCC): Status: RESOLVED | Noted: 2023-04-17 | Resolved: 2023-04-20

## 2023-04-20 PROCEDURE — 99212 OFFICE O/P EST SF 10 MIN: CPT | Performed by: NURSE PRACTITIONER

## 2023-04-20 NOTE — PROGRESS NOTES
[address] [facility]  Consult and Procedure Note      [address] [facility] RECORD NUMBER:  [id number]  AGE: 68 y.o. GENDER: male  : 1950  EPISODE DATE:  2023    SUBJECTIVE:     Chief Complaint   Patient presents with    Wound Check     Right ankle         HISTORY OF PRESENT ILLNESS      Deanna Nye is a 68 y.o. male who presents today for evaluation of bilateral lower leg wound. Wound has reportedly been present for many months, located in area of previous skin graft. Has been following with PCP for this problem is taking Doxycyline as prescribed. Xray's of foot were completed, no bony involvement noted. Has known venous insufficiency, does not wear compression as recommended. He states he spends most of his days sitting in chair at home. Current wound care includes calcium alginate, unna boot and coban to bilateral legs. At last visit debridement of wound was completed. He presents today for re-evaluation. 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 S/p skin graft. Healed. From chronic venous insuficiency. History of traumatic brain injury     Hyperlipidemia     Hypertension     Mentally challenged     \"slow\" states his sister    Obesity (BMI 30-39. 9)     Osteoarthritis     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 ([facility] 2014    s/p fall down a hill, Dr. Nadya Horton follows, no seizure activity since    Seizure disorder ([facility] 2014       PAST SURGICAL HISTORY     Past Surgical History:   Procedure Laterality Date    [place] I&D DEEP ABSC BURSA/HEMATOMA THIGH/KNEE REGION Right 2017    RIGHT SERGIO LEG WOUND DEBRIDEMENT WITH SKIN GRAFT AND WOUND VAC APPLICATION performed by Kerri Sterling DPM at

## 2023-04-20 NOTE — DISCHARGE INSTRUCTIONS
experience any of the following, please call the Wound Care Service during business hours: Monday through Friday 8:00 am - 4:30 pm  ([phone]). *Increase in pain              *Temperature over 101              *Increase in drainage from your wound or a foul odor              *Uncontrolled swelling              *Need for compression bandage changes due to slippage, breakthrough drainage     If you need medical attention outside of business hours, please contact your Primary Care Doctor or go to the nearest emergency room.

## 2023-04-27 ENCOUNTER — HOSPITAL ENCOUNTER (OUTPATIENT)
Dept: WOUND CARE | Age: 73
Discharge: HOME | End: 2023-04-27
Payer: MEDICARE

## 2023-04-27 VITALS
RESPIRATION RATE: 18 BRPM | TEMPERATURE: 97.3 F | DIASTOLIC BLOOD PRESSURE: 74 MMHG | HEART RATE: 52 BPM | SYSTOLIC BLOOD PRESSURE: 165 MMHG | OXYGEN SATURATION: 98 %

## 2023-04-27 DIAGNOSIS — I87.2 VENOUS INSUFFICIENCY OF RIGHT LEG: Primary | ICD-10-CM

## 2023-04-27 PROCEDURE — 29580 STRAPPING UNNA BOOT: CPT

## 2023-04-27 RX ORDER — GINSENG 100 MG
CAPSULE ORAL ONCE
OUTPATIENT
Start: 2023-04-27 | End: 2023-04-27

## 2023-04-27 RX ORDER — BACITRACIN, NEOMYCIN, POLYMYXIN B 400; 3.5; 5 [USP'U]/G; MG/G; [USP'U]/G
OINTMENT TOPICAL ONCE
OUTPATIENT
Start: 2023-04-27 | End: 2023-04-27

## 2023-04-27 RX ORDER — CLOBETASOL PROPIONATE 0.5 MG/G
OINTMENT TOPICAL ONCE
OUTPATIENT
Start: 2023-04-27 | End: 2023-04-27

## 2023-04-27 RX ORDER — LIDOCAINE HYDROCHLORIDE 40 MG/ML
SOLUTION TOPICAL ONCE
OUTPATIENT
Start: 2023-04-27 | End: 2023-04-27

## 2023-04-27 RX ORDER — LIDOCAINE 50 MG/G
OINTMENT TOPICAL ONCE
OUTPATIENT
Start: 2023-04-27 | End: 2023-04-27

## 2023-04-27 RX ORDER — LIDOCAINE 40 MG/G
CREAM TOPICAL ONCE
OUTPATIENT
Start: 2023-04-27 | End: 2023-04-27

## 2023-04-27 RX ORDER — LIDOCAINE HYDROCHLORIDE 20 MG/ML
JELLY TOPICAL ONCE
OUTPATIENT
Start: 2023-04-27 | End: 2023-04-27

## 2023-04-27 RX ORDER — BETAMETHASONE DIPROPIONATE 0.05 %
OINTMENT (GRAM) TOPICAL ONCE
OUTPATIENT
Start: 2023-04-27 | End: 2023-04-27

## 2023-04-27 RX ORDER — GENTAMICIN SULFATE 1 MG/G
OINTMENT TOPICAL ONCE
OUTPATIENT
Start: 2023-04-27 | End: 2023-04-27

## 2023-04-27 RX ORDER — BACITRACIN ZINC AND POLYMYXIN B SULFATE 500; 1000 [USP'U]/G; [USP'U]/G
OINTMENT TOPICAL ONCE
OUTPATIENT
Start: 2023-04-27 | End: 2023-04-27

## 2023-04-27 NOTE — DISCHARGE INSTRUCTIONS
Visit Discharge/Physician Orders:   - Elevate legs to reduce swelling.  - Sponge bath while the wraps are on or use a cast cover if showering.  - Apply compression stocking to left leg daily in the morning and remove at bedtime. Wound Location: Right leg, Right ankle     Dressing orders:      1) Gather wound care supplies and arrange on clean table. 2) Wash your hands with soap and water or use alcohol based hand  for 20 seconds (sing \"Happy Birthday\" twice). 3) Cleanse wounds with normal saline or wound cleanser and gauze. Pat dry with clean gauze. 4) Right ankle, leg wounds- Applied triad paste around wounds. Applied hydrofera blue and a gauze to wound beds. Wrapped with unna boot, and coban from base of toes to 1-2 inches below bend of knee. Leave dressing intact until next appt. *If unna boot becomes too tight- raise/elevate legs above the level of the heart for 15-20 minutes if swelling does not go down then carefully cut off unna boot and call clinic or go to local ER or family physician. If unna boot becomes wet or starts to roll down then carefully remove unna boot and call clinic. Keep all dressings clean & dry. Follow up visits:   Thursday 5/4/23 AT 2:30PM     Supplies:     Duration of dressings: n/a        We have sent your supply order to the following company:  n/a  If you don't receive the items you were expecting or don't know what the items are that you received, call the company where the order was sent. If you are unable to obtain wound supplies, continue to use the supplies you have available until you are able to reach us. It is most important to keep the wound covered at all times. It is YOUR responsibility to make sure that supplies are re-ordered before you run out. Re-order telephone numbers are included in each package. Keep next scheduled appointment. Please give 24 hour notice if unable to keep appointment.  [phone]     If you experience

## 2023-04-27 NOTE — PLAN OF CARE
Problem: Wound:  Goal: Will show signs of wound healing; wound closure and no evidence of infection  Description: Will show signs of wound healing; wound closure and no evidence of infection  Outcome: Progressing   Pt. Seen today for unna boot change see AVS for new orders. Follow up in 1 week. Care plan reviewed with patient. Patient verbalize understanding of the plan of care and contribute to goal setting.

## 2023-04-27 NOTE — PROGRESS NOTES
[facility] Application   Below Knee    NAME:  Andres Loving  YOB: 1950  MEDICAL RECORD NUMBER:  [id number]  DATE:  4/27/2023    Rich Ulloa boot: Applied moisturizing agent to dry skin as needed. Appied primary and secondary dressing as ordered. Applied Unna roll from toes to knee overlapping each time. Applied ace wrap or coban from toes to below the knee. Secured with tape and/or metal clips covered with tape. Instructed patient/caregiver to keep dressing dry and intact. DO NOT REMOVE DRESSING. Instructed pt/family/caregiver to report excessive draining, loose bandage, wet dressing, severe pain or tingling in toes. Applied [facility] dressing below the knee to right lower leg. Unna Boot(s) were applied per  Guidelines.      Electronically signed by Сергей Madden RN on 4/27/2023 at 11:37 AM

## 2023-05-02 ENCOUNTER — CARE COORDINATION (OUTPATIENT)
Dept: CARE COORDINATION | Age: 73
End: 2023-05-02

## 2023-05-02 NOTE — CARE COORDINATION
Attempted to reach patient for continued Care Coordination follow up and education. Patient was unavailable at the time of my call, and a generic voicemail message was left asking patient to return my call at [phone].

## 2023-05-04 ENCOUNTER — HOSPITAL ENCOUNTER (OUTPATIENT)
Dept: WOUND CARE | Age: 73
Discharge: HOME | End: 2023-05-04
Payer: MEDICARE

## 2023-05-04 VITALS
HEART RATE: 80 BPM | OXYGEN SATURATION: 98 % | RESPIRATION RATE: 18 BRPM | DIASTOLIC BLOOD PRESSURE: 76 MMHG | SYSTOLIC BLOOD PRESSURE: 169 MMHG | TEMPERATURE: 98 F

## 2023-05-04 DIAGNOSIS — I87.2 VENOUS INSUFFICIENCY OF RIGHT LEG: Primary | ICD-10-CM

## 2023-05-04 PROCEDURE — 99213 OFFICE O/P EST LOW 20 MIN: CPT | Performed by: NURSE PRACTITIONER

## 2023-05-04 PROCEDURE — 29580 STRAPPING UNNA BOOT: CPT

## 2023-05-04 RX ORDER — BETAMETHASONE DIPROPIONATE 0.05 %
OINTMENT (GRAM) TOPICAL ONCE
OUTPATIENT
Start: 2023-05-04 | End: 2023-05-04

## 2023-05-04 RX ORDER — CLOBETASOL PROPIONATE 0.5 MG/G
OINTMENT TOPICAL ONCE
OUTPATIENT
Start: 2023-05-04 | End: 2023-05-04

## 2023-05-04 RX ORDER — LIDOCAINE 50 MG/G
OINTMENT TOPICAL ONCE
OUTPATIENT
Start: 2023-05-04 | End: 2023-05-04

## 2023-05-04 RX ORDER — LIDOCAINE HYDROCHLORIDE 40 MG/ML
SOLUTION TOPICAL ONCE
OUTPATIENT
Start: 2023-05-04 | End: 2023-05-04

## 2023-05-04 RX ORDER — LIDOCAINE HYDROCHLORIDE 20 MG/ML
JELLY TOPICAL ONCE
OUTPATIENT
Start: 2023-05-04 | End: 2023-05-04

## 2023-05-04 RX ORDER — GENTAMICIN SULFATE 1 MG/G
OINTMENT TOPICAL ONCE
OUTPATIENT
Start: 2023-05-04 | End: 2023-05-04

## 2023-05-04 RX ORDER — GINSENG 100 MG
CAPSULE ORAL ONCE
OUTPATIENT
Start: 2023-05-04 | End: 2023-05-04

## 2023-05-04 RX ORDER — BACITRACIN, NEOMYCIN, POLYMYXIN B 400; 3.5; 5 [USP'U]/G; MG/G; [USP'U]/G
OINTMENT TOPICAL ONCE
OUTPATIENT
Start: 2023-05-04 | End: 2023-05-04

## 2023-05-04 RX ORDER — BACITRACIN ZINC AND POLYMYXIN B SULFATE 500; 1000 [USP'U]/G; [USP'U]/G
OINTMENT TOPICAL ONCE
OUTPATIENT
Start: 2023-05-04 | End: 2023-05-04

## 2023-05-04 RX ORDER — LIDOCAINE 40 MG/G
CREAM TOPICAL ONCE
OUTPATIENT
Start: 2023-05-04 | End: 2023-05-04

## 2023-05-04 NOTE — PROGRESS NOTES
[facility] Application   Below Knee    NAME:  Miguel Loving  YOB: 1950  MEDICAL RECORD NUMBER:  [id number]  DATE:  5/4/2023    Brittany Courser boot: Applied moisturizing agent to dry skin as needed. Appied primary and secondary dressing as ordered. Applied Unna roll from toes to knee overlapping each time. Applied ace wrap or coban from toes to below the knee. Secured with tape and/or metal clips covered with tape. Instructed patient/caregiver to keep dressing dry and intact. DO NOT REMOVE DRESSING. Instructed pt/family/caregiver to report excessive draining, loose bandage, wet dressing, severe pain or tingling in toes. Applied [facility] dressing below the knee to right lower leg. Unna Boot(s) were applied per  Guidelines.      Electronically signed by Bart Beauchamp RN on 5/4/2023 at 3:37 PM

## 2023-05-04 NOTE — PROGRESS NOTES
[facility]  Consult and Procedure Note      [address] [facility] RECORD NUMBER:  [id number]  AGE: 68 y.o. GENDER: male  : 1950  EPISODE DATE:  2023    SUBJECTIVE:     Chief Complaint   Patient presents with    Wound Check     Right leg         HISTORY OF PRESENT ILLNESS      Efren Erwin is a 68 y.o. male who presents today for evaluation of right lower leg wound. Wound has reportedly been present for many months, located in area of previous skin graft. Xray's of foot were completed, no bony involvement noted. Has known venous insufficiency, does not wear compression as recommended. He states he spends most of his days sitting in chair at home. Current wound care includes hydroferra blue, unna boot and coban to right leg, compression sock to left leg. Patient states he does not wear compression sock to left leg, does not plan to start use. He presents today for re-evaluation. 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 S/p skin graft. Healed. From chronic venous insuficiency. History of traumatic brain injury     Hyperlipidemia     Hypertension     Mentally challenged     \"slow\" states his sister    Obesity (BMI 30-39. 9)     Osteoarthritis     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 ([facility] [address] 2014    s/p fall down a hill, Dr. Sherine Marinelli follows, no seizure activity since    Seizure disorder ([facility] [address] 2014       PAST SURGICAL HISTORY     Past Surgical History:   Procedure Laterality Date    [place] I&D DEEP ABSC BURSA/HEMATOMA THIGH/KNEE REGION Right 2017    RIGHT SERGIO LEG WOUND DEBRIDEMENT WITH SKIN GRAFT AND WOUND VAC APPLICATION performed by Sruthi Arthur DPM at [address] [place] [address] [facility]

## 2023-05-04 NOTE — PLAN OF CARE
Problem: Wound:  Goal: Will show signs of wound healing; wound closure and no evidence of infection  Description: Will show signs of wound healing; wound closure and no evidence of infection  Outcome: Progressing  Patient presents to wound clinic for follow up right leg wounds. Right leg wounds- Applied collagen to wound beds and moistened with saline, covered with alginate. Wrapped with unna boot, ABD over wound area, roll gauze, and coban from base of toes to 1-2 inches below bend of knee. Patient to leave dressing intact until next appt. Follow up visits: 1 week - Thursday May 11th at 1:00 pm.     Care plan reviewed with patient. Patient verbalize understanding of the plan of care and contribute to goal setting.

## 2023-05-04 NOTE — DISCHARGE INSTRUCTIONS
Visit Discharge/Physician Orders:   - Elevate legs to reduce swelling.  - Sponge bath while the wraps are on or use a cast cover if showering.  - Apply compression stocking to left leg daily in the morning and remove at bedtime. Wound Location: Right leg     Dressing orders:      1) Gather wound care supplies and arrange on clean table. 2) Wash your hands with soap and water or use alcohol based hand  for 20 seconds (sing \"Happy Birthday\" twice). 3) Cleanse wounds with normal saline or wound cleanser and gauze. Pat dry with clean gauze. 4) Right leg wounds- Applied collagen to wound beds and moistened with saline, covered with alginate. Wrapped with unna boot, ABD over wound area, roll gauze, and coban from base of toes to 1-2 inches below bend of knee. Leave dressing intact until next appt. *If unna boot becomes too tight- raise/elevate legs above the level of the heart for 15-20 minutes if swelling does not go down then carefully cut off unna boot and call clinic or go to local ER or family physician. If unna boot becomes wet or starts to roll down then carefully remove unna boot and call clinic. Keep all dressings clean & dry. Follow up visits: 1 week - Thursday May 11th at 1:00 pm     Supplies:     Duration of dressings: n/a        We have sent your supply order to the following company:  n/a  If you don't receive the items you were expecting or don't know what the items are that you received, call the company where the order was sent. If you are unable to obtain wound supplies, continue to use the supplies you have available until you are able to reach us. It is most important to keep the wound covered at all times. It is YOUR responsibility to make sure that supplies are re-ordered before you run out. Re-order telephone numbers are included in each package. Keep next scheduled appointment. Please give 24 hour notice if unable to keep appointment.  [phone]

## 2023-05-09 ENCOUNTER — CARE COORDINATION (OUTPATIENT)
Dept: CARE COORDINATION | Age: 73
End: 2023-05-09

## 2023-05-09 NOTE — CARE COORDINATION
Unable to reach patient to confirm residence address due to receiving returned CHF campaign mail. Contacted Amara/RICK she stated that she receives all of his mail and it should be sent to   [place] [phone]    Resending to this address.

## 2023-05-10 ENCOUNTER — CARE COORDINATION (OUTPATIENT)
Dept: CARE COORDINATION | Age: 73
End: 2023-05-10

## 2023-05-10 NOTE — CARE COORDINATION
Ambulatory Care Coordination Note  5/10/2023    Patient Current Location:  Home: [facility] [facility] [address]     ACM contacted the patient by telephone. Verified name and  with patient as identifiers. Provided introduction to self, and explanation of the ACM role. Challenges to be reviewed by the provider   Additional needs identified to be addressed with provider: No  none               Method of communication with provider: none. ACM: Lindsey Trammell RN    Spoke with Kassie Ramirez for continued Care Coordination  States doing better for review  Continues to follow up with wound clinic and states legs are improving  One leg looks better than the other but both continuing to improve  Treatments being managed by wound clinic  Denies need for [place]  Discussed CHF Thack me in information  No falls    Plan  Reinforce education completed  Reinforce CHF Antonio Me In information given  Collaborate with wound clinic as needed  Reinforce importance of fall prevention and intervention to reduce risk for injury related to falls  Congestive Heart Failure Assessment    Are you currently restricting fluids?: No Restriction  Do you understand a low sodium diet?: Yes  Do you understand how to read food labels?: Yes  How many restaurant meals do you eat per week?: 1-2  Do you salt your food before tasting it?: No         Symptoms:  CHF associated angina: Neg, CHF associated dyspnea on exertion: Neg, CHF associated fatigue: Neg, CHF associated leg swelling: Neg, CHF associated orthostatic hypotension: Neg, CHF associated PND: Neg, CHF associated shortness of breath: Neg, CHF associated weakness: Neg      Symptom course: stable      and   General Assessment    Do you have any symptoms that are causing concern?: Yes  Progression since Onset: Gradually Improving  Reported Symptoms: Other (Comment: wounds)           Offered patient enrollment in the Remote Patient Monitoring (RPM) program for in-home monitoring: Patient declined.   Heart Failure

## 2023-05-11 ENCOUNTER — HOSPITAL ENCOUNTER (OUTPATIENT)
Dept: WOUND CARE | Age: 73
Discharge: HOME | End: 2023-05-11
Payer: MEDICARE

## 2023-05-11 VITALS
SYSTOLIC BLOOD PRESSURE: 163 MMHG | RESPIRATION RATE: 18 BRPM | DIASTOLIC BLOOD PRESSURE: 72 MMHG | TEMPERATURE: 98.2 F | OXYGEN SATURATION: 96 % | HEART RATE: 68 BPM

## 2023-05-11 DIAGNOSIS — I87.2 VENOUS INSUFFICIENCY OF RIGHT LEG: Primary | ICD-10-CM

## 2023-05-11 PROCEDURE — 29580 STRAPPING UNNA BOOT: CPT

## 2023-05-11 PROCEDURE — 99213 OFFICE O/P EST LOW 20 MIN: CPT | Performed by: NURSE PRACTITIONER

## 2023-05-11 RX ORDER — BACITRACIN ZINC AND POLYMYXIN B SULFATE 500; 1000 [USP'U]/G; [USP'U]/G
OINTMENT TOPICAL ONCE
OUTPATIENT
Start: 2023-05-11 | End: 2023-05-11

## 2023-05-11 RX ORDER — LIDOCAINE HYDROCHLORIDE 20 MG/ML
JELLY TOPICAL ONCE
OUTPATIENT
Start: 2023-05-11 | End: 2023-05-11

## 2023-05-11 RX ORDER — BACITRACIN, NEOMYCIN, POLYMYXIN B 400; 3.5; 5 [USP'U]/G; MG/G; [USP'U]/G
OINTMENT TOPICAL ONCE
OUTPATIENT
Start: 2023-05-11 | End: 2023-05-11

## 2023-05-11 RX ORDER — LIDOCAINE 40 MG/G
CREAM TOPICAL ONCE
OUTPATIENT
Start: 2023-05-11 | End: 2023-05-11

## 2023-05-11 RX ORDER — GENTAMICIN SULFATE 1 MG/G
OINTMENT TOPICAL ONCE
OUTPATIENT
Start: 2023-05-11 | End: 2023-05-11

## 2023-05-11 RX ORDER — LIDOCAINE 50 MG/G
OINTMENT TOPICAL ONCE
OUTPATIENT
Start: 2023-05-11 | End: 2023-05-11

## 2023-05-11 RX ORDER — LIDOCAINE HYDROCHLORIDE 40 MG/ML
SOLUTION TOPICAL ONCE
OUTPATIENT
Start: 2023-05-11 | End: 2023-05-11

## 2023-05-11 RX ORDER — GINSENG 100 MG
CAPSULE ORAL ONCE
OUTPATIENT
Start: 2023-05-11 | End: 2023-05-11

## 2023-05-11 RX ORDER — CLOBETASOL PROPIONATE 0.5 MG/G
OINTMENT TOPICAL ONCE
OUTPATIENT
Start: 2023-05-11 | End: 2023-05-11

## 2023-05-11 RX ORDER — BETAMETHASONE DIPROPIONATE 0.05 %
OINTMENT (GRAM) TOPICAL ONCE
OUTPATIENT
Start: 2023-05-11 | End: 2023-05-11

## 2023-05-11 NOTE — PLAN OF CARE
Problem: Wound:  Goal: Will show signs of wound healing; wound closure and no evidence of infection  Description: Will show signs of wound healing; wound closure and no evidence of infection  Outcome: Progressing     Patient presents to wound clinic for right leg wound. No s/s of infection noted. See avs for discharge instructions. Follow up visits: 1 week - Thursday May 18th at 3:00 pm     Care plan reviewed with patient. Patient verbalize understanding of the plan of care and contribute to goal setting.

## 2023-05-11 NOTE — PROGRESS NOTES
[facility] Application   Below Knee    NAME:  Denae Loving  YOB: 1950  MEDICAL RECORD NUMBER:  [id number]  DATE:  5/11/2023    Jose A Caballero boot: Applied moisturizing agent to dry skin as needed. Appied primary and secondary dressing as ordered. Applied Unna roll from toes to knee overlapping each time. Applied ace wrap or coban from toes to below the knee. Secured with tape and/or metal clips covered with tape. Instructed patient/caregiver to keep dressing dry and intact. DO NOT REMOVE DRESSING. Instructed pt/family/caregiver to report excessive draining, loose bandage, wet dressing, severe pain or tingling in toes. Applied [facility] dressing below the knee to right lower leg. Unna Boot(s) were applied per  Guidelines.      Electronically signed by Rodo Comer RN on 5/11/2023 at 3:27 PM
GRAFT Right 10/9/2017    SPLIT THICKNESS SKIN GRAFT FROM RIGHT LEG performed by Gilberto Jackson DPM at [address]      vein stripping       FAMILY HISTORY     Family History   Problem Relation Age of Onset    Cancer Mother     Stroke Mother     Diabetes Mother     High Blood Pressure Mother     High Cholesterol Mother     Stroke Father     COPD Father     Diabetes Maternal Grandmother     Heart Disease Maternal Grandfather     Cancer Sister     Asthma Sister     High Blood Pressure Sister     High Cholesterol Sister     Arthritis Sister     Diabetes Sister     Diabetes Sister     Colon Cancer Neg Hx     Prostate Cancer Neg Hx        SOCIAL HISTORY     Social History     Tobacco Use    Smoking status: Never     Passive exposure: Never    Smokeless tobacco: Never   Vaping Use    Vaping Use: Never used   Substance Use Topics    Alcohol use: No     Comment:      Drug use: No       ALLERGIES     No Known Allergies    MEDICATIONS     Current Outpatient Medications on File Prior to Encounter   Medication Sig Dispense Refill    tiZANidine (ZANAFLEX) 4 MG tablet Take 1 tablet by mouth every 8 hours as needed (neck stiffness/pain) 30 tablet 0    levETIRAcetam (KEPPRA) 1000 MG tablet take 1 tablet by mouth twice a day 180 tablet 3    amLODIPine (NORVASC) 2.5 MG tablet take 1 tablet by mouth once daily 90 tablet 3    atorvastatin (LIPITOR) 20 MG tablet take 1 tablet by mouth once daily 90 tablet 3    Handicap Placard MISC by Does not apply route Expires 15 SEPT 2025 1 each 0    aspirin 81 MG chewable tablet Take 1 tablet by mouth daily       No current facility-administered medications on file prior to encounter.        REVIEW OF SYSTEMS:     Comprehensive ROS completed, pertinent items per HPI    PHYSICAL EXAM:     BP (!) 163/72   Pulse 68   Temp 98.2 °F (36.8 °C) (Infrared)   Resp 18   SpO2 96%   Wt Readings from Last 3 Encounters:   04/13/23 244 lb 6.4 oz (110.9 kg)   04/06/23 244 lb

## 2023-05-11 NOTE — DISCHARGE INSTRUCTIONS
Visit Discharge/Physician Orders:   - Elevate legs to reduce swelling.  - Sponge bath while the wraps are on or use a cast cover if showering.  - Apply compression stocking to left leg daily in the morning and remove at bedtime. Wound Location: Right leg     Dressing orders:      1) Gather wound care supplies and arrange on clean table. 2) Wash your hands with soap and water or use alcohol based hand  for 20 seconds (sing \"Happy Birthday\" twice). 3) Cleanse wounds with normal saline or wound cleanser and gauze. Pat dry with clean gauze. 4) Right leg wounds- Applied collagen to wound beds and moistened with saline. Wrapped with unna boot, ABD over wound area, roll gauze, and coban from base of toes to 1-2 inches below bend of knee. Leave dressing intact until next appt. *If unna boot becomes too tight- raise/elevate legs above the level of the heart for 15-20 minutes if swelling does not go down then carefully cut off unna boot and call clinic or go to local ER or family physician. If unna boot becomes wet or starts to roll down then carefully remove unna boot and call clinic. Keep all dressings clean & dry. Follow up visits: 1 week - Thursday May 18th at 3:00 pm      Supplies:     Duration of dressings: n/a        We have sent your supply order to the following company:  n/a  If you don't receive the items you were expecting or don't know what the items are that you received, call the company where the order was sent. If you are unable to obtain wound supplies, continue to use the supplies you have available until you are able to reach us. It is most important to keep the wound covered at all times. It is YOUR responsibility to make sure that supplies are re-ordered before you run out. Re-order telephone numbers are included in each package. Keep next scheduled appointment. Please give 24 hour notice if unable to keep appointment.  [phone]    If you experience any

## 2023-05-18 ENCOUNTER — HOSPITAL ENCOUNTER (OUTPATIENT)
Dept: WOUND CARE | Age: 73
Discharge: HOME | End: 2023-05-18
Payer: MEDICARE

## 2023-05-18 ENCOUNTER — TELEPHONE (OUTPATIENT)
Dept: FAMILY MEDICINE CLINIC | Age: 73
End: 2023-05-18

## 2023-05-18 VITALS
HEART RATE: 60 BPM | SYSTOLIC BLOOD PRESSURE: 178 MMHG | RESPIRATION RATE: 16 BRPM | DIASTOLIC BLOOD PRESSURE: 79 MMHG | TEMPERATURE: 98 F | OXYGEN SATURATION: 98 %

## 2023-05-18 DIAGNOSIS — G40.909 SEIZURE DISORDER (HCC): ICD-10-CM

## 2023-05-18 DIAGNOSIS — I10 ESSENTIAL HYPERTENSION: Primary | ICD-10-CM

## 2023-05-18 DIAGNOSIS — E53.1 VITAMIN B6 DEFICIENCY: ICD-10-CM

## 2023-05-18 DIAGNOSIS — I87.2 VENOUS INSUFFICIENCY OF RIGHT LEG: Primary | ICD-10-CM

## 2023-05-18 PROBLEM — L97.929 VENOUS STASIS ULCER OF LEFT LOWER LEG WITH EDEMA OF LEFT LOWER LEG (HCC): Status: RESOLVED | Noted: 2023-04-14 | Resolved: 2023-05-18

## 2023-05-18 PROBLEM — I83.029 VENOUS STASIS ULCER OF LEFT LOWER LEG WITH EDEMA OF LEFT LOWER LEG (HCC): Status: RESOLVED | Noted: 2023-04-14 | Resolved: 2023-05-18

## 2023-05-18 PROBLEM — I83.892 VENOUS STASIS ULCER OF LEFT LOWER LEG WITH EDEMA OF LEFT LOWER LEG (HCC): Status: RESOLVED | Noted: 2023-04-14 | Resolved: 2023-05-18

## 2023-05-18 PROBLEM — R60.0 VENOUS STASIS ULCER OF LEFT LOWER LEG WITH EDEMA OF LEFT LOWER LEG (HCC): Status: RESOLVED | Noted: 2023-04-14 | Resolved: 2023-05-18

## 2023-05-18 PROCEDURE — 99212 OFFICE O/P EST SF 10 MIN: CPT | Performed by: NURSE PRACTITIONER

## 2023-05-18 PROCEDURE — 99212 OFFICE O/P EST SF 10 MIN: CPT

## 2023-05-18 RX ORDER — GENTAMICIN SULFATE 1 MG/G
OINTMENT TOPICAL ONCE
Status: CANCELLED | OUTPATIENT
Start: 2023-05-18 | End: 2023-05-18

## 2023-05-18 RX ORDER — LIDOCAINE HYDROCHLORIDE 20 MG/ML
JELLY TOPICAL ONCE
Status: CANCELLED | OUTPATIENT
Start: 2023-05-18 | End: 2023-05-18

## 2023-05-18 RX ORDER — BACITRACIN, NEOMYCIN, POLYMYXIN B 400; 3.5; 5 [USP'U]/G; MG/G; [USP'U]/G
OINTMENT TOPICAL ONCE
Status: CANCELLED | OUTPATIENT
Start: 2023-05-18 | End: 2023-05-18

## 2023-05-18 RX ORDER — BACITRACIN ZINC AND POLYMYXIN B SULFATE 500; 1000 [USP'U]/G; [USP'U]/G
OINTMENT TOPICAL ONCE
Status: CANCELLED | OUTPATIENT
Start: 2023-05-18 | End: 2023-05-18

## 2023-05-18 RX ORDER — BETAMETHASONE DIPROPIONATE 0.05 %
OINTMENT (GRAM) TOPICAL ONCE
Status: CANCELLED | OUTPATIENT
Start: 2023-05-18 | End: 2023-05-18

## 2023-05-18 RX ORDER — LIDOCAINE 40 MG/G
CREAM TOPICAL ONCE
Status: CANCELLED | OUTPATIENT
Start: 2023-05-18 | End: 2023-05-18

## 2023-05-18 RX ORDER — LIDOCAINE HYDROCHLORIDE 40 MG/ML
SOLUTION TOPICAL ONCE
Status: CANCELLED | OUTPATIENT
Start: 2023-05-18 | End: 2023-05-18

## 2023-05-18 RX ORDER — CLOBETASOL PROPIONATE 0.5 MG/G
OINTMENT TOPICAL ONCE
Status: CANCELLED | OUTPATIENT
Start: 2023-05-18 | End: 2023-05-18

## 2023-05-18 RX ORDER — GINSENG 100 MG
CAPSULE ORAL ONCE
Status: CANCELLED | OUTPATIENT
Start: 2023-05-18 | End: 2023-05-18

## 2023-05-18 RX ORDER — LIDOCAINE 50 MG/G
OINTMENT TOPICAL ONCE
Status: CANCELLED | OUTPATIENT
Start: 2023-05-18 | End: 2023-05-18

## 2023-05-18 NOTE — PROGRESS NOTES
[facility]  Consult and Procedure Note      [facility] RECORD NUMBER:  [id number]  AGE: 68 y.o. GENDER: male  : 1950  EPISODE DATE:  2023    SUBJECTIVE:     Chief Complaint   Patient presents with    Wound Check     Right leg          HISTORY OF PRESENT ILLNESS      Jeferson Gonzalez is a 68 y.o. male who presents today for evaluation of right lower leg wound. Wound has reportedly been present for many months, located in area of previous skin graft. Xray's of foot were completed, no bony involvement noted. Has known venous insufficiency, does not wear compression as recommended. He states he spends most of his days sitting in chair at home. Current wound care includes collagen, unna boot and coban to right leg, compression sock to left leg. Patient states he does not wear compression sock to left leg, does not plan to start use. He presents today for re-evaluation. 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 S/p skin graft. Healed. From chronic venous insuficiency. History of traumatic brain injury     Hyperlipidemia     Hypertension     Mentally challenged     \"slow\" states his sister    Obesity (BMI 30-39. 9)     Osteoarthritis     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 ([facility] [address] 2014    s/p fall down a hill, Dr. Saad Blair follows, no seizure activity since    Seizure disorder ([facility] [address] 2014       PAST SURGICAL HISTORY     Past Surgical History:   Procedure Laterality Date    MA I&D DEEP ABSC BURSA/HEMATOMA THIGH/KNEE REGION Right 2017    RIGHT SERGIO LEG WOUND DEBRIDEMENT WITH SKIN GRAFT AND WOUND VAC APPLICATION performed by Gerhardt Stabs, DPM at [address] [place] [address] Right

## 2023-05-18 NOTE — PLAN OF CARE
Problem: Wound:  Goal: Will show signs of wound healing; wound closure and no evidence of infection  Description: Will show signs of wound healing; wound closure and no evidence of infection  Outcome: Adequate for Discharge  Note: Patient seen for right leg wound. Wound shows signs of proper closure and healing. Wound is healed. No s/s of infection noted. Follow up as needed. See AVS for order changes.

## 2023-05-18 NOTE — TELEPHONE ENCOUNTER
Left message on answering machine requesting pt to call back at earliest convenience. Labs mailed to patient.
Pt due for fasting labs prior to next apt on 6/22/2023. Please call to have pt complete this. Thanks! ASSESSMENT & PLAN   Diagnosis Orders   1. Essential hypertension  CBC with Auto Differential      2. Vitamin B6 deficiency  CBC with Auto Differential    Vitamin B6      3.  Seizure disorder [facility])  Levetiracetam Level        Future Appointments   Date Time Provider Brandon Lopez   5/18/2023  3:00 PM Cari Milligan, APRN - [address] [facility]   6/22/2023  2:00 PM Lucy Byrd, [address] [place] [address]
Pt informed and verbalized understanding.
normal...

## 2023-05-18 NOTE — DISCHARGE INSTRUCTIONS
Visit Discharge/Physician Orders:   - Elevate legs to reduce swelling.  - it is important that you wear compression on both of your legs to prevent future wounds. On in the morning off at night         Wound Location: Right leg     Dressing orders:      1) Gather wound care supplies and arrange on clean table. 2) Wash your hands with soap and water or use alcohol based hand  for 20 seconds (sing \"Happy Birthday\" twice). 3) Cleanse wounds with normal saline or wound cleanser and gauze. Pat dry with clean gauze. 4) Right leg wounds- Healed       Keep all dressings clean & dry. Follow up visits: As needed. Supplies:     Duration of dressings: n/a        We have sent your supply order to the following company:  n/a  If you don't receive the items you were expecting or don't know what the items are that you received, call the company where the order was sent. If you are unable to obtain wound supplies, continue to use the supplies you have available until you are able to reach us. It is most important to keep the wound covered at all times. It is YOUR responsibility to make sure that supplies are re-ordered before you run out. Re-order telephone numbers are included in each package. Keep next scheduled appointment. Please give 24 hour notice if unable to keep appointment. [phone]     If you experience any of the following, please call the Wound Care Service during business hours: Monday through Friday 8:00 am - 4:30 pm  ([phone]). *Increase in pain              *Temperature over 101              *Increase in drainage from your wound or a foul odor              *Uncontrolled swelling              *Need for compression bandage changes due to slippage, breakthrough drainage     If you need medical attention outside of business hours, please contact your Primary Care Doctor or go to the nearest emergency room.

## 2023-05-24 ENCOUNTER — CARE COORDINATION (OUTPATIENT)
Dept: CARE COORDINATION | Age: 73
End: 2023-05-24

## 2023-05-25 ENCOUNTER — CARE COORDINATION (OUTPATIENT)
Dept: CARE COORDINATION | Age: 73
End: 2023-05-25

## 2023-05-25 NOTE — CARE COORDINATION
Ambulatory Care Coordination Note  2023    Patient Current Location:  Home: [facility] [facility] [address]     ACM contacted the patient by telephone. Verified name and  with patient as identifiers. Provided introduction to self, and explanation of the ACM role. Challenges to be reviewed by the provider   Additional needs identified to be addressed with provider: No  none               Method of communication with provider: none. ACM: Myles Osman, RN    Spoke with Pito Devine for continued Care Coordination  States he is no longer following with wound clinic stating wounds are healed  No falls  No new barriers  Informed him I would follow up in a few weeks and he was agreeable to follow up  Will work towards graduation  CHF is stable    Plan  Reinforce education completed  Reinforce CHF Tuck Me In information given  Collaborate with wound clinic as needed  Reinforce importance of fall prevention and intervention to reduce risk     Offered patient enrollment in the Remote Patient Monitoring (RPM) program for in-home monitoring: Patient declined.     Lab Results       None            Care Coordination Interventions    Referral from Primary Care Provider: No  Suggested Interventions and Community Resources  Fall Risk Prevention: Completed  Home Health Services: Not Started  Occupational Therapy: Not Started  Physical Therapy: Not Started  Transportation Support: Declined          Goals Addressed                      This Visit's Progress      Reduce Falls  (pt-stated)   On track      I will reduce my risk of falls by the following:  be proactive with fall interventions and prevention measure to reduce risk for injury related to falls    Barriers: impairment:  cognitive  Plan for overcoming my barriers: family and ACM support  Confidence: 9/10  Anticipated Goal Completion Date: 23              Future Appointments   Date Time Provider Brandon Lopez   2023  2:00 PM Divya Esteban, [address] [facility]

## 2023-06-06 ENCOUNTER — HOSPITAL ENCOUNTER (OUTPATIENT)
Age: 73
Discharge: HOME | End: 2023-06-06
Payer: MEDICARE

## 2023-06-06 DIAGNOSIS — E78.2 MIXED HYPERLIPIDEMIA: Chronic | ICD-10-CM

## 2023-06-06 DIAGNOSIS — E53.1 VITAMIN B6 DEFICIENCY: ICD-10-CM

## 2023-06-06 DIAGNOSIS — I10 ESSENTIAL HYPERTENSION: ICD-10-CM

## 2023-06-06 DIAGNOSIS — G40.909 SEIZURE DISORDER (HCC): ICD-10-CM

## 2023-06-06 LAB
BASOPHILS ABSOLUTE: 0 THOU/MM3 (ref 0–0.1)
BASOPHILS NFR BLD AUTO: 0.8 %
DEPRECATED RDW RBC AUTO: 43.8 FL (ref 35–45)
EOSINOPHIL NFR BLD AUTO: 3.6 %
EOSINOPHILS ABSOLUTE: 0.2 THOU/MM3 (ref 0–0.4)
ERYTHROCYTE [DISTWIDTH] IN BLOOD BY AUTOMATED COUNT: 13.4 % (ref 11.5–14.5)
HCT VFR BLD AUTO: 41.4 % (ref 42–52)
HGB BLD-MCNC: 13.5 GM/DL (ref 14–18)
IMM GRANULOCYTES # BLD AUTO: 0.03 THOU/MM3 (ref 0–0.07)
IMM GRANULOCYTES NFR BLD AUTO: 0.6 %
LYMPHOCYTES ABSOLUTE: 1.2 THOU/MM3 (ref 1–4.8)
LYMPHOCYTES NFR BLD AUTO: 23.2 %
MCH RBC QN AUTO: 29.3 PG (ref 26–33)
MCHC RBC AUTO-ENTMCNC: 32.6 GM/DL (ref 32.2–35.5)
MCV RBC AUTO: 90 FL (ref 80–94)
MONOCYTES ABSOLUTE: 0.3 THOU/MM3 (ref 0.4–1.3)
MONOCYTES NFR BLD AUTO: 6.9 %
NEUTROPHILS NFR BLD AUTO: 64.9 %
NRBC BLD AUTO-RTO: 0 /100 WBC
PLATELET # BLD AUTO: 287 THOU/MM3 (ref 130–400)
PMV BLD AUTO: 8.9 FL (ref 9.4–12.4)
RBC # BLD AUTO: 4.6 MILL/MM3 (ref 4.7–6.1)
SEGMENTED NEUTROPHILS ABSOLUTE COUNT: 3.2 THOU/MM3 (ref 1.8–7.7)
WBC # BLD AUTO: 5 THOU/MM3 (ref 4.8–10.8)

## 2023-06-06 PROCEDURE — 84207 ASSAY OF VITAMIN B-6: CPT

## 2023-06-06 PROCEDURE — 36415 COLL VENOUS BLD VENIPUNCTURE: CPT

## 2023-06-06 PROCEDURE — 85025 COMPLETE CBC W/AUTO DIFF WBC: CPT

## 2023-06-06 PROCEDURE — 80177 DRUG SCRN QUAN LEVETIRACETAM: CPT

## 2023-06-06 RX ORDER — ATORVASTATIN CALCIUM 20 MG/1
TABLET, FILM COATED ORAL
Qty: 90 TABLET | Refills: 3 | Status: SHIPPED | OUTPATIENT
Start: 2023-06-06

## 2023-06-06 NOTE — TELEPHONE ENCOUNTER
Recent Visits  Date Type Provider Dept   04/13/23 Office Visit Thomas Jackman, DO [facility]   04/06/23 Office Visit Thomas Jackman, DO [facility]   12/22/22 Office Visit Thomas Jackman, DO [facility]   06/21/22 Office Visit Thomas Jackman, DO [facility]   12/21/21 Office Visit Thomas Jackman, DO [facility]   Showing recent visits within past 540 days with a meds authorizing provider and meeting all other requirements  Future Appointments  Date Type Provider Dept   06/22/23 Appointment Thomas Jackman, [facility]   Showing future appointments within next 150 days with a meds authorizing provider and meeting all other requirements      Future Appointments   Date Time Provider Brandon Lopez   6/22/2023  2:00 PM Thomas Jackman, [facility]

## 2023-06-08 LAB — LEVETIRACETAM SERPL-MCNC: 35 UG/ML (ref 10–40)

## 2023-06-09 LAB — PYRIDOXAL PHOS SERPL-SCNC: 19.8 NMOL/L (ref 20–125)

## 2023-06-20 ENCOUNTER — CARE COORDINATION (OUTPATIENT)
Dept: CARE COORDINATION | Age: 73
End: 2023-06-20

## 2023-06-20 NOTE — CARE COORDINATION
Left message to return phone call to complete Care Coordination follow up. Left date and time of PCP appt scheduled for this week on VM as well.

## 2023-06-20 NOTE — CARE COORDINATION
Ambulatory Care Coordination Note  2023    Patient Current Location:  Home: [facility] [address] [facility] [address]     ACM contacted the patient by telephone. Verified name and  with patient as identifiers. Provided introduction to self, and explanation of the ACM role. Challenges to be reviewed by the provider   Additional needs identified to be addressed with provider: No  none               Method of communication with provider: none.     ACM: Eri Ruiz, RN    Spoke with Blu Storey for continued Digerati he is doing well  No longer following with wound clinic and states wounds are healed  Denies the need for additional support in the home  Confirmed date and time of PCP appt scheduled for this week  States chronic conditions at baseline  Will work towards graduation if new barriers and all education completed    Plan  Reinforce education completed  Ensure no new opening on healed wounds  Reinforce importance of early symptom recognition and reporting to prevent exacerbation and unnecessary hospitalization  Work towards graduation if no new barriers  Congestive Heart Failure Assessment    Are you currently restricting fluids?: No Restriction  Do you understand a low sodium diet?: Yes  Do you understand how to read food labels?: Yes  How many restaurant meals do you eat per week?: 1-2  Do you salt your food before tasting it?: No         Symptoms:  CHF associated angina: Neg, CHF associated dyspnea on exertion: Neg, CHF associated fatigue: Neg, CHF associated leg swelling: Neg, CHF associated orthostatic hypotension: Neg, CHF associated PND: Neg, CHF associated shortness of breath: Neg, CHF associated weakness: Neg      Symptom course: stable      and   General Assessment    Do you have any symptoms that are causing concern?: Yes  Progression since Onset: Resolved  Reported Symptoms: Other (Comment: wounds on leg)           Offered patient enrollment in the Remote Patient Monitoring (RPM) program for

## 2023-06-21 NOTE — PROGRESS NOTES
Date    PSA 1.53 (H) 11/23/2020    PSA 1.42 (H) 11/05/2019    PSA 1.62 (H) 10/29/2018       AAA Screening - never smoker      Specialists List:  Kim Stovall: wound care  Min: neuro for seizures  Cardio: Sachi Sandra       Current Outpatient Medications   Medication Sig Dispense Refill    atorvastatin (LIPITOR) 20 MG tablet take 1 tablet by mouth once daily 90 tablet 3    tiZANidine (ZANAFLEX) 4 MG tablet Take 1 tablet by mouth every 8 hours as needed (neck stiffness/pain) 30 tablet 0    levETIRAcetam (KEPPRA) 1000 MG tablet take 1 tablet by mouth twice a day 180 tablet 3    amLODIPine (NORVASC) 2.5 MG tablet take 1 tablet by mouth once daily 90 tablet 3    Handicap Placard MISC by Does not apply route Expires 15 SEPT 2025 1 each 0    aspirin 81 MG chewable tablet Take 1 tablet by mouth daily       No current facility-administered medications for this visit. No orders of the defined types were placed in this encounter. All medications reviewed and reconciled, including OTC and herbal medications. Updated list given to patient. 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 9)     Chronic bilateral low back pain without sciatica 09/10/2018    Vitamin B6 deficiency 10/29/2017    History of acute kidney injury from excessive NSAID use     Osteoarthritis     Hypertension     Hyperlipidemia     History of DVT of right lower extremity      2015. Txt with 6 months of coumadin. Provoked. Heart failure with preserved ejection fraction (HCC)     History of alcohol abuse     History of traumatic brain injury     History of non-healing open leg wound of R leg. S/p skin graft. Healed. From chronic venous insuficiency.      Venous insufficiency of right leg 10/31/2016    Seizure disorder ([facility] [address] 07/17/2014    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 ([facility] [address] 07/01/2014     s/p fall down a hill, Dr. Berenice Wright

## 2023-06-22 ENCOUNTER — OFFICE VISIT (OUTPATIENT)
Dept: FAMILY MEDICINE CLINIC | Age: 73
End: 2023-06-22
Payer: MEDICARE

## 2023-06-22 VITALS
TEMPERATURE: 98.2 F | RESPIRATION RATE: 16 BRPM | HEART RATE: 65 BPM | WEIGHT: 240 LBS | OXYGEN SATURATION: 97 % | SYSTOLIC BLOOD PRESSURE: 130 MMHG | HEIGHT: 69 IN | DIASTOLIC BLOOD PRESSURE: 70 MMHG | BODY MASS INDEX: 35.55 KG/M2

## 2023-06-22 DIAGNOSIS — S09.90XS COGNITIVE DEFICIT DUE TO OLD HEAD TRAUMA: ICD-10-CM

## 2023-06-22 DIAGNOSIS — L98.499 ULCER OF EXTREMITY DUE TO CHRONIC VENOUS INSUFFICIENCY (HCC): ICD-10-CM

## 2023-06-22 DIAGNOSIS — G40.909 SEIZURE DISORDER (HCC): ICD-10-CM

## 2023-06-22 DIAGNOSIS — R56.1 SEIZURE AFTER HEAD INJURY (HCC): ICD-10-CM

## 2023-06-22 DIAGNOSIS — E66.9 OBESITY (BMI 30-39.9): ICD-10-CM

## 2023-06-22 DIAGNOSIS — Z00.00 MEDICARE ANNUAL WELLNESS VISIT, SUBSEQUENT: Primary | ICD-10-CM

## 2023-06-22 DIAGNOSIS — S81.801S MULTIPLE OPEN WOUNDS OF RIGHT LOWER EXTREMITY, SEQUELA: ICD-10-CM

## 2023-06-22 DIAGNOSIS — I87.2 ULCER OF EXTREMITY DUE TO CHRONIC VENOUS INSUFFICIENCY (HCC): ICD-10-CM

## 2023-06-22 DIAGNOSIS — I10 ESSENTIAL HYPERTENSION: ICD-10-CM

## 2023-06-22 DIAGNOSIS — E53.1 VITAMIN B6 DEFICIENCY: ICD-10-CM

## 2023-06-22 DIAGNOSIS — I50.32 CHRONIC HEART FAILURE WITH PRESERVED EJECTION FRACTION (HCC): ICD-10-CM

## 2023-06-22 DIAGNOSIS — F09 COGNITIVE DEFICIT DUE TO OLD HEAD TRAUMA: ICD-10-CM

## 2023-06-22 DIAGNOSIS — E78.2 MIXED HYPERLIPIDEMIA: ICD-10-CM

## 2023-06-22 PROCEDURE — 3078F DIAST BP <80 MM HG: CPT | Performed by: FAMILY MEDICINE

## 2023-06-22 PROCEDURE — 3017F COLORECTAL CA SCREEN DOC REV: CPT | Performed by: FAMILY MEDICINE

## 2023-06-22 PROCEDURE — G0439 PPPS, SUBSEQ VISIT: HCPCS | Performed by: FAMILY MEDICINE

## 2023-06-22 PROCEDURE — 3075F SYST BP GE 130 - 139MM HG: CPT | Performed by: FAMILY MEDICINE

## 2023-06-22 PROCEDURE — 1123F ACP DISCUSS/DSCN MKR DOCD: CPT | Performed by: FAMILY MEDICINE

## 2023-06-22 ASSESSMENT — PATIENT HEALTH QUESTIONNAIRE - PHQ9
1. LITTLE INTEREST OR PLEASURE IN DOING THINGS: 0
SUM OF ALL RESPONSES TO PHQ9 QUESTIONS 1 & 2: 0
SUM OF ALL RESPONSES TO PHQ QUESTIONS 1-9: 0
2. FEELING DOWN, DEPRESSED OR HOPELESS: 0

## 2023-06-22 ASSESSMENT — LIFESTYLE VARIABLES
HOW OFTEN DO YOU HAVE A DRINK CONTAINING ALCOHOL: NEVER
HOW MANY STANDARD DRINKS CONTAINING ALCOHOL DO YOU HAVE ON A TYPICAL DAY: PATIENT DOES NOT DRINK

## 2023-06-22 NOTE — PATIENT INSTRUCTIONS
lifestyle, illnesses that may run in your family, and various assessments and screenings as appropriate. After reviewing your medical record and screening and assessments performed today your provider may have ordered immunizations, labs, imaging, and/or referrals for you. A list of these orders (if applicable) as well as your Preventive Care list are included within your After Visit Summary for your review. Other Preventive Recommendations:    A preventive eye exam performed by an eye specialist is recommended every 1-2 years to screen for glaucoma; cataracts, macular degeneration, and other eye disorders. A preventive dental visit is recommended every 6 months. Try to get at least 150 minutes of exercise per week or 10,000 steps per day on a pedometer . Order or download the FREE \"Exercise & Physical Activity: Your Everyday Guide\" from [employer]. Call 1-961.472.9668 or search [employer] online. You need 9453-1694 mg of calcium and 0253-7409 IU of vitamin D per day. It is possible to meet your calcium requirement with diet alone, but a vitamin D supplement is usually necessary to meet this goal.  When exposed to the sun, use a sunscreen that protects against both UVA and UVB radiation with an SPF of 30 or greater. Reapply every 2 to 3 hours or after sweating, drying off with a towel, or swimming. Always wear a seat belt when traveling in a car. Always wear a helmet when riding a bicycle or motorcycle.

## 2023-07-11 ENCOUNTER — CARE COORDINATION (OUTPATIENT)
Dept: CARE COORDINATION | Age: 73
End: 2023-07-11

## 2023-07-11 NOTE — CARE COORDINATION
Attempted to reach patient for continued Care Coordination follow up and education. Patient was unavailable at the time of my call, and a generic voicemail message was left asking patient to return my call at [phone].

## 2023-07-26 ENCOUNTER — CARE COORDINATION (OUTPATIENT)
Dept: CARE COORDINATION | Age: 73
End: 2023-07-26

## 2023-07-26 NOTE — CARE COORDINATION
Dr. Lashon Moreira, I have been working with Johnny Dominguez on Care Coordination program to provide support and education to help manage chronic conditions. Pt has met those goals and all education has been completed with no new barriers noted. I would like to graduate pt from Care Coordination at this time pending PCP approval.  Do you approve of this discharge? Please advise. Thank you.

## 2023-08-23 DIAGNOSIS — I10 ESSENTIAL HYPERTENSION: ICD-10-CM

## 2023-08-23 RX ORDER — AMLODIPINE BESYLATE 2.5 MG/1
TABLET ORAL
Qty: 90 TABLET | Refills: 3 | Status: SHIPPED | OUTPATIENT
Start: 2023-08-23

## 2023-08-23 NOTE — TELEPHONE ENCOUNTER
Recent Visits  Date Type Provider Dept   06/22/23 Office Visit Vladislav Limon, DO [facility]   04/13/23 Office Visit Vladislav Limon, DO [facility]   04/06/23 Office Visit Vladislav Limon, DO [facility]   12/22/22 Office Visit Vladislav Limon, DO [facility]   06/21/22 Office Visit Vladislav Limon, DO [facility]   Showing recent visits within past 540 days with a meds authorizing provider and meeting all other requirements  Future Appointments  Date Type Provider Dept   12/27/23 Appointment Vladislav Limon, [facility]   Showing future appointments within next 150 days with a meds authorizing provider and meeting all other requirements     Future Appointments   Date Time Provider [address]   12/27/2023  2:00 PM Vladislav Limon[address] [place]

## 2023-09-21 ENCOUNTER — APPOINTMENT (OUTPATIENT)
Dept: GENERAL RADIOLOGY | Age: 73
End: 2023-09-21
Payer: MEDICARE

## 2023-09-21 ENCOUNTER — HOSPITAL ENCOUNTER (EMERGENCY)
Age: 73
Discharge: ANOTHER HOSPITAL | End: 2023-09-21
Attending: EMERGENCY MEDICINE
Payer: MEDICARE

## 2023-09-21 ENCOUNTER — APPOINTMENT (OUTPATIENT)
Dept: CT IMAGING | Age: 73
End: 2023-09-21
Payer: MEDICARE

## 2023-09-21 VITALS
RESPIRATION RATE: 16 BRPM | DIASTOLIC BLOOD PRESSURE: 85 MMHG | BODY MASS INDEX: 36.48 KG/M2 | OXYGEN SATURATION: 96 % | WEIGHT: 247 LBS | TEMPERATURE: 97.1 F | HEART RATE: 82 BPM | SYSTOLIC BLOOD PRESSURE: 132 MMHG

## 2023-09-21 DIAGNOSIS — E87.0 HYPERNATREMIA: ICD-10-CM

## 2023-09-21 DIAGNOSIS — T79.6XXA TRAUMATIC RHABDOMYOLYSIS, INITIAL ENCOUNTER (HCC): ICD-10-CM

## 2023-09-21 DIAGNOSIS — U07.1 COVID-19: ICD-10-CM

## 2023-09-21 DIAGNOSIS — S37.009A ACUTE KIDNEY INJURY DUE TO TRAUMA: ICD-10-CM

## 2023-09-21 DIAGNOSIS — R79.89 ELEVATED TROPONIN: ICD-10-CM
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
## 2023-09-21 DIAGNOSIS — J96.01 ACUTE RESPIRATORY FAILURE WITH HYPOXIA (HCC): ICD-10-CM

## 2023-09-21 DIAGNOSIS — L89.95 PRESSURE INJURY, UNSTAGEABLE, UNSPECIFIED LOCATION (HCC): ICD-10-CM

## 2023-09-21 LAB
ALBUMIN SERPL BCG-MCNC: 3.8 G/DL (ref 3.5–5.1)
ALP SERPL-CCNC: 69 U/L (ref 38–126)
ALT SERPL W/O P-5'-P-CCNC: 88 U/L (ref 11–66)
AMMONIA PLAS-MCNC: 59 UMOL/L (ref 11–60)
AMPHETAMINES UR QL SCN: NEGATIVE
ANION GAP SERPL CALC-SCNC: 22 MEQ/L (ref 8–16)
ANISOCYTOSIS BLD QL SMEAR: PRESENT
APTT PPP: 27.1 SECONDS (ref 22–38)
ARTERIAL PATENCY WRIST A: POSITIVE
AST SERPL-CCNC: 230 U/L (ref 5–40)
BACTERIA URNS QL MICRO: ABNORMAL /HPF
BARBITURATES UR QL SCN: NEGATIVE
BASE EXCESS BLDA CALC-SCNC: -2.5 MMOL/L (ref -2.5–2.5)
BASE EXCESS BLDA CALC-SCNC: 2 MMOL/L (ref -2–3)
BASOPHILS ABSOLUTE: 0.1 THOU/MM3 (ref 0–0.1)
BASOPHILS NFR BLD AUTO: 0.7 %
BDY SITE: ABNORMAL
BENZODIAZ UR QL SCN: NEGATIVE
BILIRUB SERPL-MCNC: 0.9 MG/DL (ref 0.3–1.2)
BILIRUB UR QL STRIP.AUTO: NEGATIVE
BREATHS SETTING VENT: 20 BPM
BUN SERPL-MCNC: 124 MG/DL (ref 7–22)
BZE UR QL SCN: NEGATIVE
CA-I BLD ISE-SCNC: 1 MMOL/L (ref 1.12–1.32)
CA-I BLD ISE-SCNC: 1.06 MMOL/L (ref 1.12–1.32)
CALCIUM SERPL-MCNC: 8.8 MG/DL (ref 8.5–10.5)
CANNABINOIDS UR QL SCN: NEGATIVE
CASTS #/AREA URNS LPF: ABNORMAL /LPF
CASTS 2: ABNORMAL /LPF
CHARACTER UR: ABNORMAL
CHLORIDE BLD-SCNC: 115 MEQ/L (ref 98–109)
CHLORIDE BLD-SCNC: 118 MEQ/L (ref 98–109)
CHLORIDE SERPL-SCNC: 106 MEQ/L (ref 98–111)
CK SERPL-CCNC: 6123 U/L (ref 55–170)
CO2 SERPL-SCNC: 23 MEQ/L (ref 23–33)
COLLECTED BY:: ABNORMAL
COLLECTED BY:: ABNORMAL
COLOR: ABNORMAL
CREAT SERPL-MCNC: 1.6 MG/DL (ref 0.4–1.2)
CRYSTALS URNS MICRO: ABNORMAL
DEPRECATED RDW RBC AUTO: 51.1 FL (ref 35–45)
DEVICE: ABNORMAL
EOSINOPHIL NFR BLD AUTO: 0.1 %
EOSINOPHILS ABSOLUTE: 0 THOU/MM3 (ref 0–0.4)
EPITHELIAL CELLS, UA: ABNORMAL /HPF
ERYTHROCYTE [DISTWIDTH] IN BLOOD BY AUTOMATED COUNT: 15.6 % (ref 11.5–14.5)
ETHANOL SERPL-MCNC: < 0.01 %
FENTANYL: NEGATIVE
FIO2 ON VENT O2 ANALYZER: 100 %
FLUAV RNA RESP QL NAA+PROBE: NOT DETECTED
FLUBV RNA RESP QL NAA+PROBE: NOT DETECTED
GFR SERPL CREATININE-BSD FRML MDRD: 45 ML/MIN/1.73M2
GLUCOSE BLD STRIP.AUTO-MCNC: 142 MG/DL (ref 70–108)
GLUCOSE BLD-MCNC: 143 MG/DL (ref 70–108)
GLUCOSE BLD-MCNC: 153 MG/DL (ref 70–108)
GLUCOSE SERPL-MCNC: 150 MG/DL (ref 70–108)
GLUCOSE UR QL STRIP.AUTO: NEGATIVE MG/DL
HCO3 BLDA-SCNC: 25 MMOL/L (ref 23–28)
HCO3 BLDA-SCNC: 26 MMOL/L (ref 23–28)
HCT VFR BLD AUTO: 56.1 % (ref 42–52)
HGB BLD-MCNC: 17.8 GM/DL (ref 14–18)
HGB UR QL STRIP.AUTO: ABNORMAL
IMM GRANULOCYTES # BLD AUTO: 0.19 THOU/MM3 (ref 0–0.07)
IMM GRANULOCYTES NFR BLD AUTO: 1.9 %
INR PPP: 1.13 (ref 0.85–1.13)
KETONES UR QL STRIP.AUTO: ABNORMAL
LACTATE BLD-SCNC: 2.3 MMOL/L (ref 0.5–1.9)
LACTATE BLD-SCNC: 4.2 MMOL/L (ref 0.5–1.9)
LACTIC ACID, SEPSIS: 4 MMOL/L (ref 0.5–1.9)
LYMPHOCYTES ABSOLUTE: 1.1 THOU/MM3 (ref 1–4.8)
LYMPHOCYTES NFR BLD AUTO: 11 %
MCH RBC QN AUTO: 29.7 PG (ref 26–33)
MCHC RBC AUTO-ENTMCNC: 31.7 GM/DL (ref 32.2–35.5)
MCV RBC AUTO: 93.5 FL (ref 80–94)
MISCELLANEOUS 2: ABNORMAL
MONOCYTES ABSOLUTE: 1 THOU/MM3 (ref 0.4–1.3)
MONOCYTES NFR BLD AUTO: 10.2 %
NEUTROPHILS NFR BLD AUTO: 76.1 %
NITRITE UR QL STRIP: NEGATIVE
NRBC BLD AUTO-RTO: 0 /100 WBC
OPIATES UR QL SCN: NEGATIVE
OSMOLALITY SERPL CALC.SUM OF ELEC: 342.5 MOSMOL/KG (ref 275–300)
OXYCODONE: NEGATIVE
PCO2 BLDA: 51 MMHG (ref 35–45)
PCO2 TEMP ADJ BLDMV: 38 MMHG (ref 41–51)
PCP UR QL SCN: NEGATIVE
PEEP SETTING VENT: 10 MMHG
PH BLDA: 7.3 [PH] (ref 7.35–7.45)
PH BLDMV: 7.44 [PH] (ref 7.31–7.41)
PH UR STRIP.AUTO: 5 [PH] (ref 5–9)
PIP: 28 CMH2O
PLATELET # BLD AUTO: 322 THOU/MM3 (ref 130–400)
PLATELET BLD QL SMEAR: ADEQUATE
PMV BLD AUTO: 9.9 FL (ref 9.4–12.4)
PO2 BLDA: 90 MMHG (ref 71–104)
PO2 BLDMV: 41 MMHG (ref 25–40)
POC CREATININE WHOLE BLOOD: 1.6 MG/DL (ref 0.5–1.2)
POC CREATININE WHOLE BLOOD: 1.8 MG/DL (ref 0.5–1.2)
POTASSIUM BLD-SCNC: 4.1 MEQ/L (ref 3.5–4.9)
POTASSIUM BLD-SCNC: 4.9 MEQ/L (ref 3.5–4.9)
POTASSIUM SERPL-SCNC: 5.1 MEQ/L (ref 3.5–5.2)
PROLACTIN SERPL-MCNC: 17.6 NG/ML
PROT SERPL-MCNC: 8.1 G/DL (ref 6.1–8)
PROT UR STRIP.AUTO-MCNC: 30 MG/DL
RBC # BLD AUTO: 6 MILL/MM3 (ref 4.7–6.1)
RBC URINE: ABNORMAL /HPF
RENAL EPI CELLS #/AREA URNS HPF: ABNORMAL /[HPF]
SAO2 % BLDA: 96 %
SAO2 % BLDMV: 79 %
SARS-COV-2 RNA RESP QL NAA+PROBE: DETECTED
SCAN OF BLOOD SMEAR: NORMAL
SEGMENTED NEUTROPHILS ABSOLUTE COUNT: 7.5 THOU/MM3 (ref 1.8–7.7)
SODIUM BLD-SCNC: 151 MEQ/L (ref 138–146)
SODIUM BLD-SCNC: 154 MEQ/L (ref 138–146)
SODIUM SERPL-SCNC: 151 MEQ/L (ref 135–145)
SP GR UR REFRACT.AUTO: 1.02 (ref 1–1.03)
TROPONIN, HIGH SENSITIVITY: 114 NG/L (ref 0–12)
TSH SERPL DL<=0.005 MIU/L-ACNC: 1.75 UIU/ML (ref 0.4–4.2)
UROBILINOGEN, URINE: 0.2 EU/DL (ref 0–1)
VENTILATION MODE VENT: ABNORMAL
WBC # BLD AUTO: 9.8 THOU/MM3 (ref 4.8–10.8)
WBC #/AREA URNS HPF: ABNORMAL /HPF
WBC #/AREA URNS HPF: NEGATIVE /[HPF]
YEAST LIKE FUNGI URNS QL MICRO: ABNORMAL

## 2023-09-21 PROCEDURE — 84132 ASSAY OF SERUM POTASSIUM: CPT

## 2023-09-21 PROCEDURE — 6360000002 HC RX W HCPCS: Performed by: STUDENT IN AN ORGANIZED HEALTH CARE EDUCATION/TRAINING PROGRAM

## 2023-09-21 PROCEDURE — 87636 SARSCOV2 & INF A&B AMP PRB: CPT

## 2023-09-21 PROCEDURE — 82330 ASSAY OF CALCIUM: CPT

## 2023-09-21 PROCEDURE — 82947 ASSAY GLUCOSE BLOOD QUANT: CPT

## 2023-09-21 PROCEDURE — 99284 EMERGENCY DEPT VISIT MOD MDM: CPT | Performed by: NURSE PRACTITIONER

## 2023-09-21 PROCEDURE — 82077 ASSAY SPEC XCP UR&BREATH IA: CPT

## 2023-09-21 PROCEDURE — 2500000003 HC RX 250 WO HCPCS: Performed by: EMERGENCY MEDICINE

## 2023-09-21 PROCEDURE — 82550 ASSAY OF CK (CPK): CPT

## 2023-09-21 PROCEDURE — 85610 PROTHROMBIN TIME: CPT

## 2023-09-21 PROCEDURE — 82803 BLOOD GASES ANY COMBINATION: CPT

## 2023-09-21 PROCEDURE — 70450 CT HEAD/BRAIN W/O DYE: CPT

## 2023-09-21 PROCEDURE — 73590 X-RAY EXAM OF LOWER LEG: CPT

## 2023-09-21 PROCEDURE — 82565 ASSAY OF CREATININE: CPT

## 2023-09-21 PROCEDURE — 73552 X-RAY EXAM OF FEMUR 2/>: CPT

## 2023-09-21 PROCEDURE — 81001 URINALYSIS AUTO W/SCOPE: CPT

## 2023-09-21 PROCEDURE — 87147 CULTURE TYPE IMMUNOLOGIC: CPT

## 2023-09-21 PROCEDURE — 84443 ASSAY THYROID STIM HORMONE: CPT

## 2023-09-21 PROCEDURE — 84295 ASSAY OF SERUM SODIUM: CPT

## 2023-09-21 PROCEDURE — 84484 ASSAY OF TROPONIN QUANT: CPT

## 2023-09-21 PROCEDURE — 73560 X-RAY EXAM OF KNEE 1 OR 2: CPT

## 2023-09-21 PROCEDURE — 85025 COMPLETE CBC W/AUTO DIFF WBC: CPT

## 2023-09-21 PROCEDURE — 96367 TX/PROPH/DG ADDL SEQ IV INF: CPT

## 2023-09-21 PROCEDURE — 71045 X-RAY EXAM CHEST 1 VIEW: CPT

## 2023-09-21 PROCEDURE — 6820000001 HC L2 TRAUMA SURGERY EVALUATION: Performed by: NURSE PRACTITIONER

## 2023-09-21 PROCEDURE — 87801 DETECT AGNT MULT DNA AMPLI: CPT

## 2023-09-21 PROCEDURE — 73060 X-RAY EXAM OF HUMERUS: CPT

## 2023-09-21 PROCEDURE — 31500 INSERT EMERGENCY AIRWAY: CPT

## 2023-09-21 PROCEDURE — 2700000000 HC OXYGEN THERAPY PER DAY

## 2023-09-21 PROCEDURE — 94660 CPAP INITIATION&MGMT: CPT

## 2023-09-21 PROCEDURE — 73090 X-RAY EXAM OF FOREARM: CPT

## 2023-09-21 PROCEDURE — 82948 REAGENT STRIP/BLOOD GLUCOSE: CPT

## 2023-09-21 PROCEDURE — 99285 EMERGENCY DEPT VISIT HI MDM: CPT

## 2023-09-21 PROCEDURE — 36415 COLL VENOUS BLD VENIPUNCTURE: CPT

## 2023-09-21 PROCEDURE — 82435 ASSAY OF BLOOD CHLORIDE: CPT

## 2023-09-21 PROCEDURE — 83605 ASSAY OF LACTIC ACID: CPT

## 2023-09-21 PROCEDURE — 2500000003 HC RX 250 WO HCPCS: Performed by: STUDENT IN AN ORGANIZED HEALTH CARE EDUCATION/TRAINING PROGRAM

## 2023-09-21 PROCEDURE — 80053 COMPREHEN METABOLIC PANEL: CPT

## 2023-09-21 PROCEDURE — 82140 ASSAY OF AMMONIA: CPT

## 2023-09-21 PROCEDURE — 87040 BLOOD CULTURE FOR BACTERIA: CPT

## 2023-09-21 PROCEDURE — 73564 X-RAY EXAM KNEE 4 OR MORE: CPT

## 2023-09-21 PROCEDURE — 71046 X-RAY EXAM CHEST 2 VIEWS: CPT

## 2023-09-21 PROCEDURE — 2580000003 HC RX 258: Performed by: STUDENT IN AN ORGANIZED HEALTH CARE EDUCATION/TRAINING PROGRAM

## 2023-09-21 PROCEDURE — 80307 DRUG TEST PRSMV CHEM ANLYZR: CPT

## 2023-09-21 PROCEDURE — 2580000003 HC RX 258: Performed by: EMERGENCY MEDICINE

## 2023-09-21 PROCEDURE — 85730 THROMBOPLASTIN TIME PARTIAL: CPT

## 2023-09-21 PROCEDURE — 94761 N-INVAS EAR/PLS OXIMETRY MLT: CPT

## 2023-09-21 PROCEDURE — 96365 THER/PROPH/DIAG IV INF INIT: CPT

## 2023-09-21 PROCEDURE — 93010 ELECTROCARDIOGRAM REPORT: CPT | Performed by: NUCLEAR MEDICINE

## 2023-09-21 PROCEDURE — 93005 ELECTROCARDIOGRAM TRACING: CPT | Performed by: STUDENT IN AN ORGANIZED HEALTH CARE EDUCATION/TRAINING PROGRAM

## 2023-09-21 PROCEDURE — 84146 ASSAY OF PROLACTIN: CPT

## 2023-09-21 PROCEDURE — 36600 WITHDRAWAL OF ARTERIAL BLOOD: CPT

## 2023-09-21 PROCEDURE — 96375 TX/PRO/DX INJ NEW DRUG ADDON: CPT

## 2023-09-21 RX ORDER — ETOMIDATE 2 MG/ML
INJECTION INTRAVENOUS DAILY PRN
Status: COMPLETED | OUTPATIENT
Start: 2023-09-21 | End: 2023-09-21

## 2023-09-21 RX ORDER — 0.9 % SODIUM CHLORIDE 0.9 %
1000 INTRAVENOUS SOLUTION INTRAVENOUS ONCE
Status: COMPLETED | OUTPATIENT
Start: 2023-09-21 | End: 2023-09-21

## 2023-09-21 RX ORDER — ROCURONIUM BROMIDE 10 MG/ML
INJECTION, SOLUTION INTRAVENOUS DAILY PRN
Status: COMPLETED | OUTPATIENT
Start: 2023-09-21 | End: 2023-09-21

## 2023-09-21 RX ORDER — FENTANYL CITRATE 50 UG/ML
50 INJECTION, SOLUTION INTRAMUSCULAR; INTRAVENOUS ONCE
Status: COMPLETED | OUTPATIENT
Start: 2023-09-21 | End: 2023-09-21

## 2023-09-21 RX ORDER — FENTANYL CITRATE-0.9 % NACL/PF 10 MCG/ML
25-200 PLASTIC BAG, INJECTION (ML) INTRAVENOUS CONTINUOUS
Status: DISCONTINUED | OUTPATIENT
Start: 2023-09-21 | End: 2023-09-21 | Stop reason: HOSPADM

## 2023-09-21 RX ADMIN — CEFEPIME 2000 MG: 2 INJECTION, POWDER, FOR SOLUTION INTRAVENOUS at 17:43

## 2023-09-21 RX ADMIN — DEXMEDETOMIDINE 0.4 MCG/KG/HR: 100 INJECTION, SOLUTION INTRAVENOUS at 19:49

## 2023-09-21 RX ADMIN — SODIUM CHLORIDE 1000 ML: 9 INJECTION, SOLUTION INTRAVENOUS at 18:47

## 2023-09-21 RX ADMIN — ETOMIDATE 20 MG: 2 INJECTION INTRAVENOUS at 19:27

## 2023-09-21 RX ADMIN — Medication 50 MCG/HR: at 20:08

## 2023-09-21 RX ADMIN — SODIUM CHLORIDE 1000 ML: 9 INJECTION, SOLUTION INTRAVENOUS at 17:03

## 2023-09-21 RX ADMIN — ROCURONIUM BROMIDE 30 MG: 10 INJECTION INTRAVENOUS at 19:27

## 2023-09-21 RX ADMIN — VANCOMYCIN HYDROCHLORIDE 1750 MG: 5 INJECTION, POWDER, LYOPHILIZED, FOR SOLUTION INTRAVENOUS at 18:57

## 2023-09-21 RX ADMIN — FENTANYL CITRATE 50 MCG: 50 INJECTION, SOLUTION INTRAMUSCULAR; INTRAVENOUS at 19:50

## 2023-09-21 RX ADMIN — LEVETIRACETAM 1000 MG: 100 INJECTION, SOLUTION INTRAVENOUS at 17:30

## 2023-09-21 ASSESSMENT — ENCOUNTER SYMPTOMS
CHEST TIGHTNESS: 0
TROUBLE SWALLOWING: 0
CHOKING: 0
STRIDOR: 0
ABDOMINAL PAIN: 0
NAUSEA: 0
EYE REDNESS: 0
EYE ITCHING: 0
RHINORRHEA: 0
BLOOD IN STOOL: 0
SINUS PRESSURE: 0
SORE THROAT: 0
COUGH: 0
ABDOMINAL DISTENTION: 0
APNEA: 0
WHEEZING: 0
PHOTOPHOBIA: 0
EYE DISCHARGE: 0
CONSTIPATION: 0
FACIAL SWELLING: 1
COLOR CHANGE: 1
VOICE CHANGE: 0
EYE PAIN: 0
BACK PAIN: 0
DIARRHEA: 0
SHORTNESS OF BREATH: 0
VOMITING: 0

## 2023-09-21 ASSESSMENT — PAIN - FUNCTIONAL ASSESSMENT: PAIN_FUNCTIONAL_ASSESSMENT: NONE - DENIES PAIN

## 2023-09-21 ASSESSMENT — PULMONARY FUNCTION TESTS: PIF_VALUE: 28

## 2023-09-21 ASSESSMENT — PAIN SCALES - GENERAL: PAINLEVEL_OUTOF10: 0

## 2023-09-21 NOTE — ED PROVIDER NOTES
(*)     Sodium 151 (*)      (*)     Total Protein 8.1 (*)     ALT 88 (*)     All other components within normal limits   TROPONIN - Abnormal; Notable for the following components:    Troponin, High Sensitivity 114 (*)     All other components within normal limits   CK - Abnormal; Notable for the following components:     Total CK 6,123 (*)     All other components within normal limits   BLOOD GAS, VENOUS - Abnormal; Notable for the following components:    PH MIXED 7.44 (*)     PCO2, MIXED VENOUS 38 (*)     PO2, Mixed 41 (*)     All other components within normal limits   LACTATE, SEPSIS - Abnormal; Notable for the following components:    Lactic Acid, Sepsis 4.0 (*)     All other components within normal limits   CHLORIDE, WHOLE BLOOD - Abnormal; Notable for the following components:    Chloride, Whole Blood 115 (*)     All other components within normal limits   CALCIUM IONIZED SERUM - Abnormal; Notable for the following components:    Calcium,Ionized 1.00 (*)     All other components within normal limits   POC LACTIC ACID - Abnormal; Notable for the following components:    POC Lactic Acid 4.2 (*)     All other components within normal limits   SODIUM, WHOLE BLOOD - Abnormal; Notable for the following components:    Sodium, Whole Blood 151 (*)     All other components within normal limits   GLUCOSE, WHOLE BLOOD - Abnormal; Notable for the following components:    Glucose, Whole Blood 153 (*)     All other components within normal limits   URINE WITH REFLEXED MICRO - Abnormal; Notable for the following components:    Ketones, Urine TRACE (*)     Blood, Urine TRACE (*)     Protein, UA 30 (*)     Color, UA DK YELLOW (*)     Character, Urine CLOUDY (*)     All other components within normal limits   ANION GAP - Abnormal; Notable for the following components:    Anion Gap 22.0 (*)     All other components within normal limits   GLOMERULAR FILTRATION RATE, ESTIMATED - Abnormal; Notable for the

## 2023-09-21 NOTE — ED TRIAGE NOTES
Pt comes to ED via [facility] from home with c/o AMS and being found on the ground prone after not being seen by anyone for 6 days. Per EMS pt usually goes to the [facility] and his friends did not see them for a while so they went to check on pt and found him like that. Pt has hx of seizures. Pt states that he has last had a seizure years ago. Pt states he is almost out of keppra. Upon first arrival. Pt is soiled and smells of ammonia. Pt confused but responds when spoken to. Pt eye are matted shut. Pt has an area of necrosis on his left cheek. Pt head is turned towards the right. Pt skin is breaking down on bilateral knees with bruising and what appears to be the start of necrosis. Pt has skin breakdown on his chest. Pt penis skin is breaking down and bloody. Pt testicles are gray in color. Pt stacia area is red and continues to be red down to his mid thigh. Pt hypothermic upon arrival. Pt has dried blood in his mouth. PT appeared to have bitten his tongue. Pt states he does not remember what happened to him. Pt states he does remember laying on the ground. Pt sister at bedside.

## 2023-09-21 NOTE — ED NOTES
Pt desating at this time. Resp heavy and labored. Provider notified. Decision to intubate at this time.       Regina Dawson RN  09/21/23 0546

## 2023-09-22 LAB
ACB COMPLEX DNA BLD POS QL NAA+NON-PROBE: NOT DETECTED
B FRAGILIS DNA BLD POS QL NAA+NON-PROBE: NOT DETECTED
BLACTX-M ISLT/SPM QL: ABNORMAL
BLAIMP ISLT/SPM QL: ABNORMAL
BLAKPC ISLT/SPM QL: ABNORMAL
BLAOXA-48-LIKE ISLT/SPM QL: ABNORMAL
BLAVIM ISLT/SPM QL: ABNORMAL
BOTTLE TYPE: ABNORMAL
C ALBICANS DNA BLD POS QL NAA+NON-PROBE: NOT DETECTED
C AURIS DNA BLD POS QL NAA+NON-PROBE: NOT DETECTED
C GATTII+NEOFOR DNA BLD POS QL NAA+N-PRB: NOT DETECTED
C GLABRATA DNA BLD POS QL NAA+NON-PROBE: NOT DETECTED
C KRUSEI DNA BLD POS QL NAA+NON-PROBE: NOT DETECTED
C PARAP DNA BLD POS QL NAA+NON-PROBE: NOT DETECTED
C TROPICLS DNA BLD POS QL NAA+NON-PROBE: NOT DETECTED
COAG NEG STAPH DNA BLD QL NAA+PROBE: DETECTED
COLISTIN RES MCR-1 ISLT/SPM QL: ABNORMAL
E CLOAC COMP DNA BLD POS NAA+NON-PROBE: NOT DETECTED
E COLI DNA BLD POS QL NAA+NON-PROBE: NOT DETECTED
E FAECALIS DNA BLD POS QL NAA+NON-PROBE: NOT DETECTED
E FAECIUM DNA BLD POS QL NAA+NON-PROBE: NOT DETECTED
ENTEROBACTERALES DNA BLD POS NAA+N-PRB: NOT DETECTED
GP B STREP DNA SPEC QL NAA+PROBE: NOT DETECTED
GP B STREP DNA SPEC QL NAA+PROBE: NOT DETECTED
HAEM INFLU DNA BLD POS QL NAA+NON-PROBE: NOT DETECTED
K OXYTOCA DNA BLD POS QL NAA+NON-PROBE: NOT DETECTED
K OXYTOCA DNA BLD POS QL NAA+NON-PROBE: NOT DETECTED
KLEBSIELLA SP DNA BLD POS QL NAA+NON-PRB: NOT DETECTED
L MONOCYTOG DNA BLD POS QL NAA+NON-PROBE: NOT DETECTED
MECA ISLT/SPM QL: DETECTED
MECA+MECC+MREJ ISLT/SPM QL: ABNORMAL
N MEN DNA BLD POS QL NAA+NON-PROBE: NOT DETECTED
NDM: ABNORMAL
P AERUGINOSA DNA BLD POS NAA+NON-PROBE: NOT DETECTED
PROTEUS SPP: NOT DETECTED
S AUREUS DNA BLD POS QL NAA+NON-PROBE: NOT DETECTED
S EPIDERMIDIS DNA BLD POS QL NAA+NON-PRB: DETECTED
S LUGDUNENSIS DNA BLD POS QL NAA+NON-PRB: NOT DETECTED
S MALTOPHILIA DNA BLD POS QL NAA+NON-PRB: NOT DETECTED
S MARCESCENS DNA BLD POS NAA+NON-PROBE: NOT DETECTED
S PYO DNA THROAT QL NAA+PROBE: NOT DETECTED
SALMONELLA DNA BLD POS QL NAA+NON-PROBE: NOT DETECTED
SOURCE OF BLOOD CULTURE: ABNORMAL
STREPTOCOCCUS DNA BLD QL NAA+PROBE: NOT DETECTED
VANA+VANB ISLT/SPM QL: ABNORMAL

## 2023-09-22 NOTE — PROGRESS NOTES
A size 8 endotracheal tube was successfully placed using a size s3 blade.  The Lot number for he endotracheal tube was [id number]

## 2023-09-22 NOTE — ED NOTES
Pt transported via [facility] at this time in stable condition.       Gualberto Melendez RN  09/21/23 2030

## 2023-09-23 LAB
BACTERIA BLD AEROBE CULT: ABNORMAL
BACTERIA BLD AEROBE CULT: NORMAL
EKG ATRIAL RATE: 84 BPM
EKG P AXIS: 74 DEGREES
EKG P-R INTERVAL: 236 MS
EKG Q-T INTERVAL: 240 MS
EKG Q-T INTERVAL: 470 MS
EKG QRS DURATION: 124 MS
EKG QRS DURATION: 92 MS
EKG QTC CALCULATION (BAZETT): 283 MS
EKG QTC CALCULATION (BAZETT): 555 MS
EKG R AXIS: 75 DEGREES
EKG R AXIS: 87 DEGREES
EKG T AXIS: -151 DEGREES
EKG T AXIS: 34 DEGREES
EKG VENTRICULAR RATE: 84 BPM
EKG VENTRICULAR RATE: 84 BPM
ORGANISM: ABNORMAL

## 2023-09-23 PROCEDURE — 93010 ELECTROCARDIOGRAM REPORT: CPT | Performed by: NUCLEAR MEDICINE

## 2023-09-26 LAB — BACTERIA BLD AEROBE CULT: NORMAL

## 2023-10-24 ENCOUNTER — APPOINTMENT (OUTPATIENT)
Dept: GENERAL RADIOLOGY | Age: 73
End: 2023-10-24
Payer: MEDICARE

## 2023-10-24 ENCOUNTER — HOSPITAL ENCOUNTER (EMERGENCY)
Age: 73
Discharge: NURSING, REHAB OR LONG-TERM CARE | End: 2023-10-24
Attending: STUDENT IN AN ORGANIZED HEALTH CARE EDUCATION/TRAINING PROGRAM
Payer: MEDICARE

## 2023-10-24 VITALS
HEART RATE: 89 BPM | SYSTOLIC BLOOD PRESSURE: 119 MMHG | RESPIRATION RATE: 20 BRPM | OXYGEN SATURATION: 99 % | DIASTOLIC BLOOD PRESSURE: 73 MMHG | TEMPERATURE: 98.7 F

## 2023-10-24 DIAGNOSIS — D64.9 ANEMIA, UNSPECIFIED TYPE: ICD-10-CM

## 2023-10-24 DIAGNOSIS — Z46.59 ENCOUNTER FOR NASOGASTRIC (NG) TUBE PLACEMENT: Primary | ICD-10-CM

## 2023-10-24 LAB
ANION GAP SERPL CALC-SCNC: 11 MEQ/L (ref 8–16)
BASOPHILS ABSOLUTE: 0 THOU/MM3 (ref 0–0.1)
BASOPHILS NFR BLD AUTO: 0.5 %
BUN SERPL-MCNC: 17 MG/DL (ref 7–22)
CALCIUM SERPL-MCNC: 9.1 MG/DL (ref 8.5–10.5)
CHLORIDE SERPL-SCNC: 101 MEQ/L (ref 98–111)
CO2 SERPL-SCNC: 28 MEQ/L (ref 23–33)
CREAT SERPL-MCNC: 0.5 MG/DL (ref 0.4–1.2)
DEPRECATED RDW RBC AUTO: 52.5 FL (ref 35–45)
EOSINOPHIL NFR BLD AUTO: 1.7 %
EOSINOPHILS ABSOLUTE: 0.2 THOU/MM3 (ref 0–0.4)
ERYTHROCYTE [DISTWIDTH] IN BLOOD BY AUTOMATED COUNT: 15.3 % (ref 11.5–14.5)
GFR SERPL CREATININE-BSD FRML MDRD: > 60 ML/MIN/1.73M2
GLUCOSE SERPL-MCNC: 102 MG/DL (ref 70–108)
HCT VFR BLD AUTO: 34.3 % (ref 42–52)
HGB BLD-MCNC: 10.2 GM/DL (ref 14–18)
IMM GRANULOCYTES # BLD AUTO: 0.18 THOU/MM3 (ref 0–0.07)
IMM GRANULOCYTES NFR BLD AUTO: 1.8 %
LYMPHOCYTES ABSOLUTE: 1.3 THOU/MM3 (ref 1–4.8)
LYMPHOCYTES NFR BLD AUTO: 12.8 %
MCH RBC QN AUTO: 28.3 PG (ref 26–33)
MCHC RBC AUTO-ENTMCNC: 29.7 GM/DL (ref 32.2–35.5)
MCV RBC AUTO: 95 FL (ref 80–94)
MONOCYTES ABSOLUTE: 0.7 THOU/MM3 (ref 0.4–1.3)
MONOCYTES NFR BLD AUTO: 6.9 %
NEUTROPHILS NFR BLD AUTO: 76.3 %
NRBC BLD AUTO-RTO: 0 /100 WBC
OSMOLALITY SERPL CALC.SUM OF ELEC: 281.1 MOSMOL/KG (ref 275–300)
PLATELET # BLD AUTO: 486 THOU/MM3 (ref 130–400)
PMV BLD AUTO: 8.5 FL (ref 9.4–12.4)
POTASSIUM SERPL-SCNC: 4.2 MEQ/L (ref 3.5–5.2)
RBC # BLD AUTO: 3.61 MILL/MM3 (ref 4.7–6.1)
SEGMENTED NEUTROPHILS ABSOLUTE COUNT: 7.5 THOU/MM3 (ref 1.8–7.7)
SODIUM SERPL-SCNC: 140 MEQ/L (ref 135–145)
WBC # BLD AUTO: 9.8 THOU/MM3 (ref 4.8–10.8)

## 2023-10-24 PROCEDURE — 99284 EMERGENCY DEPT VISIT MOD MDM: CPT

## 2023-10-24 PROCEDURE — 74018 RADEX ABDOMEN 1 VIEW: CPT

## 2023-10-24 PROCEDURE — 80048 BASIC METABOLIC PNL TOTAL CA: CPT

## 2023-10-24 PROCEDURE — 43762 RPLC GTUBE NO REVJ TRC: CPT

## 2023-10-24 PROCEDURE — 36415 COLL VENOUS BLD VENIPUNCTURE: CPT

## 2023-10-24 PROCEDURE — 85025 COMPLETE CBC W/AUTO DIFF WBC: CPT

## 2023-10-24 ASSESSMENT — PAIN - FUNCTIONAL ASSESSMENT: PAIN_FUNCTIONAL_ASSESSMENT: NONE - DENIES PAIN

## 2023-10-24 NOTE — DISCHARGE INSTRUCTIONS
Please follow-up with your primary doctor in the next 1-2 days. You need to have your hemoglobin rechecked. I am unable to definitively tell what caused your NG tube to become clogged, but if you had bleeding that plugged up the tube it needs to be monitored closely. Mel Leija, you do not have any signs or symptoms of active bleeding at this time. If you begin having severe nausea, vomiting, abdominal pain or inability to tolerate feeds/medications return to the emergency department immediately.

## 2023-10-24 NOTE — ED TRIAGE NOTES
Pt to ED via EMS from [facility] w/rprts of clogged NG tube. Pt denies any other complaints. Pt A&O to person and place. Breathing easy and unlabored on RA. Placed on monitor and in gown.

## 2023-10-24 NOTE — ED NOTES
Rprt called to [facility]. [facility] notified of need for transport. [facility] faxed. ETA 1280.       Jacqueline Crain  10/24/23 7509

## 2023-10-25 ENCOUNTER — APPOINTMENT (OUTPATIENT)
Dept: GENERAL RADIOLOGY | Age: 73
End: 2023-10-25
Payer: MEDICARE

## 2023-10-25 ENCOUNTER — HOSPITAL ENCOUNTER (EMERGENCY)
Age: 73
Discharge: HOME | End: 2023-10-25
Payer: MEDICARE

## 2023-10-25 ENCOUNTER — HOSPITAL ENCOUNTER (EMERGENCY)
Age: 73
Discharge: HOME | End: 2023-10-26
Attending: EMERGENCY MEDICINE
Payer: MEDICARE

## 2023-10-25 VITALS
OXYGEN SATURATION: 91 % | TEMPERATURE: 98.6 F | BODY MASS INDEX: 31.1 KG/M2 | RESPIRATION RATE: 24 BRPM | HEART RATE: 70 BPM | HEIGHT: 69 IN | DIASTOLIC BLOOD PRESSURE: 98 MMHG | SYSTOLIC BLOOD PRESSURE: 144 MMHG | WEIGHT: 210 LBS

## 2023-10-25 DIAGNOSIS — Z46.59 ENCOUNTER FOR NASOGASTRIC (NG) TUBE PLACEMENT: Primary | ICD-10-CM

## 2023-10-25 PROCEDURE — 74018 RADEX ABDOMEN 1 VIEW: CPT

## 2023-10-25 PROCEDURE — 6370000000 HC RX 637 (ALT 250 FOR IP): Performed by: NURSE PRACTITIONER

## 2023-10-25 PROCEDURE — 99284 EMERGENCY DEPT VISIT MOD MDM: CPT

## 2023-10-25 RX ADMIN — BENZOCAINE: 200 SPRAY DENTAL; ORAL; PERIODONTAL at 03:59

## 2023-10-25 ASSESSMENT — PAIN - FUNCTIONAL ASSESSMENT
PAIN_FUNCTIONAL_ASSESSMENT: NONE - DENIES PAIN
PAIN_FUNCTIONAL_ASSESSMENT: NONE - DENIES PAIN

## 2023-10-25 NOTE — ED NOTES
Pt resting in bed watching tv, Pt and VS assessed call light within reach pt denies needs at this time     Jalyn Prado  10/25/23 8590

## 2023-10-25 NOTE — ED NOTES
NG tube placed 18 Czech 65 at the right nare pt tolerated well x- ray ordered for placement confirmation         Brigida Hernandez  10/25/23 5824

## 2023-10-25 NOTE — ED TRIAGE NOTES
PT to the ED via EMS from [facility] for replacement of NG tube. Pt states PT had NG tube placed today for feeding. PT states NG tube fell out due to not being taped on well. PT alert and oriented and following commands.

## 2023-10-26 VITALS
OXYGEN SATURATION: 91 % | HEIGHT: 69 IN | DIASTOLIC BLOOD PRESSURE: 62 MMHG | WEIGHT: 210 LBS | TEMPERATURE: 98 F | RESPIRATION RATE: 17 BRPM | HEART RATE: 89 BPM | SYSTOLIC BLOOD PRESSURE: 117 MMHG | BODY MASS INDEX: 31.1 KG/M2

## 2023-10-26 ASSESSMENT — PAIN - FUNCTIONAL ASSESSMENT
PAIN_FUNCTIONAL_ASSESSMENT: NONE - DENIES PAIN
PAIN_FUNCTIONAL_ASSESSMENT: NONE - DENIES PAIN

## 2023-10-26 NOTE — ED NOTES
Pt resting on cot. Vitals stable. Resp easy and unlabored. Waiting for transport at this time.       Segun Wilson RN  10/26/23 0559

## 2023-10-26 NOTE — ED PROVIDER NOTES
nasogastric tube position. This document has been electronically signed by: Lionel Hodgson MD on    10/26/2023 12:19 AM          LABS: (none if blank)  Labs Reviewed - No data to display    (Any cultures that may have been sent were not resulted at the time of this patient visit)    [facility] / ED COURSE:     1) Number and Complexity of Problems            Problem List This Visit:         Chief Complaint   Patient presents with    Needing NG Tube Replacement            Differential Diagnosis includes (but not limited to):  Dislodged NG tube        Diagnoses Considered but I have low suspicion of:   Pneumonia, esophageal perforation             Pertinent Comorbid Conditions:    Seizure disorder, medic brain injury, hypertension    2)  Data Reviewed (none if left blank)          My Independent interpretations:       Imaging: See ED course              External Documentation Reviewed:         Previous patient encounter documents & history available on EMR was reviewed              See Formal Diagnostic Results above for the lab and radiology tests and orders. 3)  Treatment and Disposition         ED Reassessment: See ED course           Shared Decision-Making was performed and disposition discussed with the        Patient/Family and questions answered          Social determinants of health impacting treatment or disposition: None         Code Status: Full      Summary of Patient Presentation:      MDM  Number of Diagnoses or Management Options  Encounter for nasogastric (NG) tube placement  Diagnosis management comments: 42-year-old male presents emergency room for dislodged NG tube. This was replaced by the nurse. We will check an x-ray to ensure that the NG tube is in place. Then we will discharge patient home. /  ED Course as of 10/27/23 0509   Thu Oct 26, 2023   0022 X-ray shows NG tube in appropriate placement.   Will discharge patient back to nursing home. [DD]      ED Course User

## 2023-10-26 NOTE — ED NOTES
[facility] ETA 0400. Pt made aware.  Report called to [facility], Juvenal Dowell RN  10/26/23 0028

## 2023-10-26 NOTE — DISCHARGE INSTRUCTIONS
You were seen for NG tube placement. This has been placed. Please avoid pulling on the NG tube as this will dislodge it. Follow-up with your primary care physician as needed.

## 2023-10-26 NOTE — ED TRIAGE NOTES
Pt presents to the ED from [facility] needing NG tube place. Pt reports that his NG tube came out when he was coughing. Denies pain.  Vitals stable

## 2023-10-27 NOTE — ED PROVIDER NOTES
[facility] EMERGENCY DEPT      EMERGENCY MEDICINE     Pt Name: Joce Penaloza  MRN: [id number]  [address] [facility] 1950  Date of evaluation: 10/25/2023  Provider: PAUL Kim CNP    CHIEF COMPLAINT       Chief Complaint   Patient presents with    Pulled out NG tube     HISTORY OF PRESENT ILLNESS   Joce Penaloza is a pleasant 68 y.o. male who presents to the emergency department from home with c/o NG tube displacement. The patient uses an NG tube for feedings. The patient states it came out tonight. New one had been placed earlier today. 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 S/p skin graft. Healed. From chronic venous insuficiency. History of traumatic brain injury     Hyperlipidemia     Hypertension     Mentally challenged     \"slow\" states his sister    Obesity (BMI 30-39. 9)     Osteoarthritis     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 ([address]) 07/2014    s/p fall down a hill, Dr. Shayla Cervantes follows, no seizure activity since    Seizure disorder ([address]) 07/17/2014       Patient Active Problem List   Diagnosis Code    Seizure disorder ([address]) G40.909    Venous insufficiency of right leg I87.2    History of non-healing open leg wound of R leg. S/p skin graft. Healed. From chronic venous insuficiency.  D61.698    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 ([address]) R56.1    Osteoarthritis M19.90    Hypertension I10    Hyperlipidemia E78.5    History of DVT of right lower extremity Z86.718    Heart failure with preserved ejection fraction (HCC) I50.30    History of alcohol abuse F10.11    History of traumatic brain injury Z87.820    History of acute kidney injury from excessive NSAID use

## 2023-10-29 ENCOUNTER — APPOINTMENT (OUTPATIENT)
Dept: GENERAL RADIOLOGY | Age: 73
End: 2023-10-29
Payer: MEDICARE

## 2023-10-29 ENCOUNTER — HOSPITAL ENCOUNTER (EMERGENCY)
Age: 73
Discharge: HOME | End: 2023-10-29
Attending: EMERGENCY MEDICINE
Payer: MEDICARE

## 2023-10-29 ENCOUNTER — HOSPITAL ENCOUNTER (EMERGENCY)
Age: 73
Discharge: HOME | End: 2023-10-30
Attending: EMERGENCY MEDICINE
Payer: MEDICARE

## 2023-10-29 VITALS
HEART RATE: 74 BPM | DIASTOLIC BLOOD PRESSURE: 80 MMHG | OXYGEN SATURATION: 94 % | SYSTOLIC BLOOD PRESSURE: 128 MMHG | TEMPERATURE: 97.8 F | RESPIRATION RATE: 16 BRPM

## 2023-10-29 DIAGNOSIS — Z01.89 ENCOUNTER FOR IMAGING STUDY TO CONFIRM NASOGASTRIC (NG) TUBE PLACEMENT: Primary | ICD-10-CM

## 2023-10-29 DIAGNOSIS — R05.8 OTHER COUGH: Primary | ICD-10-CM

## 2023-10-29 PROCEDURE — 71045 X-RAY EXAM CHEST 1 VIEW: CPT

## 2023-10-29 PROCEDURE — 74018 RADEX ABDOMEN 1 VIEW: CPT

## 2023-10-29 ASSESSMENT — PAIN - FUNCTIONAL ASSESSMENT
PAIN_FUNCTIONAL_ASSESSMENT: NONE - DENIES PAIN

## 2023-10-29 NOTE — ED NOTES
Pt presents to the ED from nursing home due to staff not being able to flush NG tube. Pt was seen earlier today with similar complaints. NG tube was able to be flushed. Pt cough after flushing. Pt respirations are even and unlabored.       Rk Oquendo RN  10/29/23 6548

## 2023-10-29 NOTE — ED NOTES
Pt resting on cot with eyes closed while tv on. Pt respirations are even and unlabored.      Padmini Chua RN  10/29/23 2068

## 2023-10-29 NOTE — ED PROVIDER NOTES
[facility] EMERGENCY DEPT      CHIEF COMPLAINT       Chief Complaint   Patient presents with    Other     NG replacement       Nurses Notes reviewed and I agree except as noted in the HPI. HISTORY OF PRESENT ILLNESS    Jennifer Joshua is a 68 y.o. male who presents with complaint of malfunctioning NG tube, nursing home concerned that patient might have aspirated, had an episode of coughing spell while he was being fed through the NG tube. 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 S/p skin graft. Healed. From chronic venous insuficiency. , History of traumatic brain injury, Hyperlipidemia, Hypertension, Mentally challenged, Obesity (BMI 30-39.9), Osteoarthritis, 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 ([address]), and Seizure disorder ([address]). SURGICAL HISTORY      has a past surgical history that includes Tonsillectomy; vascular surgery; pr i&d deep absc bursa/hematoma thigh/knee region (Right, 9/25/2017); and Skin graft (Right, 10/9/2017).     CURRENT MEDICATIONS       Previous Medications    AMLODIPINE (NORVASC) 2.5 MG TABLET    take 1 tablet by mouth once daily    ASPIRIN 81 MG CHEWABLE TABLET    Take 1 tablet by mouth daily    ATORVASTATIN (LIPITOR) 20 MG TABLET    take 1 tablet by mouth once daily    HANDICAP PLACARD MISC    by Does not apply route Expires 15 SEPT 2025    LEVETIRACETAM (KEPPRA) 1000 MG TABLET    take 1 tablet by mouth twice a day    TIZANIDINE (ZANAFLEX) 4 MG TABLET    Take 1 tablet by mouth every 8 hours as needed (neck

## 2023-10-29 NOTE — ED TRIAGE NOTES
Pt to ED with a concern for aspiration. Nurse at [place] was attempting to change the patients NG tube when he started to cough. Nurse states that the NG tube feels stuck. Pt denies any pain. Pt appears to have a wet cough.

## 2023-10-29 NOTE — ED NOTES
I am seeing this patient at the request of  Liss Lind MD for skin cancer.  A copy of this report will be sent to  Liss Lind MD.    This patient is a 84 year old with a lesion of concern.  L pretibial - this lesion has been present for approximately several  months.    Growth - slowly increasing in size    Symptoms - none  Bleeding - no  Focused PMH    - Prior skin cancer: none  - History of sun exposure: routine  - Medical contributors - none    ROS - recent illness or infections - No  Other skin concerns at this time - No    Past Medical History:   Diagnosis Date   • Benign neoplasm of cranial nerves (CMS/HCC)     interdigital second space R. foot   • Calculus of kidney 3/11/92   • Cardiac pacemaker in situ 12/29/00    Dr. Angel/complete heart block   • Essential hypertension, benign     Hypertension   • Generalized osteoarthrosis, unspecified site     L3-L4 and L4-L5 with lateral recess ssenosis   • Mixed hyperlipidemia     Hyperlipidemia   • Reflux esophagitis     GERD   • Symptomatic menopausal or female climacteric states     Menopausal Syndrome   • Tachycardia, unspecified    • Third degree AV block (CMS/HCC)     Gen Change 3/25/2011 Dr. Michel     Past Surgical History:   Procedure Laterality Date   • ANTER VESICOURETHROPEXY,SIMPLE  8/19/96    Dr Kaiser/stress incontinence   • COLONOSCOPY DIAGNOSTIC  8/26/03    Dr Bello  hemorrhoids, rectal and anal hemorrhage  f/u 10 yrs   • COLONOSCOPY W BIOPSY  4/19/07    Dr. Bello, Polyps, F/U 5 years   • CYSTOSCOPY,URETEROSCOPY,LITHOTRIPSY  3/11/92    Dr. Mayers/ kidney stone   • DEXA BONE DENSITY AXIAL SKELETON  01,04    nl all 3 sites   • ESOPHAGOGASTRODUODENOSCOPY TRANSORAL FLEX DIAG  5/6/99    minimal inflammation of esophagus/Dr. Gomez   • FLEXIBLE SIGMOIDOSCOPY DX  01/06/2000    Normal BE afterwards   • INJ EPIDURAL CERVICAL THORACIC  7/7/2010    #1.C-AMANDA repeat   • LIGATE FALLOPIAN TUBE  age 32    Sterilization ligate Fallopian tubes   •  RN attempting to call nursing home X2 to give report. No answer.      Elizabeth Pollack, ERIK  10/29/23 5661 NECK/CHEST PROCEDURE UNLISTED  2002    Unspecified Neck Procedure   • PHONE ANALYSIS,PACEMAKER,DUAL  12/29/2000    Dual chamber rate responsive pacemaker   • REMV CATARACT EXTRACAP INSERT LENS  4-    Cataract Removal Lens Implant OD   • REMV CATARACT EXTRACAP INSERT LENS  5/23/2013    Cataract Removal Lens Implant OS     Current Outpatient Prescriptions   Medication Sig Dispense Refill   • metformin (GLUCOPHAGE-XR) 500 MG 24 hr tablet Take 1 tablet by mouth daily (with breakfast). 90 tablet 3   • acetaminophen (TYLENOL) 325 MG tablet Take 650 mg by mouth every 4 hours as needed for Pain.     • tiZANidine (ZANAFLEX) 2 MG tablet Take 1 tablet by mouth nightly as needed for Muscle spasms. 30 tablet 0   • furosemide (LASIX) 20 MG tablet TAKE ONE TABLET BY MOUTH EVERY DAY 90 tablet 3   • valsartan-hydrochlorothiazide (DIOVAN-HCT) 320-25 MG per tablet Take 1 tablet by mouth daily. 90 tablet 3   • amLODIPine (NORVASC) 5 MG tablet Take 1 tablet by mouth daily. 90 tablet 3   • montelukast (SINGULAIR) 10 MG tablet Take 1 tablet by mouth every morning. 90 tablet 3   • atenolol (TENORMIN) 50 MG tablet One tablet daily. 90 tablet 3   • lansoprazole (PREVACID) 30 MG capsule Take 1 capsule by mouth daily. 90 capsule 2   • Misc Natural Products (GLUCOSAMINE CHONDROITIN ADV) TABS Take 1 capsule by mouth 2 times daily.     • CINNAMON 500 MG PO CAPS take on twice daily 0 0   • ASPIRIN TABS 81 MG PO 1 TABLET DAILY 0 0   • HYDROcodone-acetaminophen (NORCO)  MG per tablet Take 1 tablet by mouth every 6 hours as needed for Pain. 30 tablet 0   • Elastic Bandages & Supports (JOBST STOCKINGS, KNEE HIGH, LARGE, 20-30 STRENGTH)      • Blood Glucose Monitoring Suppl (ONE TOUCH ULTRA MINI) W/DEVICE KIT use daily as directed     • Glucose Blood (ONE TOUCH ULTRA TEST) strip test daily as directed for diabetes     • ONE TOUCH DELICA LANCETS MISC test daily as directed for diabetes       No current facility-administered medications for  this visit.        PE - well developed/ well nourished/ NAD  Psych - oriented x 3 - Yes  General skin condition - mild actinic damage    Focused PE  - Size - 1 cm  - healed shave bx site  Previous biopsy performed - yes    Diagnosis - BCC        RECOMMENDATION - excision under local anesthesia    Surgical treatment discussed with the patient.  The nature of the surgical procedure was explained.  The approximate length and location of the resulting scar was discussed.  Risks were discussed including   - bleeding resulting in bruising   - infection resulting in delayed healing and a more prominent scar  - need for re-excision based on the final pathology  - a permanent scar will result and it may be significant  - reaction to medications  All questions were answered.

## 2023-10-29 NOTE — ED NOTES
Beside report received from [place], [address] [place] [address]. Patient resting in bed. Respirations easy and unlabored. No distress noted. Call light within reach.        Catherine Peterson RN  10/29/23 1911

## 2023-10-30 ENCOUNTER — APPOINTMENT (OUTPATIENT)
Dept: INTERVENTIONAL RADIOLOGY/VASCULAR | Age: 73
End: 2023-10-30
Payer: MEDICARE

## 2023-10-30 VITALS
HEART RATE: 91 BPM | SYSTOLIC BLOOD PRESSURE: 127 MMHG | RESPIRATION RATE: 16 BRPM | TEMPERATURE: 98.1 F | OXYGEN SATURATION: 98 % | DIASTOLIC BLOOD PRESSURE: 70 MMHG

## 2023-10-30 VITALS
WEIGHT: 215 LBS | SYSTOLIC BLOOD PRESSURE: 131 MMHG | BODY MASS INDEX: 31.84 KG/M2 | DIASTOLIC BLOOD PRESSURE: 70 MMHG | OXYGEN SATURATION: 94 % | TEMPERATURE: 98.2 F | RESPIRATION RATE: 16 BRPM | HEART RATE: 70 BPM | HEIGHT: 69 IN

## 2023-10-30 DIAGNOSIS — I80.01 THROMBOPHLEBITIS OF SUPERFICIAL VEINS OF RIGHT LOWER EXTREMITY: Primary | ICD-10-CM

## 2023-10-30 DIAGNOSIS — I82.431 THROMBOSIS OF RIGHT POPLITEAL VEIN (HCC): ICD-10-CM

## 2023-10-30 LAB
ALBUMIN SERPL BCG-MCNC: 2.7 G/DL (ref 3.5–5.1)
ALP SERPL-CCNC: 104 U/L (ref 38–126)
ALT SERPL W/O P-5'-P-CCNC: 58 U/L (ref 11–66)
ANION GAP SERPL CALC-SCNC: 12 MEQ/L (ref 8–16)
AST SERPL-CCNC: 29 U/L (ref 5–40)
BASOPHILS ABSOLUTE: 0.1 THOU/MM3 (ref 0–0.1)
BASOPHILS NFR BLD AUTO: 0.5 %
BILIRUB SERPL-MCNC: 0.4 MG/DL (ref 0.3–1.2)
BUN SERPL-MCNC: 17 MG/DL (ref 7–22)
CALCIUM SERPL-MCNC: 8.8 MG/DL (ref 8.5–10.5)
CHLORIDE SERPL-SCNC: 96 MEQ/L (ref 98–111)
CO2 SERPL-SCNC: 26 MEQ/L (ref 23–33)
CREAT SERPL-MCNC: 0.5 MG/DL (ref 0.4–1.2)
DEPRECATED RDW RBC AUTO: 52 FL (ref 35–45)
EOSINOPHIL NFR BLD AUTO: 0.8 %
EOSINOPHILS ABSOLUTE: 0.1 THOU/MM3 (ref 0–0.4)
ERYTHROCYTE [DISTWIDTH] IN BLOOD BY AUTOMATED COUNT: 15.9 % (ref 11.5–14.5)
GFR SERPL CREATININE-BSD FRML MDRD: > 60 ML/MIN/1.73M2
GLUCOSE SERPL-MCNC: 137 MG/DL (ref 70–108)
HCT VFR BLD AUTO: 31.8 % (ref 42–52)
HGB BLD-MCNC: 9.8 GM/DL (ref 14–18)
IMM GRANULOCYTES # BLD AUTO: 0.49 THOU/MM3 (ref 0–0.07)
IMM GRANULOCYTES NFR BLD AUTO: 3.6 %
LYMPHOCYTES ABSOLUTE: 1.3 THOU/MM3 (ref 1–4.8)
LYMPHOCYTES NFR BLD AUTO: 9.3 %
MCH RBC QN AUTO: 28.1 PG (ref 26–33)
MCHC RBC AUTO-ENTMCNC: 30.8 GM/DL (ref 32.2–35.5)
MCV RBC AUTO: 91.1 FL (ref 80–94)
MONOCYTES ABSOLUTE: 1.2 THOU/MM3 (ref 0.4–1.3)
MONOCYTES NFR BLD AUTO: 8.8 %
NEUTROPHILS NFR BLD AUTO: 77 %
NRBC BLD AUTO-RTO: 0 /100 WBC
OSMOLALITY SERPL CALC.SUM OF ELEC: 271.9 MOSMOL/KG (ref 275–300)
PLATELET # BLD AUTO: 376 THOU/MM3 (ref 130–400)
PMV BLD AUTO: 8.9 FL (ref 9.4–12.4)
POTASSIUM SERPL-SCNC: 4.4 MEQ/L (ref 3.5–5.2)
PROT SERPL-MCNC: 6.9 G/DL (ref 6.1–8)
RBC # BLD AUTO: 3.49 MILL/MM3 (ref 4.7–6.1)
SEGMENTED NEUTROPHILS ABSOLUTE COUNT: 10.6 THOU/MM3 (ref 1.8–7.7)
SODIUM SERPL-SCNC: 134 MEQ/L (ref 135–145)
TROPONIN, HIGH SENSITIVITY: 119 NG/L (ref 0–12)
WBC # BLD AUTO: 13.8 THOU/MM3 (ref 4.8–10.8)

## 2023-10-30 PROCEDURE — 85025 COMPLETE CBC W/AUTO DIFF WBC: CPT

## 2023-10-30 PROCEDURE — 93971 EXTREMITY STUDY: CPT

## 2023-10-30 PROCEDURE — 36415 COLL VENOUS BLD VENIPUNCTURE: CPT

## 2023-10-30 PROCEDURE — 80053 COMPREHEN METABOLIC PANEL: CPT

## 2023-10-30 PROCEDURE — 84484 ASSAY OF TROPONIN QUANT: CPT

## 2023-10-30 PROCEDURE — 99284 EMERGENCY DEPT VISIT MOD MDM: CPT

## 2023-10-30 ASSESSMENT — PAIN - FUNCTIONAL ASSESSMENT
PAIN_FUNCTIONAL_ASSESSMENT: NONE - DENIES PAIN
PAIN_FUNCTIONAL_ASSESSMENT: NONE - DENIES PAIN

## 2023-10-30 NOTE — ED NOTES
Patient resting in bed watching television, no distress noted. Respirations easy and unlabored. Denies any further needs or concerns. Call light remains in reach.       Billing, [address] [facility] [address] Lamont JARRELL, RN  10/30/23 6919

## 2023-10-30 NOTE — ED NOTES
Pt off the floor at ultrasound.      Nuha, [address] [facility] [address] Tommy JARRELL, RN  10/30/23 2070

## 2023-10-30 NOTE — ED NOTES
Patient resting in bed. Respirations easy and unlabored. No distress noted. Call light within reach.        Jian Weller RN  10/29/23 0849

## 2023-10-30 NOTE — ED NOTES
Patient resting in bed, respirations even and unlabored at this time.      Maria Del Carmen Park RN  10/30/23 9786

## 2023-10-30 NOTE — ED NOTES
Patient resting in bed. Respirations easy and unlabored. No distress noted. Call light within reach.      Billing, [address] [facility] [address] Burger) WESLY, RN  10/30/23 2407

## 2023-10-30 NOTE — ED PROVIDER NOTES
to this ED encounter:  Head injury ulcers alcoholism      Differential Diagnosis includes but is not limited to:  DVT  Thrombophlebitis       Decision Rules/Clinical Scores utilized:  Not Applicable     Plan:   Basic lab work, Doppler ultrasound of the right lower extremity       Code Status:  Not addressed during this ED visit    Social determinants of health impacting treatment or disposition:  Considered and not applicable       PREVIOUS RECORDS  AND EXTERNAL INFORMATION REVIEWED   History obtained from: Nursing home. Pertinent previous and/or external records reviewed: Noncontributory. Case discussed with specialties other than Emergency Medicine: Not Applicable      ED COURSE   ED Medications administered this visit (None if left blank):   Medications - No data to display             PROCEDURES: (None if blank)  Procedures:       MEDICATION CHANGES     New Prescriptions    No medications on file         FINAL DISPOSITION   Medical Decision Making    80-year-old male with venous thrombosis in the superficial femoral and the popliteal artery. The patient is already on Eliquis plan to continue treatment and follow-up outpatient with his primary care physician for further assessment. CRITICAL CARE:  None    Shared Decision-Making was performed, disposition discussed with the patient/family and questions answered. Outpatient follow up (If applicable):  [address]  [facility] [address] [phone]      As needed, If symptoms worsen        FINAL DIAGNOSES:  Final diagnoses: Thrombophlebitis of superficial veins of right lower extremity   Thrombosis of right popliteal vein (HCC)       Condition: condition: good  Dispo: Discharge to nursing home  DISPOSITION Discharge - Pending Orders Complete 10/30/2023 07:10:56 PM      This transcription was electronically signed. It was dictated by use of voice recognition software and electronically transcribed.  The transcription may contain

## 2023-10-30 NOTE — ED TRIAGE NOTES
Patient presents to ED via [facility] from [facility] with report of a diagnosed DVT in the right femoral area. Patient states he has been in the hospital many times in  the last week for problems with his NG tube. Patient is A/O x4 and denies any pain on arrival. Wet cough and crackled in the lung noted at this time.

## 2023-10-30 NOTE — ED NOTES
Patient resting in bed. Respirations easy and unlabored. No distress noted. Call light within reach.        Dg Madden RN  10/30/23 1954

## 2023-10-30 NOTE — ED NOTES
Patient resting in bed, updated on POC. Respirations easy and unlabored. No distress noted. Call light within reach. Awaiting transport back to [facility] via [address] [facility].        Ana Owens RN  10/30/23 0020

## 2023-10-30 NOTE — ED NOTES
Discharge instructions and follow up discussed with pt. Pt verbalized understanding and denied further questions. LDA removed. Pt discharged with all belongings.         Camacho Ortiz RN  10/30/23 6116

## 2023-10-31 NOTE — ED NOTES
Discharge instructions and follow up discussed with pt. Pt verbalized understanding and denied further questions. LDA removed. Pt discharged with all belongings.         Delmar Tenorio RN  10/30/23 2029

## 2023-11-06 ENCOUNTER — HOSPITAL ENCOUNTER (EMERGENCY)
Age: 73
Discharge: HOME | DRG: 871 | End: 2023-11-06
Payer: MEDICARE

## 2023-11-06 ENCOUNTER — APPOINTMENT (OUTPATIENT)
Dept: GENERAL RADIOLOGY | Age: 73
DRG: 871 | End: 2023-11-06
Payer: MEDICARE

## 2023-11-06 VITALS
HEART RATE: 94 BPM | RESPIRATION RATE: 18 BRPM | BODY MASS INDEX: 31.1 KG/M2 | DIASTOLIC BLOOD PRESSURE: 80 MMHG | OXYGEN SATURATION: 94 % | TEMPERATURE: 97.7 F | SYSTOLIC BLOOD PRESSURE: 138 MMHG | HEIGHT: 69 IN | WEIGHT: 210 LBS

## 2023-11-06 DIAGNOSIS — Z46.59 ENCOUNTER FOR NASOGASTRIC (NG) TUBE PLACEMENT: Primary | ICD-10-CM

## 2023-11-06 PROCEDURE — 43762 RPLC GTUBE NO REVJ TRC: CPT

## 2023-11-06 PROCEDURE — 99284 EMERGENCY DEPT VISIT MOD MDM: CPT

## 2023-11-06 PROCEDURE — 74018 RADEX ABDOMEN 1 VIEW: CPT

## 2023-11-06 ASSESSMENT — PAIN - FUNCTIONAL ASSESSMENT
PAIN_FUNCTIONAL_ASSESSMENT: NONE - DENIES PAIN
PAIN_FUNCTIONAL_ASSESSMENT: NONE - DENIES PAIN

## 2023-11-06 NOTE — ED NOTES
NG tube advance to 65 cm external length at this time. Pt tolerated well.  [facility] contacted for transport back to [facility], Jessica Ma, [address]  11/06/23 1031       Alison Hogan RN  11/06/23 1038

## 2023-11-06 NOTE — ED TRIAGE NOTES
Pt presents to the ED by EMS from [facility] with c/c clogged NG tube. Pt denies pain. Vitals stable.

## 2023-11-06 NOTE — ED PROVIDER NOTES
[facility] EMERGENCY DEPT      EMERGENCY MEDICINE     Pt Name: Qi Bullock  MRN: [id number]  [address] [facility] 1950  Date of evaluation: 11/6/2023  Provider: PAUL Lindquist CNP    CHIEF COMPLAINT       Chief Complaint   Patient presents with    NG Tube Problem     HISTORY OF PRESENT ILLNESS   Qi Bullock is a pleasant 68 y.o. male who presents to the emergency department from nursing home by EMS with chief complaint of clogged nasogastric tube. Patient had a fall, fell to the ground approximately a month ago. Sustained large pressure ulcer to left cheek and had multiple visits since. Currently residing in nursing home where he had an NG tube that unfortunately became clogged. Was sent to ER for evaluation/treatment of clogged NG tube. NG changed by RN, x-ray completed indicating placement within the stomach, radiologist recommended advancement. Advanced 5 cm. Denies chest pain shortness of breath fever chills nausea or vomiting. History obtained from  patient, historical records and RN. 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 S/p skin graft. Healed. From chronic venous insuficiency. History of traumatic brain injury     Hyperlipidemia     Hypertension     Mentally challenged     \"slow\" states his sister    Obesity (BMI 30-39. 9)     Osteoarthritis     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 ([address]) 07/2014    s/p fall down a hill, Dr. Carleen Albrecht follows, no seizure activity since    Seizure disorder ([address]) 07/17/2014       Patient Active Problem List   Diagnosis Code    Seizure disorder ([address]) G40.909    Venous insufficiency of right leg I87.2    History of non-healing open leg wound of R leg.  S/p

## 2023-11-07 ENCOUNTER — APPOINTMENT (OUTPATIENT)
Dept: INTERVENTIONAL RADIOLOGY/VASCULAR | Age: 73
DRG: 871 | End: 2023-11-07
Payer: MEDICARE

## 2023-11-07 ENCOUNTER — APPOINTMENT (OUTPATIENT)
Dept: GENERAL RADIOLOGY | Age: 73
DRG: 871 | End: 2023-11-07
Payer: MEDICARE

## 2023-11-07 ENCOUNTER — HOSPITAL ENCOUNTER (INPATIENT)
Age: 73
LOS: 5 days | Discharge: NURSING, REHAB OR LONG-TERM CARE | DRG: 871 | End: 2023-11-12
Attending: INTERNAL MEDICINE | Admitting: INTERNAL MEDICINE
Payer: MEDICARE

## 2023-11-07 ENCOUNTER — APPOINTMENT (OUTPATIENT)
Dept: CT IMAGING | Age: 73
DRG: 871 | End: 2023-11-07
Payer: MEDICARE

## 2023-11-07 DIAGNOSIS — F09 COGNITIVE DEFICIT DUE TO OLD HEAD TRAUMA: Chronic | ICD-10-CM

## 2023-11-07 DIAGNOSIS — S09.90XS COGNITIVE DEFICIT DUE TO OLD HEAD TRAUMA: Chronic | ICD-10-CM

## 2023-11-07 DIAGNOSIS — M54.50 CHRONIC BILATERAL LOW BACK PAIN WITHOUT SCIATICA: ICD-10-CM

## 2023-11-07 DIAGNOSIS — E66.9 OBESITY (BMI 30-39.9): Chronic | ICD-10-CM

## 2023-11-07 DIAGNOSIS — R06.03 RESPIRATORY DISTRESS: ICD-10-CM

## 2023-11-07 DIAGNOSIS — G89.29 CHRONIC BILATERAL LOW BACK PAIN WITHOUT SCIATICA: ICD-10-CM

## 2023-11-07 DIAGNOSIS — Z87.828 HISTORY OF KIDNEY INJURY: Chronic | ICD-10-CM

## 2023-11-07 DIAGNOSIS — R06.82 TACHYPNEA: ICD-10-CM

## 2023-11-07 DIAGNOSIS — R06.02 SHORTNESS OF BREATH: ICD-10-CM

## 2023-11-07 DIAGNOSIS — Z86.718 HISTORY OF DVT OF LOWER EXTREMITY: Primary | Chronic | ICD-10-CM

## 2023-11-07 PROBLEM — A41.9 SEPSIS (HCC): Status: ACTIVE | Noted: 2023-11-07

## 2023-11-07 LAB
ALBUMIN SERPL BCG-MCNC: 2.7 G/DL (ref 3.5–5.1)
ALP SERPL-CCNC: 110 U/L (ref 38–126)
ALT SERPL W/O P-5'-P-CCNC: 62 U/L (ref 11–66)
ANION GAP SERPL CALC-SCNC: 12 MEQ/L (ref 8–16)
APTT PPP: 38.1 SECONDS (ref 22–38)
AST SERPL-CCNC: 38 U/L (ref 5–40)
BACTERIA URNS QL MICRO: ABNORMAL /HPF
BASOPHILS ABSOLUTE: 0.1 THOU/MM3 (ref 0–0.1)
BASOPHILS NFR BLD AUTO: 0.3 %
BILIRUB SERPL-MCNC: 1 MG/DL (ref 0.3–1.2)
BILIRUB UR QL STRIP.AUTO: NEGATIVE
BUN SERPL-MCNC: 21 MG/DL (ref 7–22)
CALCIUM SERPL-MCNC: 9.4 MG/DL (ref 8.5–10.5)
CASTS #/AREA URNS LPF: ABNORMAL /LPF
CHARACTER UR: CLEAR
CHLORIDE SERPL-SCNC: 95 MEQ/L (ref 98–111)
CK SERPL-CCNC: 53 U/L (ref 55–170)
CO2 SERPL-SCNC: 26 MEQ/L (ref 23–33)
COLOR: YELLOW
CREAT SERPL-MCNC: 0.8 MG/DL (ref 0.4–1.2)
CRYSTALS URNS MICRO: ABNORMAL
D DIMER PPP IA.FEU-MCNC: 4264 NG/ML FEU (ref 0–500)
DEPRECATED RDW RBC AUTO: 52.7 FL (ref 35–45)
EOSINOPHIL NFR BLD AUTO: 0 %
EOSINOPHILS ABSOLUTE: 0 THOU/MM3 (ref 0–0.4)
EPITHELIAL CELLS, UA: ABNORMAL /HPF
ERYTHROCYTE [DISTWIDTH] IN BLOOD BY AUTOMATED COUNT: 16.1 % (ref 11.5–14.5)
FLUAV RNA RESP QL NAA+PROBE: NOT DETECTED
FLUBV RNA RESP QL NAA+PROBE: NOT DETECTED
GFR SERPL CREATININE-BSD FRML MDRD: > 60 ML/MIN/1.73M2
GLUCOSE SERPL-MCNC: 135 MG/DL (ref 70–108)
GLUCOSE UR QL STRIP.AUTO: NEGATIVE MG/DL
HCT VFR BLD AUTO: 32.8 % (ref 42–52)
HGB BLD-MCNC: 9.9 GM/DL (ref 14–18)
HGB UR QL STRIP.AUTO: NEGATIVE
IMM GRANULOCYTES # BLD AUTO: 0.38 THOU/MM3 (ref 0–0.07)
IMM GRANULOCYTES NFR BLD AUTO: 2.1 %
INR PPP: 2 (ref 0.85–1.13)
KEPPRA: 22 UG/ML
KETONES UR QL STRIP.AUTO: NEGATIVE
LACTATE SERPL-SCNC: 2.3 MMOL/L (ref 0.5–2)
LACTIC ACID, SEPSIS: 1.3 MMOL/L (ref 0.5–1.9)
LACTIC ACID, SEPSIS: 1.5 MMOL/L (ref 0.5–1.9)
LYMPHOCYTES ABSOLUTE: 0.7 THOU/MM3 (ref 1–4.8)
LYMPHOCYTES NFR BLD AUTO: 3.7 %
MAGNESIUM SERPL-MCNC: 1.9 MG/DL (ref 1.6–2.4)
MCH RBC QN AUTO: 27 PG (ref 26–33)
MCHC RBC AUTO-ENTMCNC: 30.2 GM/DL (ref 32.2–35.5)
MCV RBC AUTO: 89.6 FL (ref 80–94)
MONOCYTES ABSOLUTE: 1 THOU/MM3 (ref 0.4–1.3)
MONOCYTES NFR BLD AUTO: 5.7 %
MRSA DNA SPEC QL NAA+PROBE: POSITIVE
NEUTROPHILS NFR BLD AUTO: 88.2 %
NITRITE UR QL STRIP: NEGATIVE
NRBC BLD AUTO-RTO: 0 /100 WBC
NT-PROBNP SERPL IA-MCNC: 306.1 PG/ML (ref 0–124)
PH UR STRIP.AUTO: 7 [PH] (ref 5–9)
PLATELET # BLD AUTO: 548 THOU/MM3 (ref 130–400)
PMV BLD AUTO: 8.7 FL (ref 9.4–12.4)
POTASSIUM SERPL-SCNC: 4.7 MEQ/L (ref 3.5–5.2)
PROCALCITONIN SERPL IA-MCNC: 0.52 NG/ML (ref 0.01–0.09)
PROT SERPL-MCNC: 7.8 G/DL (ref 6.1–8)
PROT UR STRIP.AUTO-MCNC: 30 MG/DL
RBC # BLD AUTO: 3.66 MILL/MM3 (ref 4.7–6.1)
RBC URINE: ABNORMAL /HPF
SARS-COV-2 RNA RESP QL NAA+PROBE: NOT DETECTED
SEGMENTED NEUTROPHILS ABSOLUTE COUNT: 15.8 THOU/MM3 (ref 1.8–7.7)
SODIUM SERPL-SCNC: 133 MEQ/L (ref 135–145)
SP GR UR REFRACT.AUTO: > 1.03 (ref 1–1.03)
TROPONIN, HIGH SENSITIVITY: 144 NG/L (ref 0–12)
TROPONIN, HIGH SENSITIVITY: 165 NG/L (ref 0–12)
UROBILINOGEN, URINE: 1 EU/DL (ref 0–1)
WBC # BLD AUTO: 17.9 THOU/MM3 (ref 4.8–10.8)
WBC #/AREA URNS HPF: ABNORMAL /HPF
WBC #/AREA URNS HPF: NEGATIVE /[HPF]

## 2023-11-07 PROCEDURE — 36415 COLL VENOUS BLD VENIPUNCTURE: CPT

## 2023-11-07 PROCEDURE — 2580000003 HC RX 258: Performed by: INTERNAL MEDICINE

## 2023-11-07 PROCEDURE — 93970 EXTREMITY STUDY: CPT

## 2023-11-07 PROCEDURE — 85610 PROTHROMBIN TIME: CPT

## 2023-11-07 PROCEDURE — 71046 X-RAY EXAM CHEST 2 VIEWS: CPT

## 2023-11-07 PROCEDURE — 6360000002 HC RX W HCPCS: Performed by: INTERNAL MEDICINE

## 2023-11-07 PROCEDURE — 81001 URINALYSIS AUTO W/SCOPE: CPT

## 2023-11-07 PROCEDURE — 83605 ASSAY OF LACTIC ACID: CPT

## 2023-11-07 PROCEDURE — 85730 THROMBOPLASTIN TIME PARTIAL: CPT

## 2023-11-07 PROCEDURE — 93010 ELECTROCARDIOGRAM REPORT: CPT | Performed by: INTERNAL MEDICINE

## 2023-11-07 PROCEDURE — 84145 PROCALCITONIN (PCT): CPT

## 2023-11-07 PROCEDURE — 85025 COMPLETE CBC W/AUTO DIFF WBC: CPT

## 2023-11-07 PROCEDURE — 93005 ELECTROCARDIOGRAM TRACING: CPT

## 2023-11-07 PROCEDURE — 82550 ASSAY OF CK (CPK): CPT

## 2023-11-07 PROCEDURE — 80177 DRUG SCRN QUAN LEVETIRACETAM: CPT

## 2023-11-07 PROCEDURE — 99285 EMERGENCY DEPT VISIT HI MDM: CPT

## 2023-11-07 PROCEDURE — 87636 SARSCOV2 & INF A&B AMP PRB: CPT

## 2023-11-07 PROCEDURE — 99223 1ST HOSP IP/OBS HIGH 75: CPT | Performed by: INTERNAL MEDICINE

## 2023-11-07 PROCEDURE — 83735 ASSAY OF MAGNESIUM: CPT

## 2023-11-07 PROCEDURE — 6360000002 HC RX W HCPCS

## 2023-11-07 PROCEDURE — 96360 HYDRATION IV INFUSION INIT: CPT

## 2023-11-07 PROCEDURE — 2580000003 HC RX 258

## 2023-11-07 PROCEDURE — 1200000003 HC TELEMETRY R&B

## 2023-11-07 PROCEDURE — 87040 BLOOD CULTURE FOR BACTERIA: CPT

## 2023-11-07 PROCEDURE — 83880 ASSAY OF NATRIURETIC PEPTIDE: CPT

## 2023-11-07 PROCEDURE — 6360000004 HC RX CONTRAST MEDICATION

## 2023-11-07 PROCEDURE — 85379 FIBRIN DEGRADATION QUANT: CPT

## 2023-11-07 PROCEDURE — 87641 MR-STAPH DNA AMP PROBE: CPT

## 2023-11-07 PROCEDURE — 71260 CT THORAX DX C+: CPT

## 2023-11-07 PROCEDURE — 80053 COMPREHEN METABOLIC PANEL: CPT

## 2023-11-07 PROCEDURE — 84484 ASSAY OF TROPONIN QUANT: CPT

## 2023-11-07 RX ORDER — AMLODIPINE BESYLATE 5 MG/1
2.5 TABLET ORAL DAILY
Status: DISCONTINUED | OUTPATIENT
Start: 2023-11-08 | End: 2023-11-10

## 2023-11-07 RX ORDER — ACETAMINOPHEN 325 MG/1
650 TABLET ORAL ONCE
Status: DISCONTINUED | OUTPATIENT
Start: 2023-11-07 | End: 2023-11-07

## 2023-11-07 RX ORDER — LEVETIRACETAM 500 MG/1
1000 TABLET ORAL 2 TIMES DAILY
Status: DISCONTINUED | OUTPATIENT
Start: 2023-11-07 | End: 2023-11-10

## 2023-11-07 RX ORDER — SODIUM CHLORIDE 0.9 % (FLUSH) 0.9 %
5-40 SYRINGE (ML) INJECTION EVERY 12 HOURS SCHEDULED
Status: DISCONTINUED | OUTPATIENT
Start: 2023-11-07 | End: 2023-11-12 | Stop reason: HOSPADM

## 2023-11-07 RX ORDER — SODIUM CHLORIDE, SODIUM LACTATE, POTASSIUM CHLORIDE, CALCIUM CHLORIDE 600; 310; 30; 20 MG/100ML; MG/100ML; MG/100ML; MG/100ML
INJECTION, SOLUTION INTRAVENOUS CONTINUOUS
Status: ACTIVE | OUTPATIENT
Start: 2023-11-07 | End: 2023-11-09

## 2023-11-07 RX ORDER — SODIUM CHLORIDE, SODIUM LACTATE, POTASSIUM CHLORIDE, AND CALCIUM CHLORIDE .6; .31; .03; .02 G/100ML; G/100ML; G/100ML; G/100ML
250 INJECTION, SOLUTION INTRAVENOUS ONCE
Status: COMPLETED | OUTPATIENT
Start: 2023-11-07 | End: 2023-11-07

## 2023-11-07 RX ORDER — ATORVASTATIN CALCIUM 20 MG/1
20 TABLET, FILM COATED ORAL DAILY
Status: DISCONTINUED | OUTPATIENT
Start: 2023-11-07 | End: 2023-11-12 | Stop reason: HOSPADM

## 2023-11-07 RX ORDER — ENOXAPARIN SODIUM 100 MG/ML
40 INJECTION SUBCUTANEOUS DAILY
Status: DISCONTINUED | OUTPATIENT
Start: 2023-11-07 | End: 2023-11-07

## 2023-11-07 RX ORDER — 0.9 % SODIUM CHLORIDE 0.9 %
1000 INTRAVENOUS SOLUTION INTRAVENOUS ONCE
Status: COMPLETED | OUTPATIENT
Start: 2023-11-07 | End: 2023-11-07

## 2023-11-07 RX ORDER — SODIUM CHLORIDE 0.9 % (FLUSH) 0.9 %
5-40 SYRINGE (ML) INJECTION PRN
Status: DISCONTINUED | OUTPATIENT
Start: 2023-11-07 | End: 2023-11-12 | Stop reason: HOSPADM

## 2023-11-07 RX ORDER — ENOXAPARIN SODIUM 100 MG/ML
1 INJECTION SUBCUTANEOUS 2 TIMES DAILY
Status: DISCONTINUED | OUTPATIENT
Start: 2023-11-07 | End: 2023-11-12 | Stop reason: HOSPADM

## 2023-11-07 RX ORDER — SODIUM CHLORIDE 9 MG/ML
INJECTION, SOLUTION INTRAVENOUS PRN
Status: DISCONTINUED | OUTPATIENT
Start: 2023-11-07 | End: 2023-11-12 | Stop reason: HOSPADM

## 2023-11-07 RX ADMIN — PIPERACILLIN AND TAZOBACTAM 4500 MG: 4; .5 INJECTION, POWDER, LYOPHILIZED, FOR SOLUTION INTRAVENOUS at 20:32

## 2023-11-07 RX ADMIN — ENOXAPARIN SODIUM 100 MG: 100 INJECTION SUBCUTANEOUS at 20:29

## 2023-11-07 RX ADMIN — SODIUM CHLORIDE, POTASSIUM CHLORIDE, SODIUM LACTATE AND CALCIUM CHLORIDE: 600; 310; 30; 20 INJECTION, SOLUTION INTRAVENOUS at 22:05

## 2023-11-07 RX ADMIN — SODIUM CHLORIDE, POTASSIUM CHLORIDE, SODIUM LACTATE AND CALCIUM CHLORIDE: 600; 310; 30; 20 INJECTION, SOLUTION INTRAVENOUS at 17:36

## 2023-11-07 RX ADMIN — IOPAMIDOL 80 ML: 755 INJECTION, SOLUTION INTRAVENOUS at 15:50

## 2023-11-07 RX ADMIN — SODIUM CHLORIDE 1000 ML: 9 INJECTION, SOLUTION INTRAVENOUS at 13:40

## 2023-11-07 RX ADMIN — PIPERACILLIN AND TAZOBACTAM 4500 MG: 4; .5 INJECTION, POWDER, LYOPHILIZED, FOR SOLUTION INTRAVENOUS at 14:57

## 2023-11-07 RX ADMIN — SODIUM CHLORIDE, POTASSIUM CHLORIDE, SODIUM LACTATE AND CALCIUM CHLORIDE 250 ML: 600; 310; 30; 20 INJECTION, SOLUTION INTRAVENOUS at 16:28

## 2023-11-07 ASSESSMENT — PAIN - FUNCTIONAL ASSESSMENT
PAIN_FUNCTIONAL_ASSESSMENT: NONE - DENIES PAIN
PAIN_FUNCTIONAL_ASSESSMENT: NONE - DENIES PAIN

## 2023-11-07 NOTE — ED PROVIDER NOTES
[facility] [place] [facility]      EMERGENCY MEDICINE     Pt Name: Taurus Lawrence  MRN: [id number]  [address] [facility] 1950  Date of evaluation: 11/7/2023  Provider: PAUL Calderon - LIVE    CHIEF COMPLAINT       Chief Complaint   Patient presents with    Cough     HISTORY OF PRESENT ILLNESS   Taurus Lawrence is a pleasant 68 y.o. male with a history of falls, DVT, and current feeding tube placement who presents to the emergency department from nursing home by EMS for worsening cough, fever. Patient is accompanied by sister who helps provide history. She reports the patient has experienced a fever of 102 degrees along with a worsening cough over the last 1-2 days. The cough is productive but the patient is not aware of sputum color. Positional changes does not affect timing/exacerbation of cough. Patient endorses dyspnea, wheezing, diaphoresis, palpitations, tremor. Denies chest pain, fatigue. 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 S/p skin graft. Healed. From chronic venous insuficiency. History of traumatic brain injury     Hyperlipidemia     Hypertension     Mentally challenged     \"slow\" states his sister    Obesity (BMI 30-39. 9)     Osteoarthritis     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 ([address]) 07/2014    s/p fall down a hill, Dr. Laya Silva follows, no seizure activity since    Seizure disorder ([address]) 07/17/2014       Patient Active Problem List   Diagnosis Code    Seizure disorder ([address]) G40.909    Venous insufficiency of right leg I87.2    History of non-healing open leg wound of R leg. S/p skin graft. Healed. From chronic venous insuficiency.  X40.411

## 2023-11-07 NOTE — ED TRIAGE NOTES
Referral to Emergency Department     Referring : Jose Flanagan CNP  Referring Clinic:  [facility]       EM Resident Receiving Referral: Missy Fernandez MD     Brief History:   Multiple recent falls, was in the emergency department yesterday and referred to [facility] for replacement of feeding tube. Is feeding tube dependent for nutrition.   Arrived to the office and was quite ill, febrile, dyspneic referred to emergency department for concern of a possible admission     Clinical Concern: Febrile, dyspneic, need for feeding tube for nutrition    Missy Fernandez MD  PGY-3 Emergency Medicine

## 2023-11-07 NOTE — ED NOTES
Pt transported to [facility] in stable condition. Floor called prior to transport. Spoke with Skip Hathaway.      Sanjeev Felling  11/07/23 [id number]

## 2023-11-07 NOTE — ED NOTES
Pt transported to [facility] by cart in stable condition. Called [facility] and informed Madelyn Roberto that the patient was on their way to the unit.       Caryle Julian, TORIE  32/96/11 0076

## 2023-11-07 NOTE — H&P
report electronically signed by Dr. Neli Villalobos on 11/7/2023 1:15 PM          Electronically signed by Madonna Cheung MD on 11/7/2023 at 2:46 PM

## 2023-11-08 ENCOUNTER — APPOINTMENT (OUTPATIENT)
Dept: CT IMAGING | Age: 73
DRG: 871 | End: 2023-11-08
Payer: MEDICARE

## 2023-11-08 ENCOUNTER — APPOINTMENT (OUTPATIENT)
Dept: GENERAL RADIOLOGY | Age: 73
DRG: 871 | End: 2023-11-08
Payer: MEDICARE

## 2023-11-08 PROBLEM — E44.0 MODERATE MALNUTRITION (HCC): Chronic | Status: ACTIVE | Noted: 2023-11-08

## 2023-11-08 LAB
ALBUMIN SERPL BCG-MCNC: 2.4 G/DL (ref 3.5–5.1)
ALP SERPL-CCNC: 81 U/L (ref 38–126)
ALT SERPL W/O P-5'-P-CCNC: 36 U/L (ref 11–66)
ANION GAP SERPL CALC-SCNC: 10 MEQ/L (ref 8–16)
AST SERPL-CCNC: 22 U/L (ref 5–40)
BASOPHILS ABSOLUTE: 0.1 THOU/MM3 (ref 0–0.1)
BASOPHILS NFR BLD AUTO: 0.7 %
BILIRUB SERPL-MCNC: 0.4 MG/DL (ref 0.3–1.2)
BUN SERPL-MCNC: 17 MG/DL (ref 7–22)
CALCIUM SERPL-MCNC: 8.7 MG/DL (ref 8.5–10.5)
CHLORIDE SERPL-SCNC: 100 MEQ/L (ref 98–111)
CO2 SERPL-SCNC: 27 MEQ/L (ref 23–33)
CREAT SERPL-MCNC: 0.5 MG/DL (ref 0.4–1.2)
DEPRECATED RDW RBC AUTO: 53.6 FL (ref 35–45)
EOSINOPHIL NFR BLD AUTO: 0.8 %
EOSINOPHILS ABSOLUTE: 0.1 THOU/MM3 (ref 0–0.4)
ERYTHROCYTE [DISTWIDTH] IN BLOOD BY AUTOMATED COUNT: 16.2 % (ref 11.5–14.5)
GFR SERPL CREATININE-BSD FRML MDRD: > 60 ML/MIN/1.73M2
GLUCOSE SERPL-MCNC: 86 MG/DL (ref 70–108)
HCT VFR BLD AUTO: 25.3 % (ref 42–52)
HGB BLD-MCNC: 7.5 GM/DL (ref 14–18)
IMM GRANULOCYTES # BLD AUTO: 0.15 THOU/MM3 (ref 0–0.07)
IMM GRANULOCYTES NFR BLD AUTO: 1.4 %
LYMPHOCYTES ABSOLUTE: 1.3 THOU/MM3 (ref 1–4.8)
LYMPHOCYTES NFR BLD AUTO: 12.5 %
MCH RBC QN AUTO: 26.8 PG (ref 26–33)
MCHC RBC AUTO-ENTMCNC: 29.6 GM/DL (ref 32.2–35.5)
MCV RBC AUTO: 90.4 FL (ref 80–94)
MONOCYTES ABSOLUTE: 1.1 THOU/MM3 (ref 0.4–1.3)
MONOCYTES NFR BLD AUTO: 10.7 %
NEUTROPHILS NFR BLD AUTO: 73.9 %
NRBC BLD AUTO-RTO: 0 /100 WBC
PLATELET # BLD AUTO: 440 THOU/MM3 (ref 130–400)
PMV BLD AUTO: 8.5 FL (ref 9.4–12.4)
POTASSIUM SERPL-SCNC: 3.8 MEQ/L (ref 3.5–5.2)
PROCALCITONIN SERPL IA-MCNC: 1.15 NG/ML (ref 0.01–0.09)
PROT SERPL-MCNC: 6.2 G/DL (ref 6.1–8)
RBC # BLD AUTO: 2.8 MILL/MM3 (ref 4.7–6.1)
REASON FOR REJECTION: NORMAL
REJECTED TEST: NORMAL
SEGMENTED NEUTROPHILS ABSOLUTE COUNT: 7.8 THOU/MM3 (ref 1.8–7.7)
SODIUM SERPL-SCNC: 137 MEQ/L (ref 135–145)
WBC # BLD AUTO: 10.6 THOU/MM3 (ref 4.8–10.8)

## 2023-11-08 PROCEDURE — 71275 CT ANGIOGRAPHY CHEST: CPT

## 2023-11-08 PROCEDURE — 6360000002 HC RX W HCPCS: Performed by: INTERNAL MEDICINE

## 2023-11-08 PROCEDURE — 92611 MOTION FLUOROSCOPY/SWALLOW: CPT

## 2023-11-08 PROCEDURE — 6370000000 HC RX 637 (ALT 250 FOR IP)

## 2023-11-08 PROCEDURE — 92526 ORAL FUNCTION THERAPY: CPT

## 2023-11-08 PROCEDURE — 99232 SBSQ HOSP IP/OBS MODERATE 35: CPT | Performed by: INTERNAL MEDICINE

## 2023-11-08 PROCEDURE — 36415 COLL VENOUS BLD VENIPUNCTURE: CPT

## 2023-11-08 PROCEDURE — 2580000003 HC RX 258: Performed by: INTERNAL MEDICINE

## 2023-11-08 PROCEDURE — 80053 COMPREHEN METABOLIC PANEL: CPT

## 2023-11-08 PROCEDURE — 85025 COMPLETE CBC W/AUTO DIFF WBC: CPT

## 2023-11-08 PROCEDURE — 6360000004 HC RX CONTRAST MEDICATION: Performed by: INTERNAL MEDICINE

## 2023-11-08 PROCEDURE — 84145 PROCALCITONIN (PCT): CPT

## 2023-11-08 PROCEDURE — 92610 EVALUATE SWALLOWING FUNCTION: CPT

## 2023-11-08 PROCEDURE — 1200000003 HC TELEMETRY R&B

## 2023-11-08 PROCEDURE — 74230 X-RAY XM SWLNG FUNCJ C+: CPT

## 2023-11-08 PROCEDURE — 2500000003 HC RX 250 WO HCPCS: Performed by: INTERNAL MEDICINE

## 2023-11-08 RX ORDER — ACETAMINOPHEN 650 MG/1
650 SUPPOSITORY RECTAL EVERY 4 HOURS PRN
Status: DISCONTINUED | OUTPATIENT
Start: 2023-11-08 | End: 2023-11-12 | Stop reason: HOSPADM

## 2023-11-08 RX ORDER — LEVETIRACETAM 100 MG/ML
1000 SOLUTION ORAL 2 TIMES DAILY
Status: DISCONTINUED | OUTPATIENT
Start: 2023-11-08 | End: 2023-11-10

## 2023-11-08 RX ADMIN — BARIUM SULFATE 20 ML: 400 SUSPENSION ORAL at 10:43

## 2023-11-08 RX ADMIN — ENOXAPARIN SODIUM 100 MG: 100 INJECTION SUBCUTANEOUS at 21:11

## 2023-11-08 RX ADMIN — PIPERACILLIN AND TAZOBACTAM 3.38 MG: 3; .375 INJECTION, POWDER, LYOPHILIZED, FOR SOLUTION INTRAVENOUS at 21:11

## 2023-11-08 RX ADMIN — SODIUM CHLORIDE, POTASSIUM CHLORIDE, SODIUM LACTATE AND CALCIUM CHLORIDE: 600; 310; 30; 20 INJECTION, SOLUTION INTRAVENOUS at 04:33

## 2023-11-08 RX ADMIN — BARIUM SULFATE 10 ML: 400 PASTE ORAL at 10:42

## 2023-11-08 RX ADMIN — ENOXAPARIN SODIUM 100 MG: 100 INJECTION SUBCUTANEOUS at 10:02

## 2023-11-08 RX ADMIN — LEVETIRACETAM 1000 MG: 500 SOLUTION ORAL at 21:43

## 2023-11-08 RX ADMIN — BARIUM SULFATE 40 ML: 0.81 POWDER, FOR SUSPENSION ORAL at 10:42

## 2023-11-08 RX ADMIN — IOPAMIDOL 80 ML: 755 INJECTION, SOLUTION INTRAVENOUS at 08:17

## 2023-11-08 RX ADMIN — ACETAMINOPHEN 650 MG: 650 SUPPOSITORY RECTAL at 12:34

## 2023-11-08 RX ADMIN — PIPERACILLIN AND TAZOBACTAM 3.38 MG: 3; .375 INJECTION, POWDER, LYOPHILIZED, FOR SOLUTION INTRAVENOUS at 12:43

## 2023-11-08 RX ADMIN — PIPERACILLIN AND TAZOBACTAM 3.38 MG: 3; .375 INJECTION, POWDER, LYOPHILIZED, FOR SOLUTION INTRAVENOUS at 05:16

## 2023-11-08 NOTE — FLOWSHEET NOTE
11/08/23 1132   Safe Environment   Safety Measures Bed in low position;Call light within reach; Side rails X 2;Nurse at bedside  (Virtual nurse safety round completed)     Call placed to patients room, patient responds to audio, permitted video. Patient alert and oriented. Patient denied any concerns/complaints at this time. Patient educated on utilizing call light. Call light within reach, no signs or symptoms of distress noted.

## 2023-11-08 NOTE — FLOWSHEET NOTE
11/08/23 1313   Safe Environment   Safety Measures Bed in low position;Call light within reach; Side rails X 2  (Virtual nurse safety round completed)     Call placed to patients room, patient responds to audio, permitted video. Patient alert and oriented. Patient denied any concerns/complaints at this time. Patient educated on utilizing call light. Call light within reach, no signs or symptoms of distress noted.

## 2023-11-09 PROBLEM — R06.03 RESPIRATORY DISTRESS: Status: ACTIVE | Noted: 2023-11-09

## 2023-11-09 PROBLEM — R49.0 DYSPHONIA: Status: ACTIVE | Noted: 2023-11-09

## 2023-11-09 PROBLEM — R06.02 SHORTNESS OF BREATH: Status: ACTIVE | Noted: 2023-11-09

## 2023-11-09 LAB
ABO GROUP BLD: NORMAL
ANION GAP SERPL CALC-SCNC: 7 MEQ/L (ref 8–16)
ANTIBODY SCREEN: NORMAL
BASOPHILS ABSOLUTE: 0 THOU/MM3 (ref 0–0.1)
BASOPHILS NFR BLD AUTO: 0.5 %
BUN SERPL-MCNC: 9 MG/DL (ref 7–22)
CA-I BLD ISE-SCNC: 1.14 MMOL/L (ref 1.12–1.32)
CALCIUM SERPL-MCNC: 8.4 MG/DL (ref 8.5–10.5)
CHLORIDE SERPL-SCNC: 102 MEQ/L (ref 98–111)
CO2 SERPL-SCNC: 25 MEQ/L (ref 23–33)
CREAT SERPL-MCNC: 0.4 MG/DL (ref 0.4–1.2)
DEPRECATED RDW RBC AUTO: 54.2 FL (ref 35–45)
EOSINOPHIL NFR BLD AUTO: 1.2 %
EOSINOPHILS ABSOLUTE: 0.1 THOU/MM3 (ref 0–0.4)
ERYTHROCYTE [DISTWIDTH] IN BLOOD BY AUTOMATED COUNT: 16.1 % (ref 11.5–14.5)
GFR SERPL CREATININE-BSD FRML MDRD: > 60 ML/MIN/1.73M2
GLUCOSE SERPL-MCNC: 99 MG/DL (ref 70–108)
HCT VFR BLD AUTO: 24.2 % (ref 42–52)
HCT VFR BLD AUTO: 28.9 % (ref 42–52)
HGB BLD-MCNC: 7.1 GM/DL (ref 14–18)
HGB BLD-MCNC: 8.8 GM/DL (ref 14–18)
IMM GRANULOCYTES # BLD AUTO: 0.07 THOU/MM3 (ref 0–0.07)
IMM GRANULOCYTES NFR BLD AUTO: 0.9 %
LYMPHOCYTES ABSOLUTE: 1.1 THOU/MM3 (ref 1–4.8)
LYMPHOCYTES NFR BLD AUTO: 14.1 %
MCH RBC QN AUTO: 26.9 PG (ref 26–33)
MCHC RBC AUTO-ENTMCNC: 29.3 GM/DL (ref 32.2–35.5)
MCV RBC AUTO: 91.7 FL (ref 80–94)
MONOCYTES ABSOLUTE: 1 THOU/MM3 (ref 0.4–1.3)
MONOCYTES NFR BLD AUTO: 12.7 %
NEUTROPHILS NFR BLD AUTO: 70.6 %
NRBC BLD AUTO-RTO: 0 /100 WBC
PLATELET # BLD AUTO: 447 THOU/MM3 (ref 130–400)
PMV BLD AUTO: 8.7 FL (ref 9.4–12.4)
POTASSIUM SERPL-SCNC: 3.5 MEQ/L (ref 3.5–5.2)
RBC # BLD AUTO: 2.64 MILL/MM3 (ref 4.7–6.1)
RH BLD: NORMAL
SEGMENTED NEUTROPHILS ABSOLUTE COUNT: 5.3 THOU/MM3 (ref 1.8–7.7)
SODIUM SERPL-SCNC: 134 MEQ/L (ref 135–145)
WBC # BLD AUTO: 7.5 THOU/MM3 (ref 4.8–10.8)

## 2023-11-09 PROCEDURE — 86850 RBC ANTIBODY SCREEN: CPT

## 2023-11-09 PROCEDURE — 80048 BASIC METABOLIC PNL TOTAL CA: CPT

## 2023-11-09 PROCEDURE — 6370000000 HC RX 637 (ALT 250 FOR IP)

## 2023-11-09 PROCEDURE — 82330 ASSAY OF CALCIUM: CPT

## 2023-11-09 PROCEDURE — 6360000002 HC RX W HCPCS

## 2023-11-09 PROCEDURE — 2580000003 HC RX 258: Performed by: INTERNAL MEDICINE

## 2023-11-09 PROCEDURE — 31575 DIAGNOSTIC LARYNGOSCOPY: CPT | Performed by: PHYSICIAN ASSISTANT

## 2023-11-09 PROCEDURE — 36430 TRANSFUSION BLD/BLD COMPNT: CPT

## 2023-11-09 PROCEDURE — 99232 SBSQ HOSP IP/OBS MODERATE 35: CPT | Performed by: INTERNAL MEDICINE

## 2023-11-09 PROCEDURE — 36415 COLL VENOUS BLD VENIPUNCTURE: CPT

## 2023-11-09 PROCEDURE — 1200000003 HC TELEMETRY R&B

## 2023-11-09 PROCEDURE — 85025 COMPLETE CBC W/AUTO DIFF WBC: CPT

## 2023-11-09 PROCEDURE — 85014 HEMATOCRIT: CPT

## 2023-11-09 PROCEDURE — 86923 COMPATIBILITY TEST ELECTRIC: CPT

## 2023-11-09 PROCEDURE — P9040 RBC LEUKOREDUCED IRRADIATED: HCPCS

## 2023-11-09 PROCEDURE — 2580000003 HC RX 258

## 2023-11-09 PROCEDURE — 99222 1ST HOSP IP/OBS MODERATE 55: CPT | Performed by: REGISTERED NURSE

## 2023-11-09 PROCEDURE — 6360000002 HC RX W HCPCS: Performed by: INTERNAL MEDICINE

## 2023-11-09 PROCEDURE — 92526 ORAL FUNCTION THERAPY: CPT

## 2023-11-09 PROCEDURE — 86900 BLOOD TYPING SEROLOGIC ABO: CPT

## 2023-11-09 PROCEDURE — 85018 HEMOGLOBIN: CPT

## 2023-11-09 PROCEDURE — 86901 BLOOD TYPING SEROLOGIC RH(D): CPT

## 2023-11-09 RX ORDER — SODIUM CHLORIDE 9 MG/ML
INJECTION, SOLUTION INTRAVENOUS PRN
Status: COMPLETED | OUTPATIENT
Start: 2023-11-09 | End: 2023-11-10

## 2023-11-09 RX ADMIN — SODIUM CHLORIDE, PRESERVATIVE FREE 10 ML: 5 INJECTION INTRAVENOUS at 20:12

## 2023-11-09 RX ADMIN — LEVETIRACETAM 1000 MG: 500 SOLUTION ORAL at 20:08

## 2023-11-09 RX ADMIN — PIPERACILLIN AND TAZOBACTAM 3.38 MG: 3; .375 INJECTION, POWDER, LYOPHILIZED, FOR SOLUTION INTRAVENOUS at 14:08

## 2023-11-09 RX ADMIN — LEVETIRACETAM 1000 MG: 500 SOLUTION ORAL at 09:50

## 2023-11-09 RX ADMIN — ENOXAPARIN SODIUM 100 MG: 100 INJECTION SUBCUTANEOUS at 09:50

## 2023-11-09 RX ADMIN — ACETAMINOPHEN 650 MG: 650 SUPPOSITORY RECTAL at 17:42

## 2023-11-09 RX ADMIN — VANCOMYCIN HYDROCHLORIDE 2500 MG: 1 INJECTION, POWDER, LYOPHILIZED, FOR SOLUTION INTRAVENOUS at 23:42

## 2023-11-09 RX ADMIN — ENOXAPARIN SODIUM 100 MG: 100 INJECTION SUBCUTANEOUS at 20:08

## 2023-11-09 RX ADMIN — SODIUM CHLORIDE, POTASSIUM CHLORIDE, SODIUM LACTATE AND CALCIUM CHLORIDE: 600; 310; 30; 20 INJECTION, SOLUTION INTRAVENOUS at 08:11

## 2023-11-09 RX ADMIN — PIPERACILLIN AND TAZOBACTAM 3.38 MG: 3; .375 INJECTION, POWDER, LYOPHILIZED, FOR SOLUTION INTRAVENOUS at 04:30

## 2023-11-09 RX ADMIN — SODIUM CHLORIDE, POTASSIUM CHLORIDE, SODIUM LACTATE AND CALCIUM CHLORIDE: 600; 310; 30; 20 INJECTION, SOLUTION INTRAVENOUS at 01:14

## 2023-11-09 ASSESSMENT — PAIN SCALES - GENERAL: PAINLEVEL_OUTOF10: 1

## 2023-11-09 NOTE — PROCEDURES
Procedure performed: Flexible Nasolaryngoscopy 08317  Attending: Shantanu Baker PA-C  Anesthesia: Topical lidocaine 4% with oxymetazoline  Blood loss: None  Specimens and Cultures: None  Procedure: The patient was taken to the procedure room and placed upright in the chair. Local anesthesia was administered to the bilateral nasal passageways. A flexible nasolaryngoscope was then passed through the nose and the hypopharynx and larynx were examined. Findings:            NASAL CAVITY/NASOPHARYNX: Significant nasal crusting in both nare, slightly less obstructive on the right so this nostril was used for the laryngoscopy. No masses/lesions/polyposis/mucosal ulceration,  Bilateral eustachian tube orifices appeared non-obstructed. OROPHARYNX: BOT, vallecula, posterior tonsillar pillars w/o masses/lesions/mucosal ulceration. No RP bulging. HYPOPHARYNX: No masses/lesions/mucosal ulceration of piriform sinuses or post-cricoid region. No significant pooling of secretions. GLOTTIS/SUPRAGLOTTIS: Lingual & laryngeal surface of epiglottis, AE folds, arytenoids, false vocal folds w/o masses/lesions/mucosal ulceration. Left vocal cord appears to be moving WNL, but there appears to be paralysis of the right vocal cord. The sister (POA) is bedside to give consent for procedure and discuss findings. Informed her of the findings of right vocal cord paralysis vs paresis. The sister reports that she has noticed a gradual improvement in his hoarseness over the last 4 weeks or so, but has seemed some what stagnant in recovery for the last 1-2 weeks. This may indicate improving/resolving paresis of the right vocal cord. Plan to review scope findings with attending and determine if vocal cord augmentation would be recommended or monitoring for time being.  Most likely will allow additional time for recovery of the vocal cord as there has reportedly been some improvement in voice since symptoms

## 2023-11-09 NOTE — DISCHARGE INSTR - COC
Continuity of Care Form    Patient Name: Angie Horton   :  1950  MRN:  [id number]    Admit date:  2023  Discharge date:  2023    Code Status Order: Limited   Advance Directives:     Admitting Physician:  Cheyenne Cool MD  PCP: Sahra Cadena DO    Discharging Nurse: Mary Maciel RN  [address] Unit/Room#: [address]  Discharging Unit Phone Number: [phone]  Emergency Contact:   Extended Emergency Contact Information  Primary Emergency Contact: Tiara Calderon [place] [address] [place] Phone: [phone]  Mobile Phone: [phone]  Relation: Brother/Sister  Secondary Emergency Contact: Jesusita Choi [place] [address] [place] Phone: [phone]  Relation: Other    Past Surgical History:  Past Surgical History:   Procedure Laterality Date    IA I&D DEEP ABSC BURSA/HEMATOMA THIGH/KNEE REGION Right 2017    RIGHT SERGIO LEG WOUND DEBRIDEMENT WITH SKIN GRAFT AND WOUND VAC APPLICATION performed by Sharlynn Dubin, DPM at [address] [facility] Right 10/9/2017    SPLIT THICKNESS SKIN GRAFT FROM RIGHT LEG performed by Sharlynn Dubin, DPM at [address] [facility]      vein stripping       Immunization History:   Immunization History   Administered Date(s) Administered    Influenza, AFLURIA (age 1 yrs+), FLUZONE, (age 10 mo+), MDV, 0.5mL 10/18/2017    Influenza, FLUAD, (age 72 y+), Adjuvanted, 0.5mL 10/19/2020, 2022    Influenza, FLUARIX, FLULAVAL, FLUZONE (age 10 mo+) AND AFLURIA, (age 1 y+), PF, 0.5mL 09/10/2018    Influenza, Triv, inactivated, subunit, adjuvanted, IM (Fluad 65 yrs and older) 2019    Pneumococcal, PCV-13, PREVNAR 13, (age 6w+), IM, 0.5mL 10/18/2017    Pneumococcal, PPSV23, PNEUMOVAX 23, (age 2y+), SC/IM, 0.5mL 2014, 2019    TDaP, ADACEL (age 6y-58y), BOOSTRIX (age 10y+), IM, 0.5mL 2013, 2023       Active Problems:  Patient Active Problem List   Diagnosis Code    Seizure disorder

## 2023-11-09 NOTE — CONSULTS
CONSULTATION NOTE :ID       Patient - Ben Soto,  Age - 68 y.o.    - 1950      Room Number - 6K-16/016-A   MRN -  [id number]   [facility]  Date of Admission -  2023 10:42 AM  Patient's PCP: Eve Delgado DO     Requesting Physician: Derick Antony MD    REASON FOR CONSULTATION   sepsis  CHIEF COMPLAINT   fever    HISTORY OF PRESENT ILLNESS       This is a very pleasant 68 y.o. male who was admitted to the hospital with a chief complaints of fever. He was sent for [facility] due to fever. He has very complex medical hx. Was in [facility] around the end of September and stayed 3 wks. He was initially found at home. He sustained deep tissue injury over the face left chest wall and left knee. Treated for aspiration pneumonia and DVT. he was discharged to and ECF with NGT. He has hx of TBI hx of alcohol abuse HTN and OA. He recently visited the ED due to issues with NGT. Had a new one and was sent back to the ECF. Today he was seen the the [facility] for consultation for peg tube. He was noted to be short of breath and febrile for which reason he was sent here. His CT shows right lower infiltrate likely related to aspiraiton. He is very lethargic with audible secretions. Has an escar on the left cheek partially  and a large escar on the left anterior lower chest and left and right knee knee. He is short of breath and febrile. 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 S/p skin graft. Healed. From chronic venous insuficiency.      History of traumatic brain injury     Hyperlipidemia     Hypertension     Mentally
members. REVIEW OF SYSTEMS:  10-point review of systems negative. PHYSICAL EXAMINATION:  GENERAL:  The patient is a 40-year-old male appears his age. HEAD, EYES, EARS, NOSE, AND THROAT:  Show no scleral icterus. CARDIOVASCULAR:  S1, S2.  LUNGS:  Respirations are clear. ABDOMEN: Soft. Bowel sounds are positive. EXTREMITIES:  The patient does have a lesion on the left cheek that  looks much better than it did previously. He has a necrotic area of the  chest wall and bilateral knee eschars from again pressure sores. ASSESSMENT AND PLAN:  Need for possible feeding tube. The sister who is  a POA was in the room at that time and her initial comment was \"no  feeding tube. \"  We discussed, however, the feeding tube with the  patient. She was under the understanding that if the feeding tube was  placed, he would not be able to eat. We indicated to her that that was  not true, that even with the feeding tube in place, that he could eat  any solid foods that he could swallow. At the conclusion of the  discussion, we felt that we would monitor how he would eat over the  weekend and possibly consider PEG tube if she agreed first part of the  week. ADDENDUM:  I received a call from Dr. Shakir Herbert who talked with the sister  who is now agreeable to the feeding tube. Again, we had discussed the  procedure in depth including the endoscopic placement. We did discuss  that it potentially cannot be placed that way, and if truly needed, an  open G-tube would have to be performed at a second time. Apparently,  she is agreeable. We will proceed with PEG tube tomorrow. ARCADIO SORIA [facility], MD    D: 11/09/2023 16:21:54       T: 11/09/2023 17:20:08     /[email or web address]  Job#: [id number]     Doc#: [id number]    CC:
artifact is noted in the distal segmental and subsegmental arteries. Multifocal airspace opacities most pronounced in  the right lower lobe        **This report has been created using voice recognition software. It may contain minor errors which are inherent in voice recognition technology. **    Final report electronically signed by Dr Jesse Herr on 11/8/2023 8:33 AM      IMPRESSION/RECOMMENDATIONS:      Dysphagia, Dysphonia, Recurrent aspiration pneumonia, H/O intubation    -Patient and sister at bedside endorse that his voice has not been the same since his hospital stay at [facility] and post extubation. Difficult timeline on chart review but patient appeared to only be intubated for approximately 3 days; unsure if he required reintubation after recurrent aspiration during hospitalization.  -Speech therapy performed modified barium swallow study with patient yesterday and at this time recommending continuing with minced/moist diet and mildly thick liquids with advanced oral trials with speech therapy only. Patient has been tolerating this diet per patient and sister at bedside.  -Dr. Augustin Carlson has been consulted for potential feeding tube placement. Sister requested to see patient's nutritional progression over the weekend prior to making a decision.   -Patient currently on Zosyn for suspected aspiration pneumonia.   -Reviewed patient presentation with Dr. Toby Armenta and he recommends if patient is already seeing some gradual progression regarding his voice and if he can tolerate a minced/moist diet and mildly thick liquids that it would be reasonable to give patient some time to see how his voice and dysphagia symptoms improve over the next week and he may not require feeding tube placement if he can tolerate the minced/moist diet without difficulty/airway concerns.  -JESSICA Mock performed bedside nasolaryngoscopy today; see procedure note for full details.  -At this time recommend

## 2023-11-09 NOTE — FLOWSHEET NOTE
11/09/23 1124   Safe Environment   Safety Measures Standard Safety Measures;Call light within reach; Bed in low position     Call placed to patients room, patient responds to audio, permitted video. Patient alert and oriented. Patient denied any voiced concerns/complaints at this time. Patient educated on utilizing call light. Call light within reach, no signs or symtpoms of distress noted.

## 2023-11-10 ENCOUNTER — ANESTHESIA EVENT (OUTPATIENT)
Dept: ENDOSCOPY | Age: 73
End: 2023-11-10
Payer: MEDICARE

## 2023-11-10 ENCOUNTER — ANESTHESIA (OUTPATIENT)
Dept: ENDOSCOPY | Age: 73
End: 2023-11-10
Payer: MEDICARE

## 2023-11-10 ENCOUNTER — TELEPHONE (OUTPATIENT)
Dept: ENT CLINIC | Age: 73
End: 2023-11-10

## 2023-11-10 LAB
ANION GAP SERPL CALC-SCNC: 10 MEQ/L (ref 8–16)
BASOPHILS ABSOLUTE: 0.1 THOU/MM3 (ref 0–0.1)
BASOPHILS NFR BLD AUTO: 0.7 %
BUN SERPL-MCNC: 6 MG/DL (ref 7–22)
CALCIUM SERPL-MCNC: 8.1 MG/DL (ref 8.5–10.5)
CHLORIDE SERPL-SCNC: 99 MEQ/L (ref 98–111)
CO2 SERPL-SCNC: 26 MEQ/L (ref 23–33)
CREAT SERPL-MCNC: 0.3 MG/DL (ref 0.4–1.2)
DEPRECATED RDW RBC AUTO: 50.9 FL (ref 35–45)
EOSINOPHIL NFR BLD AUTO: 1.5 %
EOSINOPHILS ABSOLUTE: 0.1 THOU/MM3 (ref 0–0.4)
ERYTHROCYTE [DISTWIDTH] IN BLOOD BY AUTOMATED COUNT: 15.6 % (ref 11.5–14.5)
GFR SERPL CREATININE-BSD FRML MDRD: > 60 ML/MIN/1.73M2
GLUCOSE SERPL-MCNC: 95 MG/DL (ref 70–108)
HCT VFR BLD AUTO: 28.5 % (ref 42–52)
HGB BLD-MCNC: 8.9 GM/DL (ref 14–18)
IMM GRANULOCYTES # BLD AUTO: 0.08 THOU/MM3 (ref 0–0.07)
IMM GRANULOCYTES NFR BLD AUTO: 1 %
LYMPHOCYTES ABSOLUTE: 1.1 THOU/MM3 (ref 1–4.8)
LYMPHOCYTES NFR BLD AUTO: 12.9 %
MCH RBC QN AUTO: 27.7 PG (ref 26–33)
MCHC RBC AUTO-ENTMCNC: 31.2 GM/DL (ref 32.2–35.5)
MCV RBC AUTO: 88.8 FL (ref 80–94)
MONOCYTES ABSOLUTE: 1 THOU/MM3 (ref 0.4–1.3)
MONOCYTES NFR BLD AUTO: 11.8 %
NEUTROPHILS NFR BLD AUTO: 72.1 %
NRBC BLD AUTO-RTO: 0 /100 WBC
PLATELET # BLD AUTO: 451 THOU/MM3 (ref 130–400)
PMV BLD AUTO: 8.5 FL (ref 9.4–12.4)
POTASSIUM SERPL-SCNC: 3.5 MEQ/L (ref 3.5–5.2)
PROCALCITONIN SERPL IA-MCNC: 0.45 NG/ML (ref 0.01–0.09)
RBC # BLD AUTO: 3.21 MILL/MM3 (ref 4.7–6.1)
SEGMENTED NEUTROPHILS ABSOLUTE COUNT: 5.9 THOU/MM3 (ref 1.8–7.7)
SODIUM SERPL-SCNC: 135 MEQ/L (ref 135–145)
WBC # BLD AUTO: 8.2 THOU/MM3 (ref 4.8–10.8)

## 2023-11-10 PROCEDURE — 2580000003 HC RX 258: Performed by: SURGERY

## 2023-11-10 PROCEDURE — 7100000011 HC PHASE II RECOVERY - ADDTL 15 MIN: Performed by: SURGERY

## 2023-11-10 PROCEDURE — 0DH68UZ INSERTION OF FEEDING DEVICE INTO STOMACH, VIA NATURAL OR ARTIFICIAL OPENING ENDOSCOPIC: ICD-10-PCS | Performed by: SURGERY

## 2023-11-10 PROCEDURE — 6360000002 HC RX W HCPCS: Performed by: SURGERY

## 2023-11-10 PROCEDURE — 85025 COMPLETE CBC W/AUTO DIFF WBC: CPT

## 2023-11-10 PROCEDURE — 6360000002 HC RX W HCPCS: Performed by: REGISTERED NURSE

## 2023-11-10 PROCEDURE — 99232 SBSQ HOSP IP/OBS MODERATE 35: CPT | Performed by: INTERNAL MEDICINE

## 2023-11-10 PROCEDURE — 3700000000 HC ANESTHESIA ATTENDED CARE: Performed by: SURGERY

## 2023-11-10 PROCEDURE — 84145 PROCALCITONIN (PCT): CPT

## 2023-11-10 PROCEDURE — 80048 BASIC METABOLIC PNL TOTAL CA: CPT

## 2023-11-10 PROCEDURE — 3609013300 HC EGD TUBE PLACEMENT: Performed by: SURGERY

## 2023-11-10 PROCEDURE — 3700000001 HC ADD 15 MINUTES (ANESTHESIA): Performed by: SURGERY

## 2023-11-10 PROCEDURE — 3E0G76Z INTRODUCTION OF NUTRITIONAL SUBSTANCE INTO UPPER GI, VIA NATURAL OR ARTIFICIAL OPENING: ICD-10-PCS | Performed by: SURGERY

## 2023-11-10 PROCEDURE — 0CJS8ZZ INSPECTION OF LARYNX, VIA NATURAL OR ARTIFICIAL OPENING ENDOSCOPIC: ICD-10-PCS | Performed by: SURGERY

## 2023-11-10 PROCEDURE — 6360000002 HC RX W HCPCS: Performed by: INTERNAL MEDICINE

## 2023-11-10 PROCEDURE — 6360000002 HC RX W HCPCS

## 2023-11-10 PROCEDURE — 7100000010 HC PHASE II RECOVERY - FIRST 15 MIN: Performed by: SURGERY

## 2023-11-10 PROCEDURE — 1200000003 HC TELEMETRY R&B

## 2023-11-10 PROCEDURE — 2580000003 HC RX 258

## 2023-11-10 PROCEDURE — 36415 COLL VENOUS BLD VENIPUNCTURE: CPT

## 2023-11-10 PROCEDURE — 2580000003 HC RX 258: Performed by: INTERNAL MEDICINE

## 2023-11-10 PROCEDURE — 6370000000 HC RX 637 (ALT 250 FOR IP): Performed by: INTERNAL MEDICINE

## 2023-11-10 PROCEDURE — 6370000000 HC RX 637 (ALT 250 FOR IP)

## 2023-11-10 RX ORDER — LEVETIRACETAM 100 MG/ML
1000 SOLUTION ORAL 2 TIMES DAILY
Status: DISCONTINUED | OUTPATIENT
Start: 2023-11-10 | End: 2023-11-12 | Stop reason: HOSPADM

## 2023-11-10 RX ORDER — AMLODIPINE BESYLATE 5 MG/1
5 TABLET ORAL DAILY
Status: DISCONTINUED | OUTPATIENT
Start: 2023-11-10 | End: 2023-11-12 | Stop reason: HOSPADM

## 2023-11-10 RX ORDER — PROPOFOL 10 MG/ML
INJECTION, EMULSION INTRAVENOUS PRN
Status: DISCONTINUED | OUTPATIENT
Start: 2023-11-10 | End: 2023-11-10 | Stop reason: SDUPTHER

## 2023-11-10 RX ORDER — AMOXICILLIN AND CLAVULANATE POTASSIUM 875; 125 MG/1; MG/1
1 TABLET, FILM COATED ORAL EVERY 12 HOURS SCHEDULED
Status: DISCONTINUED | OUTPATIENT
Start: 2023-11-10 | End: 2023-11-12 | Stop reason: HOSPADM

## 2023-11-10 RX ADMIN — PROPOFOL 50 MG: 10 INJECTION, EMULSION INTRAVENOUS at 12:04

## 2023-11-10 RX ADMIN — PROPOFOL 50 MG: 10 INJECTION, EMULSION INTRAVENOUS at 12:07

## 2023-11-10 RX ADMIN — LEVETIRACETAM 1000 MG: 500 SOLUTION ORAL at 22:08

## 2023-11-10 RX ADMIN — SODIUM CHLORIDE, PRESERVATIVE FREE 10 ML: 5 INJECTION INTRAVENOUS at 09:21

## 2023-11-10 RX ADMIN — PROPOFOL 50 MG: 10 INJECTION, EMULSION INTRAVENOUS at 12:00

## 2023-11-10 RX ADMIN — ACETAMINOPHEN 650 MG: 650 SUPPOSITORY RECTAL at 22:00

## 2023-11-10 RX ADMIN — SODIUM CHLORIDE: 9 INJECTION, SOLUTION INTRAVENOUS at 11:55

## 2023-11-10 RX ADMIN — PIPERACILLIN AND TAZOBACTAM 3375 MG: 3; .375 INJECTION, POWDER, LYOPHILIZED, FOR SOLUTION INTRAVENOUS at 11:04

## 2023-11-10 RX ADMIN — Medication 1500 MG: at 13:53

## 2023-11-10 RX ADMIN — ENOXAPARIN SODIUM 100 MG: 100 INJECTION SUBCUTANEOUS at 22:00

## 2023-11-10 RX ADMIN — AMLODIPINE BESYLATE 5 MG: 5 TABLET ORAL at 18:28

## 2023-11-10 RX ADMIN — SODIUM CHLORIDE, PRESERVATIVE FREE 10 ML: 5 INJECTION INTRAVENOUS at 21:42

## 2023-11-10 RX ADMIN — PIPERACILLIN AND TAZOBACTAM 3375 MG: 3; .375 INJECTION, POWDER, LYOPHILIZED, FOR SOLUTION INTRAVENOUS at 04:50

## 2023-11-10 RX ADMIN — AMOXICILLIN AND CLAVULANATE POTASSIUM 1 TABLET: 875; 125 TABLET, FILM COATED ORAL at 17:13

## 2023-11-10 ASSESSMENT — PAIN DESCRIPTION - ORIENTATION: ORIENTATION: LEFT

## 2023-11-10 ASSESSMENT — PAIN SCALES - GENERAL: PAINLEVEL_OUTOF10: 2

## 2023-11-10 ASSESSMENT — PAIN DESCRIPTION - LOCATION: LOCATION: ABDOMEN

## 2023-11-10 ASSESSMENT — PAIN - FUNCTIONAL ASSESSMENT: PAIN_FUNCTIONAL_ASSESSMENT: NONE - DENIES PAIN

## 2023-11-10 ASSESSMENT — ENCOUNTER SYMPTOMS: SHORTNESS OF BREATH: 1

## 2023-11-10 NOTE — BRIEF OP NOTE
Brief Postoperative Note      Patient: Arthur Merino  YOB: 1950  MRN: [id number]    Date of Procedure: 11/10/2023    Pre-Op Diagnosis Codes:     * Malnutrition, unspecified type ([address]) [E46]    Post-Op Diagnosis: same       Procedure(s):  EGD PEG TUBE INSERTION    Surgeon(s):  West Baer MD    Assistant:      Anesthesia: Monitor Anesthesia Care    Estimated Blood Loss (mL): 3 ml    Complications: none    Specimens:   none    Implants:  none      Drains:   Gastrostomy/Enterostomy/Jejunostomy Tube Percutaneous Endoscopic Gastrostomy (PEG) LUQ 20 fr (Active)       [REMOVED] NG/OG/NJ/NE Tube Nasogastric Left nostril (Removed)   Placement Verified X-Ray (repeat) 10/24/23 1250       [REMOVED] NG/OG/NJ/NE Tube Nasogastric 18 fr Right nostril (Removed)   Surrounding Skin Clean, dry & intact 10/25/23 0421   Securement device Adhesive based sims 10/25/23 0421   Placement Verified X-Ray (Initial); X-Ray (repeat) 10/25/23 0421       [REMOVED] NG/OG/NJ/NE Tube Nasogastric 18 fr Right nostril (Removed)   Surrounding Skin Clean, dry & intact 10/25/23 2350   Placement Verified External Catheter Length;Respiratory Status 10/25/23 2350       [REMOVED] NG/OG/NJ/NE Tube Nasogastric 18 fr Right nostril (Removed)   Surrounding Skin Clean, dry & intact 11/06/23 1011   NG/OG/NJ/NE External Measurement (cm) 65 cm 11/06/23 1031       Findings: see op note      Electronically signed by West Baer MD on 11/10/2023 at 12:14 PM

## 2023-11-10 NOTE — FLOWSHEET NOTE
11/10/23 9695   Safe Environment   Safety Measures Standard Safety Measures; Family at bedside;Call light within reach; [facility]  [facility]     Pt responds to voice and permits camera. Pt resting in bed with call light in reach. Pt alert and oriented. Reminded to utilize call light when needed. Reminded of fall and safety precautions. No voiced questions or concerns.

## 2023-11-10 NOTE — FLOWSHEET NOTE
11/10/23 1038   Treatment Team Notification   Reason for Communication Patient/Family request  (Procedure Consent)   Name of Team Member Notified Dr. Portia Apgar Team Role Resident   Method of Communication Face to face           Patient alert and oriented X 4, Patient has Esperanza Rodriguez ( Sister ) who is aware and informed of the PEG placement. Patient completed consent verbal with a two nurse witness and Dr. Nohemi Whyte (Resident) also present in the room.

## 2023-11-10 NOTE — TELEPHONE ENCOUNTER
The patient was seen in the hospital for hoarseness and found to have right vocal cord paresis/paralysis. The patient will likely be discharged over the next couple of days. He will need a short term follow up with Dr Ceci Castaneda, ideally next 2-3 weeks post discharge.  Please contact the patient's POA to make arrangements for follow up

## 2023-11-10 NOTE — OP NOTE
the abdominal wall, cut it to  the appropriate length, and used to disattach it to the abdominal wall. We went back with the scope and proved that the PEG tube was sitting in  the gastric lumen. The scope was withdrawn. The patient tolerated the  procedure well. ARCADIO SORIA [facility] TREATMENT [facility], MD    D: 11/10/2023 12:33:40       T: 11/10/2023 13:09:21     [email or web address]  Job#: [id number]     Doc#: [id number]    CC:

## 2023-11-11 LAB
ANION GAP SERPL CALC-SCNC: 9 MEQ/L (ref 8–16)
BASOPHILS ABSOLUTE: 0 THOU/MM3 (ref 0–0.1)
BASOPHILS NFR BLD AUTO: 0.5 %
BUN SERPL-MCNC: 9 MG/DL (ref 7–22)
CALCIUM SERPL-MCNC: 8.3 MG/DL (ref 8.5–10.5)
CHLORIDE SERPL-SCNC: 99 MEQ/L (ref 98–111)
CO2 SERPL-SCNC: 25 MEQ/L (ref 23–33)
CREAT SERPL-MCNC: 0.4 MG/DL (ref 0.4–1.2)
DEPRECATED RDW RBC AUTO: 52.5 FL (ref 35–45)
EOSINOPHIL NFR BLD AUTO: 0.8 %
EOSINOPHILS ABSOLUTE: 0.1 THOU/MM3 (ref 0–0.4)
ERYTHROCYTE [DISTWIDTH] IN BLOOD BY AUTOMATED COUNT: 15.9 % (ref 11.5–14.5)
GFR SERPL CREATININE-BSD FRML MDRD: > 60 ML/MIN/1.73M2
GLUCOSE SERPL-MCNC: 116 MG/DL (ref 70–108)
HCT VFR BLD AUTO: 30.9 % (ref 42–52)
HGB BLD-MCNC: 9.4 GM/DL (ref 14–18)
IMM GRANULOCYTES # BLD AUTO: 0.09 THOU/MM3 (ref 0–0.07)
IMM GRANULOCYTES NFR BLD AUTO: 1.1 %
LYMPHOCYTES ABSOLUTE: 1.1 THOU/MM3 (ref 1–4.8)
LYMPHOCYTES NFR BLD AUTO: 13.5 %
MCH RBC QN AUTO: 27.6 PG (ref 26–33)
MCHC RBC AUTO-ENTMCNC: 30.4 GM/DL (ref 32.2–35.5)
MCV RBC AUTO: 90.9 FL (ref 80–94)
MONOCYTES ABSOLUTE: 0.8 THOU/MM3 (ref 0.4–1.3)
MONOCYTES NFR BLD AUTO: 9.2 %
NEUTROPHILS NFR BLD AUTO: 74.9 %
NRBC BLD AUTO-RTO: 0 /100 WBC
PLATELET # BLD AUTO: 494 THOU/MM3 (ref 130–400)
PMV BLD AUTO: 8.5 FL (ref 9.4–12.4)
POTASSIUM SERPL-SCNC: 3.3 MEQ/L (ref 3.5–5.2)
RBC # BLD AUTO: 3.4 MILL/MM3 (ref 4.7–6.1)
SEGMENTED NEUTROPHILS ABSOLUTE COUNT: 6.3 THOU/MM3 (ref 1.8–7.7)
SODIUM SERPL-SCNC: 133 MEQ/L (ref 135–145)
WBC # BLD AUTO: 8.4 THOU/MM3 (ref 4.8–10.8)

## 2023-11-11 PROCEDURE — 80048 BASIC METABOLIC PNL TOTAL CA: CPT

## 2023-11-11 PROCEDURE — 6360000002 HC RX W HCPCS: Performed by: INTERNAL MEDICINE

## 2023-11-11 PROCEDURE — 2580000003 HC RX 258: Performed by: INTERNAL MEDICINE

## 2023-11-11 PROCEDURE — 36415 COLL VENOUS BLD VENIPUNCTURE: CPT

## 2023-11-11 PROCEDURE — 99232 SBSQ HOSP IP/OBS MODERATE 35: CPT | Performed by: INTERNAL MEDICINE

## 2023-11-11 PROCEDURE — 6370000000 HC RX 637 (ALT 250 FOR IP)

## 2023-11-11 PROCEDURE — 1200000003 HC TELEMETRY R&B

## 2023-11-11 PROCEDURE — 6370000000 HC RX 637 (ALT 250 FOR IP): Performed by: INTERNAL MEDICINE

## 2023-11-11 PROCEDURE — 85025 COMPLETE CBC W/AUTO DIFF WBC: CPT

## 2023-11-11 PROCEDURE — 6370000000 HC RX 637 (ALT 250 FOR IP): Performed by: STUDENT IN AN ORGANIZED HEALTH CARE EDUCATION/TRAINING PROGRAM

## 2023-11-11 RX ORDER — POTASSIUM CHLORIDE 20 MEQ/1
40 TABLET, EXTENDED RELEASE ORAL PRN
Status: DISCONTINUED | OUTPATIENT
Start: 2023-11-11 | End: 2023-11-12 | Stop reason: HOSPADM

## 2023-11-11 RX ORDER — POTASSIUM CHLORIDE 7.45 MG/ML
10 INJECTION INTRAVENOUS PRN
Status: DISCONTINUED | OUTPATIENT
Start: 2023-11-11 | End: 2023-11-12 | Stop reason: HOSPADM

## 2023-11-11 RX ADMIN — SODIUM CHLORIDE, PRESERVATIVE FREE 10 ML: 5 INJECTION INTRAVENOUS at 08:21

## 2023-11-11 RX ADMIN — ACETAMINOPHEN 650 MG: 650 SUPPOSITORY RECTAL at 08:58

## 2023-11-11 RX ADMIN — ATORVASTATIN CALCIUM 20 MG: 20 TABLET, FILM COATED ORAL at 08:21

## 2023-11-11 RX ADMIN — LEVETIRACETAM 1000 MG: 500 SOLUTION ORAL at 08:21

## 2023-11-11 RX ADMIN — ENOXAPARIN SODIUM 100 MG: 100 INJECTION SUBCUTANEOUS at 08:58

## 2023-11-11 RX ADMIN — SODIUM CHLORIDE, PRESERVATIVE FREE 10 ML: 5 INJECTION INTRAVENOUS at 20:40

## 2023-11-11 RX ADMIN — AMOXICILLIN AND CLAVULANATE POTASSIUM 1 TABLET: 875; 125 TABLET, FILM COATED ORAL at 08:22

## 2023-11-11 RX ADMIN — POTASSIUM BICARBONATE 40 MEQ: 782 TABLET, EFFERVESCENT ORAL at 09:04

## 2023-11-11 RX ADMIN — ENOXAPARIN SODIUM 100 MG: 100 INJECTION SUBCUTANEOUS at 20:39

## 2023-11-11 RX ADMIN — LEVETIRACETAM 1000 MG: 500 SOLUTION ORAL at 20:39

## 2023-11-11 RX ADMIN — AMOXICILLIN AND CLAVULANATE POTASSIUM 1 TABLET: 875; 125 TABLET, FILM COATED ORAL at 20:39

## 2023-11-11 RX ADMIN — AMLODIPINE BESYLATE 5 MG: 5 TABLET ORAL at 08:21

## 2023-11-11 NOTE — CARE COORDINATION
11/10/23, 10:57 AM EST    DISCHARGE PLANNING EVALUATION     CASTILLO notified No Elmore with admissions at [facility] that Patient will need tube feeds at discharge due to peg tube placement today. No Elmore stated that they are fine with that but that they do need to verify they have all the supplies on hand prior to discharge. No Elmore indicated they are okay to accept over the weekend only if supplies are at the building. CASTILLO asked No Elmore that dietary should place a note today but that Patient may need to stay till Monday to verify supplies before discharge. All numbers placed on blue envelope on chart. RN updated.
11/10/23, 2:52 PM EST    DISCHARGE ON GOING [address] day: 3  Location: -16/016-A Reason for admit: Sepsis [facility]) [A41.9]   Procedure: 11/7 BLE dopplers: nonocclusive thrombus on both right and left  11/7 CT chest: R mid and lower lung pneumonia. Mediastinal lymphadenopathy  11/10 EGD with gastrostomy tube placement  Barriers to Discharge: Hgb improved to 8.9, PCT 0.45. Dietitian to follow for initiation of TF. Possible discharge this weekend if patient tolerates. PCP: Gio Robins DO  Readmission Risk Score: 17.9%  Patient Goals/Plan/Treatment Preferences: return to [facility]. ECF has TF available for weekend discharge.
11/11/23, 10:23 AM EST    DISCHARGE ON [address] day: 4  Location: [facility]16/016-A Reason for admit: Sepsis [facility]) [A41.9]   Patient Goals/Plan/Treatment Preferences: Per call received from Dr. Shaji Ferreira, pt ready for discharge back to [facility] today. Notified pt ERIK Osorio regarding this and advised blue packet and instructions left by SW are with pt chart. Advised pt ERIK Osorio to verify with [facility] that they do have the supplies.  ( Nsg Admin at [facility] stated yesterday that pt was ok to return as they have the tube feed)
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 S/p skin graft. Healed. From chronic venous insuficiency. History of traumatic brain injury     Hyperlipidemia     Hypertension     Mentally challenged     \"slow\" states his sister    Obesity (BMI 30-39. 9)     Osteoarthritis     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 ([address]) 07/2014    s/p fall down a hill, Dr. Majano Speaks follows, no seizure activity since    Seizure disorder ([address]) 07/17/2014       Procedure:   11/7 LE dopplers:   Partial decompression consistent with nonocclusive thrombus within the right distal common femoral vein, femoral vein and popliteal vein. There is also nonocclusive thrombus within the left posterior tibial vein. 11/7 CT chest:   1. Right mid and lower lung pneumonia. 2. Mediastinal lymphadenopathy, possibly reactive. Metastatic disease is considered less likely but not excluded. 11/8 CTA chest ordered. Barriers to Discharge: Presented to ED from ECF. Admitted with sepsis of unknown etiology. Suspected aspiration pneumonia. Hx TBI, extensive face, abdomen/chest, bilateral knee wounds. Hospitalist following. ID consult. IVF. Norvasc. Lipitor. Lovenox. IV zosyn. Wound care with betadine.  On 2L NC.   PCP: Randi Miller DO    Readmission Risk Low 0-14, Mod 15-19), High > 20: Readmission Risk Score: 18      Advance Directives:      Code Status: Limited   Patient's Primary Decision Maker is:        Patient Goals/Plan/Treatment Preferences: complete assessment deferred as Ml Urbano resides at [facility]
11/9/23, 2:12 PM EST    DISCHARGE ON GOING EVALUATION    [address] day: 2  Location: [facility]16/016-A Reason for admit: Sepsis [facility]) [A41.9]   Procedure: 11/7 BLE dopplers: nonocclusive thrombus on both right and left  11/7 CT chest: R mid and lower lung pneumonia. Mediastinal lymphadenopathy  Barriers to Discharge: Hgb 7.1. 100% on 2L O2. LR infusion, IV zosyn. PRBC transfusion ordered. ENT consult pending. General surgery consulted for PEG placement, sister and patient not agreeable at this time. Currently on minced and moist diet with thick liquids. PCP: Ceci Hunt DO  Readmission Risk Score: 18.2%  Patient Goals/Plan/Treatment Preferences: return to [facility].
Patient expects to discharge to: House  Plan for transportation at discharge:      Financial  Payor: MEDICARE / Plan: MEDICARE PART A AND B / Product Type: *No Product type* /     Potential assistance Purchasing Medications: No

## 2023-11-11 NOTE — FLOWSHEET NOTE
11/11/23 1159   Safe Environment   Safety Measures Other (comment)  (Virtual Nurse Safety Round)     VN called into patients room and introduced myself and role. Patient sleeping without any signs of distress noted.  Call light within reach

## 2023-11-11 NOTE — ANESTHESIA POSTPROCEDURE EVALUATION
Department of Anesthesiology  Postprocedure Note    Patient: Stefany Styles  MRN: [id number]  YOB: 1950  Date of evaluation: 11/11/2023      Procedure Summary     Date: 11/10/23 Room / Location: [address] [place] [address] 12 / [address] [place] [address]    Anesthesia Start: 6073 Anesthesia Stop: 1218    Procedure: EGD PEG TUBE INSERTION Diagnosis:       Malnutrition, unspecified type ([address])      (Malnutrition, unspecified type ([address]) Sidney Menard)    Surgeons: Maycol Lin MD Responsible Provider: Estela Casanova MD    Anesthesia Type: MAC ASA Status: 3          Anesthesia Type: No value filed.     Daljit Phase I: Daljit Score: 10    Daljit Phase II: Daljit Score: 9      Anesthesia Post Evaluation    Patient location during evaluation: bedside  Patient participation: complete - patient participated  Level of consciousness: awake  Airway patency: patent  Nausea & Vomiting: no vomiting and no nausea  Complications: no  Cardiovascular status: hemodynamically stable  Respiratory status: acceptable  Hydration status: stable  Comments: Delayed entry  Pain management: adequate Maunie South accepted placement on pt.

## 2023-11-11 NOTE — PLAN OF CARE
I have discussed with the patient the rationale for blood component transfusion; its benefits in treating or preventing fatigue, organ damage, or death; and its risk which includes mild transfusion reactions, rare risk of blood borne infection, or more serious but rare reactions. I have discussed the alternatives to transfusion, including the risk and consequences of not receiving transfusion. The patient had an opportunity to ask questions and had agreed to proceed with transfusion of blood components.
Problem: Discharge Planning  Goal: Discharge to home or other facility with appropriate resources  11/9/2023 1209 by TREMAYNE An, VIRGILIOW  Outcome: Progressing  Flowsheets (Taken 11/9/2023 1209)  Discharge to home or other facility with appropriate resources: Identify barriers to discharge with patient and caregiver  Note: Return to [facility], see SW notes 11/9/23.
Problem: Discharge Planning  Goal: Discharge to home or other facility with appropriate resources  11/9/2023 2315 by Kelly Pa RN  Outcome: Progressing  11/9/2023 1209 by TREMAYNE Maria, LSW  Outcome: Progressing  Flowsheets (Taken 11/9/2023 1209)  Discharge to home or other facility with appropriate resources: Identify barriers to discharge with patient and caregiver  Note: Return to [facility], see SW notes 11/9/23. Problem: Skin/Tissue Integrity  Goal: Absence of new skin breakdown  Description: 1. Monitor for areas of redness and/or skin breakdown  2. Assess vascular access sites hourly  3. Every 4-6 hours minimum:  Change oxygen saturation probe site  4. Every 4-6 hours:  If on nasal continuous positive airway pressure, respiratory therapy assess nares and determine need for appliance change or resting period.   Outcome: Progressing     Problem: Safety - Adult  Goal: Free from fall injury  Outcome: Progressing     Problem: ABCDS Injury Assessment  Goal: Absence of physical injury  Outcome: Progressing     Problem: Gastrointestinal - Adult  Goal: Maintains adequate nutritional intake  Outcome: Progressing  Goal: Maintains or returns to baseline bowel function  Outcome: Progressing     Problem: Respiratory - Adult  Goal: Achieves optimal ventilation and oxygenation  Outcome: Progressing     Problem: Hematologic - Adult  Goal: Maintains hematologic stability  Outcome: Progressing     Problem: Metabolic/Fluid and Electrolytes - Adult  Goal: Electrolytes maintained within normal limits  Outcome: Progressing  Goal: Hemodynamic stability and optimal renal function maintained  Outcome: Progressing
Problem: Discharge Planning  Goal: Discharge to home or other facility with appropriate resources  Outcome: Progressing     Problem: Skin/Tissue Integrity  Goal: Absence of new skin breakdown  Description: 1. Monitor for areas of redness and/or skin breakdown  2. Assess vascular access sites hourly  3. Every 4-6 hours minimum:  Change oxygen saturation probe site  4. Every 4-6 hours:  If on nasal continuous positive airway pressure, respiratory therapy assess nares and determine need for appliance change or resting period.   Outcome: Progressing     Problem: Safety - Adult  Goal: Free from fall injury  Outcome: Progressing     Problem: ABCDS Injury Assessment  Goal: Absence of physical injury  Outcome: Progressing     Problem: Gastrointestinal - Adult  Goal: Maintains adequate nutritional intake  Outcome: Progressing     Problem: Infection - Adult  Goal: Absence of infection at discharge  Outcome: Progressing  Goal: Absence of infection during hospitalization  Outcome: Progressing  Goal: Absence of fever/infection during anticipated neutropenic period  Outcome: Progressing
Problem: Discharge Planning  Goal: Discharge to home or other facility with appropriate resources  Outcome: Progressing  Flowsheets (Taken 11/10/2023 2245)  Discharge to home or other facility with appropriate resources:   Identify barriers to discharge with patient and caregiver   Arrange for needed discharge resources and transportation as appropriate   Identify discharge learning needs (meds, wound care, etc)   Refer to discharge planning if patient needs post-hospital services based on physician order or complex needs related to functional status, cognitive ability or social support system  Note: Pt is from home alone, will need placement after discharge. Problem: Skin/Tissue Integrity  Goal: Absence of new skin breakdown  Description: 1. Monitor for areas of redness and/or skin breakdown  2. Assess vascular access sites hourly  3. Every 4-6 hours minimum:  Change oxygen saturation probe site  4. Every 4-6 hours:  If on nasal continuous positive airway pressure, respiratory therapy assess nares and determine need for appliance change or resting period.   Outcome: Progressing     Problem: Safety - Adult  Goal: Free from fall injury  Outcome: Progressing  Flowsheets (Taken 11/10/2023 2245)  Free From Fall Injury:   Instruct family/caregiver on patient safety   Based on caregiver fall risk screen, instruct family/caregiver to ask for assistance with transferring infant if caregiver noted to have fall risk factors     Problem: ABCDS Injury Assessment  Goal: Absence of physical injury  Outcome: Progressing  Flowsheets (Taken 11/10/2023 2245)  Absence of Physical Injury: Implement safety measures based on patient assessment     Problem: Gastrointestinal - Adult  Goal: Maintains adequate nutritional intake  Outcome: Progressing  Flowsheets (Taken 11/10/2023 2245)  Maintains adequate nutritional intake:   Monitor percentage of each meal consumed   Assist with meals as needed   Obtain nutritional consult as
Problem: Discharge Planning  Goal: Discharge to home or other facility with appropriate resources  Outcome: Progressing  Flowsheets (Taken 11/9/2023 0128)  Discharge to home or other facility with appropriate resources:   Identify barriers to discharge with patient and caregiver   Arrange for needed discharge resources and transportation as appropriate   Identify discharge learning needs (meds, wound care, etc)     Problem: Skin/Tissue Integrity  Goal: Absence of new skin breakdown  Description: 1. Monitor for areas of redness and/or skin breakdown  2. Assess vascular access sites hourly  3. Every 4-6 hours minimum:  Change oxygen saturation probe site  4. Every 4-6 hours:  If on nasal continuous positive airway pressure, respiratory therapy assess nares and determine need for appliance change or resting period.   Outcome: Progressing     Problem: Safety - Adult  Goal: Free from fall injury  Outcome: Progressing  Flowsheets (Taken 11/9/2023 0128)  Free From Fall Injury: Instruct family/caregiver on patient safety     Problem: ABCDS Injury Assessment  Goal: Absence of physical injury  Outcome: Progressing  Flowsheets (Taken 11/9/2023 0128)  Absence of Physical Injury: Implement safety measures based on patient assessment     Problem: Gastrointestinal - Adult  Goal: Maintains adequate nutritional intake  Outcome: Progressing  Flowsheets (Taken 11/9/2023 0128)  Maintains adequate nutritional intake: Monitor percentage of each meal consumed  Goal: Maintains or returns to baseline bowel function  Outcome: Progressing  Flowsheets (Taken 11/9/2023 0128)  Maintains or returns to baseline bowel function: Assess bowel function     Problem: Infection - Adult  Goal: Absence of infection at discharge  Outcome: Progressing  Flowsheets (Taken 11/9/2023 0128)  Absence of infection at discharge:   Assess and monitor for signs and symptoms of infection   Monitor lab/diagnostic results   Monitor all insertion sites i.e.,
mobility to safest level of function  Outcome: Progressing  Flowsheets (Taken 11/7/2023 2323)  Return Mobility to Safest Level of Function:   Assess patient stability and activity tolerance for standing, transferring and ambulating with or without assistive devices   Assist with transfers and ambulation using safe patient handling equipment as needed     Problem: Metabolic/Fluid and Electrolytes - Adult  Goal: Electrolytes maintained within normal limits  Outcome: Progressing  Flowsheets (Taken 11/7/2023 2323)  Electrolytes maintained within normal limits:   Monitor labs and assess patient for signs and symptoms of electrolyte imbalances   Administer electrolyte replacement as ordered   Monitor response to electrolyte replacements, including repeat lab results as appropriate     Problem: Metabolic/Fluid and Electrolytes - Adult  Goal: Hemodynamic stability and optimal renal function maintained  Outcome: Progressing  Flowsheets (Taken 11/7/2023 2323)  Hemodynamic stability and optimal renal function maintained:   Monitor labs and assess for signs and symptoms of volume excess or deficit   Monitor intake, output and patient weight     Problem: Hematologic - Adult  Goal: Maintains hematologic stability  Outcome: Progressing  Flowsheets (Taken 11/7/2023 2323)  Maintains hematologic stability:   Assess for signs and symptoms of bleeding or hemorrhage   Monitor labs for bleeding or clotting disorders     Care plan reviewed with patient. Patient verbalizes understanding of the care plan and contributed to goal setting.

## 2023-11-11 NOTE — FLOWSHEET NOTE
11/11/23 1400   Safe Environment   Safety Measures Call light within reach;Standard Safety Measures     VN completed safety rounds. Camera accessed for safety. Pt alert in bed. No s/s of any distress noted. Call bell and personal items within reach. No needs at this time.

## 2023-11-12 VITALS
TEMPERATURE: 99.3 F | DIASTOLIC BLOOD PRESSURE: 56 MMHG | WEIGHT: 217.15 LBS | SYSTOLIC BLOOD PRESSURE: 122 MMHG | RESPIRATION RATE: 18 BRPM | OXYGEN SATURATION: 95 % | HEART RATE: 84 BPM | BODY MASS INDEX: 32.16 KG/M2 | HEIGHT: 69 IN

## 2023-11-12 LAB
ANION GAP SERPL CALC-SCNC: 7 MEQ/L (ref 8–16)
BACTERIA BLD AEROBE CULT: NORMAL
BACTERIA BLD AEROBE CULT: NORMAL
BASOPHILS ABSOLUTE: 0 THOU/MM3 (ref 0–0.1)
BASOPHILS NFR BLD AUTO: 0.6 %
BUN SERPL-MCNC: 9 MG/DL (ref 7–22)
CALCIUM SERPL-MCNC: 8.4 MG/DL (ref 8.5–10.5)
CHLORIDE SERPL-SCNC: 101 MEQ/L (ref 98–111)
CO2 SERPL-SCNC: 28 MEQ/L (ref 23–33)
CREAT SERPL-MCNC: 0.4 MG/DL (ref 0.4–1.2)
DEPRECATED RDW RBC AUTO: 55.3 FL (ref 35–45)
EOSINOPHIL NFR BLD AUTO: 1.6 %
EOSINOPHILS ABSOLUTE: 0.1 THOU/MM3 (ref 0–0.4)
ERYTHROCYTE [DISTWIDTH] IN BLOOD BY AUTOMATED COUNT: 16.5 % (ref 11.5–14.5)
GFR SERPL CREATININE-BSD FRML MDRD: > 60 ML/MIN/1.73M2
GLUCOSE SERPL-MCNC: 142 MG/DL (ref 70–108)
HCT VFR BLD AUTO: 32.2 % (ref 42–52)
HGB BLD-MCNC: 9.7 GM/DL (ref 14–18)
IMM GRANULOCYTES # BLD AUTO: 0.1 THOU/MM3 (ref 0–0.07)
IMM GRANULOCYTES NFR BLD AUTO: 1.2 %
LYMPHOCYTES ABSOLUTE: 0.9 THOU/MM3 (ref 1–4.8)
LYMPHOCYTES NFR BLD AUTO: 10.6 %
MCH RBC QN AUTO: 27.6 PG (ref 26–33)
MCHC RBC AUTO-ENTMCNC: 30.1 GM/DL (ref 32.2–35.5)
MCV RBC AUTO: 91.7 FL (ref 80–94)
MONOCYTES ABSOLUTE: 0.7 THOU/MM3 (ref 0.4–1.3)
MONOCYTES NFR BLD AUTO: 8.9 %
NEUTROPHILS NFR BLD AUTO: 77.1 %
NRBC BLD AUTO-RTO: 0 /100 WBC
PLATELET # BLD AUTO: 522 THOU/MM3 (ref 130–400)
PMV BLD AUTO: 8.5 FL (ref 9.4–12.4)
POTASSIUM SERPL-SCNC: 3.6 MEQ/L (ref 3.5–5.2)
RBC # BLD AUTO: 3.51 MILL/MM3 (ref 4.7–6.1)
SEGMENTED NEUTROPHILS ABSOLUTE COUNT: 6.3 THOU/MM3 (ref 1.8–7.7)
SODIUM SERPL-SCNC: 136 MEQ/L (ref 135–145)
WBC # BLD AUTO: 8.2 THOU/MM3 (ref 4.8–10.8)

## 2023-11-12 PROCEDURE — 36415 COLL VENOUS BLD VENIPUNCTURE: CPT

## 2023-11-12 PROCEDURE — 85025 COMPLETE CBC W/AUTO DIFF WBC: CPT

## 2023-11-12 PROCEDURE — 2580000003 HC RX 258: Performed by: INTERNAL MEDICINE

## 2023-11-12 PROCEDURE — 80048 BASIC METABOLIC PNL TOTAL CA: CPT

## 2023-11-12 PROCEDURE — 6370000000 HC RX 637 (ALT 250 FOR IP)

## 2023-11-12 PROCEDURE — 99239 HOSP IP/OBS DSCHRG MGMT >30: CPT | Performed by: INTERNAL MEDICINE

## 2023-11-12 PROCEDURE — 6370000000 HC RX 637 (ALT 250 FOR IP): Performed by: INTERNAL MEDICINE

## 2023-11-12 RX ORDER — AMOXICILLIN AND CLAVULANATE POTASSIUM 875; 125 MG/1; MG/1
1 TABLET, FILM COATED ORAL EVERY 12 HOURS SCHEDULED
Qty: 10 TABLET | Refills: 0 | DISCHARGE
Start: 2023-11-12 | End: 2023-11-17

## 2023-11-12 RX ADMIN — LEVETIRACETAM 1000 MG: 500 SOLUTION ORAL at 11:01

## 2023-11-12 RX ADMIN — AMOXICILLIN AND CLAVULANATE POTASSIUM 1 TABLET: 875; 125 TABLET, FILM COATED ORAL at 11:01

## 2023-11-12 RX ADMIN — SODIUM CHLORIDE, PRESERVATIVE FREE 10 ML: 5 INJECTION INTRAVENOUS at 11:01

## 2023-11-12 RX ADMIN — ACETAMINOPHEN 650 MG: 650 SUPPOSITORY RECTAL at 12:48

## 2023-11-12 RX ADMIN — ATORVASTATIN CALCIUM 20 MG: 20 TABLET, FILM COATED ORAL at 11:01

## 2023-11-12 RX ADMIN — AMLODIPINE BESYLATE 5 MG: 5 TABLET ORAL at 11:01

## 2023-11-12 ASSESSMENT — PAIN DESCRIPTION - ORIENTATION: ORIENTATION: LEFT

## 2023-11-12 ASSESSMENT — PAIN DESCRIPTION - DESCRIPTORS: DESCRIPTORS: PATIENT UNABLE TO DESCRIBE

## 2023-11-12 ASSESSMENT — PAIN SCALES - GENERAL: PAINLEVEL_OUTOF10: 0

## 2023-11-12 ASSESSMENT — PAIN DESCRIPTION - LOCATION: LOCATION: ARM

## 2023-11-12 ASSESSMENT — PAIN - FUNCTIONAL ASSESSMENT: PAIN_FUNCTIONAL_ASSESSMENT: ACTIVITIES ARE NOT PREVENTED

## 2023-11-12 NOTE — FLOWSHEET NOTE
11/12/23 1132   Safe Environment   Safety Measures Other (comment)  (Virtual Care Nurse Safety Round)     VN called into patients room and introduced myself and role. Patient answered and permitted video. Video activated. Patient resting comfortably in bed. Patient voiced no needs or concerns at this time. Pt denies pain. VN educated pt on utilizing call light if condition changes. Call light within reach.

## 2023-11-12 NOTE — DISCHARGE SUMMARY
dose modulation, iterative reconstruction, and/or weight-based dosing when appropriate to reduce radiation dose to as low as reasonably achievable. COMPARISON: None FINDINGS: Cardiac size is normal. There is no pleural or pericardial effusion. Alveolar and reticular opacities are present in the right mid and lower lung. A subcarinal lymph node measures 1.4 cm in short axis diameter (image 29). There is no hilar or axillary lymphadenopathy. Degenerative changes are present in the thoracic spine without evidence of aggressive osseous lesions. There are calcifications in the gallbladder. There is fatty replacement of the pancreas. 1. Right mid and lower lung pneumonia. 2. Mediastinal lymphadenopathy, possibly reactive. Metastatic disease is considered less likely but not excluded. Final report electronically signed by Dr. Radha Balderas on 11/7/2023 4:10 PM    XR CHEST (2 VW)    Result Date: 11/7/2023  PROCEDURE: XR CHEST (2 VW) CLINICAL INFORMATION: Cough TECHNIQUE: PA and lateral chest radiographs. COMPARISON: Mobile AP chest radiograph 10/29/2023 FINDINGS: Cardiomediastinal silhouette is within normal limits. There are no lung consolidations. Degenerative changes in the thoracic spine are poorly visualized. No acute intrathoracic process. **This report has been created using voice recognition software. It may contain minor errors which are inherent in voice recognition technology. ** Final report electronically signed by Dr. Radha Balderas on 11/7/2023 1:15 PM       Follow-up scheduled after discharge :-    in the next few days with Vladislav Limon DO  Or his NH doctor    Consultations during this hospital stay:-  [] NONE [] Cardiology  [] Nephrology  [] Hemo onco   [] GI   [x] ID  [] Endocrine  [] Pulm    [] Neuro    [] Psych   [] Urology  [x] ENT   [x] G SURGERY   []Ortho    []CV surg    [] Palliative  [] Hospice [] Pain management   []    []TCU   [] PT/OT  OTHERS:-    Disposition:

## 2023-11-13 NOTE — TELEPHONE ENCOUNTER
Called Patient's Dianelys Mata. Gave her day and time for hospital follow up. Patient's POA verbalized understanding and thanked me. David Yip. Spoke to [employer]. Informed her of patient's appointment. Layla verbalized understanding and thanked me.

## 2023-11-19 LAB
EKG ATRIAL RATE: 113 BPM
EKG P-R INTERVAL: 232 MS
EKG Q-T INTERVAL: 366 MS
EKG QRS DURATION: 122 MS
EKG QTC CALCULATION (BAZETT): 502 MS
EKG R AXIS: 80 DEGREES
EKG T AXIS: 44 DEGREES
EKG VENTRICULAR RATE: 113 BPM

## 2023-11-28 ENCOUNTER — OFFICE VISIT (OUTPATIENT)
Dept: ENT CLINIC | Age: 73
End: 2023-11-28
Payer: MEDICARE

## 2023-11-28 ENCOUNTER — TELEPHONE (OUTPATIENT)
Dept: FAMILY MEDICINE CLINIC | Age: 73
End: 2023-11-28

## 2023-11-28 VITALS
OXYGEN SATURATION: 93 % | HEIGHT: 69 IN | RESPIRATION RATE: 16 BRPM | HEART RATE: 104 BPM | TEMPERATURE: 97.1 F | SYSTOLIC BLOOD PRESSURE: 120 MMHG | DIASTOLIC BLOOD PRESSURE: 68 MMHG | BODY MASS INDEX: 32.07 KG/M2

## 2023-11-28 DIAGNOSIS — R06.02 SHORTNESS OF BREATH: ICD-10-CM

## 2023-11-28 DIAGNOSIS — J38.01 VOCAL FOLD PARALYSIS, RIGHT: Primary | ICD-10-CM

## 2023-11-28 DIAGNOSIS — K22.4 CRICOPHARYNGEUS MUSCLE DYSFUNCTION: ICD-10-CM

## 2023-11-28 DIAGNOSIS — R13.14 PHARYNGOESOPHAGEAL DYSPHAGIA: ICD-10-CM

## 2023-11-28 DIAGNOSIS — R49.0 DYSPHONIA: ICD-10-CM

## 2023-11-28 PROCEDURE — 3078F DIAST BP <80 MM HG: CPT | Performed by: OTOLARYNGOLOGY

## 2023-11-28 PROCEDURE — G8427 DOCREV CUR MEDS BY ELIG CLIN: HCPCS | Performed by: OTOLARYNGOLOGY

## 2023-11-28 PROCEDURE — 1123F ACP DISCUSS/DSCN MKR DOCD: CPT | Performed by: OTOLARYNGOLOGY

## 2023-11-28 PROCEDURE — 1036F TOBACCO NON-USER: CPT | Performed by: OTOLARYNGOLOGY

## 2023-11-28 PROCEDURE — G8417 CALC BMI ABV UP PARAM F/U: HCPCS | Performed by: OTOLARYNGOLOGY

## 2023-11-28 PROCEDURE — 3074F SYST BP LT 130 MM HG: CPT | Performed by: OTOLARYNGOLOGY

## 2023-11-28 PROCEDURE — 99215 OFFICE O/P EST HI 40 MIN: CPT | Performed by: OTOLARYNGOLOGY

## 2023-11-28 PROCEDURE — 92511 NASOPHARYNGOSCOPY: CPT | Performed by: OTOLARYNGOLOGY

## 2023-11-28 PROCEDURE — 3017F COLORECTAL CA SCREEN DOC REV: CPT | Performed by: OTOLARYNGOLOGY

## 2023-11-28 PROCEDURE — 1111F DSCHRG MED/CURRENT MED MERGE: CPT | Performed by: OTOLARYNGOLOGY

## 2023-11-28 PROCEDURE — G8484 FLU IMMUNIZE NO ADMIN: HCPCS | Performed by: OTOLARYNGOLOGY

## 2023-11-28 NOTE — TELEPHONE ENCOUNTER
We have this patient scheduled for surgery with Dr Ordonez Given tentatively on 12/22/23 and to see Dr Nithin Briscoe on 12/13/23. However, we would like to move his surgery up to next Friday 12/8/23 if there is any way he can be cleared prior. He did already have labs, ekg, chest xray done from a hospital visit recently. Also, his phone # has been updated. He is in [address] [place] [address],[address] so his [address] [facility] (sister) is the phone # listed now. [phone]    Please advise. Thank you!

## 2023-11-28 NOTE — PROGRESS NOTES
[facility] [place] [facility]  [facility] E[facility], NOSE AND THRO[facility]  [address]  Dept: [phone]  Dept Fax: [phone]  Loc: [phone]    Steph Dumas is a 68 y.o. male who was referred by No ref. provider found for:  Chief Complaint   Patient presents with    Follow-up     Patient here for hospital f/u       HPI:     Steph Dumas is a 68 y.o. male seen by otolaryngology and a recent hospitalization for aspiration pneumonia post being found down for 4 days with subsequent critical illness. The patient was found at that time to have a vocal cord paralysis and associated dysphagia. A gastrostomy tube was placed and the patient was made NPO. He is currently at a nursing home getting chronic care for his multiple decubitus ulcers. He continues have difficulty handling his own secretions and has a weak voice that apparently was entirely normal prior to his intubation was critical illness. He had no neck surgery at the time so the temporal association is that somehow his intubation was associated with his cord stopping normal function. They come for follow-up evaluation of his voicing and swallowing problems.      History:     No Known Allergies  Current Outpatient Medications   Medication Sig Dispense Refill    apixaban (ELIQUIS) 5 MG TABS tablet Take 1 tablet by mouth 2 times daily After completing 10 mg BID, start above dose 60 tablet 0    sodium chloride (OCEAN, BABY AYR) 0.65 % nasal spray 2 sprays by Nasal route 2 times daily as needed for Congestion  0    amLODIPine (NORVASC) 2.5 MG tablet take 1 tablet by mouth once daily 90 tablet 3    atorvastatin (LIPITOR) 20 MG tablet take 1 tablet by mouth once daily 90 tablet 3    tiZANidine (ZANAFLEX) 4 MG tablet Take 1 tablet by mouth every 8 hours as needed (neck stiffness/pain) 30 tablet 0    levETIRAcetam (KEPPRA) 1000 MG tablet take 1 tablet by mouth twice a day 180 tablet 3    Handicap Placard MISC

## 2023-11-28 NOTE — TELEPHONE ENCOUNTER
Antonella @ ENT informed and verbalized understating  I informed her appt on 12/13/23  for pre-op cancelled

## 2023-11-28 NOTE — TELEPHONE ENCOUNTER
----- Message from Sherman Clark sent at 11/28/2023 11:31 AM EST -----  Subject: Appointment Request    Reason for Call: Established Patient Appointment needed: Routine Pre-Op    QUESTIONS    Reason for appointment request? Available appointments did not meet   patient need     Additional Information for Provider? Pt has surgery scheduled on   12/08/2023. This is for a balloon dilatation and vocal cord injection. He   needs to get in for a pre op before the 8th and the availability does not   work with the pt.  Please contact to let him know if there is any other   time he can get in, or if you are able to review the lab and ekg results   and clear him he would like to know if that is an option .   ---------------------------------------------------------------------------  --------------  [address] [place]  [phone]; OK to leave message on voicemail  ---------------------------------------------------------------------------  --------------  SCRIPT ANSWERS

## 2023-11-29 ENCOUNTER — TELEPHONE (OUTPATIENT)
Dept: WOUND CARE | Age: 73
End: 2023-11-29

## 2023-11-29 NOTE — PROGRESS NOTES
Pt resides at [facility] ([phone])    Who will be transporting patient? Xu will arrange  Who will be with patient? Sister will meet patient at [facility]  Is the patient oriented? A&O x3  Height:    5'10\"          Weight: 213.2lbs  Bring list of medications with dose and frequency. Does patient have any assistive devices? Speanita Cordero  Is the patient weight bearing? Very weak  How many people are needed to move the patient? X2 assist  Does the patient have a pacemaker or AICD?  no  Medication instructions given  Bring list of medications with dose and frequency.   Please fax medication list and send list of allergies  NPO after midnight  Bring insurance info and drivers license  Wear comfortable clean clothing  Do not bring jewelry or valuables  Shower night before and morning of surgery with a liquid antibacterial soap  Follow all instructions given by your physician   needed at discharge  Name of nurse you spoke with:  Vitaliy Sterling

## 2023-11-30 ENCOUNTER — PREP FOR PROCEDURE (OUTPATIENT)
Dept: ENT CLINIC | Age: 73
End: 2023-11-30

## 2023-12-05 RX ORDER — SODIUM CHLORIDE 9 MG/ML
INJECTION, SOLUTION INTRAVENOUS PRN
Status: CANCELLED | OUTPATIENT
Start: 2023-12-05

## 2023-12-05 RX ORDER — SODIUM CHLORIDE 0.9 % (FLUSH) 0.9 %
5-40 SYRINGE (ML) INJECTION EVERY 12 HOURS SCHEDULED
Status: CANCELLED | OUTPATIENT
Start: 2023-12-05

## 2023-12-05 RX ORDER — SODIUM CHLORIDE 0.9 % (FLUSH) 0.9 %
5-40 SYRINGE (ML) INJECTION PRN
Status: CANCELLED | OUTPATIENT
Start: 2023-12-05

## 2023-12-05 NOTE — H&P
Adapted from prior ENT note:    Vocal fold paralysis, right; Dysphonia; Shortness of breath; Pharyngoesophageal dysphagia; Cricopharyngeus muscle dysfunction  No new symptoms    Past Medical History:   Diagnosis Date    Cognitive deficit due to old head trauma     Heart failure with preserved ejection fraction (HCC)     History of acute kidney injury from excessive NSAID use     History of alcohol abuse     History of DVT of right lower extremity     History of non-healing open leg wound of R leg. S/p skin graft. Healed. From chronic venous insuficiency. History of traumatic brain injury     Hyperlipidemia     Hypertension     Mentally challenged     \"slow\" states his sister    Obesity (BMI 30-39. 9)     Osteoarthritis     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 ([address]) 07/2014    s/p fall down a hill, Dr. Marc Sanchez follows, no seizure activity since    Seizure disorder ([address]) 07/17/2014       Past Surgical History:   Procedure Laterality Date    HI I&D DEEP ABSC BURSA/HEMATOMA THIGH/KNEE REGION Right 9/25/2017    RIGHT SERGIO LEG WOUND DEBRIDEMENT WITH SKIN GRAFT AND WOUND VAC APPLICATION performed by Jason Barger DPM at [address] [place] Right 10/9/2017    SPLIT THICKNESS SKIN GRAFT FROM RIGHT LEG performed by Jason Barger DPM at [address] [facility] [address] 11/10/2023    EGD PEG TUBE INSERTION performed by Love Rojo MD at [address] [facility]      vein stripping       No Known Allergies    Current Facility-Administered Medications   Medication Dose Route Frequency Provider Last Rate Last Admin    ampicillin-sulbactam (UNASYN) 3,000 mg in sodium chloride 0.9 % 100 mL IVPB (mini-bag)  3,000 mg IntraVENous Once Layla Mayo, APRN - CNP        sodium chloride flush 0.9 % injection 5-40 mL  5-40 mL IntraVENous 2 times per day Layla Mayo, APRN - CNP        sodium chloride flush 0.9 % injection 5-40 mL  5-40 mL IntraVENous PRN Layla Mayo, (ELIQUIS) 5 MG TABS tablet Take 1 tablet by mouth 2 times daily After completing 10 mg BID, start above dose 60 tablet 0    sodium chloride (OCEAN, BABY AYR) 0.65 % nasal spray 2 sprays by Nasal route 2 times daily as needed for Congestion   0    amLODIPine (NORVASC) 2.5 MG tablet take 1 tablet by mouth once daily 90 tablet 3    atorvastatin (LIPITOR) 20 MG tablet take 1 tablet by mouth once daily 90 tablet 3    tiZANidine (ZANAFLEX) 4 MG tablet Take 1 tablet by mouth every 8 hours as needed (neck stiffness/pain) 30 tablet 0    levETIRAcetam (KEPPRA) 1000 MG tablet take 1 tablet by mouth twice a day 180 tablet 3    Handicap Placard MISC by Does not apply route Expires 15 SEPT 2025 1 each 0    aspirin 81 MG chewable tablet Take 1 tablet by mouth daily          No current facility-administered medications for this visit. Past Medical History        Past Medical History:   Diagnosis Date    Cognitive deficit due to old head trauma      Heart failure with preserved ejection fraction (HCC)      History of acute kidney injury from excessive NSAID use      History of alcohol abuse      History of DVT of right lower extremity      History of non-healing open leg wound of R leg. S/p skin graft. Healed. From chronic venous insuficiency. History of traumatic brain injury      Hyperlipidemia      Hypertension      Mentally challenged       \"slow\" states his sister    Obesity (BMI 30-39. 9)      Osteoarthritis      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 ([address]) 07/2014     s/p fall down a hill, Dr. Barbara Valdovinos follows, no seizure activity since    Seizure disorder ([address]) 07/17/2014         Past Surgical History         Past Surgical History:   Procedure Laterality Date    MT I&D DEEP ABSC BURSA/HEMATOMA THIGH/KNEE REGION Right 9/25/2017     RIGHT SERGIO LEG WOUND DEBRIDEMENT WITH SKIN GRAFT AND WOUND VAC APPLICATION performed by Deepti Weller DPM at [address] [place] [address] Right 10/9/2017     SPLIT THICKNESS SKIN GRAFT FROM RIGHT LEG

## 2023-12-06 ENCOUNTER — TELEPHONE (OUTPATIENT)
Dept: ENT CLINIC | Age: 73
End: 2023-12-06

## 2023-12-06 NOTE — TELEPHONE ENCOUNTER
Dr Charles Davis from [facility] cleared the patient on 12/1/23. I spoke to [facility] at [facility] and Dr Charles Davis will fax the written clearance on 12/7/23.

## 2023-12-07 NOTE — TELEPHONE ENCOUNTER
Received a message from medical assistant that someone called and wanted to know about specific pre op clearance form. I called [address] [place] [address] and [facility]. I informed her that as long as ov says patient is clear to proceed, didn't need specific form. She voiced understanding.

## 2023-12-08 ENCOUNTER — ANESTHESIA EVENT (OUTPATIENT)
Dept: OPERATING ROOM | Age: 73
End: 2023-12-08
Payer: MEDICARE

## 2023-12-08 ENCOUNTER — HOSPITAL ENCOUNTER (OUTPATIENT)
Age: 73
Setting detail: OUTPATIENT SURGERY
Discharge: HOME | End: 2023-12-08
Attending: OTOLARYNGOLOGY | Admitting: OTOLARYNGOLOGY
Payer: MEDICARE

## 2023-12-08 ENCOUNTER — APPOINTMENT (OUTPATIENT)
Dept: GENERAL RADIOLOGY | Age: 73
End: 2023-12-08
Attending: OTOLARYNGOLOGY
Payer: MEDICARE

## 2023-12-08 ENCOUNTER — ANESTHESIA (OUTPATIENT)
Dept: OPERATING ROOM | Age: 73
End: 2023-12-08
Payer: MEDICARE

## 2023-12-08 VITALS
DIASTOLIC BLOOD PRESSURE: 62 MMHG | HEIGHT: 69 IN | OXYGEN SATURATION: 100 % | SYSTOLIC BLOOD PRESSURE: 145 MMHG | HEART RATE: 90 BPM | RESPIRATION RATE: 18 BRPM | TEMPERATURE: 98.1 F | BODY MASS INDEX: 31.7 KG/M2 | WEIGHT: 214 LBS

## 2023-12-08 PROBLEM — L89.812: Status: ACTIVE | Noted: 2023-12-08

## 2023-12-08 PROBLEM — J38.01 PARESIS OF LEFT VOCAL CORD: Status: ACTIVE | Noted: 2023-12-08

## 2023-12-08 PROBLEM — K22.2 ESOPHAGEAL STRICTURE: Status: ACTIVE | Noted: 2023-12-08

## 2023-12-08 PROBLEM — J38.7 PRESBYLARYNGES: Status: ACTIVE | Noted: 2023-12-08

## 2023-12-08 PROCEDURE — 2500000003 HC RX 250 WO HCPCS: Performed by: NURSE ANESTHETIST, CERTIFIED REGISTERED

## 2023-12-08 PROCEDURE — 7100000001 HC PACU RECOVERY - ADDTL 15 MIN: Performed by: OTOLARYNGOLOGY

## 2023-12-08 PROCEDURE — 6360000002 HC RX W HCPCS: Performed by: NURSE ANESTHETIST, CERTIFIED REGISTERED

## 2023-12-08 PROCEDURE — C1726 CATH, BAL DIL, NON-VASCULAR: HCPCS | Performed by: OTOLARYNGOLOGY

## 2023-12-08 PROCEDURE — 2709999900 HC NON-CHARGEABLE SUPPLY: Performed by: OTOLARYNGOLOGY

## 2023-12-08 PROCEDURE — 3700000000 HC ANESTHESIA ATTENDED CARE: Performed by: OTOLARYNGOLOGY

## 2023-12-08 PROCEDURE — 7100000011 HC PHASE II RECOVERY - ADDTL 15 MIN: Performed by: OTOLARYNGOLOGY

## 2023-12-08 PROCEDURE — 3600000004 HC SURGERY LEVEL 4 BASE: Performed by: OTOLARYNGOLOGY

## 2023-12-08 PROCEDURE — 2580000003 HC RX 258: Performed by: NURSE PRACTITIONER

## 2023-12-08 PROCEDURE — APPNB45 APP NON BILLABLE 31-45 MINUTES: Performed by: NURSE PRACTITIONER

## 2023-12-08 PROCEDURE — 71045 X-RAY EXAM CHEST 1 VIEW: CPT

## 2023-12-08 PROCEDURE — 11042 DBRDMT SUBQ TIS 1ST 20SQCM/<: CPT | Performed by: OTOLARYNGOLOGY

## 2023-12-08 PROCEDURE — 7100000000 HC PACU RECOVERY - FIRST 15 MIN: Performed by: OTOLARYNGOLOGY

## 2023-12-08 PROCEDURE — 6370000000 HC RX 637 (ALT 250 FOR IP): Performed by: OTOLARYNGOLOGY

## 2023-12-08 PROCEDURE — 43195 ESOPHAGOSCOPY RIGID BALLOON: CPT | Performed by: OTOLARYNGOLOGY

## 2023-12-08 PROCEDURE — 3700000001 HC ADD 15 MINUTES (ANESTHESIA): Performed by: OTOLARYNGOLOGY

## 2023-12-08 PROCEDURE — 6360000002 HC RX W HCPCS: Performed by: NURSE PRACTITIONER

## 2023-12-08 PROCEDURE — 7100000010 HC PHASE II RECOVERY - FIRST 15 MIN: Performed by: OTOLARYNGOLOGY

## 2023-12-08 PROCEDURE — L8607 INJ VOCAL CORD BULKING AGENT: HCPCS | Performed by: OTOLARYNGOLOGY

## 2023-12-08 PROCEDURE — 3600000014 HC SURGERY LEVEL 4 ADDTL 15MIN: Performed by: OTOLARYNGOLOGY

## 2023-12-08 PROCEDURE — 31574 LARGSC W/NJX AUGMENTATION: CPT | Performed by: OTOLARYNGOLOGY

## 2023-12-08 DEVICE — PROLARYN PLUS IS A WATER BASED INJECTABLE GEL IMPLANT USED TO TREAT VOCAL FOLD INSUFFICIENCY. THE PRINCIPLE COMPONENT OF PROLARYN PLUS IS SYNTHETIC CALCIUM HYDROXYAPATITE, A BIOMATERIAL FOUND IN BONE AND TEETH. INJECTION WITH PROLARYN PLUS AUGMENTS OR BULKS UPS THE DISPLACED OR DAMAGED VOCAL FOLD SO THAT IT CAN IMPROVE SPEAKING. THE RESULT IS LONG TERM RESTORATION AND AUGMENTATION. PROLARYN PLUS CAN BE INJECTED WITH A 26 GAUGE OR LARGER DIAMETER THIN-WALL-NEEDLE.
Type: IMPLANTABLE DEVICE | Site: LARYNX | Status: FUNCTIONAL
Brand: PROLARYN PLUS INJECTABLE IMPLANT

## 2023-12-08 RX ORDER — PROPOFOL 10 MG/ML
INJECTION, EMULSION INTRAVENOUS PRN
Status: DISCONTINUED | OUTPATIENT
Start: 2023-12-08 | End: 2023-12-08 | Stop reason: SDUPTHER

## 2023-12-08 RX ORDER — SODIUM CHLORIDE 0.9 % (FLUSH) 0.9 %
5-40 SYRINGE (ML) INJECTION EVERY 12 HOURS SCHEDULED
Status: DISCONTINUED | OUTPATIENT
Start: 2023-12-08 | End: 2023-12-08 | Stop reason: HOSPADM

## 2023-12-08 RX ORDER — MIDAZOLAM HYDROCHLORIDE 1 MG/ML
INJECTION INTRAMUSCULAR; INTRAVENOUS PRN
Status: DISCONTINUED | OUTPATIENT
Start: 2023-12-08 | End: 2023-12-08 | Stop reason: SDUPTHER

## 2023-12-08 RX ORDER — SODIUM CHLORIDE 9 MG/ML
INJECTION, SOLUTION INTRAVENOUS PRN
Status: DISCONTINUED | OUTPATIENT
Start: 2023-12-08 | End: 2023-12-08 | Stop reason: HOSPADM

## 2023-12-08 RX ORDER — LIDOCAINE HCL/PF 100 MG/5ML
SYRINGE (ML) INJECTION PRN
Status: DISCONTINUED | OUTPATIENT
Start: 2023-12-08 | End: 2023-12-08 | Stop reason: SDUPTHER

## 2023-12-08 RX ORDER — SODIUM CHLORIDE 0.9 % (FLUSH) 0.9 %
5-40 SYRINGE (ML) INJECTION PRN
Status: DISCONTINUED | OUTPATIENT
Start: 2023-12-08 | End: 2023-12-08 | Stop reason: HOSPADM

## 2023-12-08 RX ORDER — ONDANSETRON 2 MG/ML
INJECTION INTRAMUSCULAR; INTRAVENOUS PRN
Status: DISCONTINUED | OUTPATIENT
Start: 2023-12-08 | End: 2023-12-08 | Stop reason: SDUPTHER

## 2023-12-08 RX ORDER — FENTANYL CITRATE 50 UG/ML
INJECTION, SOLUTION INTRAMUSCULAR; INTRAVENOUS PRN
Status: DISCONTINUED | OUTPATIENT
Start: 2023-12-08 | End: 2023-12-08 | Stop reason: SDUPTHER

## 2023-12-08 RX ORDER — ROCURONIUM BROMIDE 10 MG/ML
INJECTION, SOLUTION INTRAVENOUS PRN
Status: DISCONTINUED | OUTPATIENT
Start: 2023-12-08 | End: 2023-12-08 | Stop reason: SDUPTHER

## 2023-12-08 RX ORDER — GINSENG 100 MG
CAPSULE ORAL PRN
Status: DISCONTINUED | OUTPATIENT
Start: 2023-12-08 | End: 2023-12-08 | Stop reason: ALTCHOICE

## 2023-12-08 RX ADMIN — SODIUM CHLORIDE: 9 INJECTION, SOLUTION INTRAVENOUS at 11:20

## 2023-12-08 RX ADMIN — FENTANYL CITRATE 100 MCG: 50 INJECTION, SOLUTION INTRAMUSCULAR; INTRAVENOUS at 15:40

## 2023-12-08 RX ADMIN — ONDANSETRON 4 MG: 2 INJECTION INTRAMUSCULAR; INTRAVENOUS at 16:22

## 2023-12-08 RX ADMIN — ROCURONIUM BROMIDE 50 MG: 10 INJECTION INTRAVENOUS at 16:14

## 2023-12-08 RX ADMIN — PROPOFOL 150 MCG/KG/MIN: 10 INJECTION, EMULSION INTRAVENOUS at 16:10

## 2023-12-08 RX ADMIN — Medication 100 MG: at 16:08

## 2023-12-08 RX ADMIN — PROPOFOL 130 MG: 10 INJECTION, EMULSION INTRAVENOUS at 16:08

## 2023-12-08 RX ADMIN — SUGAMMADEX 200 MG: 100 INJECTION, SOLUTION INTRAVENOUS at 16:31

## 2023-12-08 RX ADMIN — MIDAZOLAM 2 MG: 1 INJECTION INTRAMUSCULAR; INTRAVENOUS at 15:40

## 2023-12-08 RX ADMIN — FENTANYL CITRATE 100 MCG: 50 INJECTION, SOLUTION INTRAMUSCULAR; INTRAVENOUS at 16:12

## 2023-12-08 RX ADMIN — SODIUM CHLORIDE 3000 MG: 900 INJECTION INTRAVENOUS at 15:55

## 2023-12-08 ASSESSMENT — PAIN - FUNCTIONAL ASSESSMENT: PAIN_FUNCTIONAL_ASSESSMENT: 0-10

## 2023-12-08 ASSESSMENT — ENCOUNTER SYMPTOMS: SHORTNESS OF BREATH: 1

## 2023-12-08 NOTE — PROGRESS NOTES
Pt admitted to [facility] room 5 and oriented to unit. SCD sleeves applied. Nares swabbed. Pt verbalized permission for first name, last initial and physicians name on white board. SDS board and discharge criteria explained, pt  understanding. Pt denies thoughts of harming self or others. Call light in reach. Patient is from [facility] [address] [facility]. Lawanda Almendarez will be transporting patient home at discharge.  [phone]

## 2023-12-08 NOTE — DISCHARGE INSTRUCTIONS
- Resume all medications as prior to surgery  - Resume tube feedings any time after 8pm tonight  - Resume activities as prior to surgery  - Please increase oral hygiene care/frequency  - Bacitracin to left cheek twice daily for the next week. The cheek wound was debrided in surgery today, so it may ooze some blood for the next day or so. Apply and change dressing if needed, as needed.

## 2023-12-08 NOTE — PROGRESS NOTES
Pt has met discharge criteria and states he is ready for discharge to home. IV removed, gauze and tape applied. Dressed in own clothes and personal belongings gathered. Discharge instructions (with opioid medication education information) given to pt and family; pt and family verbalized understanding of discharge instructions, prescriptions and follow up appointments. Pt transported to discharge lobby by [facility] staff. [facility].

## 2023-12-08 NOTE — ANESTHESIA PRE PROCEDURE
Date/Time    WBC 8.2 11/12/2023 05:46 AM    RBC 3.51 11/12/2023 05:46 AM    HGB 9.7 11/12/2023 05:46 AM    HCT 32.2 11/12/2023 05:46 AM    MCV 91.7 11/12/2023 05:46 AM    RDW 15.5 10/26/2017 05:22 AM     11/12/2023 05:46 AM       CMP:   Lab Results   Component Value Date/Time     11/12/2023 05:46 AM    K 3.6 11/12/2023 05:46 AM    K 3.8 11/08/2023 05:06 AM     11/12/2023 05:46 AM    CO2 28 11/12/2023 05:46 AM    BUN 9 11/12/2023 05:46 AM    CREATININE 0.4 11/12/2023 05:46 AM    LABGLOM >60 11/12/2023 05:46 AM    GLUCOSE 142 11/12/2023 05:46 AM    PROT 6.2 11/08/2023 05:06 AM    CALCIUM 8.4 11/12/2023 05:46 AM    BILITOT 0.4 11/08/2023 05:06 AM    ALKPHOS 81 11/08/2023 05:06 AM    AST 22 11/08/2023 05:06 AM    ALT 36 11/08/2023 05:06 AM       POC Tests: No results for input(s): \"POCGLU\", \"POCNA\", \"POCK\", \"POCCL\", \"POCBUN\", \"POCHEMO\", \"POCHCT\" in the last 72 hours.     Coags:   Lab Results   Component Value Date/Time    PROTIME 24.9 10/01/2014 08:17 AM    INR 2.00 11/07/2023 01:04 PM    APTT 38.1 11/07/2023 01:04 PM       HCG (If Applicable): No results found for: \"PREGTESTUR\", \"PREGSERUM\", \"HCG\", \"HCGQUANT\"     ABGs: No results found for: \"PHART\", \"PO2ART\", \"KQN9EJB\", \"HMY4XNV\", \"BEART\", \"G0BYROJE\"     Type & Screen (If Applicable):  Lab Results   Component Value Date    LABRH NEG 11/09/2023       Drug/Infectious Status (If Applicable):  Lab Results   Component Value Date/Time    HEPCAB Negative 10/29/2018 08:58 AM       COVID-19 Screening (If Applicable):   Lab Results   Component Value Date/Time    COVID19 NOT DETECTED 11/07/2023 03:05 PM           Anesthesia Evaluation  Patient summary reviewed  Airway: Mallampati: II  TM distance: >3 FB   Neck ROM: full  Mouth opening: > = 3 FB   Dental: normal exam         Pulmonary:   (+)   shortness of breath:     decreased breath sounds                               Cardiovascular:    (+) hypertension:                  Neuro/Psych:   (+) seizures:,

## 2023-12-08 NOTE — OP NOTE
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 Item Serial No.  Lot No. LRB No. Used Action   IMPLANT LARYN 1ML GEL INJ VOCAL CRD MEDIALIZATION PROLARYN - [id number]  IMPLANT LARYN 1ML GEL INJ VOCAL CRD MEDIALIZATION [facility] [id number] N/A 1 Implanted         Drains:   Gastrostomy/Enterostomy/Jejunostomy Tube Percutaneous Endoscopic Gastrostomy (PEG) LUQ 20 fr (Active)   Site Description Clean, dry & intact 11/12/23 1547   J Port Status Clamped 11/12/23 1547   G Port Status Clamped 11/12/23 1547   Surrounding Skin Clean, dry & intact 11/12/23 1300   Dressing Type Dry dressing 11/12/23 1300   Tube Feeding Other Tube Feeding (specify) 11/10/23 2107   Tube feeding/verify rate (mL/hr) 50 mL/hr 11/12/23 0836   Tube Feeding Supplement (Product) Wound Healing Supplement 11/12/23 1300   Tube Feeding Intake (mL) 1063 ml 11/12/23 1547   Tube Feeding Supplement Amount (mL) 530 mL 11/12/23 1547   Free Water/Flush (mL) 240 mL 11/11/23 1622   Action Taken Other (comment) 11/10/23 1600       [REMOVED] NG/OG/NJ/NE Tube Nasogastric 18 fr Right nostril (Removed)       Findings: 1.   Right true vocal fold markedly hypoplastic; augmented with 2 aliquots of 0.1 cc of brought the balloon to approximately 19 mm. I insufflated the balloon until no further fluid needed to be added to maintain pressure. At that point the pneumatic work of the balloon on the soft tissues of the esophagus was completed. I deflated the balloon and saw small amount of blood emerged from the esophageal inlet consistent with a shallow shearing likely of a cicatrix process. I removed the balloon and examined the patient's larynx. I rotated the camera to check for any significant extravasation of the injected material into the vocal folds and found none. As such my assistant reintubated the patient with a #6 endotracheal tube without difficulty. After inflating the cuff she removed the laryngoscope and connected the patient back to a close circuit ventilation. The table was turned 90 degrees back to anesthesia. The patient was reversed from general anesthesia and extubated in the operating room. He was taken recovery in satisfactory condition. Estimated blood loss of the case was less than 10 cc the majority of which occurred with the debridement and the patient was given less than a liter of intravenous lactated Ringer's intraoperatively. No blood products were transfused. No intraoperative complications were detected. Counts were considered accurate x 3. I was present for participated in the patient's entire operation along with my assistant at surgery, [address] [place] [address] NOAH Yip.     Electronically signed by Prabha Russell MD on 12/8/2023 at 4:57 PM

## 2023-12-08 NOTE — PROGRESS NOTES
1640  - pt arrives to pacu, respirations unlabored on     1650 - pt denies pain, xray at bedside    1700 - pt meets criteria for discharge from pacu at this time per Dr. Gabino Archer, pt transported to [facility] in stable condition

## 2023-12-09 NOTE — ANESTHESIA POSTPROCEDURE EVALUATION
Department of Anesthesiology  Postprocedure Note    Patient: Lianna Delatorre  MRN: [id number]  YOB: 1950  Date of evaluation: 12/8/2023      Procedure Summary       Date: 12/08/23 Room / Location: [address] [place]    Anesthesia Start: 1540 Anesthesia Stop: 1644    Procedure: Laryngoscopy bilateral  Injection Augmentation of Prolaryn Plus, Esophagoscopy Rigid with Balloon Dilation, left facial wound debridement Diagnosis:       Dysphonia      Vocal fold paralysis, right      Pharyngoesophageal dysphagia      Cricopharyngeus muscle dysfunction      (Dysphonia [R49.0])      (Vocal fold paralysis, 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     Daljit Phase I: Daljit Score: 10    Daljit Phase II: Daljit Score: 10      Anesthesia Post Evaluation    Patient location during evaluation: PACU  Patient participation: complete - patient participated  Level of consciousness: awake and alert  Airway patency: patent  Nausea & Vomiting: no vomiting and no nausea  Complications: no  Cardiovascular status: hemodynamically stable  Respiratory status: acceptable and nasal cannula  Hydration status: stable  Pain management: adequate

## 2023-12-13 ENCOUNTER — HOSPITAL ENCOUNTER (OUTPATIENT)
Dept: WOUND CARE | Age: 73
Discharge: HOME | End: 2023-12-13
Attending: INTERNAL MEDICINE
Payer: MEDICARE

## 2023-12-13 VITALS
OXYGEN SATURATION: 100 % | TEMPERATURE: 97.8 F | RESPIRATION RATE: 18 BRPM | DIASTOLIC BLOOD PRESSURE: 77 MMHG | SYSTOLIC BLOOD PRESSURE: 142 MMHG | HEART RATE: 105 BPM

## 2023-12-13 DIAGNOSIS — I87.2 VENOUS INSUFFICIENCY OF RIGHT LEG: Primary | ICD-10-CM

## 2023-12-13 PROCEDURE — 97598 DBRDMT OPN WND ADDL 20CM/<: CPT

## 2023-12-13 PROCEDURE — 99214 OFFICE O/P EST MOD 30 MIN: CPT

## 2023-12-13 PROCEDURE — 97597 DBRDMT OPN WND 1ST 20 CM/<: CPT

## 2023-12-13 PROCEDURE — 6370000000 HC RX 637 (ALT 250 FOR IP): Performed by: INTERNAL MEDICINE

## 2023-12-13 RX ORDER — LIDOCAINE HYDROCHLORIDE 20 MG/ML
JELLY TOPICAL PRN
Status: DISCONTINUED | OUTPATIENT
Start: 2023-12-13 | End: 2023-12-14 | Stop reason: HOSPADM

## 2023-12-13 RX ADMIN — LIDOCAINE HYDROCHLORIDE: 20 JELLY TOPICAL at 11:11

## 2023-12-13 NOTE — PROGRESS NOTES
Osteoarthritis M19.90    Hypertension I10    Hyperlipidemia E78.5    History of DVT of right lower extremity Z86.718    Heart failure with preserved ejection fraction (HCC) I50.30    History of alcohol abuse F10.11    History of traumatic brain injury Z87.820    History of acute kidney injury from excessive NSAID use Z87.828    Vitamin B6 deficiency E53.1    Chronic bilateral low back pain without sciatica M54.50, G89.29    Obesity (BMI 30-39. 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 Please see attached Discharge Instructions    Written patient dismissal instructions given to patient and signed by patient or POA.            Electronically signed by Nimesh Frank MD on 12/13/2023 at 10:38 AM

## 2023-12-13 NOTE — PATIENT INSTRUCTIONS
Visit Discharge/Physician Orders:  - Mechanical Debridement was completed today by using a saline and gauze. Home Care: [facility]     Dressing orders:     1) Gather wound care supplies and arrange on clean table. 2) Wash your hands with soap and water or use alcohol based hand  for 20 seconds (sing \"Happy Birthday\" twice). 3) Cleanse wounds with normal saline or wound cleanser and gauze. Pat dry with clean gauze. 4) Bilateral knees- apply wet to dry dressing cover with guaze, abd pad, and kerlix. Change daily  Chest- apply wet to dry dressing cover with guaze, abd pad, change daily   Left cheek-cover with foam dressing. Change daily. Right lower leg: cover with foams and change daily     Keep all dressings clean & dry. Follow up visit:   2 Weeks Wednesday December 27 at 10:00 am     Supplies:    Duration of dressings: We have sent your supply order to the following company:   If you don't receive the items you were expecting or don't know what the items are that you received, call the company where the order was sent. If you are unable to obtain wound supplies, continue to use the supplies you have available until you are able to reach us. It is most important to keep the wound covered at all times. It is YOUR responsibility to make sure that supplies are re-ordered before you run out. Re-order telephone numbers are included in each package. Keep next scheduled appointment. Please give 24 hour notice if unable to keep appointment. [phone]    If you experience any of the following, please call the Wound Care Service during business hours: Monday through Friday 8:00 am - 4:30 pm  ([phone]).    *Increase in pain   *Temperature over 101   *Increase in drainage from your wound or a foul odor   *Uncontrolled swelling   *Need for compression bandage changes due to slippage, breakthrough drainage    If you need medical attention outside of business hours, please contact

## 2023-12-13 NOTE — PLAN OF CARE
Problem: Wound:  Goal: Will show signs of wound healing; wound closure and no evidence of infection  Description: Will show signs of wound healing; wound closure and no evidence of infection  Outcome: Progressing   Seen today for multiple wounds. See AVS for discharge   Care plan reviewed with patient . Patient verbalize understanding of the plan of care and contribute to goal setting.

## 2024-01-02 ENCOUNTER — APPOINTMENT (OUTPATIENT)
Dept: GENERAL RADIOLOGY | Age: 74
End: 2024-01-02
Payer: MEDICARE

## 2024-01-02 ENCOUNTER — HOSPITAL ENCOUNTER (EMERGENCY)
Age: 74
Discharge: HOME | End: 2024-01-02
Attending: STUDENT IN AN ORGANIZED HEALTH CARE EDUCATION/TRAINING PROGRAM
Payer: MEDICARE

## 2024-01-02 VITALS
WEIGHT: 205 LBS | BODY MASS INDEX: 30.36 KG/M2 | OXYGEN SATURATION: 99 % | DIASTOLIC BLOOD PRESSURE: 81 MMHG | RESPIRATION RATE: 14 BRPM | TEMPERATURE: 98.3 F | HEIGHT: 69 IN | SYSTOLIC BLOOD PRESSURE: 111 MMHG | HEART RATE: 110 BPM

## 2024-01-02 DIAGNOSIS — K94.23 MALFUNCTION OF PERCUTANEOUS ENDOSCOPIC GASTROSTOMY (PEG) TUBE (HCC): Primary | ICD-10-CM

## 2024-01-02 PROCEDURE — 99283 EMERGENCY DEPT VISIT LOW MDM: CPT

## 2024-01-02 PROCEDURE — 49465 FLUORO EXAM OF G/COLON TUBE: CPT

## 2024-01-02 PROCEDURE — 6360000004 HC RX CONTRAST MEDICATION: Performed by: STUDENT IN AN ORGANIZED HEALTH CARE EDUCATION/TRAINING PROGRAM

## 2024-01-02 RX ADMIN — DIATRIZOATE MEGLUMINE AND DIATRIZOATE SODIUM 30 ML: 600; 100 SOLUTION ORAL; RECTAL at 17:58

## 2024-01-02 ASSESSMENT — PAIN - FUNCTIONAL ASSESSMENT
PAIN_FUNCTIONAL_ASSESSMENT: NONE - DENIES PAIN

## 2024-01-02 NOTE — ED NOTES
Pt resting in bed with no requests at this time. Pt breathing regular and unlabored. Pt has no signs of distress at this time. Call light within reach.

## 2024-01-02 NOTE — DISCHARGE INSTRUCTIONS
Continue taking all of your medications as prescribed.  As we discussed, the tube that is inserted is smaller than the one documented by your surgeon.  You now have a 14 Tamazight PEG tube instead of a 20 Tamazight.  If this becomes problematic please follow-up with your surgeon or a gastroenterologist to have it exchanged and upsized.  Otherwise continue caring for it as normal.  Be sure to watch out when you shower and are moving around to prevent it from withdrawing again.

## 2024-01-02 NOTE — ED NOTES
Pt resting in bed with no requests at this time. Pt is breathing regular and unlabored at this time. Pt does not appear to be any distress currently. Call light within reach.

## 2024-01-02 NOTE — ED PROVIDER NOTES
[facility] EMERGENCY DEPT    EMERGENCY MEDICINE      Pt Name: Levi Soto  MRN: [id number]  Birthdate 1950  Date of evaluation: 1/2/2024  Treating Physician: Krzysztof Dooley DO    CHIEF COMPLAINT       Chief Complaint   Patient presents with    G Tube Complications     PEG tube complications     History obtained from chart review and the patient.    HISTORY OF PRESENT ILLNESS    HPI    Levi Soto is a 73 y.o. male presents to the emergency department for evaluation of Peg tube malfunction. Pt reports that after his shower he was drying off and accidentally pulled his G tube out. The doctor at his facility immediately put in a 14 fr soto catheter and sent to the ED for evaluation. Pt reports an uncomfortable blood pressure cuff, but has no other concerns. Denies fevers, chills, cp, sob. States the wounds on his lower extremities are improving since I saw him last.     The patient has no other acute complaints at this time.      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 S/p skin graft. Healed. From chronic venous insuficiency.     History of traumatic brain injury     Hyperlipidemia     Hypertension     Mentally challenged     \"slow\" states his sister    Moderate protein-calorie malnutrition (HCC)     Obesity (BMI 30-39.9)     Osteoarthritis     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 ([facility]) 07/2014    s/p fall down a hill, Dr. Singleton follows, no seizure activity since    Seizure disorder ([facility]) 07/17/2014    Venous insufficiency        Past Surgical History:   Procedure Laterality Date    LARYNGOSCOPY N/A 12/8/2023    Laryngoscopy bilateral  Injection Augmentation of Prolaryn Plus, Esophagoscopy

## 2024-01-02 NOTE — ED NOTES
Pt resting in bed with no requests at this time. Pt is breathing regular and unlabored at this time. Pt has no signs of distress noted at this time. Call light within reach.

## 2024-01-02 NOTE — ED TRIAGE NOTES
Pt presents to the ED via EMS from [facility] due to PEG tube removal. EMS states pt was assisted with taking a shower and accidental removal of the PEG tube occurred. Dressing is present over the PEG tube at this time. Pt denies any pain at this time. EMS states pt also has sores throughout body that they put dressings on. Pt has dressing on middle of abdomen above PEG tube dressing, bilateral dressings on lower extremities, and open wound on posterior right leg. Pt VSS and A&Ox4.

## 2024-01-03 ENCOUNTER — HOSPITAL ENCOUNTER (OUTPATIENT)
Dept: WOUND CARE | Age: 74
Discharge: HOME | End: 2024-01-03
Attending: INTERNAL MEDICINE
Payer: MEDICARE

## 2024-01-03 VITALS
DIASTOLIC BLOOD PRESSURE: 77 MMHG | RESPIRATION RATE: 20 BRPM | HEART RATE: 116 BPM | TEMPERATURE: 97.3 F | SYSTOLIC BLOOD PRESSURE: 140 MMHG | OXYGEN SATURATION: 97 %

## 2024-01-03 PROCEDURE — 97598 DBRDMT OPN WND ADDL 20CM/<: CPT

## 2024-01-03 PROCEDURE — 97597 DBRDMT OPN WND 1ST 20 CM/<: CPT

## 2024-01-03 RX ORDER — SULFAMETHOXAZOLE AND TRIMETHOPRIM 800; 160 MG/1; MG/1
1 TABLET ORAL 2 TIMES DAILY
COMMUNITY

## 2024-01-03 NOTE — PATIENT INSTRUCTIONS
Visit Discharge/Physician Orders:  - Debridement of left chest wound done today    Home Care: [facility]     Dressing orders:     1) Gather wound care supplies and arrange on clean table.     2) Wash your hands with soap and water or use alcohol based hand  for 20 seconds (sing \"Happy Birthday\" twice).    3) Cleanse wounds with normal saline or wound cleanser and gauze. Pat dry with clean gauze.    4) Left knee- apply saline moist to dry dressing cover with gauze, abd pad, and wrap with kerlix. Change daily  Right knee - betadine moist to dry dressing, cover with gauze and abd, wrap with kerlix, change daily   Chest- apply saline moist to dry dressing cover with guaze, abd pad, change daily   Left cheek-ok to leave open to air    Right lower leg: cover with silicone bordered foam and change daily     Keep all dressings clean & dry.    Follow up visit:   2 Weeks Wednesday January 17th at 0845    Supplies:    Duration of dressings:     We have sent your supply order to the following company:   If you don't receive the items you were expecting or don't know what the items are that you received, call the company where the order was sent.   If you are unable to obtain wound supplies, continue to use the supplies you have available until you are able to reach us. It is most important to keep the wound covered at all times.  It is YOUR responsibility to make sure that supplies are re-ordered before you run out. Re-order telephone numbers are included in each package.     Keep next scheduled appointment. Please give 24 hour notice if unable to keep appointment. [phone]    If you experience any of the following, please call the Wound Care Service during business hours: Monday through Friday 8:00 am - 4:30 pm  ([phone]).   *Increase in pain   *Temperature over 101   *Increase in drainage from your wound or a foul odor   *Uncontrolled swelling   *Need for compression bandage changes due to slippage,

## 2024-01-03 NOTE — DISCHARGE INSTR - COC
Continuity of Care Form    Patient Name: Levi Loving   :  1950  MRN:  [id number]    Admit date:  2024  Discharge date:  ***    Code Status Order: Prior   Advance Directives:     Admitting Physician:  No admitting provider for patient encounter.  PCP: Kar Valdez DO    Discharging Nurse: ***  Discharging Hospital Unit/Room#: 19/019A  Discharging Unit Phone Number: ***    Emergency Contact:   Extended Emergency Contact Information  Primary Emergency Contact: Amara Cabrera   [facility]  Home Phone: [phone]  Mobile Phone: [phone]  Relation: Brother/Sister  Secondary Emergency Contact: Henrry Cabrera   [facility]  Home Phone: [phone]  Relation: Other    Past Surgical History:  Past Surgical History:   Procedure Laterality Date    LARYNGOSCOPY N/A 2023    Laryngoscopy bilateral  Injection Augmentation of Prolaryn Plus, Esophagoscopy Rigid with Balloon Dilation, left facial wound debridement performed by Leander Craven MD at [facility] OR    MI I&D DEEP ABSC BURSA/HEMATOMA THIGH/KNEE REGION Right 2017    RIGHT SERGIO LEG WOUND DEBRIDEMENT WITH SKIN GRAFT AND WOUND VAC APPLICATION performed by Davi Betancourt DPM at [facility] OR    SKIN GRAFT Right 10/9/2017    SPLIT THICKNESS SKIN GRAFT FROM RIGHT LEG performed by Davi Betancourt DPM at [facility] OR    TONSILLECTOMY      UPPER GASTROINTESTINAL ENDOSCOPY N/A 11/10/2023    EGD PEG TUBE INSERTION performed by Barrie Hill MD at [facility] ENDOSCOPY    VASCULAR SURGERY      vein stripping       Immunization History:   Immunization History   Administered Date(s) Administered    Influenza, AFLURIA (age 3 yrs+), FLUZONE, (age 6 mo+), MDV, 0.5mL 10/18/2017    Influenza, FLUAD, (age 65 y+), Adjuvanted, 0.5mL 10/19/2020, 2022    Influenza, FLUARIX, FLULAVAL, FLUZONE (age 6 mo+) AND AFLURIA, (age 3 y+), PF, 0.5mL 09/10/2018    Influenza, Triv, inactivated, subunit, adjuvanted, IM (Fluad 65 yrs and older)

## 2024-01-03 NOTE — PROGRESS NOTES
[facility]          Progress Note and Procedure Note      Levi Loving  MEDICAL RECORD NUMBER:  [id number]  AGE: 73 y.o.   GENDER: male  : 1950  EPISODE DATE:  1/3/2024    Subjective:     SUBJECTIVE     Chief Complaint   Patient presents with    Wound Check       Follow up visit.    HISTORY OF PRESENT ILLNESS      Levi Loving is a 73 y.o. male who presents today for wound/ulcer evaluation.  He was recently hospitalized at treated for aspiration pneumonia patient had deep tissue injury on his face chest and knee due to pressure injury after he had been on the floor for 4 days I was treated for pneumonia today he is seen for follow-up visit.  He has multiple eschars on his face left chest wall and bilateral knee all related to pressure injury    His face wound is much smaller, the left chest wall has loosely attached escar the left knee is granular and the right knee has formed an escar     PAST MEDICAL HISTORY         Diagnosis Date    Cognitive deficit due to old head trauma     Heart failure with preserved ejection fraction (HCC)     History of acute kidney injury from excessive NSAID use     History of alcohol abuse     History of DVT of right lower extremity     History of non-healing open leg wound of R leg. S/p skin graft. Healed. From chronic venous insuficiency.     History of traumatic brain injury     Hyperlipidemia     Hypertension     Mentally challenged     \"slow\" states his sister    Moderate protein-calorie malnutrition (HCC)     Obesity (BMI 30-39.9)     Osteoarthritis     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 ([facility]) 2014    s/p fall down a hill, Dr. Singleton follows, no seizure activity since    Seizure disorder ([facility]) 2014    Venous insufficiency          PAST SURGICAL HISTORY     Past Surgical History:   Procedure Laterality Date    LARYNGOSCOPY N/A 2023    Laryngoscopy bilateral  Injection Augmentation of Prolaryn Plus, Esophagoscopy Rigid with Balloon Dilation,

## 2024-01-03 NOTE — PLAN OF CARE
Problem: Wound:  Goal: Will show signs of wound healing; wound closure and no evidence of infection  Description: Will show signs of wound healing; wound closure and no evidence of infection  Outcome: Progressing  Note: Patient seen for left chest, bilateral knee, right lower leg and left cheek wound. Wound shows signs of proper closure and healing. No s/s of infection noted. Left chest wound debrided today. Patient is at [facility]/   Follow up in 2 weeks. See AVS for order changes. left     Care plan reviewed with patient.  Patient verbalize understanding of the plan of care and contribute to goal setting.

## 2024-01-03 NOTE — ED NOTES
Pt continues to lay in bed and watch tv with no s/s of distress noted. Updated on new ETA of transport. Voiced no needs. Call light in reach.

## 2024-01-03 NOTE — ED NOTES
Pt in bed watching tv with no s/s of distress noted. Pt updated on ETA of transport. Voiced no needs at this time. Call light in reach.

## 2024-01-08 ENCOUNTER — CARE COORDINATION (OUTPATIENT)
Dept: CARE COORDINATION | Age: 74
End: 2024-01-08

## 2024-01-08 ENCOUNTER — OFFICE VISIT (OUTPATIENT)
Dept: ENT CLINIC | Age: 74
End: 2024-01-08

## 2024-01-08 VITALS
BODY MASS INDEX: 30.27 KG/M2 | TEMPERATURE: 97.6 F | RESPIRATION RATE: 16 BRPM | SYSTOLIC BLOOD PRESSURE: 110 MMHG | DIASTOLIC BLOOD PRESSURE: 68 MMHG | HEIGHT: 69 IN | HEART RATE: 101 BPM | OXYGEN SATURATION: 96 %

## 2024-01-08 DIAGNOSIS — J38.01 VOCAL FOLD PARALYSIS, RIGHT: ICD-10-CM

## 2024-01-08 DIAGNOSIS — K22.4 CRICOPHARYNGEUS MUSCLE DYSFUNCTION: ICD-10-CM

## 2024-01-08 DIAGNOSIS — R13.14 PHARYNGOESOPHAGEAL DYSPHAGIA: Primary | ICD-10-CM

## 2024-01-08 DIAGNOSIS — R49.0 DYSPHONIA: ICD-10-CM

## 2024-01-08 NOTE — PROGRESS NOTES
Relation Age of Onset    Cancer Mother     Stroke Mother     Diabetes Mother     High Blood Pressure Mother     High Cholesterol Mother     Stroke Father     COPD Father     Diabetes Maternal Grandmother     Heart Disease Maternal Grandfather     Cancer Sister     Asthma Sister     High Blood Pressure Sister     High Cholesterol Sister     Arthritis Sister     Diabetes Sister     Diabetes Sister     Colon Cancer Neg Hx     Prostate Cancer Neg Hx      Social History     Tobacco Use    Smoking status: Never     Passive exposure: Never    Smokeless tobacco: Never   Substance Use Topics    Alcohol use: No     Comment:          Subjective:      Review of Systems  Rest of review of systems are negative, except as noted in HPI.     Objective:     /68 (Site: Left Upper Arm, Position: Sitting)   Pulse (!) 101   Temp 97.6 °F (36.4 °C) (Infrared)   Resp 16   Ht 1.753 m (5' 9\")   SpO2 96%   BMI 30.27 kg/m²     Physical Exam       General physical exam the patient is pleasant alert oriented and cooperative older man looks younger than his stated age of 73.  His voice and his speech pattern are abnormal for the mild vocal cord raspiness.  His voice and his respirations were dry sounding.  I heard no throat clearing coughing or inspiratory stridor.  His voice was strong.  He had no warbling audible.    Procedure: Flexible laryngoscopy  Indication: The patient has history of vocal cord paralysis and CT dysfunction with chronic aspiration status post surgery  Findings: Following informed consent and spraying avadomide's decongestant and lidocaine through the right nasal airway, I passed a chip tip flexible video endoscope through that side and thoroughly examine his larynx.  The larynx to be abnormal for the presence of the midline and mobile right true vocal fold that had normal symmetrical bulk compared to the fully mobile left side.  He was capable of full flush closure in the midline.  I saw no areas of granuloma

## 2024-01-08 NOTE — CARE COORDINATION
ACWESLY received a BPA RPM referral from ENT office for Levi.  He is currently a resident of [facility].  I called [facility] to confirm this and they did confirm that he is a resident there.  Will not enroll into RPM at this time.

## 2024-01-17 ENCOUNTER — HOSPITAL ENCOUNTER (OUTPATIENT)
Dept: WOUND CARE | Age: 74
Discharge: HOME | End: 2024-01-17
Attending: INTERNAL MEDICINE
Payer: MEDICARE

## 2024-01-17 VITALS
SYSTOLIC BLOOD PRESSURE: 125 MMHG | HEART RATE: 108 BPM | RESPIRATION RATE: 16 BRPM | DIASTOLIC BLOOD PRESSURE: 72 MMHG | TEMPERATURE: 98.4 F | OXYGEN SATURATION: 98 %

## 2024-01-17 PROCEDURE — 97597 DBRDMT OPN WND 1ST 20 CM/<: CPT

## 2024-01-17 PROCEDURE — 6370000000 HC RX 637 (ALT 250 FOR IP): Performed by: INTERNAL MEDICINE

## 2024-01-17 PROCEDURE — 97598 DBRDMT OPN WND ADDL 20CM/<: CPT

## 2024-01-17 PROCEDURE — 17250 CHEM CAUT OF GRANLTJ TISSUE: CPT

## 2024-01-17 RX ADMIN — SILVER NITRATE APPLICATORS 4 EACH: 25; 75 STICK TOPICAL at 09:19

## 2024-01-17 NOTE — PATIENT INSTRUCTIONS
Visit Discharge/Physician Orders:  - Left chest wound debrided today.  - Elevate legs periodically throughout the day to decrease swelling.  - Stop xeroform.    Home Care: [facility]     Dressing orders:     1) Gather wound care supplies and arrange on clean table.     2) Wash your hands with soap and water or use alcohol based hand  for 20 seconds (sing \"Happy Birthday\" twice).    3) Cleanse wounds with normal saline or wound cleanser and gauze. Pat dry with clean gauze.    4) Left knee- Apply saline moist gauze to wound. Cover with dry gauze, ABD pad, wrap with roll gauze. Change daily.        Right knee- Apply saline moist gauze to wound. Cover with dry gauze, ABD pad, wrap with roll gauze. Change daily.        Left chest- Apply saline moist gauze to wound. Cover with dry gauze, ABD pad, secure with tape. Change daily.        Left cheek- Leave open to air.        Right lower leg- Apply alginate over wound, cover with ABD pad. Wrap with roll gauze and ace bandage from base of toes to bend of knee. Change daily.    Keep all dressings clean & dry.    Follow up visit:   3 Weeks - Wednesday February 7th at 9:00 am    Keep next scheduled appointment. Please give 24 hour notice if unable to keep appointment. [phone]    If you experience any of the following, please call the Wound Care Service during business hours: Monday through Friday 8:00 am - 4:30 pm  ([phone]).   *Increase in pain   *Temperature over 101   *Increase in drainage from your wound or a foul odor   *Uncontrolled swelling   *Need for compression bandage changes due to slippage, breakthrough drainage    If you need medical attention outside of business hours, please contact your Primary Care Doctor or go to the nearest emergency room.

## 2024-01-17 NOTE — PROGRESS NOTES
(NORVASC) 2.5 MG tablet take 1 tablet by mouth once daily 90 tablet 3    atorvastatin (LIPITOR) 20 MG tablet take 1 tablet by mouth once daily 90 tablet 3    tiZANidine (ZANAFLEX) 4 MG tablet Take 1 tablet by mouth every 8 hours as needed (neck stiffness/pain) 30 tablet 0    levETIRAcetam (KEPPRA) 1000 MG tablet take 1 tablet by mouth twice a day 180 tablet 3    Handicap Placard MISC by Does not apply route Expires 15 SEPT 2025 1 each 0    aspirin 81 MG chewable tablet Take 1 tablet by mouth daily       No current facility-administered medications on file prior to encounter.         REVIEW OF SYSTEMS:     Constitutional: no fever, no night sweats, no fatigue.  Head: no head ache , no head injury, no migranes.  Eye: no blurring of vision, no double vision.  Ears: no hearing difficulty, no tinnitus  Mouth/throat: no ulceration, dental caries , dysphagia  Lungs: no cough, no shortness of breath, no wheeze  CVS: no palpitation, no chest pain, no shortness of breath  GI: no abdominal pain, no nausea , no vomiting, no constipation  KEV: no dysuria, frequency and urgency, no hematuria, no kidney stones  Musculoskeletal: no joint pain, swelling , stiffness,  Endocrine: no polyuria, polydipsia, no cold or heat intolerance  Hematology: no anemia, no easy brusing or bleeding, no hx of clotting disorder  Dermatology: no skin rash, no eczema, no pruritis,  Psychiatry: no depression, no anxiety,no panic attacks, no suicide ideation        PHYSICAL EXAM      infrared temperature is 98.4 °F (36.9 °C). His blood pressure is 125/72 and his pulse is 108 (abnormal). His respiration is 16 and oxygen saturation is 98%.       Wt Readings from Last 3 Encounters:   01/02/24 93 kg (205 lb)   12/08/23 97.1 kg (214 lb)   11/12/23 98.5 kg (217 lb 2.5 oz)          General Appearance  Awake, alert, oriented,  not  In acute distress  HEENT - normocephalic, atraumatic, pink conjunctiva,  anicteric sclera  Neck - Supple, no mass  Lungs -  Bilateral

## 2024-01-17 NOTE — PLAN OF CARE
Problem: Wound:  Goal: Will show signs of wound healing; wound closure and no evidence of infection  Description: Will show signs of wound healing; wound closure and no evidence of infection  Outcome: Progressing   Patient presents to wound clinic for follow up appointment. Discharge instructions/dressing orders per AVS. Follow up appointment scheduled in 3 weeks.     Care plan reviewed with patient.  Patient verbalize understanding of the plan of care and contribute to goal setting.

## 2024-01-23 ENCOUNTER — TELEPHONE (OUTPATIENT)
Dept: WOUND CARE | Age: 74
End: 2024-01-23

## 2024-01-23 NOTE — PROGRESS NOTES
[facility]  [address] [place] [address]  Telephone: [phone]     FAX [phone]    [facility]   If you need more measurements before this date you will have to contact medical records.     [facility]: [facility] Phone # [phone], Fax # [phone]    There are no Patient Instructions on file for this visit.    Skilled nurse to evaluate and treat for wound care. Change dressing as ordered. Patient/Family/caregiver may change dressings as needed as instructed when Home Care unavailable.    WOUNDS REQUIRING DRESSING Changes:     Wound 11/07/23 Knee Left;Anterior (Active)   Wound Image   01/17/24 0848   Wound Etiology Traumatic 01/17/24 0848   Dressing Status Intact;New dressing applied 01/17/24 0848   Wound Cleansed Cleansed with saline 01/17/24 0848   Dressing/Treatment Gauze dressing/dressing sponge;Moisten with saline;ABD;Roll gauze 01/17/24 0848   Offloading for Diabetic Foot Ulcers Offloading not required 01/17/24 0848   Wound Length (cm) 5.5 cm 01/17/24 0848   Wound Width (cm) 3 cm 01/17/24 0848   Wound Depth (cm) 0.2 cm 01/17/24 0848   Wound Surface Area (cm^2) 16.5 cm^2 01/17/24 0848   Change in Wound Size % (l*w) 23.61 01/17/24 0848   Wound Volume (cm^3) 3.3 cm^3 01/17/24 0848   Wound Assessment Slough;Granulation tissue;Hyper granulation tissue 01/17/24 0848   Drainage Amount Moderate (25-50%) 01/17/24 0848   Drainage Description Serosanguinous 01/17/24 0848   Odor None 01/17/24 0848   Renate-wound Assessment Maceration;Hyperpigmented 01/17/24 0848   Margins Attached edges 01/17/24 0848   Wound Thickness Description not for Pressure Injury Full thickness 01/17/24 0848   Number of days: 76       Wound 11/07/23 Chest Lateral;Left;Lower (Active)   Wound Image   01/17/24 0848   Wound Etiology Traumatic 01/17/24 0848   Dressing Status Intact;New dressing applied 01/17/24 0848   Wound Cleansed Cleansed with saline 01/17/24 0848

## 2024-01-31 ENCOUNTER — HOSPITAL ENCOUNTER (OUTPATIENT)
Dept: GENERAL RADIOLOGY | Age: 74
Discharge: HOME | End: 2024-01-31
Attending: OTOLARYNGOLOGY
Payer: MEDICARE

## 2024-01-31 DIAGNOSIS — J38.01 VOCAL FOLD PARALYSIS, RIGHT: ICD-10-CM

## 2024-01-31 DIAGNOSIS — R49.0 DYSPHONIA: ICD-10-CM

## 2024-01-31 DIAGNOSIS — K22.4 CRICOPHARYNGEUS MUSCLE DYSFUNCTION: ICD-10-CM

## 2024-01-31 DIAGNOSIS — R13.14 PHARYNGOESOPHAGEAL DYSPHAGIA: ICD-10-CM

## 2024-01-31 PROCEDURE — 92611 MOTION FLUOROSCOPY/SWALLOW: CPT

## 2024-01-31 PROCEDURE — 2500000003 HC RX 250 WO HCPCS: Performed by: OTOLARYNGOLOGY

## 2024-01-31 PROCEDURE — 74230 X-RAY XM SWLNG FUNCJ C+: CPT

## 2024-01-31 RX ADMIN — BARIUM SULFATE 20 ML: 400 PASTE ORAL at 09:05

## 2024-01-31 RX ADMIN — BARIUM SULFATE 30 ML: 0.81 POWDER, FOR SUSPENSION ORAL at 09:05

## 2024-01-31 RX ADMIN — BARIUM SULFATE 30 ML: 400 SUSPENSION ORAL at 09:05

## 2024-01-31 NOTE — DISCHARGE SUMMARY
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 Patient is a poor historian. 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 Patient is status post post upper esophageal stricture dilation and bilateral true vocal fold Prolaryn injections. He currently resides at [facility]. Patient utilizes a PEG tube for primary nutrition and is not taking anything PO (no ice chips/water either). Patient completed a visit on 24 with Dr. Craven (ENT) in which a laryngoscopy identified improved midline closure of vocal folds. A MBS was recommended to see if patient is able to take anything by mouth. Patient reports he does not currently receive ST services at [facility]. Patient believes he receives 4 tube feeds per day. He denies any food allergies, GERD or recent pneumonia.     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 S/p skin graft. Healed. From chronic venous insuficiency., History of traumatic brain injury, Hyperlipidemia, Hypertension, Mentally challenged, Moderate protein-calorie malnutrition

## 2024-02-03 ENCOUNTER — APPOINTMENT (OUTPATIENT)
Dept: GENERAL RADIOLOGY | Age: 74
End: 2024-02-03
Payer: MEDICARE

## 2024-02-03 ENCOUNTER — HOSPITAL ENCOUNTER (EMERGENCY)
Age: 74
Discharge: HOME | End: 2024-02-03
Attending: EMERGENCY MEDICINE
Payer: MEDICARE

## 2024-02-03 VITALS
TEMPERATURE: 98.3 F | DIASTOLIC BLOOD PRESSURE: 66 MMHG | HEIGHT: 69 IN | OXYGEN SATURATION: 99 % | RESPIRATION RATE: 16 BRPM | HEART RATE: 82 BPM | WEIGHT: 205 LBS | SYSTOLIC BLOOD PRESSURE: 121 MMHG | BODY MASS INDEX: 30.36 KG/M2

## 2024-02-03 DIAGNOSIS — K94.23 PEG TUBE MALFUNCTION (HCC): Primary | ICD-10-CM

## 2024-02-03 PROCEDURE — 49465 FLUORO EXAM OF G/COLON TUBE: CPT

## 2024-02-03 PROCEDURE — 43762 RPLC GTUBE NO REVJ TRC: CPT

## 2024-02-03 PROCEDURE — 6360000004 HC RX CONTRAST MEDICATION: Performed by: STUDENT IN AN ORGANIZED HEALTH CARE EDUCATION/TRAINING PROGRAM

## 2024-02-03 PROCEDURE — 99283 EMERGENCY DEPT VISIT LOW MDM: CPT

## 2024-02-03 RX ADMIN — DIATRIZOATE MEGLUMINE AND DIATRIZOATE SODIUM 30 ML: 600; 100 SOLUTION ORAL; RECTAL at 10:50

## 2024-02-03 ASSESSMENT — PAIN - FUNCTIONAL ASSESSMENT
PAIN_FUNCTIONAL_ASSESSMENT: NONE - DENIES PAIN
PAIN_FUNCTIONAL_ASSESSMENT: NONE - DENIES PAIN

## 2024-02-03 NOTE — DISCHARGE INSTRUCTIONS
If given narcotics (opiates) during this Emergency Department visit, please do not drink, drive or operate any machinery for at least 4 - 6 hours.    Avoid eating any spicy food, milk type products or drinks that have caffeine in it.  Take all medications as prescribed.  For pain use ibuprofen (Motrin) or acetaminophen (Tylenol), unless prescribed medications that have acetaminophen in it.  You can take over the counter acetaminophen tablets (1 - 2 tablets of the 500-mg strength every 6 hours) or ibuprofen tablets (2 tablets every 4 hours).    PLEASE RETURN TO THE EMERGENCY DEPARTMENT IMMEDIATELY for worsening symptoms, or if you develop any concerning symptoms such as: high fever not relieved by acetaminophen (Tylenol) and/or ibuprofen (Motrin), chills, shortness of breath, chest pain, persistent nausea and/or vomiting, numbness, weakness or tingling in the arms or legs or change in color of the extremities, changes in mental status, persistent headache, blurry vision.    Return within 8 - 12 hours if you have any of the following: worsening of pain in your abdomen, no food sounds good to you, you continue to vomit, pain goes to your back or testicles, have pain in the abdomen when going over a bump in the car or when you jump up and down, inability to urinate, unable to follow up with your physician, or other any other care or concern.

## 2024-02-03 NOTE — ED PROVIDER NOTES
[facility]  EMERGENCY DEPARTMENT ENCOUNTER          Pt Name: Levi Loving  MRN: [id number]  Birthdate 1950  Date of evaluation: 2/3/2024  Emergency Physician: Jamaal Carreon MD    CHIEF COMPLAINT       Chief Complaint   Patient presents with    PEG Tube Problem     History obtained from the patient.      HISTORY OF PRESENT ILLNESS    HPI  Levi Loving is a 73 y.o. male with past medical history of seizure disorder, hypertension, hyperlipidemia, DVT, TBI, PEG tube who presents to the emergency department for evaluation of PEG tube malfunction. Apparently came out accidentally while nursing home staff was using the PEG tube. Occurred approximately one hour PTA. Per chart review, last time he was here for PEG tube malfunction he had it replaced with 14Fr. Patient has no complaints.   The patient has no other acute complaints at this time.          REVIEW OF SYSTEMS   Review of Systems    See HPI. 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 S/p skin graft. Healed. From chronic venous insuficiency.     History of traumatic brain injury     Hyperlipidemia     Hypertension     Mentally challenged     \"slow\" states his sister    Moderate protein-calorie malnutrition (HCC)     Obesity (BMI 30-39.9)     Osteoarthritis     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 ([facility]) 07/2014    s/p fall down a hill, Dr. Singleton follows, no seizure activity since    Seizure disorder ([facility]) 07/17/2014    Venous insufficiency      Past Surgical History:   Procedure Laterality Date    LARYNGOSCOPY N/A 12/8/2023     solution 30 mL (30 mLs PEG Tube Given 2/3/24 1050)         MEDICAL DECISION MAKING     ED Course as of 02/03/24 1251   Sat Feb 03, 2024   1022 72 yo M chronically ill appearing presents to ED from nursing home due to PEG tube falling out. Per chart review had 14Fr placed due to malfunction a month ago. 14Fr Peg tube placed at bedside with minimal resistance. Inflated with 5cc of sterile saline. Awaiting confirmation imaging at this time. If in place, will plan to d/c back to NH.  [JT]   1250 XR INJ CONTRAST GASTRIC TUBE PERC    FINDINGS:      Contrast is seen opacifying the stomach. No evidence of contrast extravasation.     Residual contrast is seen within the colon from the prior infusion. Stool fills the colon.      [JT]      ED Course User Index  [JT] Jamaal Carreon MD     Available laboratory and imaging results were independently reviewed and clinically correlated.    Decision Rules/Clinical Scores utilized:  Not Applicable.     Code Status:  Not addressed during this ED visit    Social determinants of health impacting treatment or disposition:  Not Applicable.    Medical Commodities impacting treatment or disposition:  Not Applicable.   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 S/p skin graft. Healed. From chronic venous insuficiency.     History of traumatic brain injury     Hyperlipidemia     Hypertension     Mentally challenged     \"slow\" states his sister    Moderate protein-calorie malnutrition (HCC)     Obesity (BMI 30-39.9)     Osteoarthritis     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 ([facility]) 07/2014    s/p fall down a hill, Dr. Singleton follows, no seizure activity since    Seizure disorder ([facility]) 07/17/2014    Venous insufficiency

## 2024-02-03 NOTE — ED TRIAGE NOTES
Pt presents to the ED via EMS due to his PEG tube falling out. Pt states that it fell out when the nurses were assessing him. Pt denies any other problems.

## 2024-02-07 ENCOUNTER — HOSPITAL ENCOUNTER (OUTPATIENT)
Dept: WOUND CARE | Age: 74
Discharge: HOME | End: 2024-02-07
Attending: INTERNAL MEDICINE
Payer: MEDICARE

## 2024-02-07 VITALS
SYSTOLIC BLOOD PRESSURE: 138 MMHG | OXYGEN SATURATION: 99 % | TEMPERATURE: 97.3 F | HEART RATE: 97 BPM | DIASTOLIC BLOOD PRESSURE: 72 MMHG | RESPIRATION RATE: 18 BRPM

## 2024-02-07 PROCEDURE — 11042 DBRDMT SUBQ TIS 1ST 20SQCM/<: CPT

## 2024-02-07 PROCEDURE — 97597 DBRDMT OPN WND 1ST 20 CM/<: CPT

## 2024-02-07 PROCEDURE — 6370000000 HC RX 637 (ALT 250 FOR IP): Performed by: INTERNAL MEDICINE

## 2024-02-07 PROCEDURE — 17250 CHEM CAUT OF GRANLTJ TISSUE: CPT

## 2024-02-07 RX ADMIN — SILVER NITRATE APPLICATORS 1 EACH: 25; 75 STICK TOPICAL at 08:55

## 2024-02-07 NOTE — PLAN OF CARE
Problem: Wound:  Goal: Will show signs of wound healing; wound closure and no evidence of infection  Description: Will show signs of wound healing; wound closure and no evidence of infection  Outcome: Progressing     Patient presents to wound clinic for multiple wounds. See AVS for discharge instructions Follow up visit:  2 weeks on Wednesday February 21st at 9:00 am     Care plan reviewed with patient .  Patient verbalize understanding of the plan of care and contribute to goal setting.

## 2024-02-07 NOTE — PROGRESS NOTES
entry, no rhonchi, no wheeze  Neurologic -he was on a wheelchair very weak     Wound  left side of face has scabbed over   left chest wall has fat necrosis with drainage the wound was surgically debrided  right knee and left knee has hypergranular wound  Right lateral leg has hypergranular wound and the legs were swollen.   Assessment:   Multiple wound related to pressure injury on his face left chest wall and bilateral knee and right lateral leg  Deconditioning  Malnutrition  History of seizure disorder  The left chest wall wound was surgically debrided to remove the devitalized tissue.will continue dakins soaked dressing to the chest wall wound   nitrate cautery was applied to the hypergranular wound on his bilateral leg . Will apply non stick dressing to the knee and leg wpund  Will apply compression therapy to the right leg  Will apply moist dressing to the anterior chest leg wounds  Follow-up will be scheduled in 2 weeks.  Patient Active Problem List   Diagnosis Code    Seizure disorder ([facility]) G40.909    Venous insufficiency of right leg I87.2    History of non-healing open leg wound of R leg. S/p skin graft. Healed. From chronic venous insuficiency. 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

## 2024-02-07 NOTE — PATIENT INSTRUCTIONS
Visit Discharge/Physician Orders:  - Left chest wound debrided today.  - Quick clot applied to right posterior leg wound   - Elevate legs periodically throughout the day to decrease swelling.    Home Care: [facility]     Dressing orders:     1) Gather wound care supplies and arrange on clean table.     2) Wash your hands with soap and water or use alcohol based hand  for 20 seconds (sing \"Happy Birthday\" twice).    3) Cleanse wounds with normal saline or wound cleanser and gauze. Pat dry with clean gauze.    4) Left knee- Apply adaptic to wound. Cover with dry gauze, ABD pad, wrap with roll gauze. Change daily.        Right knee- Apply adaptic to wound. Cover with dry gauze, ABD pad, wrap with roll gauze. Change daily.        Left chest- Apply Dakins moistened gauze to wound. Cover with dry gauze, ABD pad, secure with tape. Change twice daily.        Left cheek- Leave open to air.        Right lower leg- Carefully remove quick clot that was applied in office today at next dressing change. Apply adaptic , cover with ABD pad. Wrap with roll gauze and ace bandage from base of toes to bend of knee. Change daily.    Keep all dressings clean & dry.    Follow up visit:  2 weeks on Wednesday February 21st at 9:00 am     Keep next scheduled appointment. Please give 24 hour notice if unable to keep appointment. [phone]    If you experience any of the following, please call the Wound Care Service during business hours: Monday through Friday 8:00 am - 4:30 pm  ([phone]).   *Increase in pain   *Temperature over 101   *Increase in drainage from your wound or a foul odor   *Uncontrolled swelling   *Need for compression bandage changes due to slippage, breakthrough drainage    If you need medical attention outside of business hours, please contact your Primary Care Doctor or go to the nearest emergency room.

## 2024-02-21 ENCOUNTER — HOSPITAL ENCOUNTER (OUTPATIENT)
Dept: WOUND CARE | Age: 74
Discharge: HOME | End: 2024-02-21
Attending: INTERNAL MEDICINE
Payer: MEDICARE

## 2024-02-21 VITALS
HEART RATE: 98 BPM | TEMPERATURE: 97.3 F | DIASTOLIC BLOOD PRESSURE: 73 MMHG | OXYGEN SATURATION: 98 % | RESPIRATION RATE: 16 BRPM | SYSTOLIC BLOOD PRESSURE: 124 MMHG

## 2024-02-21 PROCEDURE — 6370000000 HC RX 637 (ALT 250 FOR IP): Performed by: INTERNAL MEDICINE

## 2024-02-21 PROCEDURE — 17250 CHEM CAUT OF GRANLTJ TISSUE: CPT

## 2024-02-21 RX ADMIN — SILVER NITRATE APPLICATORS 2 EACH: 25; 75 STICK TOPICAL at 08:44

## 2024-02-21 NOTE — PLAN OF CARE
Problem: Wound:  Goal: Will show signs of wound healing; wound closure and no evidence of infection  Description: Will show signs of wound healing; wound closure and no evidence of infection  Outcome: Progressing   Pt. Seen today for multiple wounds see AVS for new orders. Follow up in 2 weeks.  Care plan reviewed with patient.  Patient verbalize understanding of the plan of care and contribute to goal setting.

## 2024-02-21 NOTE — PATIENT INSTRUCTIONS
Visit Discharge/Physician Orders:  - Left chest wound debrided today.  - silver nitrate applied to the right leg and knee there will be gray drainage this is normal  - Elevate legs periodically throughout the day to decrease swelling.    Home Care: [facility]     Dressing orders:     1) Gather wound care supplies and arrange on clean table.     2) Wash your hands with soap and water or use alcohol based hand  for 20 seconds (sing \"Happy Birthday\" twice).    3) Cleanse wounds with normal saline or wound cleanser and gauze. Pat dry with clean gauze.    4) Left knee- Apply adaptic to wound. Cover with dry gauze, ABD pad, wrap with roll gauze. Change daily.        Right knee- Apply adaptic to wound. Cover with dry gauze, ABD pad, wrap with roll gauze. Change daily.        Left chest- Apply Dakins moistened gauze to wound. Cover with dry gauze, ABD pad, secure with tape. Change twice daily.        Left cheek- Leave open to air.        Right lower leg- Apply adaptic , cover with ABD pad. Wrap with roll gauze and ace bandage from base of toes to bend of knee. Change daily.    Keep all dressings clean & dry.    Follow up visit:  2 weeks on Wednesday March 6th at 9:00 am     Keep next scheduled appointment. Please give 24 hour notice if unable to keep appointment. [phone]    If you experience any of the following, please call the Wound Care Service during business hours: Monday through Friday 8:00 am - 4:30 pm  ([phone]).   *Increase in pain   *Temperature over 101   *Increase in drainage from your wound or a foul odor   *Uncontrolled swelling   *Need for compression bandage changes due to slippage, breakthrough drainage    If you need medical attention outside of business hours, please contact your Primary Care Doctor or go to the nearest emergency room.

## 2024-02-21 NOTE — PROGRESS NOTES
[facility]  Cardiology Office Note  [address] [id number]  Phone: [phone]  Fax: [phone]                            Date:  3/26/2021  Patient: Moira Mar  Age:  68 y.o., 1943    Referral: No ref.  provider found      PROBLEM LIST:    Patient Active Problem List    Diagnosis Date Noted    Bloating 03/11/2020     Priority: Low    Myalgia due to statin 11/07/2019     Priority: Low    Urinary tract infection with hematuria 05/16/2019     Priority: Low    Overactive bladder 07/24/2017     Priority: Low    Mixed hyperlipidemia 04/18/2017     Priority: Low    H/O right coronary artery stent placement 04/18/2017     Priority: Low     Overview Note:     Stent placed in 2009 by Dr. Julee Castleman at [address] abdominal pain 07/21/2016     Priority: Low    Irritable bowel syndrome with diarrhea 06/10/2016     Priority: [address] Internal hemorrhoids 06/10/2016     Priority: Low    Allergic reaction 09/18/2015     Priority: Low    Precordial pain 09/08/2015     Priority: Low    Gout 01/30/2015     Priority: Low    Bleeding internal hemorrhoids 07/15/2014     Priority: Low    Gastroesophageal reflux disease without esophagitis 05/20/2014     Priority: Low    Internal hemorrhoids without complication 29/91/4234     Priority: Low    Spider veins 03/04/2013     Priority: Low    Carotid artery stenosis 11/08/2012     Priority: Low    Leg pain 11/08/2012     Priority: Low    Leg swelling 11/08/2012     Priority: Low    Chronic venous insufficiency 11/08/2012     Priority: Low    Varicose veins 11/08/2012     Priority: Low    Coronary artery disease involving native coronary artery of native heart with angina pectoris with documented spasm ([facility] [address]      Priority: Low     Overview Note:     2012  PTCA /stent  Progressive angina; AUC indication 54, AUC score 7, JACC 2012;[id number]  9/10/15  Cath  Mild CAD, normal LVFX        Essential hypertension      Priority: Low     1. Coronary artery disease, prior ostial RCA stent with obstructive left main disease by catheterization 9/10/2020, status post 2 vessel CABG 9/11/2020 with LIMA to LAD and SVG to OM, normal LV ejection fraction. 2. Hypertension. 3. Rheumatoid arthritis. PRESENTATION: Adrián Dupree is a 68y.o. year old female presents for follow-up evaluation. She is now 6 months since her bypass. She has been doing fairly well. She is trying to exercise and did go to rehab. She has not been able to maintain it as well though she is exercising 3 times a week by her account doing at least 60 minutes. Her blood pressure is lower since her bypass. No chest pain or shortness of breath. REVIEW OF SYSTEMS:  Review of Systems   Constitutional: Negative for activity change, fatigue and fever. HENT: Negative for ear pain, hearing loss and tinnitus. Eyes: Negative for discharge and visual disturbance. Respiratory: Negative for cough, shortness of breath and wheezing. Cardiovascular: Negative for chest pain, palpitations and leg swelling. Gastrointestinal: Negative for abdominal distention, blood in stool, constipation, diarrhea and vomiting. Endocrine: Negative for cold intolerance, heat intolerance, polydipsia and polyuria. Genitourinary: Negative for dysuria and hematuria. Musculoskeletal: Negative for arthralgias, back pain and myalgias. Skin: Negative for pallor and rash. Neurological: Negative for seizures, syncope, weakness and headaches. Psychiatric/Behavioral: Negative for behavioral problems and dysphoric mood.        Past Medical History:      Diagnosis Date    Acid reflux     Anxiety     Arthritis     Back pain     CAD (coronary artery disease)     CAD (coronary artery disease)     Carotid artery occlusion     Chronic venous insufficiency 11/8/2012    Cough     Fibromyalgia     Goiter     Gout     Head ache     headaches    Heart murmur     History of colon polyps     History of colon polyps     Hoarseness     Hypercholesterolemia     Hyperlipidemia     Hypertension     Insomnia     Irregular heart beat     Itching     Muscle cramp     Neck pain     Osteoarthritis     Osteoporosis     Palpitations     RA (rheumatoid arthritis) (HCC)     Rash     Rectal bleeding     SOB (shortness of breath)     Sore throat     Tympanic membrane perforation     Varicose veins 2012       Past Surgical History:      Procedure Laterality Date    APPENDECTOMY      BREAST SURGERY  2002    CARDIAC CATHETERIZATION  9/10/15  JDT    EF 50%    CARDIAC CATHETERIZATION  09/10/2020    LM disease- sent for bypass     CERVICAL FUSION  1999     SECTION  1968    x 1    CHOLECYSTECTOMY  2012    [place]    COLONOSCOPY  1-    Dr LUJAN    COLONOSCOPY  2014    Dr. Haley Common      Dr. Handley Speaks in [place]- Polyps-benign,internal Hemorrhoids, per patient.  CORONARY ANGIOPLASTY WITH STENT PLACEMENT      Dr LUJAN    CORONARY ARTERY BYPASS GRAFT N/A 2020    CORONARY ARTERY BYPASS GRAFT X2 WITH LEFT INTERNAL MAMMARY ARTERY WITH OPEN VEIN HARVESTING WITH PERFUSION TRANSESOPHAGEAL ECHOCARDIOGRAM performed by James Joy MD at [address]    partial    KNEE SURGERY      left knee surgery    PTCA      stent    TUBAL LIGATION      UPPER GASTROINTESTINAL ENDOSCOPY  ?  UPPER GASTROINTESTINAL ENDOSCOPY  1-    Dr LUJAN    UPPER GASTROINTESTINAL ENDOSCOPY  2014    Dr. Kiersten Quiroga      Dr. Handley Speaks in [facility]-Gastritis per patient.     VARICOSE VEIN SURGERY   2013 [facility]    Left Leg Ablation    VARICOSE VEIN SURGERY  3-  [facility]    right leg ablation       Medications:  Current Outpatient Medications   Medication Sig Dispense Refill    ZINC PO Take by mouth  metoprolol succinate (TOPROL XL) 50 MG extended release tablet TAKE 1 TABLET TWICE DAILY 180 tablet 3    olmesartan-hydroCHLOROthiazide (BENICAR HCT) 40-25 MG per tablet Take 0.5 tablets by mouth daily Was holding due to low bp but now is taking randomly as bp is elevated. They are thinking needs daily again (Patient taking differently: Take 0.5 tablets by mouth nightly Was holding due to low bp but now is taking randomly as bp is elevated. They are thinking needs daily again) 30 tablet 5    Omega-3 Fatty Acids (FISH OIL) 1360 MG CAPS Take 1 capsule by mouth 2 times daily       Evolocumab (REPATHA) 140 MG/ML SOSY Inject 140 mg into the skin every 14 days Patient has not started      vitamin D (CHOLECALCIFEROL) 1000 UNIT TABS tablet 1,000 Units 2 times daily Take 3 tablets daily       allopurinol (ZYLOPRIM) 300 MG tablet Take 300 mg by mouth daily.  aspirin 81 MG EC tablet Take 81 mg by mouth daily.  loratadine (CLARITIN) 10 MG tablet Take 10 mg by mouth 2 times daily       Multiple Vitamin (MULTIVITAMIN PO) Take 1 tablet by mouth daily       bumetanide (BUMEX) 1 MG tablet Take 1 tablet by mouth daily as needed (swelling) (Patient not taking: Reported on 3/26/2021) 30 tablet 3     No current facility-administered medications for this visit.         Allergies:  Demerol, Reclast [zoledronic acid], Ipratropium bromide hfa, Abatacept, Buspirone, Celexa [citalopram hydrobromide], Colchicine, Dye [barium-containing compounds], Dye [iodides], Evolocumab, Fenofibrate, Hydrocodone-acetaminophen, Lasix [furosemide], Levofloxacin, Losartan, Methotrexate, Mirtazapine, Neurontin [gabapentin], Nitroglycerin, Plaquenil [hydroxychloroquine sulfate], Trazodone, Cozaar [losartan potassium], Esomeprazole magnesium, Lisinopril, and Prilosec [omeprazole]    Past Social History:  Social History     Socioeconomic History    Marital status:      Spouse name: Not on file    Number of children: Not on file    Years Respiratory distress R06.03    Shortness of breath R06.02    Hoarseness R49.0    Vocal fold paralysis, 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  Please see attached Discharge Instructions    Written patient dismissal instructions given to patient and signed by patient or POA.           Electronically signed by Tashi Martinez MD on 2/21/2024 at 8:53 AM   of education: Not on file    Highest education level: Not on file   Occupational History    Not on file   Social Needs    Financial resource strain: Not on file    Food insecurity     Worry: Not on file     Inability: Not on file    Transportation needs     Medical: Not on file     Non-medical: Not on file   Tobacco Use    Smoking status: Never Smoker    Smokeless tobacco: Never Used   Substance and Sexual Activity    Alcohol use: No    Drug use: No    Sexual activity: Not on file   Lifestyle    Physical activity     Days per week: Not on file     Minutes per session: Not on file    Stress: Not on file   Relationships    Social connections     Talks on phone: Not on file     Gets together: Not on file     Attends Buddhist service: Not on file     Active member of club or organization: Not on file     Attends meetings of clubs or organizations: Not on file     Relationship status: Not on file    Intimate partner violence     Fear of current or ex partner: Not on file     Emotionally abused: Not on file     Physically abused: Not on file     Forced sexual activity: Not on file   Other Topics Concern    Not on file   Social History Narrative    Not on file       Family History:       Problem Relation Age of Onset    Diabetes Mother     Heart Disease Mother     Heart Disease Father     Diabetes Father     High Blood Pressure Father     Esophageal Cancer Father     Colon Polyps Father     Crohn's Disease Brother     Colon Cancer Neg Hx     Liver Cancer Neg Hx     Liver Disease Neg Hx     Rectal Cancer Neg Hx     Stomach Cancer Neg Hx          Physical Examination:  /80   Pulse 62   Ht 5' 7\" (1.702 m)   Wt 178 lb (80.7 kg)   BMI 27.88 kg/m²   Physical Exam  Constitutional:       General: She is not in acute distress. Appearance: She is not diaphoretic. HENT:      Mouth/Throat:      Pharynx: No oropharyngeal exudate. Eyes:      General: No scleral icterus.         Right eye: No discharge. Left eye: No discharge. Neck:      Thyroid: No thyromegaly. Vascular: No JVD. Cardiovascular:      Rate and Rhythm: Normal rate and regular rhythm. No extrasystoles are present. Heart sounds: Normal heart sounds, S1 normal and S2 normal. No murmur. No systolic murmur. No diastolic murmur. No friction rub. No gallop. No S3 or S4 sounds. Comments: No JVD  No edema  No significant systolic or diastolic murmurs appreciated    Pulmonary:      Effort: Pulmonary effort is normal. No respiratory distress. Breath sounds: Normal breath sounds. No wheezing or rales. Comments: Good air entry bilaterally with no crepitations or wheezes  Chest:      Chest wall: No tenderness. Abdominal:      General: Bowel sounds are normal. There is no distension. Palpations: Abdomen is soft. There is no mass. Tenderness: There is no abdominal tenderness. There is no guarding or rebound. Hernia: No hernia is present. Comments: No palpable organomegaly   Musculoskeletal: Normal range of motion. Skin:     General: Skin is warm. Coloration: Skin is not pale. Findings: No rash. Neurological:      Mental Status: She is alert and oriented to person, place, and time. Cranial Nerves: No cranial nerve deficit. Deep Tendon Reflexes: Reflexes normal.           Labs:   CBC: No results for input(s): WBC, HGB, HCT, PLT in the last 72 hours. BMP:No results for input(s): NA, K, CO2, BUN, CREATININE, LABGLOM, GLUCOSE in the last 72 hours. BNP: No results for input(s): BNP in the last 72 hours. PT/INR: No results for input(s): PROTIME, INR in the last 72 hours. APTT:No results for input(s): APTT in the last 72 hours. CARDIAC ENZYMES:No results for input(s): CKTOTAL, CKMB, CKMBINDEX, TROPONINI in the last 72 hours.   FASTING LIPID PANEL:  Lab Results   Component Value Date    HDL 46 07/24/2018    HDL 49 07/24/2018    LDLCALC 196 07/24/2018    LDLCALC 189 07/24/2018    TRIG 259 07/24/2018    TRIG 291 07/24/2018     LIVER PROFILE:No results for input(s): AST, ALT, LABALBU in the last 72 hours. Imaging:          ASSESSMENT and PLAN:    80-year-old female patient with past medical history of hypertension, reported arthritis, coronary artery disease, prior PCI to the ostial RCA with catheterization showing obstructive left main disease 9/10/2020, status post CABG 9/11/2020 with two-vessel grafting, normal LV ejection fraction. 1.  She is doing well post CABG with no significant issues. I have advised her on diet as well as activity and regular exercise that she has to maintain going forward. Continue current medications without change. 2.  She will follow-up with me in 7 months.       Orders:  No orders of the defined types were placed in this encounter. No orders of the defined types were placed in this encounter. Return in about 7 months (around 10/26/2021). Electronically signed by Laura Luu MD on 3/26/2021 at [address] [place] [address] [facility]      Thisdictation was generated by voice recognition computer software. Although all attempts are made to edit the dictation for accuracy, there may be errors in the transcription that are not intended.

## 2024-03-06 ENCOUNTER — HOSPITAL ENCOUNTER (OUTPATIENT)
Dept: WOUND CARE | Age: 74
Discharge: HOME | End: 2024-03-06
Attending: INTERNAL MEDICINE
Payer: MEDICARE

## 2024-03-06 VITALS
SYSTOLIC BLOOD PRESSURE: 135 MMHG | OXYGEN SATURATION: 98 % | TEMPERATURE: 96.3 F | HEART RATE: 94 BPM | DIASTOLIC BLOOD PRESSURE: 65 MMHG | RESPIRATION RATE: 16 BRPM

## 2024-03-06 PROCEDURE — 11042 DBRDMT SUBQ TIS 1ST 20SQCM/<: CPT

## 2024-03-06 PROCEDURE — 97597 DBRDMT OPN WND 1ST 20 CM/<: CPT

## 2024-03-06 PROCEDURE — 99214 OFFICE O/P EST MOD 30 MIN: CPT

## 2024-03-06 PROCEDURE — 17250 CHEM CAUT OF GRANLTJ TISSUE: CPT

## 2024-03-06 PROCEDURE — 6370000000 HC RX 637 (ALT 250 FOR IP): Performed by: INTERNAL MEDICINE

## 2024-03-06 RX ADMIN — SILVER NITRATE APPLICATORS 3 EACH: 25; 75 STICK TOPICAL at 09:32

## 2024-03-06 ASSESSMENT — PAIN SCALES - GENERAL: PAINLEVEL_OUTOF10: 7

## 2024-03-06 ASSESSMENT — PAIN DESCRIPTION - LOCATION: LOCATION: FOOT

## 2024-03-06 ASSESSMENT — PAIN DESCRIPTION - ORIENTATION: ORIENTATION: RIGHT

## 2024-03-06 NOTE — PROGRESS NOTES
List   Diagnosis Code    Seizure disorder ([facility]) G40.909    Venous insufficiency of right leg I87.2    History of non-healing open leg wound of R leg. S/p skin graft. Healed. From chronic venous insuficiency. Z87.828    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 ([facility]) R56.1    Osteoarthritis M19.90    Hypertension I10    Hyperlipidemia E78.5    History of DVT of right lower extremity Z86.718    Heart failure with preserved ejection fraction ([facility]) I50.30    History of alcohol abuse F10.11    History of traumatic brain injury Z87.820    History of acute kidney injury from excessive NSAID use Z87.828    Vitamin B6 deficiency E53.1    Chronic bilateral low back pain without sciatica M54.50, G89.29    Obesity (BMI 30-39.9) E66.9    Cognitive deficit due to old head trauma F09, S09.90XS    Lymphedema of both lower extremities I89.0    Sepsis without septic shock ([facility]) A41.9    Moderate malnutrition ([facility]) E44.0    Respiratory distress R06.03    Shortness of breath R06.02    Hoarseness R49.0    Vocal fold paralysis, 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         Patient Instructions   Visit Discharge/Physician Orders:  -PLEASE UPDATE WOUND CARE ORDERS TO REFLECT WOUND CARE BELOW.   - Left chest wound debrided today.  - silver nitrate applied to the right leg and bilateral knees there will be gray drainage this is normal  - Elevate legs periodically throughout the day to decrease swelling.    Home Care: [facility]     Dressing orders:     1) Gather wound care supplies and arrange on clean table.

## 2024-03-06 NOTE — PLAN OF CARE
Problem: Wound:  Goal: Will show signs of wound healing; wound closure and no evidence of infection  Description: Will show signs of wound healing; wound closure and no evidence of infection  Outcome: Progressing     Patient presents to wound clinic for multiple wounds. See AVS for discharge instructions. Follow up visit:  2 weeks on Wednesday March 20th at 9:00 am      Care plan reviewed with patient.  Patient verbalize understanding of the plan of care and contribute to goal setting.

## 2024-03-06 NOTE — PATIENT INSTRUCTIONS
Visit Discharge/Physician Orders:  - PLEASE UPDATE WOUND CARE ORDERS TO REFLECT CURRENT WOUND CARE BELOW. Right & left knee and right posterior leg all need adaptic to wounds.  - Left chest wound debrided today.  - silver nitrate applied to the right leg and bilateral knees there will be gray drainage this is normal  - Elevate legs periodically throughout the day to decrease swelling.    Home Care: [facility]     Dressing orders:     1) Gather wound care supplies and arrange on clean table.     2) Wash your hands with soap and water or use alcohol based hand  for 20 seconds (sing \"Happy Birthday\" twice).    3) Cleanse wounds with normal saline or wound cleanser and gauze. Pat dry with clean gauze.    4) Left knee- Apply adaptic to wound. Cover with dry gauze, ABD pad, wrap with roll gauze. Change daily.        Right knee- Apply adaptic to wound. Cover with dry gauze, ABD pad, wrap with roll gauze. Change daily.        Left chest- Apply Dakins moistened gauze to wound. Cover with dry gauze, ABD pad, secure with tape. Change twice daily.        Right lower leg- Apply adaptic , cover with ABD pad. Wrap with roll gauze and ace bandage from base of toes to bend of knee. Change daily.    Keep all dressings clean & dry.    Follow up visit:  2 weeks on Wednesday March 20th at 9:00 am     Keep next scheduled appointment. Please give 24 hour notice if unable to keep appointment. [phone]    If you experience any of the following, please call the Wound Care Service during business hours: Monday through Friday 8:00 am - 4:30 pm  ([phone]).   *Increase in pain   *Temperature over 101   *Increase in drainage from your wound or a foul odor   *Uncontrolled swelling   *Need for compression bandage changes due to slippage, breakthrough drainage    If you need medical attention outside of business hours, please contact your Primary Care Doctor or go to the nearest emergency room.

## 2024-03-20 ENCOUNTER — HOSPITAL ENCOUNTER (OUTPATIENT)
Dept: WOUND CARE | Age: 74
Discharge: HOME | End: 2024-03-20
Attending: INTERNAL MEDICINE
Payer: MEDICARE

## 2024-03-20 VITALS
RESPIRATION RATE: 16 BRPM | OXYGEN SATURATION: 100 % | SYSTOLIC BLOOD PRESSURE: 137 MMHG | DIASTOLIC BLOOD PRESSURE: 80 MMHG | HEART RATE: 96 BPM | TEMPERATURE: 97.7 F

## 2024-03-20 PROCEDURE — 6370000000 HC RX 637 (ALT 250 FOR IP): Performed by: INTERNAL MEDICINE

## 2024-03-20 PROCEDURE — 17250 CHEM CAUT OF GRANLTJ TISSUE: CPT

## 2024-03-20 RX ORDER — ACETAMINOPHEN 325 MG/1
650 TABLET ORAL EVERY 4 HOURS PRN
COMMUNITY

## 2024-03-20 RX ADMIN — SILVER NITRATE APPLICATORS 2 EACH: 25; 75 STICK TOPICAL at 09:31

## 2024-03-20 NOTE — PROGRESS NOTES
instructions      Please see attached Discharge Instructions    Written patient dismissal instructions given to patient and signed by patient or POA.         Patient Instructions   Visit Discharge/Physician Orders:  - PLEASE UPDATE WOUND CARE ORDERS TO REFLECT CURRENT WOUND CARE BELOW. Right & left knee and right posterior leg all need adaptic to wounds.  - Left chest wound debrided today.  - silver nitrate applied to the right leg and bilateral knees there will be gray drainage this is normal  - Elevate legs periodically throughout the day to decrease swelling.    Home Care: [facility]     Dressing orders:     1) Gather wound care supplies and arrange on clean table.     2) Wash your hands with soap and water or use alcohol based hand  for 20 seconds (sing \"Happy Birthday\" twice).    3) Cleanse wounds with normal saline or wound cleanser and gauze. Pat dry with clean gauze.    4) Left knee- Apply adaptic to wound. Cover with dry gauze, ABD pad, wrap with roll gauze. Change daily.        Right knee- Apply adaptic to wound. Cover with dry gauze, ABD pad, wrap with roll gauze. Change daily.        Left chest- Apply Dakins moistened gauze to wound. Cover with dry gauze, ABD pad, secure with tape. Change twice daily.        Right lower leg- Apply adaptic , cover with ABD pad. Wrap with roll gauze and ace bandage from base of toes to bend of knee. Change daily.    Keep all dressings clean & dry.    Follow up visit:  2 weeks on Wednesday March 20th at 9:00 am     Keep next scheduled appointment. Please give 24 hour notice if unable to keep appointment. [phone]    If you experience any of the following, please call the Wound Care Service during business hours: Monday through Friday 8:00 am - 4:30 pm  ([phone]).   *Increase in pain   *Temperature over 101   *Increase in drainage from your wound or a foul odor   *Uncontrolled swelling   *Need for compression bandage changes due to slippage,

## 2024-03-20 NOTE — PATIENT INSTRUCTIONS
Visit Discharge/Physician Orders:  - PLEASE UPDATE WOUND CARE ORDERS TO REFLECT CURRENT WOUND CARE BELOW.   - silver nitrate applied to the bilateral knees there will be gray drainage this is normal  - Elevate legs periodically throughout the day to decrease swelling.    Home Care: [facility]     Dressing orders:     1) Gather wound care supplies and arrange on clean table.     2) Wash your hands with soap and water or use alcohol based hand  for 20 seconds (sing \"Happy Birthday\" twice).    3) Cleanse wounds with normal saline or wound cleanser and gauze. Pat dry with clean gauze.    4) Left knee-  Apply triad paste to the wound and cover with silicone bordered foam. Change 3 times per week        Right knee- Apply triad paste to the wound and cover with silicone bordered foam. Change 3 times per week        Left chest- Apply Dakins moistened gauze to wound. Cover with dry gauze, ABD pad, secure with tape. Change twice daily.        Right lower leg-  Apply triad paste to the wound and cover with silicone bordered foam. Change 3 times per week Wrap the leg with an ace wrap starting at the base of the toes to just below the bend of the knee every day    Keep all dressings clean & dry.    Follow up visit:  2 weeks on Wednesday April 3rd at 8:30 am     Keep next scheduled appointment. Please give 24 hour notice if unable to keep appointment. [phone]    If you experience any of the following, please call the Wound Care Service during business hours: Monday through Friday 8:00 am - 4:30 pm  ([phone]).   *Increase in pain   *Temperature over 101   *Increase in drainage from your wound or a foul odor   *Uncontrolled swelling   *Need for compression bandage changes due to slippage, breakthrough drainage    If you need medical attention outside of business hours, please contact your Primary Care Doctor or go to the nearest emergency room.

## 2024-03-21 ENCOUNTER — TELEPHONE (OUTPATIENT)
Dept: WOUND CARE | Age: 74
End: 2024-03-21

## 2024-03-21 NOTE — TELEPHONE ENCOUNTER
José Manuel Brock called to reschedule patients wound clinic appointment on 04/03 to 4/10 with Dr Matrinez as patient already had another appointment scheduled on 04/03. Appointment updated per facility request.

## 2024-03-25 ENCOUNTER — OFFICE VISIT (OUTPATIENT)
Dept: ENT CLINIC | Age: 74
End: 2024-03-25
Payer: MEDICARE

## 2024-03-25 VITALS
RESPIRATION RATE: 14 BRPM | DIASTOLIC BLOOD PRESSURE: 74 MMHG | HEART RATE: 90 BPM | HEIGHT: 69 IN | BODY MASS INDEX: 30.27 KG/M2 | SYSTOLIC BLOOD PRESSURE: 130 MMHG | OXYGEN SATURATION: 99 % | TEMPERATURE: 97.3 F

## 2024-03-25 DIAGNOSIS — R13.14 PHARYNGOESOPHAGEAL DYSPHAGIA: Primary | ICD-10-CM

## 2024-03-25 DIAGNOSIS — K22.0 ACHALASIA OF THE CRICOPHARYNGEUS MUSCLE: ICD-10-CM

## 2024-03-25 PROCEDURE — 3075F SYST BP GE 130 - 139MM HG: CPT | Performed by: OTOLARYNGOLOGY

## 2024-03-25 PROCEDURE — 3017F COLORECTAL CA SCREEN DOC REV: CPT | Performed by: OTOLARYNGOLOGY

## 2024-03-25 PROCEDURE — G8427 DOCREV CUR MEDS BY ELIG CLIN: HCPCS | Performed by: OTOLARYNGOLOGY

## 2024-03-25 PROCEDURE — 1123F ACP DISCUSS/DSCN MKR DOCD: CPT | Performed by: OTOLARYNGOLOGY

## 2024-03-25 PROCEDURE — G8484 FLU IMMUNIZE NO ADMIN: HCPCS | Performed by: OTOLARYNGOLOGY

## 2024-03-25 PROCEDURE — G8417 CALC BMI ABV UP PARAM F/U: HCPCS | Performed by: OTOLARYNGOLOGY

## 2024-03-25 PROCEDURE — 1036F TOBACCO NON-USER: CPT | Performed by: OTOLARYNGOLOGY

## 2024-03-25 PROCEDURE — 99215 OFFICE O/P EST HI 40 MIN: CPT | Performed by: OTOLARYNGOLOGY

## 2024-03-25 PROCEDURE — 3078F DIAST BP <80 MM HG: CPT | Performed by: OTOLARYNGOLOGY

## 2024-03-25 PROCEDURE — 31575 DIAGNOSTIC LARYNGOSCOPY: CPT | Performed by: OTOLARYNGOLOGY

## 2024-03-25 NOTE — PROGRESS NOTES
05:06 AM    BILITOT 1.0 2023 01:04 PM    BILITOT 0.4 10/30/2023 02:22 PM    ALKPHOS 81 2023 05:06 AM    ALKPHOS 110 2023 01:04 PM    ALKPHOS 104 10/30/2023 02:22 PM    AST 22 2023 05:06 AM    AST 38 2023 01:04 PM    AST 29 10/30/2023 02:22 PM    ALT 36 2023 05:06 AM    ALT 62 2023 01:04 PM    ALT 58 10/30/2023 02:22 PM       All of the past medical history, past surgical history, family history,social history, allergies and current medications were reviewed with the patient.     Assessment & Plan   Diagnoses and all orders for this visit:     Diagnosis Orders   1. Pharyngoesophageal dysphagia  FL MODIFIED BARIUM SWALLOW W VIDEO      2. Aspiration by  with respiratory symptoms, unspecified aspirated material        3. Achalasia of the cricopharyngeus muscle            The findings were explained and his questions were answered.  The patient is remarkably improved all the way around.  His vocal cords are capable of full flush closure in the midline.  He still has significant pooling of frothy secretions but they are not as bad as they were previously.  I still think it is time to retest him with a modified barium swallow study to see what he is capable of taking p.o. if anything.  Orders were placed for this several months ago but it never occurred.  I will place the orders before he leaves so that he can take a typed copy of the orders to bring back with him to the nursing home.      At the very least we will hopefully get him started on ice chips and sips of water at the nursing home which she should be able to have with little if any additional risk.  I cautioned him not to drink large quantities of water as that may cause him aspirate enough to get sick.  Otherwise occasional sips of water should not be hazard.  He reported understanding the basis of my recommendations.  I will see him back in approximately 3 months for an interval exam and to plan further care for

## 2024-04-10 ENCOUNTER — HOSPITAL ENCOUNTER (OUTPATIENT)
Dept: WOUND CARE | Age: 74
Discharge: HOME | End: 2024-04-10
Attending: INTERNAL MEDICINE
Payer: MEDICARE

## 2024-04-10 VITALS
OXYGEN SATURATION: 100 % | RESPIRATION RATE: 16 BRPM | DIASTOLIC BLOOD PRESSURE: 63 MMHG | TEMPERATURE: 97.5 F | SYSTOLIC BLOOD PRESSURE: 122 MMHG | HEART RATE: 85 BPM

## 2024-04-10 PROCEDURE — 17250 CHEM CAUT OF GRANLTJ TISSUE: CPT

## 2024-04-10 PROCEDURE — 6370000000 HC RX 637 (ALT 250 FOR IP): Performed by: INTERNAL MEDICINE

## 2024-04-10 RX ORDER — DOCUSATE SODIUM 100 MG/1
100 CAPSULE, LIQUID FILLED ORAL 2 TIMES DAILY
COMMUNITY

## 2024-04-10 RX ADMIN — SILVER NITRATE APPLICATORS 1 EACH: 25; 75 STICK TOPICAL at 08:53

## 2024-04-10 NOTE — PATIENT INSTRUCTIONS
Visit Discharge/Physician Orders:  - PLEASE UPDATE WOUND CARE ORDERS TO REFLECT CURRENT WOUND CARE BELOW.   - silver nitrate applied to the bilateral knees there will be gray drainage this is normal  - Elevate legs periodically throughout the day to decrease swelling.      Home Care: [facility]     Dressing orders:     1) Gather wound care supplies and arrange on clean table.     2) Wash your hands with soap and water or use alcohol based hand  for 20 seconds (sing \"Happy Birthday\" twice).    3) Cleanse wounds with normal saline or wound cleanser and gauze. Pat dry with clean gauze.    4) Left knee-  Apply triad paste to the wound and cover with silicone bordered foam. Change 3 times per week        Right knee- Apply triad paste to the wound and cover with silicone bordered foam. Change 3 times per week        Left chest- Apply saline moistened gauze to wound. Cover with dry gauze, ABD pad, secure with tape. Change daily.        Right lower leg-  Apply triad paste to the wound and cover with silicone bordered foam. Change 3 times per week Wrap the leg with an ace wrap starting at the base of the toes to just below the bend of the knee every day    Keep all dressings clean & dry.    Follow up visit:  4 weeks on Wednesday May 8th at 8:30 am     Keep next scheduled appointment. Please give 24 hour notice if unable to keep appointment. [phone]    If you experience any of the following, please call the Wound Care Service during business hours: Monday through Friday 8:00 am - 4:30 pm  ([phone]).   *Increase in pain   *Temperature over 101   *Increase in drainage from your wound or a foul odor   *Uncontrolled swelling   *Need for compression bandage changes due to slippage, breakthrough drainage    If you need medical attention outside of business hours, please contact your Primary Care Doctor or go to the nearest emergency room.

## 2024-04-10 NOTE — PROGRESS NOTES
stiffness/pain) 30 tablet 0    levETIRAcetam (KEPPRA) 1000 MG tablet take 1 tablet by mouth twice a day 180 tablet 3    Handicap Placard MISC by Does not apply route Expires 15 SEPT 2025 1 each 0    aspirin 81 MG chewable tablet Take 1 tablet by mouth daily       No current facility-administered medications on file prior to encounter.         REVIEW OF SYSTEMS:     Constitutional: no fever, no night sweats, no fatigue.  Head: no head ache , no head injury, no migranes.  Eye: no blurring of vision, no double vision.  Ears: no hearing difficulty, no tinnitus  Mouth/throat: no ulceration, dental caries , dysphagia  Lungs: no cough, no shortness of breath, no wheeze  CVS: no palpitation, no chest pain, no shortness of breath  GI: no abdominal pain, no nausea , no vomiting, no constipation  KEV: no dysuria, frequency and urgency, no hematuria, no kidney stones  Musculoskeletal: no joint pain, swelling , stiffness,  Endocrine: no polyuria, polydipsia, no cold or heat intolerance  Hematology: no anemia, no easy brusing or bleeding, no hx of clotting disorder  Dermatology: no skin rash, no eczema, no pruritis,  Psychiatry: no depression, no anxiety,no panic attacks, no suicide ideation        PHYSICAL EXAM      vitals were not taken for this visit.       Wt Readings from Last 3 Encounters:   02/03/24 93 kg (205 lb)   01/02/24 93 kg (205 lb)   12/08/23 97.1 kg (214 lb)          General Appearance  Awake, alert, oriented, chronically sick looking.    HEENT - normocephalic, atraumatic, pale conjunctiva,  anicteric sclera  Neck - Supple, no mass  He has full-thickness open wound on his left anterior chest the wound is clean no slough abdomen - soft, not distended, nontender, PEG tube in place  Neurologic -awake and oriented  Skin - No bruising or bleeding  Extremities -he has bilateral knee hypergranular wound the wounds are improving   Wound 11/07/23 Knee Left;Anterior (Active)   Wound Image   04/10/24 0846   Wound Etiology

## 2024-04-10 NOTE — PLAN OF CARE
Problem: Wound:  Goal: Will show signs of wound healing; wound closure and no evidence of infection  Description: Will show signs of wound healing; wound closure and no evidence of infection  Outcome: Progressing   Pt. Seen today for multiple wounds see AVS for new orders. Follow up in 3 weeks.  Care plan reviewed with patient.  Patient verbalize understanding of the plan of care and contribute to goal setting.

## 2024-04-11 ENCOUNTER — HOSPITAL ENCOUNTER (OUTPATIENT)
Dept: GENERAL RADIOLOGY | Age: 74
Discharge: HOME | End: 2024-04-11
Attending: OTOLARYNGOLOGY
Payer: MEDICARE

## 2024-04-11 DIAGNOSIS — R13.14 PHARYNGOESOPHAGEAL DYSPHAGIA: ICD-10-CM

## 2024-04-11 PROCEDURE — 92611 MOTION FLUOROSCOPY/SWALLOW: CPT

## 2024-04-11 PROCEDURE — 2500000003 HC RX 250 WO HCPCS: Performed by: OTOLARYNGOLOGY

## 2024-04-11 PROCEDURE — 74230 X-RAY XM SWLNG FUNCJ C+: CPT

## 2024-04-11 RX ADMIN — BARIUM SULFATE 20 ML: 400 SUSPENSION ORAL at 08:32

## 2024-04-11 RX ADMIN — BARIUM SULFATE 15 ML: 400 PASTE ORAL at 08:32

## 2024-04-11 RX ADMIN — BARIUM SULFATE 50 ML: 0.81 POWDER, FOR SUSPENSION ORAL at 08:32

## 2024-04-11 NOTE — DISCHARGE SUMMARY
Reviewed results and recommendations of this evaluation, Reviewed diet and strategies, Reviewed signs, symptoms and risks of aspiration, Reviewed ST goals and Plan of Care, Reviewed recommendations for follow-up, Education Related to Potential Risks and Complications Due to Impairment/Illness/Injury, and Reviewed diet recommendations, recommendations to continue ST at [facility] and follow up with Dr. Craven   Evaluation of Education: Verbalizes understanding, Demonstrates with assistance, Needs further instruction, No evidence of learning, and Family not present    PLAN:  Skilled SLP intervention is recommended; recommend follow up with F SLP     PATIENT GOAL:    \"Eat and drink\"             Jeanette Patel M.S. CCC-SLP [id number]

## 2024-04-23 ENCOUNTER — TELEPHONE (OUTPATIENT)
Dept: ENT CLINIC | Age: 74
End: 2024-04-23

## 2024-04-23 NOTE — TELEPHONE ENCOUNTER
Noe who is the director of [facility] called and said that the patient is refusing his feedings since he was stopped on the soft mechanical diet. She was stating that this was in part due to the MBS and silent aspirations that he has. Speech therapist is refusing to work with the patient because of the MBS. After last visit he is NPO Sometimes family comes nd brings food and they cannot stop it only do education. She wanted to make you aware and asked if there are any other recommendations to help work with the patient.

## 2024-04-30 ENCOUNTER — TELEPHONE (OUTPATIENT)
Dept: ENT CLINIC | Age: 74
End: 2024-04-30

## 2024-04-30 NOTE — TELEPHONE ENCOUNTER
Noe from [facility] called in stating that she just wanted to follow up with Dr Craven to let him know that the patient has been working with the speech therapist with eating ice chips but the patient has been going to get a pop on his own every so often. But she stated that th patient signed at diet denial form and they can't give him nothing that does not go along with his diet plan but they can't take nothing from the patient that he got on his own or from somebody else. Noe stated that the patient is doing better with the NPO restrictions not eating food and only having a pop every so often.

## 2024-05-08 ENCOUNTER — HOSPITAL ENCOUNTER (OUTPATIENT)
Dept: WOUND CARE | Age: 74
Discharge: HOME | End: 2024-05-08
Attending: INTERNAL MEDICINE
Payer: MEDICARE

## 2024-05-08 VITALS
OXYGEN SATURATION: 100 % | HEART RATE: 72 BPM | DIASTOLIC BLOOD PRESSURE: 58 MMHG | TEMPERATURE: 97.6 F | RESPIRATION RATE: 16 BRPM | SYSTOLIC BLOOD PRESSURE: 123 MMHG

## 2024-05-08 PROCEDURE — 99214 OFFICE O/P EST MOD 30 MIN: CPT

## 2024-05-08 NOTE — PLAN OF CARE
Problem: Wound:  Goal: Will show signs of wound healing; wound closure and no evidence of infection  Description: Will show signs of wound healing; wound closure and no evidence of infection  Outcome: Progressing   Patient seen in clinic today for chest, knee, and leg wounds. No s/s of infection. See AVS. Follow up in 2 months.   Care plan reviewed with patient.  Patient verbalize understanding of the plan of care and contribute to goal setting.

## 2024-05-08 NOTE — PROGRESS NOTES
[facility]          Progress Note and Procedure Note      Levi Loving  MEDICAL RECORD NUMBER:  [id number]  AGE: 74 y.o.   GENDER: male  : 1950  EPISODE DATE:  2024    Subjective:     SUBJECTIVE   Follow up for non healing wound    HISTORY OF PRESENT ILLNESS      Levi Loving is a 74 y.o. male who presents today for wound/ulcer evaluation.  He had multiple wounds developed after he was found on the floor.  He had a deep wound on his face left chest bilateral knee and right lower leg.  Today he was seen for follow-up visit.  He is at the nursing home. His wounds are slowly improving. His face wound has healed. He is slowly getting better.     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 S/p skin graft. Healed. From chronic venous insuficiency.     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 Singleton follows, no seizure activity since    Seizure disorder ([facility]) 2014    Venous insufficiency          PAST SURGICAL HISTORY     Past Surgical History:   Procedure Laterality Date    LARYNGOSCOPY N/A 2023    Laryngoscopy bilateral  Injection Augmentation of Prolaryn Plus, Esophagoscopy Rigid with Balloon Dilation, left facial wound debridement performed by Leander Craven MD at [facility] OR    AR I&D DEEP ABSC BURSA/HEMATOMA THIGH/KNEE REGION Right 2017    RIGHT SERGIO LEG WOUND DEBRIDEMENT WITH SKIN GRAFT AND WOUND VAC APPLICATION performed by Davi COY

## 2024-05-08 NOTE — PATIENT INSTRUCTIONS
Visit Discharge/Physician Orders:  - PLEASE UPDATE WOUND CARE ORDERS TO REFLECT CURRENT WOUND CARE BELOW.   - Elevate legs periodically throughout the day to decrease swelling.      Home Care: [facility]     Dressing orders:     1) Gather wound care supplies and arrange on clean table.     2) Wash your hands with soap and water or use alcohol based hand  for 20 seconds (sing \"Happy Birthday\" twice).    3) Cleanse wounds with normal saline or wound cleanser and gauze. Pat dry with clean gauze.    4) Left knee-  Apply triad paste to the wound and cover with silicone bordered foam. Change 3 times per week        Right knee- Apply triad paste to the wound and cover with silicone bordered foam. Change 3 times per week        Left chest- Apply saline moistened gauze to wound. Cover with dry gauze, ABD pad, secure with tape. Change daily.        Right lower leg-  Apply triad paste to the wound and cover with silicone bordered foam. Change 3 times per week Wrap the leg with an ace wrap starting at the base of the toes to just below the bend of the knee every day    Keep all dressings clean & dry.    Follow up visit:  2 months on Wednesday July 10th at 8:30 am     Keep next scheduled appointment. Please give 24 hour notice if unable to keep appointment. [phone]    If you experience any of the following, please call the Wound Care Service during business hours: Monday through Friday 8:00 am - 4:30 pm  ([phone]).   *Increase in pain   *Temperature over 101   *Increase in drainage from your wound or a foul odor   *Uncontrolled swelling   *Need for compression bandage changes due to slippage, breakthrough drainage    If you need medical attention outside of business hours, please contact your Primary Care Doctor or go to the nearest emergency room.

## 2024-05-13 ENCOUNTER — TELEPHONE (OUTPATIENT)
Dept: WOUND CARE | Age: 74
End: 2024-05-13

## 2024-05-13 NOTE — TELEPHONE ENCOUNTER
----- Message from Ysabel Bailey sent at 5/13/2024 10:05 AM EDT -----   [phone] OLAMIDE @ [facility] ASKS IF THERE WERE ANY ORDER CHANGES FROM LAST VISIT, THEY CANNOT FIND THE AVS

## 2024-05-13 NOTE — TELEPHONE ENCOUNTER
Faxed new orders to [facility] and called and spoke with Sharri and let her know, advised her to call back if they do not receive the fax with dressing orders. No further concerns.

## 2024-05-20 ENCOUNTER — TELEPHONE (OUTPATIENT)
Dept: ENT CLINIC | Age: 74
End: 2024-05-20

## 2024-05-20 NOTE — TELEPHONE ENCOUNTER
Mi from [facility] called the office today and said the nursing home was wondering if the speech therapist at their facility could conduct a repeat MBS? Patient is unable to eat anything due to nursing home policy since he failed his MBS and, would like to repeat it to potentially get back to eating normally. Please advise.  We are currently searching for a date and time for patient's follow up appointment due at the end of June with Dr Craven.    Call [phone] and ask for patient's nurse.

## 2024-05-21 NOTE — TELEPHONE ENCOUNTER
Called [facility] to speak with the patients nurse and was ask to call back because she was in the middle of a treatment. will try at a later time.

## 2024-05-22 ENCOUNTER — HOSPITAL ENCOUNTER (EMERGENCY)
Age: 74
Discharge: HOME | End: 2024-05-22
Attending: EMERGENCY MEDICINE
Payer: MEDICARE

## 2024-05-22 ENCOUNTER — APPOINTMENT (OUTPATIENT)
Dept: GENERAL RADIOLOGY | Age: 74
End: 2024-05-22
Payer: MEDICARE

## 2024-05-22 VITALS
OXYGEN SATURATION: 99 % | WEIGHT: 205 LBS | DIASTOLIC BLOOD PRESSURE: 60 MMHG | SYSTOLIC BLOOD PRESSURE: 116 MMHG | BODY MASS INDEX: 30.27 KG/M2 | HEART RATE: 65 BPM | TEMPERATURE: 97.4 F | RESPIRATION RATE: 18 BRPM

## 2024-05-22 DIAGNOSIS — K94.23 PEG TUBE MALFUNCTION (HCC): Primary | ICD-10-CM

## 2024-05-22 PROCEDURE — 99283 EMERGENCY DEPT VISIT LOW MDM: CPT

## 2024-05-22 PROCEDURE — 43762 RPLC GTUBE NO REVJ TRC: CPT

## 2024-05-22 PROCEDURE — 99284 EMERGENCY DEPT VISIT MOD MDM: CPT

## 2024-05-22 PROCEDURE — 49465 FLUORO EXAM OF G/COLON TUBE: CPT

## 2024-05-22 PROCEDURE — 6360000004 HC RX CONTRAST MEDICATION: Performed by: EMERGENCY MEDICINE

## 2024-05-22 PROCEDURE — 2500000003 HC RX 250 WO HCPCS: Performed by: EMERGENCY MEDICINE

## 2024-05-22 RX ORDER — LIDOCAINE HYDROCHLORIDE AND EPINEPHRINE 10; 10 MG/ML; UG/ML
20 INJECTION, SOLUTION INFILTRATION; PERINEURAL ONCE
Status: COMPLETED | OUTPATIENT
Start: 2024-05-22 | End: 2024-05-22

## 2024-05-22 RX ADMIN — LIDOCAINE HYDROCHLORIDE AND EPINEPHRINE 20 ML: 10; 10 INJECTION, SOLUTION INFILTRATION; PERINEURAL at 12:13

## 2024-05-22 RX ADMIN — DIATRIZOATE MEGLUMINE AND DIATRIZOATE SODIUM 30 ML: 600; 100 SOLUTION ORAL; RECTAL at 10:51

## 2024-05-22 ASSESSMENT — PAIN - FUNCTIONAL ASSESSMENT
PAIN_FUNCTIONAL_ASSESSMENT: NONE - DENIES PAIN
PAIN_FUNCTIONAL_ASSESSMENT: NONE - DENIES PAIN

## 2024-05-22 NOTE — ED PROVIDER NOTES
[facility] EMERGENCY DEPT      CHIEF COMPLAINT       Chief Complaint   Patient presents with    Feeding Tube Problem       Nurses Notes reviewed and I agree except as noted in the HPI.      HISTORY OF PRESENT ILLNESS    Levi Loving is a 74 y.o. male who presents with complaint of having accidentally pulled his PEG tube out, has had a PEG tube in place since November 2023.  He had a 14 French placed by Dr. Hill.  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 S/p skin graft. Healed. From chronic venous insuficiency., History of traumatic brain injury, Hyperlipidemia, Hypertension, Mentally challenged, Moderate protein-calorie malnutrition (HCC), Obesity (BMI 30-39.9), Osteoarthritis, 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 (HCC), Seizure disorder (HCC), and Venous insufficiency.    SURGICAL HISTORY      has a past surgical history that includes Tonsillectomy; vascular surgery; pr i&d deep absc bursa/hematoma thigh/knee region (Right, 9/25/2017); Skin graft (Right, 10/9/2017); Upper gastrointestinal endoscopy (N/A, 11/10/2023); and laryngoscopy (N/A, 12/8/2023).    CURRENT MEDICATIONS       Discharge Medication List as of 5/22/2024 11:17 AM        CONTINUE these medications which have NOT CHANGED    Details   docusate sodium (COLACE) 100 MG capsule Take 1 capsule by mouth 2 times dailyHistorical Med      magnesium hydroxide (MILK OF MAGNESIA) 400 MG/5ML suspension Take 15 mLs by mouth daily as needed for ConstipationHistorical Med      PROTEIN PO Take by mouth in the

## 2024-05-22 NOTE — ED NOTES
Patient to ED with peg tube problem. Patient states he was getting his daily shower and and noticed his peg tube was out 30 mins PTA. He denies pain. Patient is resting in bed with easy and unlabored respirations. Call light in reach. Side rails up x2. Patient denies further complaints or concerns. Will monitor.

## 2024-05-31 NOTE — TELEPHONE ENCOUNTER
I called [facility] again and left a msg to call the office when there was no answer. The ext. To his nurse is [phone].

## 2024-06-03 NOTE — TELEPHONE ENCOUNTER
I called and spoke with Lakhwinder at [facility] and he said he would let them know. He is an  at the home. He also took our fax number to fax resulsts of MBS.

## 2024-06-05 NOTE — TELEPHONE ENCOUNTER
Argelia from [facility] [phone] called wanting to know was going on with this patient.  I explained he is supposed to have a repeat MBS and the results are supposed to be faxed to office.  We need to schedule patient for a follow up with Dr Craven after the repeat MBS

## 2024-06-11 ENCOUNTER — TELEPHONE (OUTPATIENT)
Dept: WOUND CARE | Age: 74
End: 2024-06-11

## 2024-06-11 NOTE — TELEPHONE ENCOUNTER
Returned call to [facility]. Advised to have provider at facility evaluate and treat wound until patient able to be seen by Dr. Martinez. Follow up appointment moved up to Dr. Martinez's next available appt. Instructed for them to call with any questions/concerns.

## 2024-06-11 NOTE — TELEPHONE ENCOUNTER
----- Message from Ysabel Bailey sent at 6/11/2024  9:12 AM EDT -----   [phone] OLAMIDE @ [facility] ANTHONY HAS A NEW AREA THAT OPENED UP ON LEFT HEEL. ITS ABOUT 1X1 AND TUNNELING. ASKS FOR ANY SUGGESTIONS, FOR TREATMENT, SINCE HE IS A WOUND CARE PT. CAN SEND A PIC IF REQUESTED

## 2024-06-25 ENCOUNTER — OFFICE VISIT (OUTPATIENT)
Dept: ENT CLINIC | Age: 74
End: 2024-06-25
Payer: MEDICARE

## 2024-06-25 VITALS
HEART RATE: 47 BPM | RESPIRATION RATE: 18 BRPM | SYSTOLIC BLOOD PRESSURE: 136 MMHG | OXYGEN SATURATION: 100 % | TEMPERATURE: 97.5 F | DIASTOLIC BLOOD PRESSURE: 60 MMHG

## 2024-06-25 DIAGNOSIS — R49.0 DYSPHONIA: ICD-10-CM

## 2024-06-25 DIAGNOSIS — R13.14 PHARYNGOESOPHAGEAL DYSPHAGIA: Primary | ICD-10-CM

## 2024-06-25 DIAGNOSIS — R06.02 SHORTNESS OF BREATH: ICD-10-CM

## 2024-06-25 DIAGNOSIS — J38.01 VOCAL FOLD PARALYSIS, RIGHT: ICD-10-CM

## 2024-06-25 PROCEDURE — 99214 OFFICE O/P EST MOD 30 MIN: CPT | Performed by: OTOLARYNGOLOGY

## 2024-06-25 PROCEDURE — 3017F COLORECTAL CA SCREEN DOC REV: CPT | Performed by: OTOLARYNGOLOGY

## 2024-06-25 PROCEDURE — 1123F ACP DISCUSS/DSCN MKR DOCD: CPT | Performed by: OTOLARYNGOLOGY

## 2024-06-25 PROCEDURE — G8417 CALC BMI ABV UP PARAM F/U: HCPCS | Performed by: OTOLARYNGOLOGY

## 2024-06-25 PROCEDURE — 3078F DIAST BP <80 MM HG: CPT | Performed by: OTOLARYNGOLOGY

## 2024-06-25 PROCEDURE — 1036F TOBACCO NON-USER: CPT | Performed by: OTOLARYNGOLOGY

## 2024-06-25 PROCEDURE — G8427 DOCREV CUR MEDS BY ELIG CLIN: HCPCS | Performed by: OTOLARYNGOLOGY

## 2024-06-25 PROCEDURE — 3075F SYST BP GE 130 - 139MM HG: CPT | Performed by: OTOLARYNGOLOGY

## 2024-06-25 NOTE — PROGRESS NOTES
visit:     Diagnosis Orders   1. Pharyngoesophageal dysphagia      Resolved      2. Dysphonia      Resolved      3. Vocal fold paralysis, right        4. Shortness of breath      Resolved          The findings were explained and his questions were answered.  Based on patient's history and these physical findings as well as his recent modified barium swallow study, I believe the patient is able to be advanced over the 3-week interval from 1 week at a time starting with all liquids and puréed solids, then minced and moist solids as well as unrestricted liquids, and on the third week to mechanically soft otherwise unrestricted diet.  He is to have his temperature taken twice daily and to send him to urgent care for evaluation if the spikes of fever above 101.  I cautioned the patient about eating carelessly or not pay attention to what he eats since he has been so long largely NPO.  He agreed to do so.  I will see him back in approximately 3 months for an interval exam.  If he has had no problems in the meantime my plan is to remove his gastrostomy tube in the office.  He would be delighted to have that happen.    His true vocal fold augmentation injection site of worked extremely well with voice quality that is perfectly clear and that of a much younger man based on its strength and clarity.  I think this is had a significant role to play in the recovery of his continence along with his upper esophageal sphincter dilation to stone the esophagus and enable him to recover his coordinated swallowing control    Explained all this in detail the patient and his wife was with this the entire time.  They reported being delighted with the results of the visit and be willing proceed as such.    I spent over 30 minutes of face-to-face time with patient the majority of which was dedicated to reviewing his complex history structuring this follow-up care plan.  Detailed dietary instructions were sent with the patient on the

## 2024-06-26 ENCOUNTER — HOSPITAL ENCOUNTER (OUTPATIENT)
Dept: WOUND CARE | Age: 74
Discharge: HOME | End: 2024-06-26
Attending: INTERNAL MEDICINE
Payer: MEDICARE

## 2024-06-26 VITALS
OXYGEN SATURATION: 97 % | DIASTOLIC BLOOD PRESSURE: 62 MMHG | HEART RATE: 74 BPM | SYSTOLIC BLOOD PRESSURE: 111 MMHG | RESPIRATION RATE: 20 BRPM

## 2024-06-26 PROCEDURE — 11721 DEBRIDE NAIL 6 OR MORE: CPT

## 2024-06-26 PROCEDURE — 17250 CHEM CAUT OF GRANLTJ TISSUE: CPT

## 2024-06-26 PROCEDURE — 6370000000 HC RX 637 (ALT 250 FOR IP): Performed by: INTERNAL MEDICINE

## 2024-06-26 RX ADMIN — SILVER NITRATE APPLICATORS 1 EACH: 25; 75 STICK TOPICAL at 09:45

## 2024-06-26 NOTE — PROGRESS NOTES
[facility]          Progress Note and Procedure Note      Levi Loving  MEDICAL RECORD NUMBER:  [id number]  AGE: 74 y.o.   GENDER: male  : 1950  EPISODE DATE:  2024    Subjective:     SUBJECTIVE   Follow up for non healing wound    HISTORY OF PRESENT ILLNESS      Levi Loving is a 74 y.o. male who presents today for wound/ulcer evaluation.  He had multiple wounds developed after he was found on the floor.  He had a deep wound on his face left chest bilateral knee and right lower leg.  Today he was seen for follow-up visit.  He was sent to the wound clinic earlier because he has developed a deep tissue injury on his left hip related to pressure.  The rest of his wounds are slowly improving.  Patient appears to be weaker he was on a wheelchair he denies any other complaints.  He also reports that he had an injury on his right hallux there was bleeding around the nail.  An old blood was noted around the nail.     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 S/p skin graft. Healed. From chronic venous insuficiency.     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 Singleton follows, no seizure activity since    Seizure disorder ([facility]) 2014    Venous insufficiency          PAST SURGICAL HISTORY     Past Surgical History:   Procedure Laterality Date    LARYNGOSCOPY N/A 2023    Laryngoscopy bilateral  Injection Augmentation of Prolaryn Plus, Esophagoscopy Rigid

## 2024-06-26 NOTE — PATIENT INSTRUCTIONS
Visit Discharge/Physician Orders:  - [facility]- Please see updated wound care orders from today's visit.   - PLEASE UPDATE WOUND CARE ORDERS TO REFLECT CURRENT WOUND CARE BELOW.   - Prevalon boot given at today's appointment, very important to wear at all times WHEN SITTING. Do not wear for walking/transfers due to fall risk.   - Elevate legs periodically throughout the day to decrease swelling.      Home Care: [facility]     Dressing orders:     1) Gather wound care supplies and arrange on clean table.     2) Wash your hands with soap and water or use alcohol based hand  for 20 seconds (sing \"Happy Birthday\" twice).    3) Cleanse wounds with normal saline or wound cleanser and gauze. Pat dry with clean gauze.      4) Left heel-  Apply silicone bordered foam. Change 3 times per week        Right great toe- Paint with betadine, daily.          Left knee-  Cover with silicone bordered foam. Change 3 times per week        Right knee- Cover with silicone bordered foam. Change 3 times per week        Left chest- Apply dry gauze, ABD pad, secure with tape. Change daily.        Right lower leg-  Apply triad paste to the wound and cover with silicone bordered foam. Change 3 times per week. Wrap the leg with an ace wrap starting at the base of the toes to just below the bend of the knee every day    Keep all dressings clean & dry.    Follow up visit:  2 months August 21st @ 9:15 am     Keep next scheduled appointment. Please give 24 hour notice if unable to keep appointment. [phone]    If you experience any of the following, please call the Wound Care Service during business hours: Monday through Friday 8:00 am - 4:30 pm  ([phone]).   *Increase in pain   *Temperature over 101   *Increase in drainage from your wound or a foul odor   *Uncontrolled swelling   *Need for compression bandage changes due to slippage, breakthrough drainage    If you need medical attention outside of business hours, please

## 2024-06-26 NOTE — PLAN OF CARE
Problem: Wound:  Goal: Will show signs of wound healing; wound closure and no evidence of infection  Description: Will show signs of wound healing; wound closure and no evidence of infection  Outcome: Progressing     Patient seen at the wound clinic today for multiple wounds, please see AVS. Care plan reviewed with patient.  Patient verbalizes understanding of the plan of care and contribute to goal setting.

## 2024-06-26 NOTE — PROGRESS NOTES
[facility] Ost[facility]  [address]  [address]   [place], [place] [address]  Telephone: [phone]     FAX [phone]    [facility]     Patient Instructions   Visit Discharge/Physician Orders:  - [facility]- Please see updated wound care orders from today's visit.   - PLEASE UPDATE WOUND CARE ORDERS TO REFLECT CURRENT WOUND CARE BELOW.   - Prevalon boot given at today's appointment, very important to wear at all times WHEN SITTING. Do not wear for walking/transfers due to fall risk.   - Elevate legs periodically throughout the day to decrease swelling.      Home Care: [facility]     Dressing orders:     1) Gather wound care supplies and arrange on clean table.     2) Wash your hands with soap and water or use alcohol based hand  for 20 seconds (sing \"Happy Birthday\" twice).    3) Cleanse wounds with normal saline or wound cleanser and gauze. Pat dry with clean gauze.      4) Left heel-  Apply silicone bordered foam. Change 3 times per week        Right great toe- Paint with betadine, daily.          Left knee-  Cover with silicone bordered foam. Change 3 times per week        Right knee- Cover with silicone bordered foam. Change 3 times per week        Left chest- Apply dry gauze, ABD pad, secure with tape. Change daily.        Right lower leg-  Apply triad paste to the wound and cover with silicone bordered foam. Change 3 times per week. Wrap the leg with an ace wrap starting at the base of the toes to just below the bend of the knee every day    Keep all dressings clean & dry.    Follow up visit:  2 months August 21st @ 9:15 am     Keep next scheduled appointment. Please give 24 hour notice if unable to keep appointment. [phone]    If you experience any of the following, please call the Wound Care Service during business hours: Monday through Friday 8:00 am - 4:30 pm  ([phone]).   *Increase in pain   *Temperature over 101   *Increase in drainage from your wound or a

## 2024-07-10 ENCOUNTER — HOSPITAL ENCOUNTER (OUTPATIENT)
Dept: WOUND CARE | Age: 74
Discharge: HOME | End: 2024-07-10
Attending: INTERNAL MEDICINE
Payer: MEDICARE

## 2024-07-10 VITALS
SYSTOLIC BLOOD PRESSURE: 126 MMHG | HEART RATE: 56 BPM | RESPIRATION RATE: 18 BRPM | OXYGEN SATURATION: 100 % | DIASTOLIC BLOOD PRESSURE: 61 MMHG | TEMPERATURE: 97.2 F

## 2024-07-10 PROCEDURE — 99214 OFFICE O/P EST MOD 30 MIN: CPT

## 2024-07-10 NOTE — PROGRESS NOTES
[facility]  [address]   [place], [place] [address]  Telephone: [phone]     FAX [phone]        [facility]: [place] [facility] Phone # [phone], Fax # [phone]    Patient Instructions   Visit Discharge/Physician Orders:  - [facility]- Please see updated wound care orders from today's visit.   - PLEASE UPDATE WOUND CARE ORDERS TO REFLECT CURRENT WOUND CARE BELOW.   - Prevalon boot given at today's appointment, very important to wear at all times WHEN SITTING. Do not wear for walking/transfers due to fall risk.   - Elevate legs periodically throughout the day to decrease swelling.      Home Care: [facility]     Dressing orders:     1) Gather wound care supplies and arrange on clean table.     2) Wash your hands with soap and water or use alcohol based hand  for 20 seconds (sing \"Happy Birthday\" twice).    3) Cleanse wounds with normal saline or wound cleanser and gauze. Pat dry with clean gauze.      4) Left heel-  Apply silicone bordered foam. Change 3 times per week        Right great toe- Paint with betadine, daily.          Left knee-  Ok to leave open to air. Monitor area for re-opening of wound or increased drainage.         Right knee- Cover with silicone bordered foam. Change 3 times per week        Left chest- Apply silicone bordered foam to wound.  Change 3 times a week .        Right lower leg-  Apply triad paste to the wound and cover with silicone bordered foam. Change 3 times per week. Wrap the leg with an ace wrap starting at the base of the toes to just below the bend of the knee every day    Keep all dressings clean & dry.    Follow up visit:  6 weeks  August 21st @ 9:15 am     Keep next scheduled appointment. Please give 24 hour notice if unable to keep appointment. [phone]    If you experience any of the following, please call the Wound Care Service during business hours: Monday through Friday 8:00 am - 4:30 pm

## 2024-07-10 NOTE — PLAN OF CARE
Problem: Wound:  Goal: Will show signs of wound healing; wound closure and no evidence of infection  Description: Will show signs of wound healing; wound closure and no evidence of infection  Outcome: Progressing  Note: Patient seen for right leg, left leg, left chest wound. Wound shows signs of proper closure and healing. No s/s of infection noted. Follow up in 6 weeks. See AVS for order changes.     Care plan reviewed with patient.  Patient verbalize understanding of the plan of care and contribute to goal setting.

## 2024-07-10 NOTE — PROGRESS NOTES
[facility]          Progress Note and Procedure Note      Levi Loving  MEDICAL RECORD NUMBER:  [id number]  AGE: 74 y.o.   GENDER: male  : 1950  EPISODE DATE:  7/10/2024    Subjective:     SUBJECTIVE   Follow up for non healing wound    HISTORY OF PRESENT ILLNESS      Levi Loving is a 74 y.o. male who presents today for wound/ulcer evaluation.  He had multiple wounds developed after he was found on the floor.  He had a deep wound on his face left chest bilateral knee and right lower leg.  Today he was seen for follow-up visit.  Since his last visit he looks better.  He is left knee wound has scabbed over and the chest right knee right lateral leg wound are stable.  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 S/p skin graft. Healed. From chronic venous insuficiency.     History of traumatic brain injury     Hyperlipidemia     Hypertension     Mentally challenged     \"slow\" states his sister    Moderate protein-calorie malnutrition (HCC)     Obesity (BMI 30-39.9)     Osteoarthritis     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 ([facility]) 2014    s/p fall down a hill, Dr. Singleton follows, no seizure activity since    Seizure disorder ([facility]) 2014    Venous insufficiency          PAST SURGICAL HISTORY     Past Surgical History:   Procedure Laterality Date    LARYNGOSCOPY N/A 2023    Laryngoscopy bilateral  Injection Augmentation of Prolaryn Plus, Esophagoscopy Rigid with Balloon Dilation, left facial wound debridement performed by Leander Craven MD at [facility] OR    OR I&D DEEP ABSC BURSA/HEMATOMA THIGH/KNEE REGION Right 2017    RIGHT SERGIO LEG WOUND DEBRIDEMENT WITH

## 2024-07-10 NOTE — PATIENT INSTRUCTIONS
Visit Discharge/Physician Orders:  - [facility]- Please see updated wound care orders from today's visit.   - PLEASE UPDATE WOUND CARE ORDERS TO REFLECT CURRENT WOUND CARE BELOW.   - Prevalon boot given at today's appointment, very important to wear at all times WHEN SITTING. Do not wear for walking/transfers due to fall risk.   - Elevate legs periodically throughout the day to decrease swelling.      Home Care: [facility]     Dressing orders:     1) Gather wound care supplies and arrange on clean table.     2) Wash your hands with soap and water or use alcohol based hand  for 20 seconds (sing \"Happy Birthday\" twice).    3) Cleanse wounds with normal saline or wound cleanser and gauze. Pat dry with clean gauze.      4) Left heel-  Apply silicone bordered foam. Change 3 times per week        Right great toe- Paint with betadine, daily.          Left knee-  Ok to leave open to air. Monitor area for re-opening of wound or increased drainage.         Right knee- Cover with silicone bordered foam. Change 3 times per week        Left chest- Apply silicone bordered foam to wound.  Change 3 times a week .        Right lower leg-  Apply triad paste to the wound and cover with silicone bordered foam. Change 3 times per week. Wrap the leg with an ace wrap starting at the base of the toes to just below the bend of the knee every day    Keep all dressings clean & dry.    Follow up visit:  6 weeks  August 21st @ 9:15 am     Keep next scheduled appointment. Please give 24 hour notice if unable to keep appointment. [phone]    If you experience any of the following, please call the Wound Care Service during business hours: Monday through Friday 8:00 am - 4:30 pm  ([phone]).   *Increase in pain   *Temperature over 101   *Increase in drainage from your wound or a foul odor   *Uncontrolled swelling   *Need for compression bandage changes due to slippage, breakthrough drainage    If you need medical attention

## 2024-08-21 ENCOUNTER — HOSPITAL ENCOUNTER (OUTPATIENT)
Dept: WOUND CARE | Age: 74
Discharge: HOME | End: 2024-08-21
Attending: INTERNAL MEDICINE
Payer: MEDICARE

## 2024-08-21 VITALS
RESPIRATION RATE: 18 BRPM | OXYGEN SATURATION: 100 % | DIASTOLIC BLOOD PRESSURE: 69 MMHG | SYSTOLIC BLOOD PRESSURE: 144 MMHG | TEMPERATURE: 97.6 F | HEART RATE: 74 BPM

## 2024-08-21 PROCEDURE — 99214 OFFICE O/P EST MOD 30 MIN: CPT

## 2024-08-21 NOTE — PATIENT INSTRUCTIONS
Visit Discharge/Physician Orders:  - [facility]- Please see updated wound care orders from today's visit.   - PLEASE UPDATE WOUND CARE ORDERS TO REFLECT CURRENT WOUND CARE BELOW.   - Prevalon boot given at today's appointment, very important to wear at all times WHEN SITTING. Do not wear for walking/transfers due to fall risk.   - Elevate legs periodically throughout the day to decrease swelling.  -right leg needs to be wrapped from base of toes to the bend of the knee to help with swelling     Home Care: [facility]     Dressing orders:     1) Gather wound care supplies and arrange on clean table.     2) Wash your hands with soap and water or use alcohol based hand  for 20 seconds (sing \"Happy Birthday\" twice).    3) Cleanse wounds with normal saline or wound cleanser and gauze. Pat dry with clean gauze.      4) Left heel-  Apply silicone bordered foam. Change 3 times per week        Right great toe- Paint with betadine, daily.          Left knee-  healed        Right knee- Cover with silicone bordered foam. Change 3 times per week        Left chest- healed        Right lower leg-  Apply alginate to the wound and cover with abd pad. Change 3 times per week. Wrap the leg with a roll guaze and ace wrap starting at the base of the toes to just below the bend of the knee.    Keep all dressings clean & dry.    Follow up visit:  4 weeks  Wednesday September 18 at 9:30 am     Keep next scheduled appointment. Please give 24 hour notice if unable to keep appointment. [phone]    If you experience any of the following, please call the Wound Care Service during business hours: Monday through Friday 8:00 am - 4:30 pm  ([phone]).   *Increase in pain   *Temperature over 101   *Increase in drainage from your wound or a foul odor   *Uncontrolled swelling   *Need for compression bandage changes due to slippage, breakthrough drainage    If you need medical attention outside of business hours, please contact your

## 2024-08-21 NOTE — PROGRESS NOTES
[facility]          Progress Note and Procedure Note      Levi Loving  MEDICAL RECORD NUMBER:  [id number]  AGE: 74 y.o.   GENDER: male  : 1950  EPISODE DATE:  2024    Subjective:     SUBJECTIVE   Follow up for non healing multiple wounds    HISTORY OF PRESENT ILLNESS      Levi Loving is a 74 y.o. male who presents today for wound/ulcer evaluation.  He had multiple wounds developed after he was found on the floor.  He had a deep wound on his face, left chest  wall bilateral knee and right lower leg.  Today he was seen for follow-up visit.      His wounds are slowly healing. The face wound left chest wall wound and left knee has completely healed, he has open wound on his right knee and right lateral leg.  The legs are swollen.  He has scabbed wound on his left heel as well.  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 S/p skin graft. Healed. From chronic venous insuficiency.     History of traumatic brain injury     Hyperlipidemia     Hypertension     Mentally challenged     \"slow\" states his sister    Moderate protein-calorie malnutrition (HCC)     Obesity (BMI 30-39.9)     Osteoarthritis     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 ([facility]) 2014    s/p fall down a hill, Dr. Singleton follows, no seizure activity since    Seizure disorder ([facility]) 2014    Venous insufficiency          PAST SURGICAL HISTORY     Past Surgical History:   Procedure Laterality Date    LARYNGOSCOPY N/A 2023    Laryngoscopy bilateral  Injection Augmentation of Prolaryn Plus, Esophagoscopy Rigid with Balloon Dilation, left facial wound debridement performed by Leander Craven MD

## 2024-08-21 NOTE — PLAN OF CARE
Problem: Wound:  Goal: Will show signs of wound healing; wound closure and no evidence of infection  Description: Will show signs of wound healing; wound closure and no evidence of infection  Outcome: Progressing   Seen today for multiple wounds. See AVS for discahrge   Care plan reviewed with patient.  Patient verbalize understanding of the plan of care and contribute to goal setting.

## 2024-09-18 ENCOUNTER — TELEPHONE (OUTPATIENT)
Dept: WOUND CARE | Age: 74
End: 2024-09-18

## 2024-09-23 ENCOUNTER — OFFICE VISIT (OUTPATIENT)
Dept: ENT CLINIC | Age: 74
End: 2024-09-23
Payer: MEDICARE

## 2024-09-23 VITALS
TEMPERATURE: 97.2 F | RESPIRATION RATE: 18 BRPM | SYSTOLIC BLOOD PRESSURE: 130 MMHG | HEART RATE: 71 BPM | DIASTOLIC BLOOD PRESSURE: 60 MMHG | OXYGEN SATURATION: 97 %

## 2024-09-23 DIAGNOSIS — J38.01 VOCAL FOLD PARALYSIS, RIGHT: Primary | ICD-10-CM

## 2024-09-23 DIAGNOSIS — R13.14 PHARYNGOESOPHAGEAL DYSPHAGIA: ICD-10-CM

## 2024-09-23 DIAGNOSIS — R49.0 DYSPHONIA: ICD-10-CM

## 2024-09-23 PROCEDURE — G8427 DOCREV CUR MEDS BY ELIG CLIN: HCPCS | Performed by: OTOLARYNGOLOGY

## 2024-09-23 PROCEDURE — G8417 CALC BMI ABV UP PARAM F/U: HCPCS | Performed by: OTOLARYNGOLOGY

## 2024-09-23 PROCEDURE — 3017F COLORECTAL CA SCREEN DOC REV: CPT | Performed by: OTOLARYNGOLOGY

## 2024-09-23 PROCEDURE — 1036F TOBACCO NON-USER: CPT | Performed by: OTOLARYNGOLOGY

## 2024-09-23 PROCEDURE — 3078F DIAST BP <80 MM HG: CPT | Performed by: OTOLARYNGOLOGY

## 2024-09-23 PROCEDURE — 99214 OFFICE O/P EST MOD 30 MIN: CPT | Performed by: OTOLARYNGOLOGY

## 2024-09-23 PROCEDURE — 1123F ACP DISCUSS/DSCN MKR DOCD: CPT | Performed by: OTOLARYNGOLOGY

## 2024-09-23 PROCEDURE — 3075F SYST BP GE 130 - 139MM HG: CPT | Performed by: OTOLARYNGOLOGY

## 2024-09-25 ENCOUNTER — TELEPHONE (OUTPATIENT)
Dept: WOUND CARE | Age: 74
End: 2024-09-25

## 2024-09-25 DIAGNOSIS — I87.2 VENOUS INSUFFICIENCY OF RIGHT LEG: Primary | ICD-10-CM

## 2024-11-26 ENCOUNTER — TELEPHONE (OUTPATIENT)
Dept: ENT CLINIC | Age: 74
End: 2024-11-26

## 2024-11-26 NOTE — TELEPHONE ENCOUNTER
Patient's POA, Amara, called in asking if Dr Craven could write an order to the nursing home for patient to be able to eat an unrestricted diet. Patient would like to eat regular food since he had the gastrostomy tube removed in our office at last visit in September. Informed Amara that in his last office note Dr Craven stated patient could have an unrestricted diet. Told her I would write up a note for them and get it faxed to the nursing home by the end of the day.  Patient's POA verbalized understanding and thanked me.

## 2024-11-26 NOTE — TELEPHONE ENCOUNTER
Spoke to Dr Craven. He stated he agrees with the patient to have an unrestricted diet and will sign a letter stating that.     I will call [facility] to get a fax number.     [facility] fax number is: [phone]

## 2025-05-12 ENCOUNTER — TELEPHONE (OUTPATIENT)
Dept: OTHER | Facility: CLINIC | Age: 75
End: 2025-05-12

## (undated) DEVICE — GLOVE ORANGE PI 8   MSG9080

## (undated) DEVICE — PACK PROCEDURE SURG ORTH BASIC SRHP LF

## (undated) DEVICE — INTENDED FOR TISSUE SEPARATION, AND OTHER PROCEDURES THAT REQUIRE A SHARP SURGICAL BLADE TO PUNCTURE OR CUT.: Brand: BARD-PARKER ® CARBON RIB-BACK BLADES

## (undated) DEVICE — 3M™ STERI-STRIP™ COMPOUND BENZOIN TINCTURE 40 BAGS/CARTON 4 CARTONS/CASE C1544: Brand: 3M™ STERI-STRIP™

## (undated) DEVICE — UNIVERSAL MONOPOLAR LAPAROSCOPIC CABLE 10FT, 4MM PIN CONNECTOR: Brand: CONMED

## (undated) DEVICE — SUTURE PROL SZ 2-0 L18IN NONABSORBABLE BLU FS L26MM 3/8 CIR 8685H

## (undated) DEVICE — Device

## (undated) DEVICE — 2000CC GUARDIAN II: Brand: GUARDIAN

## (undated) DEVICE — SOLUTION IV IRRIG POUR BRL 0.9% SODIUM CHL 2F7124

## (undated) DEVICE — DRAPE,U/SHT,SPLIT,FILM,60X84,STERILE: Brand: MEDLINE

## (undated) DEVICE — PACK-MAJOR

## (undated) DEVICE — TRAY PREP DRY W/ PREM GLV 2 APPL 6 SPNG 2 UNDPD 1 OVERWRAP

## (undated) DEVICE — BANDAGE COMPR W4INXL12FT ESMARCH

## (undated) DEVICE — VERSAJET II HYDROSURGERY SYSTEM HANDSET, 45DEG 14MM EXACT: Brand: VERSAJET

## (undated) DEVICE — COVER ARMBRD W13XL28.5IN IMPERV BLU FOR OP RM

## (undated) DEVICE — ROYAL SILK SURGICAL GOWN, XXL: Brand: CONVERTORS

## (undated) DEVICE — 3M™ TEGADERM™ TRANSPARENT FILM DRESSING FRAME STYLE, 1629, 8 IN X 12 IN (20 CM X 30 CM), 10/CT 8CT/CASE: Brand: 3M™ TEGADERM™

## (undated) DEVICE — DRESSING,GAUZE,XEROFORM,CURAD,5"X9",ST: Brand: CURAD

## (undated) DEVICE — SOLUTION IRRIG 1000ML 0.9% SOD CHL USP POUR PLAS BTL

## (undated) DEVICE — SYRINGE MED 10ML TRNSLUC BRL PLUNG BLK MRK POLYPR CTRL

## (undated) DEVICE — GAUZE,SPONGE,8"X4",12PLY,XRAY,STRL,LF: Brand: MEDLINE

## (undated) DEVICE — SYRINGE MED 10ML LUERLOCK TIP W/O SFTY DISP

## (undated) DEVICE — BASIC SINGLE BASIN 1-LF: Brand: MEDLINE INDUSTRIES, INC.

## (undated) DEVICE — 3M™ COBAN™ NL STERILE NON-LATEX SELF-ADHERENT WRAP, 2084S, 4 IN X 5 YD (10 CM X 4,5 M), 18 ROLLS/CASE: Brand: 3M™ COBAN™

## (undated) DEVICE — CONVERTED USE 338908 SPONGES LAP 18X18 ST

## (undated) DEVICE — STRIP,CLOSURE,WOUND,MEDI-STRIP,1/2X4: Brand: MEDLINE

## (undated) DEVICE — SOLUTION IRRIG 1000ML STRL H2O USP PLAS POUR BTL

## (undated) DEVICE — LARYNGOSCOPY - BRONCHOSCOPY: Brand: MEDLINE INDUSTRIES, INC.

## (undated) DEVICE — DERMATOME BLADES: Brand: DERMATOME

## (undated) DEVICE — TUBING, SUCTION, 1/4" X 20', STRAIGHT: Brand: MEDLINE INDUSTRIES, INC.

## (undated) DEVICE — CONTAINER,SPECIMEN,PNEU TUBE,4OZ,OR STRL: Brand: MEDLINE

## (undated) DEVICE — SUTURE MCRYL SZ 4 0 L18IN ABSRB UD P 3 3 8 CIR REV CUT NDL MCP494G

## (undated) DEVICE — GLOVE ORANGE PI 7   MSG9070

## (undated) DEVICE — BANDAGE GZ W45INXL4 1 10YD FLUF RL 6 PLY DERMACEA

## (undated) DEVICE — DUP USE 304928 DRESSING GZ 12 PLY 4X4 STRL

## (undated) DEVICE — STERILE TETRA-FLEX CF LF, 6IN X 11 YD: Brand: TETRA-FLEX™ CF

## (undated) DEVICE — GOWN,SIRUS,NONRNF,SETINSLV,XL,20/CS: Brand: MEDLINE

## (undated) DEVICE — GLOVE SURG SZ 65 THK91MIL LTX FREE SYN POLYISOPRENE

## (undated) DEVICE — GLOVE SURG SZ 7.5 L11.73IN FNGR THK9.8MIL STRW LTX POLYMER

## (undated) DEVICE — GOWN,SIRUS,NON REINFRCD,LARGE,SET IN SL: Brand: MEDLINE

## (undated) DEVICE — GLOVE ORANGE PI 7 1/2   MSG9075

## (undated) DEVICE — HERCULES 3 STAGE BALLOON ESOPHAGEAL: Brand: HERCULES

## (undated) DEVICE — GLOVE ORANGE PI 8 1/2   MSG9085

## (undated) DEVICE — DILATION SYRINGE: Brand: COOK

## (undated) DEVICE — ZIMMER SKIN GRAFT CARRIER 8 INCH LENGTH: Brand: DERMACARRIERS

## (undated) DEVICE — SUTURE MCRYL + SZ 3-0 L27IN ABSRB UD L26MM SH 1/2 CIR MCP416H

## (undated) DEVICE — SPONGE GZ W4XL4IN COT 12 PLY TYP VII WVN C FLD DSGN